# Patient Record
Sex: FEMALE | HISPANIC OR LATINO | Employment: UNEMPLOYED | URBAN - METROPOLITAN AREA
[De-identification: names, ages, dates, MRNs, and addresses within clinical notes are randomized per-mention and may not be internally consistent; named-entity substitution may affect disease eponyms.]

---

## 2017-10-09 ENCOUNTER — HOSPITAL ENCOUNTER (EMERGENCY)
Facility: HOSPITAL | Age: 57
Discharge: HOME | End: 2017-10-10
Attending: EMERGENCY MEDICINE | Admitting: EMERGENCY MEDICINE
Payer: COMMERCIAL

## 2017-10-09 ENCOUNTER — APPOINTMENT (EMERGENCY)
Dept: CT IMAGING | Facility: HOSPITAL | Age: 57
End: 2017-10-09
Payer: COMMERCIAL

## 2017-10-09 VITALS
HEART RATE: 100 BPM | WEIGHT: 169.8 LBS | TEMPERATURE: 97.7 F | RESPIRATION RATE: 16 BRPM | DIASTOLIC BLOOD PRESSURE: 67 MMHG | SYSTOLIC BLOOD PRESSURE: 142 MMHG | OXYGEN SATURATION: 98 %

## 2017-10-09 DIAGNOSIS — L03.317 CELLULITIS, GLUTEAL, LEFT: Primary | ICD-10-CM

## 2017-10-09 LAB
ANION GAP SERPL CALCULATED.3IONS-SCNC: 4 MMOL/L (ref 4–13)
BASOPHILS # BLD AUTO: 0.02 THOUSANDS/ΜL (ref 0–0.1)
BASOPHILS NFR BLD AUTO: 0 % (ref 0–1)
BUN SERPL-MCNC: 12 MG/DL (ref 5–25)
CALCIUM SERPL-MCNC: 9 MG/DL (ref 8.3–10.1)
CHLORIDE SERPL-SCNC: 105 MMOL/L (ref 100–108)
CO2 SERPL-SCNC: 33 MMOL/L (ref 21–32)
CREAT SERPL-MCNC: 0.86 MG/DL (ref 0.6–1.3)
EOSINOPHIL # BLD AUTO: 0.08 THOUSAND/ΜL (ref 0–0.61)
EOSINOPHIL NFR BLD AUTO: 1 % (ref 0–6)
ERYTHROCYTE [DISTWIDTH] IN BLOOD BY AUTOMATED COUNT: 14.2 % (ref 11.6–15.1)
GFR SERPL CREATININE-BSD FRML MDRD: 76 ML/MIN/1.73SQ M
GLUCOSE SERPL-MCNC: 170 MG/DL (ref 65–140)
HCT VFR BLD AUTO: 40.2 % (ref 34.8–46.1)
HGB BLD-MCNC: 13.2 G/DL (ref 11.5–15.4)
LYMPHOCYTES # BLD AUTO: 1.63 THOUSANDS/ΜL (ref 0.6–4.47)
LYMPHOCYTES NFR BLD AUTO: 28 % (ref 14–44)
MCH RBC QN AUTO: 32.6 PG (ref 26.8–34.3)
MCHC RBC AUTO-ENTMCNC: 32.8 G/DL (ref 31.4–37.4)
MCV RBC AUTO: 99 FL (ref 82–98)
MONOCYTES # BLD AUTO: 0.55 THOUSAND/ΜL (ref 0.17–1.22)
MONOCYTES NFR BLD AUTO: 9 % (ref 4–12)
NEUTROPHILS # BLD AUTO: 3.65 THOUSANDS/ΜL (ref 1.85–7.62)
NEUTS SEG NFR BLD AUTO: 62 % (ref 43–75)
NRBC BLD AUTO-RTO: 0 /100 WBCS
PLATELET # BLD AUTO: 234 THOUSANDS/UL (ref 149–390)
PMV BLD AUTO: 10 FL (ref 8.9–12.7)
POTASSIUM SERPL-SCNC: 3.5 MMOL/L (ref 3.5–5.3)
RBC # BLD AUTO: 4.05 MILLION/UL (ref 3.81–5.12)
SODIUM SERPL-SCNC: 142 MMOL/L (ref 136–145)
WBC # BLD AUTO: 5.93 THOUSAND/UL (ref 4.31–10.16)

## 2017-10-09 PROCEDURE — 80048 BASIC METABOLIC PNL TOTAL CA: CPT | Performed by: EMERGENCY MEDICINE

## 2017-10-09 PROCEDURE — 85025 COMPLETE CBC W/AUTO DIFF WBC: CPT | Performed by: EMERGENCY MEDICINE

## 2017-10-09 PROCEDURE — 36415 COLL VENOUS BLD VENIPUNCTURE: CPT | Performed by: EMERGENCY MEDICINE

## 2017-10-09 PROCEDURE — 72193 CT PELVIS W/DYE: CPT

## 2017-10-09 RX ORDER — SULFAMETHOXAZOLE AND TRIMETHOPRIM 800; 160 MG/1; MG/1
1 TABLET ORAL ONCE
Status: COMPLETED | OUTPATIENT
Start: 2017-10-10 | End: 2017-10-10

## 2017-10-09 RX ORDER — SULFAMETHOXAZOLE AND TRIMETHOPRIM 800; 160 MG/1; MG/1
1 TABLET ORAL EVERY 12 HOURS SCHEDULED
Qty: 14 TABLET | Refills: 0 | Status: SHIPPED | OUTPATIENT
Start: 2017-10-09 | End: 2017-10-16

## 2017-10-09 RX ADMIN — IOHEXOL 100 ML: 350 INJECTION, SOLUTION INTRAVENOUS at 22:34

## 2017-10-10 PROCEDURE — 99284 EMERGENCY DEPT VISIT MOD MDM: CPT

## 2017-10-10 RX ADMIN — SULFAMETHOXAZOLE AND TRIMETHOPRIM 1 TABLET: 800; 160 TABLET ORAL at 00:07

## 2017-10-10 NOTE — ED PROVIDER NOTES
History  Chief Complaint   Patient presents with    Perineal Abscess     pt reports abscess to this area, states noted pus and blood this morning, hx of same in [place] and had drain placed  denies fevers  pt does not want mother to know that she is HIV positive and would not like this mentioned in front of her in treatment area     71-year-old female presents for evaluation of a perianal abscess which began spontaneously draining today  She has had discomfort in the region on and off for some time  She has had 2 of them in that region  She had 1 drained in [place] in the recent past and that 1 resolved  The patient denies any associated fevers, chills  She has pain with bowel movements and wiping  Nothing she has done has made it better  She notes that she has blood and pus like drainage from the wound        History provided by:  Patient  Perineal Abscess   Location:  Pelvis  Pelvic abscess location:  L buttock  Abscess quality: draining    Red streaking: no    Progression:  Worsening  Chronicity:  Recurrent  Context: immunosuppression    Relieved by:  Nothing  Worsened by:  Nothing  Ineffective treatments:  None tried  Associated symptoms: no fatigue, no fever, no headaches, no nausea and no vomiting        None       Past Medical History:   Diagnosis Date    Arthritis     [facility]     Fibromyalgia     HIV (human immunodeficiency virus infection) ([facility] )     Hyperlipidemia        History reviewed  No pertinent surgical history  History reviewed  No pertinent family history  I have reviewed and agree with the history as documented  Social History   Substance Use Topics    Smoking status: Current Every Day Smoker     Types: Cigarettes    Smokeless tobacco: Never Used    Alcohol use No        Review of Systems   Constitutional: Negative for chills, fatigue and fever  HENT: Negative for sore throat  Eyes: Negative for visual disturbance     Respiratory: Negative for shortness of breath  Cardiovascular: Negative for chest pain  Gastrointestinal: Positive for rectal pain  Negative for abdominal pain, diarrhea, nausea and vomiting  Genitourinary: Negative for difficulty urinating, dysuria and pelvic pain  Musculoskeletal: Negative for back pain  Skin: Positive for wound (left buttock)  Negative for rash  Neurological: Negative for syncope, weakness and headaches  All other systems reviewed and are negative  Physical Exam  ED Triage Vitals [10/09/17 1735]   Temperature Pulse Respirations Blood Pressure SpO2   97 7 °F (36 5 °C) 100 16 142/67 98 %      Temp Source Heart Rate Source Patient Position - Orthostatic VS BP Location FiO2 (%)   Temporal -- -- -- --      Pain Score       7           Physical Exam   Constitutional: She is oriented to person, place, and time  She appears well-developed and well-nourished  No distress  HENT:   Head: Normocephalic and atraumatic  Right Ear: External ear normal    Left Ear: External ear normal    Eyes: Conjunctivae and EOM are normal  Pupils are equal, round, and reactive to light  No scleral icterus  Neck: Normal range of motion  Cardiovascular: Normal rate, regular rhythm and normal heart sounds  Pulmonary/Chest: Effort normal and breath sounds normal  No respiratory distress  Abdominal: Soft  Bowel sounds are normal  There is no tenderness  There is no rebound and no guarding  Musculoskeletal: Normal range of motion  She exhibits no edema  Neurological: She is alert and oriented to person, place, and time  Skin: Skin is warm and dry  No rash noted  Draining perianal abscess with tenderness  No fluctuance appreciated  There is mild localized induration at the spontaneously draining wound site   Psychiatric: She has a normal mood and affect  Nursing note and vitals reviewed        ED Medications  Medications   sulfamethoxazole-trimethoprim (BACTRIM DS) 800-160 mg per tablet 1 tablet (not administered)   iohexol (OMNIPAQUE) 350 MG/ML injection (MULTI-DOSE) 100 mL (100 mL Intravenous Given 10/9/17 4185)       Diagnostic Studies  Labs Reviewed   CBC AND DIFFERENTIAL - Abnormal        Result Value Ref Range Status    MCV 99 (*) 82 - 98 fL Final    WBC 5 93  4 31 - 10 16 Thousand/uL Final    RBC 4 05  3 81 - 5 12 Million/uL Final    Hemoglobin 13 2  11 5 - 15 4 g/dL Final    Hematocrit 40 2  34 8 - 46 1 % Final    MCH 32 6  26 8 - 34 3 pg Final    MCHC 32 8  31 4 - 37 4 g/dL Final    RDW 14 2  11 6 - 15 1 % Final    MPV 10 0  8 9 - 12 7 fL Final    Platelets 273  404 - 390 Thousands/uL Final    nRBC 0  /100 WBCs Final    Neutrophils Relative 62  43 - 75 % Final    Lymphocytes Relative 28  14 - 44 % Final    Monocytes Relative 9  4 - 12 % Final    Eosinophils Relative 1  0 - 6 % Final    Basophils Relative 0  0 - 1 % Final    Neutrophils Absolute 3 65  1 85 - 7 62 Thousands/µL Final    Lymphocytes Absolute 1 63  0 60 - 4 47 Thousands/µL Final    Monocytes Absolute 0 55  0 17 - 1 22 Thousand/µL Final    Eosinophils Absolute 0 08  0 00 - 0 61 Thousand/µL Final    Basophils Absolute 0 02  0 00 - 0 10 Thousands/µL Final   BASIC METABOLIC PANEL - Abnormal     CO2 33 (*) 21 - 32 mmol/L Final    Glucose 170 (*) 65 - 140 mg/dL Final    Comment:   If the patient is fasting, the ADA then defines impaired fasting glucose as > 100 mg/dL and diabetes as > or equal to 123 mg/dL  Specimen collection should occur prior to Sulfasalazine administration due to the potential for falsely depressed results  Specimen collection should occur prior to Sulfapyridine administration due to the potential for falsely elevated results      Sodium 142  136 - 145 mmol/L Final    Potassium 3 5  3 5 - 5 3 mmol/L Final    Chloride 105  100 - 108 mmol/L Final    Anion Gap 4  4 - 13 mmol/L Final    BUN 12  5 - 25 mg/dL Final    Creatinine 0 86  0 60 - 1 30 mg/dL Final    Comment: Standardized to IDMS reference method    Calcium 9 0  8 3 - 10 1 mg/dL Final    eGFR 76 ml/min/1 73sq m Final    Narrative:     National Kidney Disease Education Program recommendations are as follows:  GFR calculation is accurate only with a steady state creatinine  Chronic Kidney disease less than 60 ml/min/1 73 sq  meters  Kidney failure less than 15 ml/min/1 73 sq  meters  CT pelvis w contrast   ED Interpretation   Vrad:Cutaneous thickening in the intergluteal fold posterior to   the level of the anus with subcutaneous   fatty stranding compatible with inflammation  There is no   discrete fluid collection to suggest abscess   however  Thank you for allowing us to participate in the care of your   patient  Procedures  Procedures      Phone Contacts  ED Phone Contact    ED Course  ED Course                                MDM  Number of Diagnoses or Management Options  Cellulitis, gluteal, left: new and requires workup  Diagnosis management comments: Differential diagnosis:  Superficial wound, abscess, deep space infection, cellulitis  The plan is to check laboratories and obtain a CT of the pelvis to rule out extending deep space abscess or infection  The CT results reviewed and discussed with the patient  The plan is for oral antibiotics and discharge  The patient (and any family present) verbalized understanding of the discharge instructions and warnings that would necessitate return to the Emergency Department  All questions were answered prior to discharge  Amount and/or Complexity of Data Reviewed  Clinical lab tests: ordered and reviewed  Tests in the radiology section of CPT®: ordered and reviewed  Independent visualization of images, tracings, or specimens: yes      CritCare Time    Disposition  Final diagnoses:   Cellulitis, gluteal, left     ED Disposition     ED Disposition Condition Comment    Discharge  [facility] discharge to home/self care      Condition at discharge: Stable        Follow-up Information     Follow up With Specialties Details Why Contact Info Additional [address] [facility] Schedule an appointment as soon as possible for a visit in 2 days For further evaluation, if not improved [facility] [id number] [address] [facility] [address] Emergency Department Emergency Medicine Go to For further evaluation, If symptoms worsen [address] [facility] ED, [address] [place], [place], [facility]        Patient's Medications   Discharge Prescriptions    SULFAMETHOXAZOLE-TRIMETHOPRIM (BACTRIM DS) 800-160 MG PER TABLET    Take 1 tablet by mouth every 12 (twelve) hours for 7 days       Start Date: 10/9/2017 End Date: 10/16/2017       Order Dose: 1 tablet       Quantity: 14 tablet    Refills: 0     No discharge procedures on file      ED Provider  Electronically Signed by       Nat Villafuerte DO  10/10/17 0871

## 2017-10-10 NOTE — DISCHARGE INSTRUCTIONS
Celulitis   LO QUE NECESITA SABER:   La celulitis es ricky infección en la piel causada por bacteria  La celulitis podría desaparecer por sí gay o puede que necesite tratamiento  Arenas médico puede dibujar un círculo alrededor de los bordes externos de arenas celulitis  Si la celulitis se extiende, arenas médico verá que se salió del círculo  INSTRUCCIONES SOBRE EL SANTO HOSPITALARIA:   Llame al 911 si presenta:   · Tiene dolor en el pecho o dificultad repentina para respirar  Regrese a la kristine de emergencias si:   · Arenas herida se engrandece o tiene más dolor  · Usted siente sonidos crepitantes bajo la piel al tocarla  · Usted tiene puntos o protuberancias color deepika en arenas piel o nota ara debajo arenas piel  · Usted tiene ricky nueva inflamación o dolor en john piernas  · [place] [employer] enrojecidas, cálidas e inflamadas se [address]  · Usted ve líneas jack saliendo del área infectada  Pregúntele a arenas [place] vitaminas y minerales son adecuados para usted  · Usted tiene fiebre  · Arenas fiebre o dolor no desaparecen o empeoran  · El área no se reduce después de 2 días de uso de antibióticos  · Arenas piel se escama o se pela  · Usted tiene preguntas o inquietudes acerca de arenas condición o cuidado  Medicamentos:   · Antibióticos  sirven para tratar la infección bacterial      · AINEs (Analgésicos antiinflamatorios no esteroides) sarah el ibuprofeno, ayudan a disminuir la inflamación, el dolor y la fiebre  Los AINEs pueden causar sangrado estomacal o problemas renales en ciertas personas  Si usted esta tomando un anticoágulante,  siempre  pregunte si los AINEs son seguros para usted  Siempre mahogany la etiqueta de nicki medicamento y [place] instrucciones  No administre nicki medicamento a niños menores de 6 meses de beth sin antes obtener la autorización de arenas médico      · Acetaminofeno:  sharron el dolor y baja la fiebre  Está disponible sin receta médica   Pregunte la cantidad y la frecuencia con que debe tomarlos  [address]  Erum las etiquetas de todos los demás medicamentos que esté usando para saber si también contienen acetaminofén, o pregunte a arensa médico o farmacéutico  El acetaminofén puede causar daño en el hígado cuando no se merly de forma correcta  No use más de 4 gramos (4000 miligramos) en total de acetaminofeno en un día  · [place] john medicamentos sarah se le haya indicado  Consulte con arenas médico si usted nelia que arenas medicamento no le está ayudando o si presenta efectos secundarios  Infórmele si es alérgico a cualquier medicamento  Mantenga ricky lista actualizada de los [place], las vitaminas y los productos herbales que merly  Incluya los siguientes datos de los medicamentos: cantidad, frecuencia y motivo de administración  Traiga con usted la lista o los envases de la píldoras a john citas de seguimiento  Lleve la lista de los medicamentos con usted en lulu de ricky emergencia  Cuidados personales:   · Eleve el área por encima del nivel de arenas corazón   con la frecuencia posible  [place] va a disminuir inflamación y el dolor  Coloque el área sobre almohadas o sábanas para tratar de mantenerla elevada cómodamente  · Limpie la sobia diariamente hasta que se forme ricky costra sobre la herida  Rhae Mat y agua  Séquela con palmaditas  Use apósitos sarah se le haya indicado  · Coloque paños húmedos fríos o calientes en la sobia sarah se le haya indicado  Use paños limpios y agua limpia  Déjelos en el área hasta que el paño llegue a temperatura ambiente  Seque el área con palmaditas con un paño limpio y seco  Josie [place]arrow ayudar a disminuir el dolor  Evite la celulitis:   · No se rasque picaduras de insectos o áreas lesionadas  Rascarse estas áreas aumenta arenas riesgo de tener celulitis  · No comparta los artículos de [address] [place] [address] personal, sarah toallas, ropa, o navajas de afeitar       · Limpie los equipos de ejercicio  con un detergente desinfectante antes y después de utilizarlo  · Lávese las juventino frecuentemente  Utilice agua y Luis  [employer] las juventino después de usar el baño, cambiarle el pañal a un owen o estornudar  Lávese las juventino antes de comer o preparar alimentos  Use ricky loción para evitar que la piel se reseque o se agriete  · Use medias de compresión sarah se le indique  Es posible que le recomienden usar las medias si tiene edema periférico  Las medias mejoran el flujo sanguíneo y [address]  · Trate el pie de atleta  [place] puede ayudar a prevenir la propagación de ricky infección bacteriana en la piel  Acuda a john consultas de control con arenas médico dentro de 3 días o según le indicaron:  Arenas médico comprobará si la celulitis está mejorando  Es posible que necesite un medicamento diferente  Anote john preguntas para que se acuerde de hacerlas tyler john visitas  © 2017 [facility] Information is for End User's use only and may not be sold, redistributed or otherwise used for commercial purposes  All illustrations and images included in CareNotes® are the copyrighted property of A D A M , Inc  or [employer]  Esta información es sólo para uso en educación  Arenas intención no es darle un consejo médico sobre enfermedades o tratamientos  Colsulte con arenas Benuel Jacobson farmacéutico antes de seguir cualquier régimen médico para saber si es seguro y efectivo para usted

## 2017-11-10 ENCOUNTER — LAB (OUTPATIENT)
Dept: LAB | Facility: CLINIC | Age: 57
End: 2017-11-10
Payer: COMMERCIAL

## 2017-11-10 DIAGNOSIS — B20 HUMAN IMMUNODEFICIENCY VIRUS I INFECTION (HCC): Primary | ICD-10-CM

## 2017-11-10 LAB
BACTERIA UR QL AUTO: ABNORMAL /HPF
BASOPHILS # BLD AUTO: 0.02 THOUSANDS/ΜL (ref 0–0.1)
BASOPHILS NFR BLD AUTO: 0 % (ref 0–1)
BILIRUB UR QL STRIP: NEGATIVE
CHLAMYDIA DNA CVX QL NAA+PROBE: NORMAL
CHOLEST SERPL-MCNC: 164 MG/DL (ref 50–200)
CLARITY UR: ABNORMAL
COLOR UR: YELLOW
EOSINOPHIL # BLD AUTO: 0.14 THOUSAND/ΜL (ref 0–0.61)
EOSINOPHIL NFR BLD AUTO: 2 % (ref 0–6)
ERYTHROCYTE [DISTWIDTH] IN BLOOD BY AUTOMATED COUNT: 13.9 % (ref 11.6–15.1)
EST. AVERAGE GLUCOSE BLD GHB EST-MCNC: 134 MG/DL
GLUCOSE UR STRIP-MCNC: NEGATIVE MG/DL
HBA1C MFR BLD: 6.3 % (ref 4.2–6.3)
HCT VFR BLD AUTO: 42.8 % (ref 34.8–46.1)
HDLC SERPL-MCNC: 51 MG/DL (ref 40–60)
HGB BLD-MCNC: 14 G/DL (ref 11.5–15.4)
HGB UR QL STRIP.AUTO: NEGATIVE
KETONES UR STRIP-MCNC: NEGATIVE MG/DL
LDLC SERPL CALC-MCNC: 81 MG/DL (ref 0–100)
LEUKOCYTE ESTERASE UR QL STRIP: NEGATIVE
LYMPHOCYTES # BLD AUTO: 1.37 THOUSANDS/ΜL (ref 0.6–4.47)
LYMPHOCYTES NFR BLD AUTO: 22 % (ref 14–44)
MCH RBC QN AUTO: 32.5 PG (ref 26.8–34.3)
MCHC RBC AUTO-ENTMCNC: 32.7 G/DL (ref 31.4–37.4)
MCV RBC AUTO: 99 FL (ref 82–98)
MONOCYTES # BLD AUTO: 0.38 THOUSAND/ΜL (ref 0.17–1.22)
MONOCYTES NFR BLD AUTO: 6 % (ref 4–12)
N GONORRHOEA DNA GENITAL QL NAA+PROBE: NORMAL
NEUTROPHILS # BLD AUTO: 4.26 THOUSANDS/ΜL (ref 1.85–7.62)
NEUTS SEG NFR BLD AUTO: 70 % (ref 43–75)
NITRITE UR QL STRIP: NEGATIVE
NON-SQ EPI CELLS URNS QL MICRO: ABNORMAL /HPF
NRBC BLD AUTO-RTO: 0 /100 WBCS
OTHER STN SPEC: ABNORMAL
PH UR STRIP.AUTO: 5.5 [PH] (ref 4.5–8)
PLATELET # BLD AUTO: 278 THOUSANDS/UL (ref 149–390)
PMV BLD AUTO: 10.9 FL (ref 8.9–12.7)
PROT UR STRIP-MCNC: NEGATIVE MG/DL
RBC # BLD AUTO: 4.31 MILLION/UL (ref 3.81–5.12)
RBC #/AREA URNS AUTO: ABNORMAL /HPF
SP GR UR STRIP.AUTO: 1.02 (ref 1–1.03)
TRIGL SERPL-MCNC: 158 MG/DL
UROBILINOGEN UR QL STRIP.AUTO: 0.2 E.U./DL
WBC # BLD AUTO: 6.18 THOUSAND/UL (ref 4.31–10.16)
WBC #/AREA URNS AUTO: ABNORMAL /HPF

## 2017-11-10 PROCEDURE — 87901 NFCT AGT GNTYP ALYS HIV1 REV: CPT

## 2017-11-10 PROCEDURE — 80061 LIPID PANEL: CPT

## 2017-11-10 PROCEDURE — 86701 HIV-1ANTIBODY: CPT

## 2017-11-10 PROCEDURE — 86645 CMV ANTIBODY IGM: CPT

## 2017-11-10 PROCEDURE — 86702 HIV-2 ANTIBODY: CPT

## 2017-11-10 PROCEDURE — 86480 TB TEST CELL IMMUN MEASURE: CPT

## 2017-11-10 PROCEDURE — 86777 TOXOPLASMA ANTIBODY: CPT

## 2017-11-10 PROCEDURE — 86706 HEP B SURFACE ANTIBODY: CPT

## 2017-11-10 PROCEDURE — 87340 HEPATITIS B SURFACE AG IA: CPT

## 2017-11-10 PROCEDURE — 85025 COMPLETE CBC W/AUTO DIFF WBC: CPT

## 2017-11-10 PROCEDURE — 36415 COLL VENOUS BLD VENIPUNCTURE: CPT

## 2017-11-10 PROCEDURE — 83036 HEMOGLOBIN GLYCOSYLATED A1C: CPT

## 2017-11-10 PROCEDURE — 86778 TOXOPLASMA ANTIBODY IGM: CPT

## 2017-11-10 PROCEDURE — 81001 URINALYSIS AUTO W/SCOPE: CPT

## 2017-11-10 PROCEDURE — 87491 CHLMYD TRACH DNA AMP PROBE: CPT

## 2017-11-10 PROCEDURE — 86708 HEPATITIS A ANTIBODY: CPT

## 2017-11-10 PROCEDURE — 86361 T CELL ABSOLUTE COUNT: CPT

## 2017-11-10 PROCEDURE — 87591 N.GONORRHOEAE DNA AMP PROB: CPT

## 2017-11-10 PROCEDURE — 87900 PHENOTYPE INFECT AGENT DRUG: CPT

## 2017-11-10 PROCEDURE — 86592 SYPHILIS TEST NON-TREP QUAL: CPT

## 2017-11-10 PROCEDURE — 86803 HEPATITIS C AB TEST: CPT

## 2017-11-10 PROCEDURE — 81381 HLA I TYPING 1 ALLELE HR: CPT

## 2017-11-10 PROCEDURE — 87536 HIV-1 QUANT&REVRSE TRNSCRPJ: CPT

## 2017-11-10 PROCEDURE — 86644 CMV ANTIBODY: CPT

## 2017-11-10 PROCEDURE — 87389 HIV-1 AG W/HIV-1&-2 AB AG IA: CPT

## 2017-11-11 LAB
BASOPHILS # BLD AUTO: 0 X10E3/UL (ref 0–0.2)
BASOPHILS NFR BLD AUTO: 0 %
CD3+CD4+ CELLS # BLD: 182 /UL (ref 359–1519)
CD3+CD4+ CELLS NFR BLD: 15.2 % (ref 30.8–58.5)
CLINICAL COMMENT: NORMAL
CMV IGG SERPL IA-ACNC: >10 U/ML (ref 0–0.59)
CMV IGM SERPL IA-ACNC: <30 AU/ML (ref 0–29.9)
EOSINOPHIL # BLD AUTO: 0.1 X10E3/UL (ref 0–0.4)
EOSINOPHIL NFR BLD AUTO: 2 %
ERYTHROCYTE [DISTWIDTH] IN BLOOD BY AUTOMATED COUNT: 14.1 % (ref 12.3–15.4)
HAV AB SER QL IA: REACTIVE
HBV SURFACE AB SER-ACNC: 3.65 MIU/ML
HBV SURFACE AG SER QL: NORMAL
HCT VFR BLD AUTO: 41.3 % (ref 34–46.6)
HCV AB SER QL: NORMAL
HGB BLD-MCNC: 13.9 G/DL (ref 11.1–15.9)
IMM GRANULOCYTES # BLD: 0 X10E3/UL (ref 0–0.1)
IMM GRANULOCYTES NFR BLD: 0 %
LYMPHOCYTES # BLD AUTO: 1.2 X10E3/UL (ref 0.7–3.1)
LYMPHOCYTES NFR BLD AUTO: 21 %
MCH RBC QN AUTO: 32 PG (ref 26.6–33)
MCHC RBC AUTO-ENTMCNC: 33.7 G/DL (ref 31.5–35.7)
MCV RBC AUTO: 95 FL (ref 79–97)
MONOCYTES # BLD AUTO: 0.4 X10E3/UL (ref 0.1–0.9)
MONOCYTES NFR BLD AUTO: 7 %
NEUTROPHILS # BLD AUTO: 3.7 X10E3/UL (ref 1.4–7)
NEUTROPHILS NFR BLD AUTO: 70 %
PLATELET # BLD AUTO: 290 X10E3/UL (ref 150–379)
RBC # BLD AUTO: 4.34 X10E6/UL (ref 3.77–5.28)
T GONDII IGG SERPL IA-ACNC: <3 IU/ML (ref 0–7.1)
T GONDII IGM SER IA-ACNC: <3 AU/ML (ref 0–7.9)
WBC # BLD AUTO: 5.4 X10E3/UL (ref 3.4–10.8)

## 2017-11-12 LAB
ANNOTATION COMMENT IMP: NORMAL
GAMMA INTERFERON BACKGROUND BLD IA-ACNC: 0.07 IU/ML
M TB IFN-G BLD-IMP: NEGATIVE
M TB IFN-G CD4+ BCKGRND COR BLD-ACNC: <0.01 IU/ML
M TB IFN-G CD4+ T-CELLS BLD-ACNC: 0.06 IU/ML
MITOGEN IGNF BLD-ACNC: 1.64 IU/ML
QUANTIFERON-TB GOLD IN TUBE: NORMAL
SERVICE CMNT-IMP: NORMAL

## 2017-11-13 LAB — RPR SER QL: NORMAL

## 2017-11-14 LAB
HIV1 RNA # SERPL NAA+PROBE: <20 COPIES/ML
HIV1 RNA SERPL NAA+PROBE-LOG#: NORMAL LOG10COPY/ML

## 2017-11-15 LAB — HIV 1+2 AB+HIV1 P24 AG SERPL QL IA: REACTIVE

## 2017-11-16 LAB
HIV 2 AB SERPL QL IA: NEGATIVE
HIV1 AB SERPL QL IA: POSITIVE
LABORATORY COMMENT REPORT: NORMAL
SL AMB NOTE:: ABNORMAL

## 2017-11-17 ENCOUNTER — ALLSCRIPTS OFFICE VISIT (OUTPATIENT)
Dept: OTHER | Facility: CLINIC | Age: 57
End: 2017-11-17

## 2017-11-17 ENCOUNTER — APPOINTMENT (OUTPATIENT)
Dept: LAB | Facility: HOSPITAL | Age: 57
End: 2017-11-17
Payer: COMMERCIAL

## 2017-11-17 DIAGNOSIS — B20 HUMAN IMMUNODEFICIENCY VIRUS (HIV) DISEASE (HCC): ICD-10-CM

## 2017-11-17 DIAGNOSIS — K61.1 RECTAL ABSCESS: ICD-10-CM

## 2017-11-17 DIAGNOSIS — H53.8 OTHER VISUAL DISTURBANCES (CODE): ICD-10-CM

## 2017-11-17 LAB — HLA-B*57:01 SPEC QL: NEGATIVE

## 2017-11-17 PROCEDURE — 87205 SMEAR GRAM STAIN: CPT

## 2017-11-17 PROCEDURE — 87070 CULTURE OTHR SPECIMN AEROBIC: CPT

## 2017-11-20 LAB
BACTERIA WND AEROBE CULT: NORMAL
GRAM STN SPEC: NORMAL
MISCELLANEOUS LAB TEST RESULT: NORMAL

## 2017-11-27 LAB — HIV GENTYP ISLT NAR: NORMAL

## 2017-12-08 ENCOUNTER — GENERIC CONVERSION - ENCOUNTER (OUTPATIENT)
Dept: OTHER | Facility: OTHER | Age: 57
End: 2017-12-08

## 2017-12-11 ENCOUNTER — GENERIC CONVERSION - ENCOUNTER (OUTPATIENT)
Dept: SURGERY | Facility: CLINIC | Age: 57
End: 2017-12-11

## 2017-12-28 ENCOUNTER — APPOINTMENT (OUTPATIENT)
Dept: LAB | Facility: CLINIC | Age: 57
End: 2017-12-28
Payer: COMMERCIAL

## 2017-12-28 DIAGNOSIS — M19.90 ARTHRITIS: ICD-10-CM

## 2017-12-28 DIAGNOSIS — M79.642 LEFT HAND PAIN: ICD-10-CM

## 2017-12-28 DIAGNOSIS — B20 HUMAN IMMUNODEFICIENCY VIRUS (HIV) DISEASE (HCC): ICD-10-CM

## 2017-12-28 DIAGNOSIS — M25.50 ARTHRALGIA, UNSPECIFIED JOINT: Primary | ICD-10-CM

## 2017-12-28 LAB
25(OH)D3 SERPL-MCNC: 10.9 NG/ML (ref 30–100)
ALBUMIN SERPL BCP-MCNC: 3.7 G/DL (ref 3.5–5)
ALP SERPL-CCNC: 102 U/L (ref 46–116)
ALT SERPL W P-5'-P-CCNC: 26 U/L (ref 12–78)
ANION GAP SERPL CALCULATED.3IONS-SCNC: 10 MMOL/L (ref 4–13)
AST SERPL W P-5'-P-CCNC: 14 U/L (ref 5–45)
BILIRUB SERPL-MCNC: 0.45 MG/DL (ref 0.2–1)
BUN SERPL-MCNC: 14 MG/DL (ref 5–25)
CALCIUM SERPL-MCNC: 9.1 MG/DL (ref 8.3–10.1)
CHLORIDE SERPL-SCNC: 104 MMOL/L (ref 100–108)
CK SERPL-CCNC: 78 U/L (ref 26–192)
CO2 SERPL-SCNC: 23 MMOL/L (ref 21–32)
CREAT SERPL-MCNC: 0.65 MG/DL (ref 0.6–1.3)
CRP SERPL QL: <3 MG/L
GFR SERPL CREATININE-BSD FRML MDRD: 100 ML/MIN/1.73SQ M
GLUCOSE P FAST SERPL-MCNC: 162 MG/DL (ref 65–99)
POTASSIUM SERPL-SCNC: 3.7 MMOL/L (ref 3.5–5.3)
PROT SERPL-MCNC: 8.7 G/DL (ref 6.4–8.2)
SODIUM SERPL-SCNC: 137 MMOL/L (ref 136–145)
TSH SERPL DL<=0.05 MIU/L-ACNC: 0.9 UIU/ML (ref 0.36–3.74)

## 2017-12-28 PROCEDURE — 82306 VITAMIN D 25 HYDROXY: CPT

## 2017-12-28 PROCEDURE — 82550 ASSAY OF CK (CPK): CPT

## 2017-12-28 PROCEDURE — 86618 LYME DISEASE ANTIBODY: CPT

## 2017-12-28 PROCEDURE — 86200 CCP ANTIBODY: CPT

## 2017-12-28 PROCEDURE — 36415 COLL VENOUS BLD VENIPUNCTURE: CPT

## 2017-12-28 PROCEDURE — 80053 COMPREHEN METABOLIC PANEL: CPT

## 2017-12-28 PROCEDURE — 84443 ASSAY THYROID STIM HORMONE: CPT

## 2017-12-28 PROCEDURE — 86430 RHEUMATOID FACTOR TEST QUAL: CPT

## 2017-12-28 PROCEDURE — 86140 C-REACTIVE PROTEIN: CPT

## 2017-12-28 PROCEDURE — 86038 ANTINUCLEAR ANTIBODIES: CPT

## 2017-12-28 PROCEDURE — 86431 RHEUMATOID FACTOR QUANT: CPT

## 2017-12-29 LAB
B BURGDOR IGG SER IA-ACNC: 0.06
B BURGDOR IGM SER IA-ACNC: 0.18
CRYOGLOB RF SER-ACNC: ABNORMAL [IU]/ML
RHEUMATOID FACT SER QL LA: POSITIVE
RYE IGE QN: NEGATIVE

## 2017-12-30 LAB — CCP IGA+IGG SERPL IA-ACNC: 9 UNITS (ref 0–19)

## 2018-01-03 ENCOUNTER — ALLSCRIPTS OFFICE VISIT (OUTPATIENT)
Dept: OTHER | Facility: CLINIC | Age: 58
End: 2018-01-03

## 2018-01-03 ENCOUNTER — GENERIC CONVERSION - ENCOUNTER (OUTPATIENT)
Dept: OTHER | Facility: OTHER | Age: 58
End: 2018-01-03

## 2018-01-10 ENCOUNTER — APPOINTMENT (OUTPATIENT)
Dept: LAB | Facility: HOSPITAL | Age: 58
End: 2018-01-10
Payer: COMMERCIAL

## 2018-01-10 ENCOUNTER — TRANSCRIBE ORDERS (OUTPATIENT)
Dept: ADMINISTRATIVE | Facility: HOSPITAL | Age: 58
End: 2018-01-10

## 2018-01-10 DIAGNOSIS — Z79.899 NEED FOR PROPHYLACTIC CHEMOTHERAPY: Primary | ICD-10-CM

## 2018-01-10 DIAGNOSIS — M06.9 RHEUMATOID ARTHRITIS INVOLVING MULTIPLE SITES, UNSPECIFIED RHEUMATOID FACTOR PRESENCE: ICD-10-CM

## 2018-01-10 DIAGNOSIS — Z79.899 NEED FOR PROPHYLACTIC CHEMOTHERAPY: ICD-10-CM

## 2018-01-10 PROCEDURE — 36415 COLL VENOUS BLD VENIPUNCTURE: CPT

## 2018-01-10 PROCEDURE — 82955 ASSAY OF G6PD ENZYME: CPT

## 2018-01-12 LAB
G6PD BLD QN: 226 U/10E12 RBC (ref 146–376)
RBC # BLD AUTO: 4.49 X10E6/UL (ref 3.77–5.28)

## 2018-01-13 VITALS
HEIGHT: 64 IN | TEMPERATURE: 98.3 F | WEIGHT: 165.34 LBS | OXYGEN SATURATION: 98 % | SYSTOLIC BLOOD PRESSURE: 112 MMHG | BODY MASS INDEX: 28.23 KG/M2 | HEART RATE: 87 BPM | DIASTOLIC BLOOD PRESSURE: 70 MMHG

## 2018-01-17 NOTE — RESULT NOTES
Verified Results  (1) WOUND CULTURE [id number] 07:28PM Beck Artis   TW Order Number: [id number]     Test Name Result Flag Reference   CLINICAL REPORT (Report)     Test:        Wound culture and Gram stain  Specimen Type: Wound  Specimen Date:   11/17/2017 7:28 PM  Result Date:    11/20/2017 9:42 AM  Result Status:   Final result  Resulting Lab:   [facility] [address] [address]            Tel: [phone]      CULTURE                                       ------------------                                   Few Colonies of      *** Mixed Skin Hilary ***      STAIN                                        ------------------                                   1+ Polys    1+ Gram negative rods    1+ Gram positive cocci in pairs       Plan  Diabetes mellitus, type 2    · MetFORMIN HCl - 500 MG Oral Tablet; take 1 tablet by mouth twice a day  Facial rash    · *1 -  CLINIC DERMATOLOGY Co-Management  *  Status: Hold For - Scheduling   Requested for: 94WQX2079  Care Summary provided  : Yes   · Dermatology Referral Other Co-Management  *  Status: Hold For - Scheduling   Requested for: 60DMW1379  are Referring to a non- Preferred Provider : Scheduling access issues  Care Summary provided  : Yes  Fibromyalgia    · Gabapentin 800 MG Oral Tablet; TAKE 1 TABLET 3 TIMES DAILY  Fungal rash of torso    · Clotrimazole 1 % External Cream; Apply to affected skin twice daily  Health Maintenance    · Follow-up visit in 3 months Evaluation and Treatment  Follow-up  Status: Complete   Done: 25EWB3389   · Ophthalmology Follow Up Evaluation and Treatment  Follow-up  Status: Hold For -  Scheduling  Requested for: 29KQT4810  High cholesterol    · Atorvastatin Calcium 40 MG Oral Tablet; TAKE 1 TABLET DAILY AS DIRECTED  HIV disease    · *1 - [facility] BEHAVIORAL HEALTH CONSULTANT Co-Management  *  Status: Hold For -  Scheduling  Requested for: 66IPC2667  Care Summary provided   Sandro Ramierz Yes   · 1 - Ivan Cabrera RD (Registered Dietitian) Co-Management  *  Status: Hold For -  Scheduling  Requested for: 59WRP4430  Care Summary provided  : Yes   · 1 - Pina Melendez MD  (Infectious Disease) Co-Management  HIV care  Status: Hold For -  Scheduling  Requested for: 61LFK1544  Care Summary provided  : Yes   · (1) COMPREHENSIVE METABOLIC PANEL; Status:Active; Requested for:20Juq0629;    · Flulaval Quadrivalent Intramuscular Suspension; INJECT 0 5  ML  Intramuscular;  To Be Done: 77YRU5686  PMH: Blurry vision, bilateral, Rectal abscess    · (1) WOUND CULTURE; Status:Complete;   Done: 17DDM8571 07:28PM  Poor dentition    · *1 -  DENTAL CLINIC Co-Management  *  Status: Active  Requested for: 04NFG8223  Care Summary provided  : Yes  Rectal abscess    · Doxycycline Hyclate 100 MG Oral Capsule; TAKE 1 CAPSULE EVERY 12 HOURS  DAILY

## 2018-01-23 VITALS
HEART RATE: 85 BPM | WEIGHT: 171.52 LBS | SYSTOLIC BLOOD PRESSURE: 122 MMHG | BODY MASS INDEX: 29.44 KG/M2 | OXYGEN SATURATION: 99 % | TEMPERATURE: 98 F | DIASTOLIC BLOOD PRESSURE: 78 MMHG

## 2018-01-23 NOTE — PROGRESS NOTES
Assessment    1  Diabetes mellitus, type 2 (250 00) (E11 9)   2  HIV disease (042) (B20)    Discussion/Summary    Intervention Diet Prescription   Energy 1560 kcal   26kcal /60kg   Protein 70 - 75g   1 2 - 1 3g /60kg   Fluid 1560ml   26ml /60kg Adjusted body weight  Snack/Supplement Recommendations: cut back on sweets, instead choose a bowl of cereal/milk, s/w or leftovers Estimated Intake: 1800kcal 75g Protein   Her current intake is meeting estimated nutrition needs  Nutrition Recommendations:  try not to skip meals  Goal #1 - Improve/Maintain  weight and comprehend education  Goal initiated  Time Frame For Accomplishment: By next follow-up  Reviewed basic diabetes education w/pt  Offered info  about DSMt classes and next DM Support group  Pt  not very interested  Intervention Nutrition Education No interest    Person Educated: patient   Topics Discussed: Lab results and T2DM   Barriers To Learning: none  Readiness to Learn: Denied Need  Teaching Method: verbal   Evaluation Of Learning: verbalized/demonstrated understanding   Offered written ed  materiials, pt  declined  History of Present Illness  Assessment: Clinical Data/Client History HIV - Yes  Her socio-economic status includes  cooks  She lives in [facility] [place] [place] apartment  Living Environment - She has access to refrigerator, stove and microwave  Her functional status is  ambulatory, prepares own meals and & other family members who she lives with  Her activity level is  normal    She eats breakfast at  Coffee, donut or "cookies/ crackers"  She eats lunch at  Often skips lunch  She eats dinner at  [facility] & beans, baked meat (no salad or vegetables, pt  states she doesn't like green veggies)  She snacks Admits she loves sweets, but tries not to eat every day  Her appetite is  good   She does not take supplements  She has problems with  pt  has an appointment with our dental clinic, no major mouth pain or oral health problems stated  Her  is Meseret Sapp  Nutrition Diagnosis   Problem related to comprehend education  As evidenced by  knowledge deficit  Signs/Symptoms:  pt  w/ T2DM  Pt  states she checks her BGs daily, and they usually are ~ 100  Good appetite & po intake reported  Current BMI 29 4  Active Problems    1  AIDS (042) (B20)   2  Depression (311) (F32 9)   3  Diabetes mellitus, type 2 (250 00) (E11 9)   4  Facial rash (782 1) (R21)   5  Fibromyalgia (729 1) (M79 7)   6  Fungal rash of torso (111 9) (B36 9)   7  High cholesterol (272 0) (E78 00)   8  HIV disease (042) (B20)   9  Mild intermittent asthma without complication (904 60) (R12 81)   10  Need for pneumocystis prophylaxis (V07 8) (Z29 8)   11  Neuropathy (355 9) (G62 9)   12  Nicotine dependence (305 1) (F17 200)   13  Poor dentition (525 9) (K08 9)   14  Rectal abscess (566) (K61 1)   15  Ringing in the ears (388 30) (H93 19)   16  Viral upper respiratory infection (465 9) (J06 9,B97 89)    Past Medical History    1  History of Blurry vision, bilateral (368 8) (H53 8)   2  History of Facial abscess (682 0) (L02 01)   3  History of abscess of skin and subcutaneous tissue (V13 3) (Z87 2)   4  History of cellulitis (V13 3) (Z87 2)    Surgical History    1  History of Breast Surgery Lumpectomy   2  History of Hysterectomy   3  History of Perirectal Abscess I&D   4  History of Tubal Ligation    Family History  Mother    1  Family history of cardiac disorder (V17 49) (Z82 49)   2  Family history of diabetes mellitus (V18 0) (Z83 3)   3  Family history of hypertension (V17 49) (Z82 49)   4  Family history of Type 1 diabetes mellitus with hypoglycemic coma  Sister    5  Family history of malignant neoplasm (V16 9) (Z80 9)    Social History    · Current every day smoker (305 1) (F17 200)    Current Meds   1  Atorvastatin Calcium 40 MG Oral Tablet; TAKE 1 TABLET DAILY AS DIRECTED;    Therapy: 27ZZG6983 to (Evaluate:73Ddm0958)  Requested for: 38TNQ0283; Last Rx:17Nov2017   Ordered   2  Bactrim -160 MG Oral Tablet; Therapy: 83DMY0423 to Recorded   3  Clotrimazole 1 % External Cream; Apply to affected skin twice daily; Therapy: 25JEQ8619 to (Last Rx:17Nov2017)  Requested for: 38DDD5943 Ordered   4  Doxycycline Hyclate 100 MG Oral Capsule; TAKE 1 CAPSULE EVERY 12 HOURS DAILY; Therapy: 96FNB8641 to (Evaluate:27Nov2017)  Requested for: 35HAW3215; Last Rx:20Nov2017   Ordered   5  Gabapentin 800 MG Oral Tablet; TAKE 1 TABLET 3 TIMES DAILY; Therapy: 85IKC2593 to (Evaluate:60Azb0513)  Requested for: 54Xch0375; Last Rx:32Tdg8868   Ordered   6  Genvoya [phone] MG Oral Tablet; take 1 tablet by mouth daily; Therapy: 02MAM7075 to (Catherine Poles)  Requested for: 90EYX7667; Last Rx:03Jan2018   Ordered   7  MetFORMIN HCl - 500 MG Oral Tablet; take 1 tablet by mouth twice a day; Therapy: 73LIQ0678 to (Evaluate:01Yyn1693)  Requested for: 83Xfn4405; Last Rx:53Soe8501   Ordered    Allergies    1  No Known Drug Allergies    2   Mold    Future Appointments    Date/Time Provider Specialty Site   03/07/2018 05:30 PM Johnny Grubbs MD Infectious Disease [facility] [place]   02/23/2018 11:30 AM Riley Rogers, [facility] [place]   01/16/2018 10:00 AM Specialty Clinic, [address] [place] [address]     Signatures   Electronically signed by : Colby Pratt, [address] [place] [address]; Jan 8 2018  2:56PM EST                       (Author)

## 2018-01-23 NOTE — PROGRESS NOTES
Patient Health Assessment    Date:            12/08/2017  Blood Pressure:  157/91  Pulse:           68  Age:             56  Weight:          154 lbs  Height/Length:   5' 5"  Body Mass Index: 25 6  Provider:        [id number]  Clinic:          [facility]: Asthma    Diabetes    Tobacco User    Frequent Headaches    HIV/AIDS  Medications: Lipitor    Gabapentin    OTHER    METFORMIN    TAB 750MG   Allergies:  Since Last Visit: Medical Alert: No Change    Medications: No Change    Allergies:        No Change  Pain Scale Type: Numeric Pain ScalePain Level: 0  Description:  Pt presents for limited exam with CC ''pain the UR and LL''  PMH obtained, Pt  significant for HIV, Diabetes, and Asthma  Obtained Panoramic film, generalized   moderate-severe bone loss  Tooth #4 and 5 have gross caries, and #18 and 19  have class 3 mobility  #4, 5, 18, and 19: Palpation (-), percussion (+)  Diagnosis of #4 and 5 carious non-restorable teeth, and #18 and 19 pain due  to periodontitis  Pt tx planned for #4, 5, 18, and 19 extractions  Karla June  translated between Dr Cecy Morales and the patient  Pt left ambulatory and alert      NV: #4, 5, 18, and 19 Extraction on Non-OS day (1 hour)    BH/AK    ----- Signed on Friday, December 08, 2017 at 9:13:14 AM  -----  ----- Provider: Abdulaziz Rubi - Resident Two, Dentist -- Clinic: [facility] -----

## 2018-01-23 NOTE — PROGRESS NOTES
History of Present Illness  [facility]:   She is being seen for an initial consultation  The patient is being seen regarding wellness screener, depression   Support/Coping: family  The patient states the problem is moderately severe  [facility]:         1  I like who I am  Yes, describes me exactly (12)   2  I am not an easy person to get along with    Somewhat describes me (21)   3  I am basically a healthy person    Somewhat describes me (31)   4  I give up to easily    No, doesn't describe me at all (42)   5  I have difficulty concentrating    Somewhat describes me (51)   6  I am happy with my family relationships    Yes, describes me exactly (62)   7  I am comfortable being around people    Yes, describes me exactly (72)     8  Walking up a flight of stairs    Rodriguez Washington A Lot (80)   9  Running the length of a football field    Rodriguez Washington A Lot (90)     10  Sleeping    Rodriguez Neri A Lot (100)   11  Hurting or aching in any part of your body    Rodriguez Washington A Lot (110)   12  Getting tired easily    Rodriguez Washington A Lot (120)   13  Feeling depressed or sad    Some (131)   14  Nervousness    Some (141)     15  Socialize with other people (talk or visit with friends or relatives A Lot ()   12  Take part in social, Congregational, or recreation activities (meetings, Yazidism, movies, sports, parties)    Rodriguez Neri A Lot (162)   17   Stay in your home, nursing home, or hospital because of sickness, injury, or other health problem None (172)   MANUAL SCORING FOR THE [facility] PROFILE     8 = 0   9 = 0   10 = 0   11 = 0   12 = 0   Sum = 0   PHYSICAL HEALTH SCORE 0      1 = 2   4 = 2   5 = 1   13 = 1   14 = 1   Sum = 7   MENTAL HEALTH SCORE 70      2 = 1   6 = 2   7 = 2   15 = 2   16 = 2   Sum = 9   SOCIAL HEALTH SCORE 90  Physical Health score = 0   Mental Health score = 70   Social Health score = 90   Sum = 160   GENERAL HEALTH SCORE 53 333      3 = 1   PERCEIVED HEALTH SCORE 50      1 = 2   2 = 1   4 = 2   6 = 2   7 = 2   Sum = 9   SELF-ESTEEM SCORE 90            2 = 1 and 1   5 = 1 and 1   7 = 2 and 0   10 = 0 and 2   12 = 0 and 2   14 = 1 and 1   Sum = 7   ANXIETY SCORE 58 331      4 = 2 and 0   5 = 1 and 1   10 = 0 and 2   12 = 0 and 2   13 = 1 and 1   Sum = 6   DEPRESSION SCORE 60      4 = 2, 0, 1 and 1   7 = 2 and 0   10 = 0 and 2   12 = 0 and 2   13 = 1 and 1   14 = 1 and 1   Sum = 7   ANXIETY-DEPRESSION ([facility]) SCORE 50 001      11 = 0 and 2   PAIN SCORE 100      17 = 2 and 0   DISABILITY SCORE 0  Smoking Cessation (Brief): The patient is being seen for a consultation regarding tobacco cessation assistance  The patient has no interest in quitting  Physical Exam    Objective: Orientation: oriented to person, oriented to place and oriented to time  Appearance: well developed, well nourished and appearance reflects stated age  Observed mood and affect: sad, but appropriate  Harm to self or others: SI but no plan  Substance abuse: None reported or observed  Assessment    1  HIV disease (042) (B20)   2  Nicotine dependence (305 1) (F17 200)   3  Viral upper respiratory infection (465 9) (J06 9,B97 89)    Plan    1  Genvoya [phone] MG Oral Tablet; take 1 tablet by mouth daily   2  (1) CBC/PLT/DIFF; Status:Active; Requested for:02Mar2018;    3  (1) COMPREHENSIVE METABOLIC PANEL; Status:Active; Requested for:02Mar2018;    4  (1) HIV-1 RNA QUANTITATIVE; [Do Not Release]; Status:Active; Requested   for:02Mar2018;    5  (1) T LYMPH SUBSET (CD4); Status:Active; Requested for:02Mar2018;    6  Influenza; INJECT 0 5  ML Intramuscular; To Be Done: [id number]    7   Follow-up visit in 3 months Evaluation and Treatment  Follow-up Status: Hold For -   Scheduling  Requested for: [id number]    [address] [facility]: Today, patient presents with depression  Patient will likely benefit from using resource given by [facility] to link to ongoing care  The stage of change is preparation  Behavioral recommendations: 1  F/U with [facility] at next visit  2  Continue maintenance of wellbeing  3  Call [facility] to schedule an intake for MH services   4  Go to ER or call 911 if having SI/HI   Discussion Summary [facility]:   PT was seen for her initial behavioral health consultation with [facility] with the help of a   [facility] services were introduced  PT completed her [facility] screener  PT stated that she was previously seen by a therapist and psychiatrist in [address] [place] [address] for depression and would like to be connected to care again  Recently, she has been triggered by being  from her sons and grandsons over the holidays  However, she also said that the depression started "years ago" after being put on medication for her fibromyalgia, when she was living near her family  As PT needs Georgian speaking providers, [facility] recommended that PT call [facility]  She was provided with their contact information and PT was told to call [facility] if she is having an issues connecting to care  She did mention that she feels she should be back on her medication as she has had suicidal thoughts  However, she does not have a plan was not actively having thoughts during the visit  PT was advised to go to ER or call 911 if having SI/HI  PT agreed to this plan        Future Appointments    Date/Time Provider Specialty Site   03/07/2018 05:30 PM Birgit Evans MD Infectious Disease [facility] [place]   02/23/2018 11:30 AM Marcella Melvin[address] [facility] [place]   01/16/2018 10:00 AM Specialty Clinic, [address] [place] [address]     Signatures   Electronically signed by : Gary Keen MS; Jan 5 2018  3:38PM EST (Author)

## 2018-01-29 ENCOUNTER — TRANSCRIBE ORDERS (OUTPATIENT)
Dept: INTERNAL MEDICINE CLINIC | Facility: CLINIC | Age: 58
End: 2018-01-29

## 2018-01-29 ENCOUNTER — OFFICE VISIT (OUTPATIENT)
Dept: MULTI SPECIALTY CLINIC | Facility: CLINIC | Age: 58
End: 2018-01-29
Payer: COMMERCIAL

## 2018-01-29 VITALS
BODY MASS INDEX: 30.23 KG/M2 | HEART RATE: 88 BPM | TEMPERATURE: 97.8 F | SYSTOLIC BLOOD PRESSURE: 122 MMHG | HEIGHT: 63 IN | DIASTOLIC BLOOD PRESSURE: 80 MMHG | WEIGHT: 170.64 LBS

## 2018-01-29 DIAGNOSIS — E11.9 DIABETES MELLITUS WITHOUT COMPLICATION (HCC): Primary | ICD-10-CM

## 2018-01-29 DIAGNOSIS — E11.40 TYPE 2 DIABETES MELLITUS WITH DIABETIC NEUROPATHY, UNSPECIFIED LONG TERM INSULIN USE STATUS: Primary | ICD-10-CM

## 2018-01-29 DIAGNOSIS — L84 CALLUS: ICD-10-CM

## 2018-01-29 PROCEDURE — 99202 OFFICE O/P NEW SF 15 MIN: CPT | Performed by: PODIATRIST

## 2018-01-29 RX ORDER — DOXYCYCLINE HYCLATE 100 MG/1
1 CAPSULE ORAL EVERY 12 HOURS
COMMUNITY
Start: 2017-11-20 | End: 2018-02-13

## 2018-01-29 RX ORDER — GABAPENTIN 800 MG/1
1 TABLET ORAL 3 TIMES DAILY
COMMUNITY
Start: 2017-11-17 | End: 2018-02-20 | Stop reason: SDUPTHER

## 2018-01-29 RX ORDER — IBUPROFEN 800 MG/1
800 TABLET ORAL EVERY 8 HOURS
COMMUNITY
Start: 2017-09-15 | End: 2018-06-13 | Stop reason: SDUPTHER

## 2018-01-29 RX ORDER — BLOOD-GLUCOSE METER
EACH MISCELLANEOUS
COMMUNITY
Start: 2017-04-18 | End: 2021-05-28 | Stop reason: ALTCHOICE

## 2018-01-29 RX ORDER — ALBUTEROL SULFATE 90 UG/1
2 AEROSOL, METERED RESPIRATORY (INHALATION)
COMMUNITY
Start: 2017-01-19 | End: 2019-03-12

## 2018-01-29 RX ORDER — CLOTRIMAZOLE 1 %
CREAM (GRAM) TOPICAL 2 TIMES DAILY
COMMUNITY
Start: 2017-11-17 | End: 2021-05-28 | Stop reason: ALTCHOICE

## 2018-01-29 RX ORDER — ALBUTEROL SULFATE 2.5 MG/3ML
3 SOLUTION RESPIRATORY (INHALATION)
COMMUNITY
Start: 2017-01-19 | End: 2018-10-01 | Stop reason: SDUPTHER

## 2018-01-29 RX ORDER — POLYETHYLENE GLYCOL 3350 17 G/17G
POWDER, FOR SOLUTION ORAL
COMMUNITY
Start: 2017-04-19

## 2018-01-29 RX ORDER — BUDESONIDE AND FORMOTEROL FUMARATE DIHYDRATE 160; 4.5 UG/1; UG/1
2 AEROSOL RESPIRATORY (INHALATION)
COMMUNITY
Start: 2016-12-27 | End: 2021-05-28

## 2018-01-29 RX ORDER — ATORVASTATIN CALCIUM 40 MG/1
1 TABLET, FILM COATED ORAL DAILY
COMMUNITY
Start: 2017-11-17 | End: 2018-04-24 | Stop reason: ALTCHOICE

## 2018-01-29 RX ORDER — SULFAMETHOXAZOLE AND TRIMETHOPRIM 800; 160 MG/1; MG/1
1 TABLET ORAL
COMMUNITY
Start: 2017-11-15 | End: 2018-04-24 | Stop reason: ALTCHOICE

## 2018-01-29 RX ORDER — LANCETS
EACH MISCELLANEOUS
COMMUNITY
Start: 2017-01-19 | End: 2018-09-10 | Stop reason: SDUPTHER

## 2018-01-29 NOTE — PROGRESS NOTES
[address] [facility] 65 y o  female MRN: [id number]  Encounter: [id number]    Assessment/Plan     Assessment:  1  DM with neuropathy  2  Painful callus, right submet 1    Plan:  - Patient was seen/examined   - discussed the importance of glycemic control, shoe gear, and proper diabetic foot care  - dispensed instructions on proper diabetic foot care  - trimmed callus x1 with #15 blade to epithelial skin without incidence  - rx: diabetic shoes w inserts  - all questions and concerns addressed  - RTC in 6 mo    History of Present Illness     HPI:  Shmuel Hagan is a 65 y o  female who returns to clinic for diabetic risk assessment  Pt states that she recently moved to the area about 4 months ago from [facility]   Pt states that she saw a podiatrist when she lived in [place] as she is a diabetic  She states that she got diabetic shoes more than a year ago but misplaced them during the move and is requesting another pair  Pt states her sugar this morning was 138  Her last visit to her PCP was about a month ago  Pt admits to burning, numbness and tingling  Pt complains of pain in her right foot especially when she ambulates as she has a painful callus  Pt denies any recent nausea, fever, vomiting, chills, SOB or chest pain  Pt ambulates to clinic in boots  Consults  Review of Systems   Constitutional: Negative  HENT: Negative  Eyes: Negative  Respiratory: Negative  Cardiovascular: Negative  Gastrointestinal: Negative      Musculoskeletal: Negative   Skin: Callus  Neurological: Burning, numbness    Historical Information   Past Medical History:   Diagnosis Date    Arthritis     Cellulitis     Diabetes mellitus ([facility] [address] )     Fibromyalgia     [facility]     Hyperlipidemia     Pinguecula of both eyes     Presbyopia of both eyes      Past Surgical History:   Procedure Laterality Date    BREAST SURGERY      lumpectomy    HYSTERECTOMY      INCISION AND DRAINAGE PERIRECTAL ABSCESS      TUBAL LIGATION       Social History   History   Alcohol Use    Yes     Comment: occasionally      History   Drug Use No     History   Smoking Status    Current Every Day Smoker    Types: Cigarettes   Smokeless Tobacco    Never Used     Family History:   Family History   Problem Relation Age of Onset    Heart disease Mother     Diabetes Mother      type 1    Hypertension Mother     Cancer Sister        Meds/Allergies     (Not in a hospital admission)  No Known Allergies    Objective     Current Vitals:   Blood Pressure: 122/80 (01/29/18 1449)  Pulse: 88 (01/29/18 1449)  Temperature: 97 8 °F (36 6 °C) (01/29/18 1449)  Temp Source: Oral (01/29/18 1449)  Height: 5' 3" (160 cm) (01/29/18 1449)  Weight - Scale: 77 4 kg (170 lb 10 2 oz) (01/29/18 1449)        /80 (BP Location: Right arm, Patient Position: Sitting, Cuff Size: Adult)   Pulse 88   Temp 97 8 °F (36 6 °C) (Oral)   Ht 5' 3" (1 6 m)   Wt 77 4 kg (170 lb 10 2 oz)   BMI 30 23 kg/m²     General Appearance:    Alert, cooperative, no distress   Head:    Normocephalic, without obvious abnormality, atraumatic   Eyes:    PERRL, conjunctiva/corneas clear, EOM's intact            Nose:   Moist mucous membranes, no drainage or sinus tenderness   Throat:   No tenderness, no exudates   Neck:   Supple, symmetrical, trachea midline, no JVD   Back:     Symmetric, no CVA tenderness   Lungs:     Clear to auscultation bilaterally, respirations unlabored   Chest wall:    No tenderness or deformity   Heart:    Regular rate and rhythm, S1 and S2 normal, no murmur, rub   or gallop   Abdomen:     Soft, non-tender, bowel sounds active all four quadrants,     no masses, no organomegaly           Extremities:   MMT is 4/5 to all compartments of the LE, +0/4 edema B/L, Digital ROM is intact,    Pulses:   R DP is +1/4, R PT is +1/4, L DP is +1/4, L PT is +1/4, CFT< 3sec to all digits   Skin: No open Lesions   Skin of the LE is normal texture, turgor  No edema, no erythema; no clinical signs of infection  No maceration in interspaces  Right submet 1 callus, tender to palpation       Neurologic:   CNII-XII intact  Normal strength, sensation and reflexes       Throughout  Gross sensation is intact   Protective sensation is Diminished with SWMF 6/10 b/l

## 2018-02-13 ENCOUNTER — TRANSCRIBE ORDERS (OUTPATIENT)
Dept: GASTROENTEROLOGY | Facility: MEDICAL CENTER | Age: 58
End: 2018-02-13

## 2018-02-13 ENCOUNTER — OFFICE VISIT (OUTPATIENT)
Dept: OBGYN CLINIC | Facility: MEDICAL CENTER | Age: 58
End: 2018-02-13
Payer: COMMERCIAL

## 2018-02-13 VITALS
WEIGHT: 171.8 LBS | HEIGHT: 63 IN | DIASTOLIC BLOOD PRESSURE: 70 MMHG | SYSTOLIC BLOOD PRESSURE: 128 MMHG | BODY MASS INDEX: 30.44 KG/M2

## 2018-02-13 DIAGNOSIS — B20 HIV (HUMAN IMMUNODEFICIENCY VIRUS INFECTION) (HCC): ICD-10-CM

## 2018-02-13 DIAGNOSIS — Z86.010 HISTORY OF COLON POLYPS: ICD-10-CM

## 2018-02-13 DIAGNOSIS — B20 AIDS (HCC): ICD-10-CM

## 2018-02-13 DIAGNOSIS — F17.219 CIGARETTE NICOTINE DEPENDENCE WITH NICOTINE-INDUCED DISORDER: ICD-10-CM

## 2018-02-13 DIAGNOSIS — E11.8 TYPE 2 DIABETES MELLITUS WITH COMPLICATION, WITH LONG-TERM CURRENT USE OF INSULIN (HCC): ICD-10-CM

## 2018-02-13 DIAGNOSIS — M79.7 FIBROMYALGIA: ICD-10-CM

## 2018-02-13 DIAGNOSIS — Z91.89 ENCOUNTER FOR GYNECOLOGIC EXAMINATION FOR HIGH-RISK PATIENT COVERED BY MEDICARE: Primary | ICD-10-CM

## 2018-02-13 DIAGNOSIS — F32.3 MDD (MAJOR DEPRESSIVE DISORDER), SINGLE EPISODE, SEVERE WITH PSYCHOTIC FEATURES (HCC): ICD-10-CM

## 2018-02-13 DIAGNOSIS — R19.09 MASS OF RIGHT INGUINAL REGION: ICD-10-CM

## 2018-02-13 DIAGNOSIS — Z79.4 TYPE 2 DIABETES MELLITUS WITH COMPLICATION, WITH LONG-TERM CURRENT USE OF INSULIN (HCC): ICD-10-CM

## 2018-02-13 DIAGNOSIS — Z12.31 ENCOUNTER FOR SCREENING MAMMOGRAM FOR MALIGNANT NEOPLASM OF BREAST: ICD-10-CM

## 2018-02-13 PROBLEM — E11.9 DIABETES MELLITUS, TYPE 2 (HCC): Status: ACTIVE | Noted: 2017-11-17

## 2018-02-13 PROBLEM — F32.A DEPRESSION: Status: ACTIVE | Noted: 2017-11-17

## 2018-02-13 PROBLEM — Z91.09 MULTIPLE ENVIRONMENTAL ALLERGIES: Status: ACTIVE | Noted: 2017-04-19

## 2018-02-13 PROBLEM — F17.200 NICOTINE DEPENDENCE: Status: ACTIVE | Noted: 2017-11-17

## 2018-02-13 PROBLEM — Z90.710 H/O ABDOMINAL HYSTERECTOMY: Status: ACTIVE | Noted: 2018-02-13

## 2018-02-13 PROBLEM — K63.5 COLON POLYPS: Status: ACTIVE | Noted: 2017-04-19

## 2018-02-13 PROBLEM — K08.9 POOR DENTITION: Status: ACTIVE | Noted: 2017-11-17

## 2018-02-13 PROCEDURE — 99386 PREV VISIT NEW AGE 40-64: CPT | Performed by: OBSTETRICS & GYNECOLOGY

## 2018-02-13 PROCEDURE — G0145 SCR C/V CYTO,THINLAYER,RESCR: HCPCS | Performed by: OBSTETRICS & GYNECOLOGY

## 2018-02-13 NOTE — PROGRESS NOTES
ASSESSMENT & PLAN: Washington Lo is a 65 y o   with normal gynecologic exam     1   Routine well woman exam done today  2  Pap and HPV:  The patient's Pap and cotesting was done today, raymon recent diagnosis of HIV (patient states diagnosed approximately one year ago)  Pap of vaginal cuff was performed    3  Mammogram ordered- hx of abnormal pap in past   4  Colonoscopy ordered - 6 polyps removed last year   5  The following were reviewed in today's visit: breast self exam, mammography screening ordered, menopause, adequate intake of calcium and vitamin D, exercise, tobacco cessation and colonoscopy discussed and ordered  6  HIV - states she currently follows with  in [place] but has recently moved to [facility] and does not have PCP / Referral placed   7  DM 2 - unable to tell if compliant / referral to PCP   8  Current every day smoker - cessation encouraged / states not interested   9  Right inguinal induration - General surgery referral     CC: Annual Gynecologic Examination    HPI: Washington Lo is a 65 y o   who presents for annual gynecologic examination  She has the following concerns:  Induration of right inguinal area  Health Maintenance:    She wears her seatbelt routinely  She does not perform regular monthly self breast exams  She feels safe at home       Pap: Hysterectomy in her 30's for benign reason   Last mammogram:    Last colonoscopy: 2017 - polyps noted      Past Medical History:   Diagnosis Date    Arthritis     Cellulitis     [facility]     Fibromyalgia     HIV [facility]human immunodeficiency virus infection) [facility]     Hyperlipidemia     Pinguecula of both eyes     Presbyopia of both eyes        Past Surgical History:   Procedure Laterality Date    BREAST SURGERY      lumpectomy    HYSTERECTOMY      INCISION AND DRAINAGE PERIRECTAL ABSCESS      TUBAL LIGATION         Past OB/Gyn History:  OB History      Para Term  AB Living    4 4 SAB TAB Ectopic Multiple Live Births                       Family History   Problem Relation Age of Onset    Heart disease Mother     Diabetes Mother      type 1    Hypertension Mother     Cancer Sister        Social History:  Social History     Social History    Marital status: Single     Spouse name: N/A    Number of children: N/A    Years of education: N/A     Occupational History    Not on file  Social History Main Topics    Smoking status: Current Every Day Smoker     Types: Cigarettes    Smokeless tobacco: Never Used    Alcohol use Yes      Comment: occasionally     Drug use: No    Sexual activity: No     Other Topics Concern    Not on file     Social History Narrative    No narrative on file     No Known Allergies    Current Outpatient Prescriptions:     albuterol (2 5 mg/3 mL) 0 083 % nebulizer solution, Inhale 3 mL, Disp: , Rfl:     albuterol (PROVENTIL HFA,VENTOLIN HFA) 90 mcg/act inhaler, Inhale 2 puffs, Disp: , Rfl:     atorvastatin (LIPITOR) 40 mg tablet, Take 1 tablet by mouth daily, Disp: , Rfl:     bisacodyl (DULCOLAX) 5 mg EC tablet, Take 30 mg by mouth, Disp: , Rfl:     Blood Glucose Monitoring Suppl (TRUE METRIX METER) GIL, daily  , Disp: , Rfl:     budesonide-formoterol (SYMBICORT) 160-4 5 mcg/act inhaler, Inhale 2 puffs, Disp: , Rfl:     clotrimazole (LOTRIMIN) 1 % cream, Apply topically 2 (two) times a day, Disp: , Rfl:     elvitegravir-cobicistat-emtricitabine-tenofovir (STRIBILD) [phone] mg, Take 1 tablet by mouth, Disp: , Rfl:     elvitegravir-cobicistat-emtricitabine-tenofovir alafenamide (GENVOYA) tablet, Take 1 tablet by mouth daily, Disp: , Rfl:     gabapentin (NEURONTIN) 800 mg tablet, Take 1 tablet by mouth 3 (three) times a day, Disp: , Rfl:     glucose blood test strip, by Percutaneous route, Disp: , Rfl:     ibuprofen (MOTRIN) 800 mg tablet, Take 800 mg by mouth every 8 (eight) hours, Disp: , Rfl:     metFORMIN (GLUCOPHAGE) 500 mg tablet, Take 1 tablet by mouth 2 (two) times a day, Disp: , Rfl:     polyethylene glycol (GLYCOLAX) powder, Drink per MD recommendations, Disp: , Rfl:     sulfamethoxazole-trimethoprim (BACTRIM DS) 800-160 mg per tablet, Take 1 tablet by mouth, Disp: , Rfl:     SUPER THIN LANCETS [facility], by Percutaneous route, Disp: , Rfl:       Review of Systems  Constitutional :no fever, feels well, no tiredness, no recent weight gain or loss  ENT: no ear ache, no loss of hearing, no nosebleeds or nasal discharge, no sore throat or hoarseness  Cardiovascular: no complaints of slow or fast heart beat, no chest pain, no palpitations, no leg claudication or lower extremity edema  Respiratory: no complaints of shortness of shortness of breath, no HECTOR  Breasts:no complaints of breast pain, breast lump, or nipple discharge  Gastrointestinal: no complaints of abdominal pain, constipation,nausea, vomiting, or diarrhea or bloody stools  Genitourinary : no complaints of dysuria, incontinence, pelvic pain, no dysmenorrhea, vaginal discharge or abnormal vaginal bleeding and as noted in HPI  Integumentary: no complaints of skin rash or lesion, itching or dry skin  Neurological: no complaints of headache, no confusion, no numbness or tingling, no dizziness or fainting    Objective      /70   Ht 5' 3" (1 6 m)   Wt 77 9 kg (171 lb 12 8 oz)   Breastfeeding? No   BMI 30 43 kg/m²     General:   appears stated age, cooperative, alert normal mood and affect   Neck: Neck: normal, supple,trachea midline, no masses   Heart: regular rate and rhythm, S1, S2 normal, no murmur, click, rub or gallop   Lungs: clear to auscultation bilaterally   Breasts: normal appearance, no masses or tenderness   Abdomen: soft, non-tender, without masses or organomegaly   Vulva: normal, normal female hair distribution, no clitoral enlargement   Vagina: normal vagina, no discharge, exudate, lesion, or erythema   Urethra: normal   Cervix: Absent     Uterus: uterus absent Adnexa: no mass, fullness, tenderness

## 2018-02-14 ENCOUNTER — OFFICE VISIT (OUTPATIENT)
Dept: GASTROENTEROLOGY | Facility: MEDICAL CENTER | Age: 58
End: 2018-02-14
Payer: COMMERCIAL

## 2018-02-14 VITALS
SYSTOLIC BLOOD PRESSURE: 114 MMHG | BODY MASS INDEX: 30.43 KG/M2 | WEIGHT: 171.74 LBS | RESPIRATION RATE: 20 BRPM | DIASTOLIC BLOOD PRESSURE: 76 MMHG | TEMPERATURE: 98.6 F | HEART RATE: 98 BPM | HEIGHT: 63 IN

## 2018-02-14 DIAGNOSIS — K63.5 POLYP OF COLON, UNSPECIFIED PART OF COLON, UNSPECIFIED TYPE: Primary | ICD-10-CM

## 2018-02-14 PROCEDURE — 99244 OFF/OP CNSLTJ NEW/EST MOD 40: CPT | Performed by: INTERNAL MEDICINE

## 2018-02-14 NOTE — PROGRESS NOTES
[facility] - Outpatient Consultation  Ashley Zavaleta 65 y o  female MRN: [id number]  Encounter: [id number]          ASSESSMENT AND PLAN:        Polyp of colon, unspecified part of colon, unspecified type   - My plan is to schedule her for colonoscopy, however upon review of labs her most recent CD4 in January 2018 was just below 200  I would like to hold off scheduling her colonoscopy until her CD4 count is at or above 200  I will discuss with Dr Yanet Loya    -     Case request operating room: COLONOSCOPY    ______________________________________________________________________    HPI: Ashley Zavaleta, 65 y o  here today as a referral for colonoscopy  Per daughter in the room acting as  for patient, her last colonoscopy was last year where she was found to have five polyps  She was advised to have follow up colonoscopy in six months which she was unable to do  She is currently asymptomatic at this time  She denies any abdominal pain,blood in stool, and any other s/s  She has DM and is currently undergoing treatment for HIV  She does not have a family history of colon cancer  She is a current smoker  Infrequent drinker  REVIEW OF SYSTEMS:    CONSTITUTIONAL: Denies any fever, chills, rigors, and weight loss  HEENT: No earache or tinnitus  Denies hearing loss or visual disturbances  CARDIOVASCULAR: No chest pain or palpitations  RESPIRATORY: Denies any cough, hemoptysis, shortness of breath or dyspnea on exertion  GASTROINTESTINAL: As noted in the History of Present Illness  GENITOURINARY: No problems with urination  Denies any hematuria or dysuria  NEUROLOGIC: No dizziness or vertigo, denies headaches  MUSCULOSKELETAL: Denies any muscle or joint pain  SKIN: Denies skin rashes or itching  ENDOCRINE: Denies excessive thirst  Denies intolerance to heat or cold  PSYCHOSOCIAL: Denies depression or anxiety  Denies any recent memory loss         Historical Information Past Medical History:   Diagnosis Date    Arthritis     Cellulitis     Colon polyp     Diabetes mellitus (HCC)     Fibromyalgia     [facility]     Hyperlipidemia     Pinguecula of both eyes     Presbyopia of both eyes      Past Surgical History:   Procedure Laterality Date    BREAST SURGERY      lumpectomy    COLONOSCOPY      last year, polyps, every 6 months    HYSTERECTOMY      INCISION AND DRAINAGE PERIRECTAL ABSCESS      TUBAL LIGATION       Social History   History   Alcohol Use    Yes     Comment: occasionally      History   Drug Use No     History   Smoking Status    Current Every Day Smoker    Types: Cigarettes   Smokeless Tobacco    Never Used     Family History   Problem Relation Age of Onset    Heart disease Mother     Diabetes Mother      type 1    Hypertension Mother     Cancer Sister        Meds/Allergies       Current Outpatient Prescriptions:     albuterol (2 5 mg/3 mL) 0 083 % nebulizer solution    albuterol (PROVENTIL HFA,VENTOLIN HFA) 90 mcg/act inhaler    atorvastatin (LIPITOR) 40 mg tablet    bisacodyl (DULCOLAX) 5 mg EC tablet    Blood Glucose Monitoring Suppl (TRUE METRIX METER) GIL    budesonide-formoterol (SYMBICORT) 160-4 5 mcg/act inhaler    clotrimazole (LOTRIMIN) 1 % cream    elvitegravir-cobicistat-emtricitabine-tenofovir (STRIBILD) [id number] mg    elvitegravir-cobicistat-emtricitabine-tenofovir alafenamide (GENVOYA) tablet    gabapentin (NEURONTIN) 800 mg tablet    glucose blood test strip    ibuprofen (MOTRIN) 800 mg tablet    metFORMIN (GLUCOPHAGE) 500 mg tablet    polyethylene glycol (GLYCOLAX) powder    sulfamethoxazole-trimethoprim (BACTRIM DS) 800-160 mg per tablet    SUPER THIN LANCETS MISC    No Known Allergies        Objective     Blood pressure 114/76, pulse 98, temperature 98 6 °F (37 °C), temperature source Oral, resp   rate 20, height 5' 2 99" (1 6 m), weight 77 9 kg (171 lb 11 8 oz), not currently breastfeeding  PHYSICAL EXAM:      General Appearance:   Alert, cooperative, no distress   HEENT:   Normocephalic, atraumatic, anicteric      Neck:  Supple, symmetrical, trachea midline   Lungs:   Clear to auscultation bilaterally; no rales, rhonchi or wheezing; respirations unlabored    Heart[de-identified]   Regular rate and rhythm; no murmur, rub, or gallop  Abdomen:   Soft, non-tender, non-distended; normal bowel sounds; no masses, no organomegaly    Genitalia:   Deferred    Rectal:   Deferred    Extremities:  No cyanosis, clubbing or edema    Pulses:  2+ and symmetric    Skin:  No jaundice, rashes, or lesions    Lymph nodes:  No palpable cervical lymphadenopathy        Lab Results:   No visits with results within 1 Day(s) from this visit  Latest known visit with results is:   Appointment on 01/10/2018   Component Date Value    G6PD, Quant 01/10/2018 226     SL AMB LAB RED BLOOD IWLIAM* 01/10/2018 4 49          Radiology Results:   No results found  Attestation:   By signing my name below, Padma Edwards, aura that this documentation has been prepared under the direction and in the presence of Lamar Valentin DO  Electronically Signed: Sebastián Wells  2/14/2018    I, Lamar Valentin, personally performed the services described in this documentation  All medical record entries made by the scribe were at my direction and in my presence  I have reviewed the chart and discharge instructions and agree that the record reflects my personal performance and is accurate and complete  Lamar Valentin DO  2/14/2018

## 2018-02-15 LAB
LAB AP GYN PRIMARY INTERPRETATION: NORMAL
Lab: NORMAL

## 2018-02-20 ENCOUNTER — APPOINTMENT (OUTPATIENT)
Dept: LAB | Facility: CLINIC | Age: 58
End: 2018-02-20
Payer: COMMERCIAL

## 2018-02-20 DIAGNOSIS — B20 HUMAN IMMUNODEFICIENCY VIRUS (HIV) DISEASE (HCC): ICD-10-CM

## 2018-02-20 DIAGNOSIS — Z13.820 SCREENING FOR OSTEOPOROSIS: Primary | ICD-10-CM

## 2018-02-20 DIAGNOSIS — Z79.4 TYPE 2 DIABETES MELLITUS WITH COMPLICATION, WITH LONG-TERM CURRENT USE OF INSULIN (HCC): ICD-10-CM

## 2018-02-20 DIAGNOSIS — E11.8 TYPE 2 DIABETES MELLITUS WITH COMPLICATION, WITH LONG-TERM CURRENT USE OF INSULIN (HCC): ICD-10-CM

## 2018-02-20 DIAGNOSIS — M06.9 RHEUMATOID ARTHRITIS, INVOLVING UNSPECIFIED SITE, UNSPECIFIED RHEUMATOID FACTOR PRESENCE: ICD-10-CM

## 2018-02-20 DIAGNOSIS — G62.9 NEUROPATHY: Primary | ICD-10-CM

## 2018-02-20 DIAGNOSIS — B20 HIV (HUMAN IMMUNODEFICIENCY VIRUS INFECTION) (HCC): Primary | ICD-10-CM

## 2018-02-20 LAB
ALBUMIN SERPL BCP-MCNC: 3.5 G/DL (ref 3.5–5)
ALP SERPL-CCNC: 85 U/L (ref 46–116)
ALT SERPL W P-5'-P-CCNC: 24 U/L (ref 12–78)
ANION GAP SERPL CALCULATED.3IONS-SCNC: 11 MMOL/L (ref 4–13)
AST SERPL W P-5'-P-CCNC: 13 U/L (ref 5–45)
BASOPHILS # BLD AUTO: 0.04 THOUSANDS/ΜL (ref 0–0.1)
BASOPHILS NFR BLD AUTO: 1 % (ref 0–1)
BILIRUB SERPL-MCNC: 0.43 MG/DL (ref 0.2–1)
BUN SERPL-MCNC: 11 MG/DL (ref 5–25)
CALCIUM SERPL-MCNC: 8.7 MG/DL (ref 8.3–10.1)
CHLORIDE SERPL-SCNC: 103 MMOL/L (ref 100–108)
CO2 SERPL-SCNC: 23 MMOL/L (ref 21–32)
CREAT SERPL-MCNC: 0.76 MG/DL (ref 0.6–1.3)
EOSINOPHIL # BLD AUTO: 0.06 THOUSAND/ΜL (ref 0–0.61)
EOSINOPHIL NFR BLD AUTO: 1 % (ref 0–6)
ERYTHROCYTE [DISTWIDTH] IN BLOOD BY AUTOMATED COUNT: 14.5 % (ref 11.6–15.1)
GFR SERPL CREATININE-BSD FRML MDRD: 88 ML/MIN/1.73SQ M
GLUCOSE P FAST SERPL-MCNC: 298 MG/DL (ref 65–99)
HCT VFR BLD AUTO: 46.5 % (ref 34.8–46.1)
HGB BLD-MCNC: 15 G/DL (ref 11.5–15.4)
LYMPHOCYTES # BLD AUTO: 1.48 THOUSANDS/ΜL (ref 0.6–4.47)
LYMPHOCYTES NFR BLD AUTO: 27 % (ref 14–44)
MCH RBC QN AUTO: 32.1 PG (ref 26.8–34.3)
MCHC RBC AUTO-ENTMCNC: 32.3 G/DL (ref 31.4–37.4)
MCV RBC AUTO: 100 FL (ref 82–98)
MONOCYTES # BLD AUTO: 0.43 THOUSAND/ΜL (ref 0.17–1.22)
MONOCYTES NFR BLD AUTO: 8 % (ref 4–12)
NEUTROPHILS # BLD AUTO: 3.38 THOUSANDS/ΜL (ref 1.85–7.62)
NEUTS SEG NFR BLD AUTO: 63 % (ref 43–75)
NRBC BLD AUTO-RTO: 0 /100 WBCS
PLATELET # BLD AUTO: 251 THOUSANDS/UL (ref 149–390)
PMV BLD AUTO: 10.2 FL (ref 8.9–12.7)
POTASSIUM SERPL-SCNC: 3.9 MMOL/L (ref 3.5–5.3)
PROT SERPL-MCNC: 7.8 G/DL (ref 6.4–8.2)
RBC # BLD AUTO: 4.67 MILLION/UL (ref 3.81–5.12)
SODIUM SERPL-SCNC: 137 MMOL/L (ref 136–145)
WBC # BLD AUTO: 5.44 THOUSAND/UL (ref 4.31–10.16)

## 2018-02-20 PROCEDURE — 80053 COMPREHEN METABOLIC PANEL: CPT

## 2018-02-20 PROCEDURE — 36415 COLL VENOUS BLD VENIPUNCTURE: CPT

## 2018-02-20 PROCEDURE — 85025 COMPLETE CBC W/AUTO DIFF WBC: CPT

## 2018-02-20 PROCEDURE — 86361 T CELL ABSOLUTE COUNT: CPT

## 2018-02-20 PROCEDURE — 87536 HIV-1 QUANT&REVRSE TRNSCRPJ: CPT

## 2018-02-20 RX ORDER — ELVITEGRAVIR, COBICISTAT, EMTRICITABINE, AND TENOFOVIR ALAFENAMIDE 150; 150; 200; 10 MG/1; MG/1; MG/1; MG/1
TABLET ORAL
Qty: 30 TABLET | Refills: 0 | Status: SHIPPED | OUTPATIENT
Start: 2018-02-20 | End: 2018-03-20 | Stop reason: SDUPTHER

## 2018-02-20 RX ORDER — GABAPENTIN 800 MG/1
TABLET ORAL
Qty: 90 TABLET | Refills: 0 | Status: SHIPPED | OUTPATIENT
Start: 2018-02-20 | End: 2018-03-20 | Stop reason: SDUPTHER

## 2018-02-21 LAB
BASOPHILS # BLD AUTO: 0 X10E3/UL (ref 0–0.2)
BASOPHILS NFR BLD AUTO: 0 %
CD3+CD4+ CELLS # BLD: 207 /UL (ref 359–1519)
CD3+CD4+ CELLS NFR BLD: 14.8 % (ref 30.8–58.5)
EOSINOPHIL # BLD AUTO: 0 X10E3/UL (ref 0–0.4)
EOSINOPHIL NFR BLD AUTO: 1 %
ERYTHROCYTE [DISTWIDTH] IN BLOOD BY AUTOMATED COUNT: 14.2 % (ref 12.3–15.4)
HCT VFR BLD AUTO: 45.5 % (ref 34–46.6)
HGB BLD-MCNC: 14.6 G/DL (ref 11.1–15.9)
IMM GRANULOCYTES # BLD: 0 X10E3/UL (ref 0–0.1)
IMM GRANULOCYTES NFR BLD: 1 %
LYMPHOCYTES # BLD AUTO: 1.4 X10E3/UL (ref 0.7–3.1)
LYMPHOCYTES NFR BLD AUTO: 27 %
MCH RBC QN AUTO: 32.3 PG (ref 26.6–33)
MCHC RBC AUTO-ENTMCNC: 32.1 G/DL (ref 31.5–35.7)
MCV RBC AUTO: 101 FL (ref 79–97)
MONOCYTES # BLD AUTO: 0.5 X10E3/UL (ref 0.1–0.9)
MONOCYTES NFR BLD AUTO: 9 %
NEUTROPHILS # BLD AUTO: 3.2 X10E3/UL (ref 1.4–7)
NEUTROPHILS NFR BLD AUTO: 62 %
PLATELET # BLD AUTO: 256 X10E3/UL (ref 150–379)
RBC # BLD AUTO: 4.52 X10E6/UL (ref 3.77–5.28)
WBC # BLD AUTO: 5.2 X10E3/UL (ref 3.4–10.8)

## 2018-02-22 LAB
HIV1 RNA # SERPL NAA+PROBE: <20 COPIES/ML
HIV1 RNA SERPL NAA+PROBE-LOG#: NORMAL {LOG_COPIES}/ML

## 2018-02-22 RX ORDER — SULFASALAZINE 500 MG/1
TABLET, DELAYED RELEASE ORAL
COMMUNITY
Start: 2018-02-19 | End: 2021-05-28 | Stop reason: ALTCHOICE

## 2018-02-26 ENCOUNTER — OFFICE VISIT (OUTPATIENT)
Dept: MULTI SPECIALTY CLINIC | Facility: CLINIC | Age: 58
End: 2018-02-26
Payer: COMMERCIAL

## 2018-02-26 VITALS
HEART RATE: 84 BPM | WEIGHT: 168.87 LBS | TEMPERATURE: 97.6 F | DIASTOLIC BLOOD PRESSURE: 82 MMHG | BODY MASS INDEX: 29.92 KG/M2 | HEIGHT: 63 IN | SYSTOLIC BLOOD PRESSURE: 120 MMHG

## 2018-02-26 DIAGNOSIS — F17.219 CIGARETTE NICOTINE DEPENDENCE WITH NICOTINE-INDUCED DISORDER: ICD-10-CM

## 2018-02-26 DIAGNOSIS — R19.09 MASS OF RIGHT INGUINAL REGION: ICD-10-CM

## 2018-02-26 DIAGNOSIS — L73.2 HIDRADENITIS SUPPURATIVA: Primary | ICD-10-CM

## 2018-02-26 DIAGNOSIS — R06.02 SHORTNESS OF BREATH: ICD-10-CM

## 2018-02-26 PROCEDURE — 99202 OFFICE O/P NEW SF 15 MIN: CPT | Performed by: SURGERY

## 2018-02-26 RX ORDER — DOXYCYCLINE 100 MG/1
100 TABLET ORAL DAILY
Qty: 90 TABLET | Refills: 0 | Status: SHIPPED | OUTPATIENT
Start: 2018-02-26 | End: 2018-05-27

## 2018-02-26 NOTE — PROGRESS NOTES
Office Visit - General Surgery  Cristiana Corcoran MRN: [id number]  Encounter: [id number]    Assessment and Plan    1  Hidradenitis suppurativa   - CD4+ count improving  She has not been treated for a long period of time with antibiotics, and is only on Bactrim as a prophylactic dosage for low CD4+ count  Discussed with infectious disease Dr Bolden, and they agree that she should continue Bactrim DS three times per week and then we can add on 3 months of doxycycline and will follow her up in our office  She is seeing Dr Nancy Bland on 3/7/2018      2  Shortness of breath   - patient advised to stop smoking and follow-up with PCP    3  Nicotine dependence   - patient advised to stop but is not interested    Rand Solomon MD PGY-4  9:39 AM  02/26/18        Chief Complaint:  Cristiana Corcoran is a 65 y o  female who presents for Mass of Right Inguinal Region    Subjective  65 y F with history of multiple draining areas in her bilateral groins, buttocks, and axillas  She was once hospitalized for this problem in [facility] but they only treated her with antibiotics  She was seen in the ED recently and at an outside hospital a perineal wound was drained  She is being followed by GI for colonoscopy but her CD4+ count is <200 so her scope is being deferred  She has an appointment with Dr Nancy Bland on 3/7/2018  She also complains of shortness of breath when walking short distances  The present wound she is concerned about is in her right groin and pilonidal area       Past Medical History  Past Medical History:   Diagnosis Date    Arthritis     Cellulitis     Colon polyp     [facility] [address] [facility]     Fibromyalgia     HIV (human immunodeficiency virus infection) ([facility] [address] [facility]     Hyperlipidemia     Pinguecula of both eyes     Presbyopia of both eyes        Past Surgical History  Past Surgical History:   Procedure Laterality Date    BREAST SURGERY      lumpectomy    COLONOSCOPY      last year, polyps, every 6 months    HYSTERECTOMY      INCISION AND DRAINAGE PERIRECTAL ABSCESS      TUBAL LIGATION         Family History  Family History   Problem Relation Age of Onset    Heart disease Mother     Diabetes Mother      type 1    Hypertension Mother     Cancer Sister        Medications  Current Outpatient Prescriptions on File Prior to Visit   Medication Sig Dispense Refill    albuterol (2 5 mg/3 mL) 0 083 % nebulizer solution Inhale 3 mL      albuterol (PROVENTIL HFA,VENTOLIN HFA) 90 mcg/act inhaler Inhale 2 puffs      atorvastatin (LIPITOR) 40 mg tablet Take 1 tablet by mouth daily      bisacodyl (DULCOLAX) 5 mg EC tablet Take 30 mg by mouth      Blood Glucose Monitoring Suppl (TRUE METRIX METER) GIL daily   budesonide-formoterol (SYMBICORT) 160-4 5 mcg/act inhaler Inhale 2 puffs      clotrimazole (LOTRIMIN) 1 % cream Apply topically 2 (two) times a day      gabapentin (NEURONTIN) 800 mg tablet TAKE 1 TABLET BY MOUTH THREE TIMES A DAY 90 tablet 0    GENVOYA tablet TAKE 1 TABLET BY MOUTH DAILY (PATCH IS STOPPING STRIBILD AND SWITCHING TO GENVOYA) 30 tablet 0    glucose blood test strip by Percutaneous route      ibuprofen (MOTRIN) 800 mg tablet Take 800 mg by mouth every 8 (eight) hours      metFORMIN (GLUCOPHAGE) 500 mg tablet TAKE 1 TABLET BY MOUTH TWICE A DAY 60 tablet 2    polyethylene glycol (GLYCOLAX) powder Drink per MD recommendations      sulfamethoxazole-trimethoprim (BACTRIM DS) 800-160 mg per tablet Take 1 tablet by mouth      SUPER THIN LANCETS MISC by Percutaneous route      elvitegravir-cobicistat-emtricitabine-tenofovir (STRIBILD) [phone] mg Take 1 tablet by mouth      Na Sulfate-K Sulfate-Mg Sulf (SUPREP BOWEL PREP KIT) 17 5-3 13-1 6 GM/180ML SOLN Take 1 Bottle by mouth once for 1 dose 1 Bottle 0     No current facility-administered medications on file prior to visit  Allergies  No Known Allergies    Review of Systems   Constitutional: Negative for fever     HENT: Negative for sore throat  Eyes: Negative for visual disturbance  Respiratory: Positive for shortness of breath  Cardiovascular: Negative for chest pain  Gastrointestinal: Negative for nausea and vomiting  Endocrine: Negative for polyuria  Genitourinary: Negative for dysuria  Musculoskeletal: Negative for arthralgias  Skin: Positive for wound  Negative for rash  Allergic/Immunologic: Negative for environmental allergies  Neurological: Negative for dizziness  Hematological: Negative for adenopathy  Psychiatric/Behavioral: The patient is not nervous/anxious  Objective  Vitals:    02/26/18 0836   BP: 120/82   Pulse: 84   Temp: 97 6 °F (36 4 °C)       Physical Exam   Constitutional: She is oriented to person, place, and time  She appears well-developed and well-nourished  HENT:   Head: Normocephalic and atraumatic  Eyes: Pupils are equal, round, and reactive to light  Neck: Normal range of motion  No tracheal deviation present  Cardiovascular: Normal rate and regular rhythm  Pulmonary/Chest: Effort normal    Abdominal: Soft  There is no tenderness  Musculoskeletal: Normal range of motion  She exhibits no edema  Neurological: She is alert and oriented to person, place, and time  Skin: Skin is warm  No rash noted  Indurated scar tissue with healed drainage sites of right inguinal area, left inguinal area, and pilonidal area with mild tenderness but no fluctuance or erythema  Psychiatric: She has a normal mood and affect

## 2018-02-26 NOTE — ASSESSMENT & PLAN NOTE
CD4+ count improving  She has not been treated for a long period of time with antibiotics, and is only on Bactrim as a prophylactic dosage for low CD4+ count  Discussed with infectious disease Dr Andre Rodriguez, and they agree that she should continue Bactrim DS three times per week and then we can add on 3 months of doxycycline and will follow her up in our office  She is seeing Dr Casandra Martinez on 3/7/2018

## 2018-02-27 ENCOUNTER — HOSPITAL ENCOUNTER (OUTPATIENT)
Dept: MAMMOGRAPHY | Facility: MEDICAL CENTER | Age: 58
Discharge: HOME | End: 2018-02-27
Payer: COMMERCIAL

## 2018-02-27 DIAGNOSIS — Z12.31 ENCOUNTER FOR SCREENING MAMMOGRAM FOR MALIGNANT NEOPLASM OF BREAST: ICD-10-CM

## 2018-02-27 PROCEDURE — 77067 SCR MAMMO BI INCL CAD: CPT

## 2018-02-27 PROCEDURE — 77063 BREAST TOMOSYNTHESIS BI: CPT

## 2018-03-02 DIAGNOSIS — B20 HUMAN IMMUNODEFICIENCY VIRUS (HIV) DISEASE (HCC): ICD-10-CM

## 2018-03-20 DIAGNOSIS — B20 HIV (HUMAN IMMUNODEFICIENCY VIRUS INFECTION) (HCC): ICD-10-CM

## 2018-03-20 DIAGNOSIS — G62.9 NEUROPATHY: ICD-10-CM

## 2018-03-20 RX ORDER — ELVITEGRAVIR, COBICISTAT, EMTRICITABINE, AND TENOFOVIR ALAFENAMIDE 150; 150; 200; 10 MG/1; MG/1; MG/1; MG/1
TABLET ORAL
Qty: 30 TABLET | Refills: 3 | Status: SHIPPED | OUTPATIENT
Start: 2018-03-20 | End: 2018-07-11 | Stop reason: SDUPTHER

## 2018-03-20 RX ORDER — GABAPENTIN 800 MG/1
TABLET ORAL
Qty: 90 TABLET | Refills: 3 | Status: SHIPPED | OUTPATIENT
Start: 2018-03-20 | End: 2018-07-11 | Stop reason: SDUPTHER

## 2018-04-24 ENCOUNTER — APPOINTMENT (EMERGENCY)
Dept: RADIOLOGY | Facility: HOSPITAL | Age: 58
End: 2018-04-24
Payer: COMMERCIAL

## 2018-04-24 ENCOUNTER — HOSPITAL ENCOUNTER (EMERGENCY)
Facility: HOSPITAL | Age: 58
Discharge: HOME | End: 2018-04-24
Payer: COMMERCIAL

## 2018-04-24 ENCOUNTER — APPOINTMENT (EMERGENCY)
Dept: CT IMAGING | Facility: HOSPITAL | Age: 58
End: 2018-04-24
Payer: COMMERCIAL

## 2018-04-24 VITALS
HEART RATE: 127 BPM | DIASTOLIC BLOOD PRESSURE: 75 MMHG | SYSTOLIC BLOOD PRESSURE: 130 MMHG | OXYGEN SATURATION: 100 % | RESPIRATION RATE: 20 BRPM | BODY MASS INDEX: 27.46 KG/M2 | WEIGHT: 155 LBS | TEMPERATURE: 98.1 F

## 2018-04-24 DIAGNOSIS — S82.142A TIBIAL PLATEAU FRACTURE, LEFT, CLOSED, INITIAL ENCOUNTER: Primary | ICD-10-CM

## 2018-04-24 PROCEDURE — 99284 EMERGENCY DEPT VISIT MOD MDM: CPT

## 2018-04-24 PROCEDURE — 73700 CT LOWER EXTREMITY W/O DYE: CPT

## 2018-04-24 PROCEDURE — 73564 X-RAY EXAM KNEE 4 OR MORE: CPT

## 2018-04-24 RX ORDER — OXYCODONE HYDROCHLORIDE 5 MG/1
5 TABLET ORAL ONCE
Status: COMPLETED | OUTPATIENT
Start: 2018-04-24 | End: 2018-04-24

## 2018-04-24 RX ORDER — ACETAMINOPHEN 325 MG/1
650 TABLET ORAL ONCE
Status: COMPLETED | OUTPATIENT
Start: 2018-04-24 | End: 2018-04-24

## 2018-04-24 RX ORDER — TRAMADOL HYDROCHLORIDE 50 MG/1
50 TABLET ORAL EVERY 6 HOURS PRN
Qty: 30 TABLET | Refills: 0 | Status: SHIPPED | OUTPATIENT
Start: 2018-04-24 | End: 2018-05-04

## 2018-04-24 RX ADMIN — OXYCODONE HYDROCHLORIDE 5 MG: 5 TABLET ORAL at 12:12

## 2018-04-24 RX ADMIN — ACETAMINOPHEN 650 MG: 325 TABLET, FILM COATED ORAL at 11:14

## 2018-04-24 NOTE — ED NOTES
Patient had 400mg of ibuprofen at 8am today     [facility]  04/24/18 3799

## 2018-04-24 NOTE — DISCHARGE INSTRUCTIONS
Fractura de pierna   LO QUE NECESITA SABER:   Ricky fractura de pierna es ricky fractura en cualquiera de los 3 huesos largos de la pierna  El fémur es el hueso más marcellsu y va de arenas cadera a la rodilla  Buzzy Sumit y tibia son los 2 huesos en la parte inferior de arenas pierna que Claudia [place] de arenas rodilla al tobillo  INSTRUCCIONES SOBRE EL SANTO HOSPITALARIA:   Regrese a la kristine de emergencias si:   · Arenas pierna se siente cálida, sensible y adolorida  Se podría yonny inflamado y finney  · El dolor que siente en arenas pierna lesionada empeora aún después de descansar y jacquie medicamentos  · El yeso se moja o se daña  · El pie o los dedos del pie están entumecidos  · La piel en los dedos de la pierna lesionada se enrojece, está fría, o [place]  Comuníquese con arenas médico o arenas especialista en huesos si:   · Usted tiene fiebre  · El yeso o la abrazadera están muy apretados  · Hay manchas nuevas de ara o un mal olor que sale de por debajo del yeso  · Usted tiene dificultad para  arenas pierna o la dificultad que ya tenía empeora  · Usted tiene preguntas o inquietudes acerca de arenas condición o cuidado  Medicamentos:   · Un medicamento con receta para el dolor  podrían ser [place] Panda  Pregunte cómo jacquie estos medicamentos de ricky forma cervantes  · AINEs (Analgésicos antiinflamatorios no esteroides) sarah el ibuprofeno, ayudan a disminuir la inflamación, el dolor y la [place]  Los AINEs pueden causar sangrado estomacal o problemas renales en ciertas personas  Si usted merly un medicamento anticoagulante, siempre pregúntele a arenas médico si los NAZARIO son seguros para usted  Siempre mahogany la etiqueta de nicki medicamento y [place] instrucciones  · Acetaminofeno:  sharron el dolor y baja la fiebre  Está disponible sin receta médica  Pregunte la cantidad y la frecuencia con que debe tomarlos  Lists of hospitals in the [place] 645   [place]any las etiquetas de todos los demás medicamentos que esté usando para saber si también contienen acetaminofén, o pregunte a arenas médico o farmacéutico  El acetaminofén puede causar daño en el hígado cuando no se merly de forma correcta  No use más de 4 gramos (4000 miligramos) en total de acetaminofeno en un día  · [place] john medicamentos sarah se le haya indicado  Consulte con arenas médico si usted nelia que arenas medicamento no le está ayudando o si presenta efectos secundarios  Infórmele si es alérgico a algún medicamento  Mantenga ricky lista actualizada de los [place], las vitaminas y los productos herbales que merly  Incluya los siguientes datos de los medicamentos: cantidad, frecuencia y motivo de administración  Traiga con usted la lista o los envases de la píldoras a john citas de seguimiento  Lleve la lista de los medicamentos con usted en lulu de ricky emergencia  Cuidado del yeso o la férula:   · Fíjese todos los días si la piel alrededor del yeso o la férula está enrojecida o [place]  · No use objetos afilados o punzantes para rascarse la piel debajo del yeso o la férula  · No se quite la férula a menos que arenas médico lo autorice  Bañarse con un yeso o ricky férula:  No permita que el yeso o férula se moje  Antes de bañarse, cubra el yeso o la férula con ricky bolsa plástica  Pegue la bolsa a arenas piel encima de la férula o yeso para sellar el agua  Mantenga la pierna fuera del agua en lulu de fugas en la bolsa  Pregunte cuándo puede jacquie un baño o [place]  Cuidados personales:   · Descanse  la pierna sarah se le indique y evite actividades que causen dolor en las piernas  · Aplique hielo  en la pierna de 15 a 20 minutos cada hora o sarah se le indique  Use un paquete de hielo o ponga hielo molido dentro de [employer]  Cúbrala con ricky toalla  El hielo ayuda a evitar daño al tejido y a disminuir la inflamación y el dolor  · Eleve  arenas pierna por encima del nivel de arenas corazón con la mayor frecuencia posible  [place] va a disminuir inflamación y el dolor   Coloque arenas pierna sobre almohadas o cobijas para mantenerla elevada cómodamente  · Use muletas o un andador  según las indicaciones  Las [facility] Insurance ayudarán a caminar y quitar un poco de peso de la pierna lesionada hasta que sane  · Fisioterapia  podría recomendarse  Un fisioterapeuta le puede enseñar ejercicios para ayudarle a mejorar el movimiento y la fuerza, y para disminuir el dolor  Acuda a john consultas de control con gonzales médico o especialista en huesos según le indicaron:  Es posible que deba regresar para que le quiten la férula o el yeso  Es posible que necesite ricky radiografía de la pierna para comprobar qué tan alysa el hueso cline sanado  Anote john preguntas para que se acuerde de hacerlas tyler john visitas  © 2017 [address] Information is for End User's use only and may not be sold, redistributed or otherwise used for commercial purposes  All illustrations and images included in CareNotes® are the copyrighted property of A D A M , Inc  or Victoriano Vuong  Esta información es sólo para uso en educación  Gonzales intención no es darle un consejo médico sobre enfermedades o tratamientos  Colsulte con gonzales [place] farmacéutico antes de seguir cualquier régimen médico para saber si es seguro y efectivo para usted  Doxycycline (By mouth)   Doxycycline ([id number])  Treats and prevents infections  Also used to prevent malaria and treat rosacea or severe acne  This medicine is a tetracycline antibiotic  Brand Name(s): Acticlate, Adoxa, Adoxa Anibal 1/150, Avidoxy, Avidoxy DK, BenzoDox 30 Kit, BenzoDox 60 Kit, Doryx, Doryx MPC, Monodox, Morgidox [id number], Morgidox [id number] Kit, Morgidox 1x50MG, Morgidox 1x50MG Kit, Morgidox [id number]   There may be other brand names for this medicine  When This Medicine Should Not Be Used: This medicine is not right for everyone   Do not use it if you had an allergic reaction to doxycycline or another tetracycline antibiotic, or if you are pregnant or breastfeeding  How to Use This Medicine:   Capsule, Delayed Release Capsule, Long Acting Capsule, Liquid, Tablet, Delayed Release Tablet  · Your doctor will tell you how much medicine to use  Do not use more than directed  · Ask your pharmacist or doctor if you need to take this medicine with or without food  Some forms can be taken with food or milk, but others must be taken on an empty stomach  · Tablets: You may take this medicine with food or milk to avoid stomach irritation  To break a tablet, hold the tablet between your thumb and index fingers close to the appropriate scored line  Then, apply enough pressure to snap the tablet segments apart  Do not use the tablet if it does not break on the scored lines  · Delayed-release tablets: You may also take this medicine by sprinkling the broken tablets onto room-temperature applesauce  Swallow this mixture right away; do not chew  Do not store the mixture for later use  · Oracea® capsules: This medicine must be taken on an empty stomach, at least 1 hour before or 2 hours after a meal   · Capsule: Swallow whole  Do not break, crush, chew, or open it  · Oral liquid: Shake the bottle well just before each use  Measure the oral liquid medicine with a marked measuring spoon, oral syringe, or medicine cup  · Take all of the medicine in your prescription to clear up your infection, even if you feel better after the first few doses  · Drink plenty of fluids to avoid throat problems, if you take the capsule or tablet form  · Malaria prevention: Start taking the medicine 1 or 2 days before you travel  Take the medicine every day during your trip  Keep taking it for 4 weeks after you return  However, do not use the medicine for longer than 4 months  · Do not use this medicine for more than 9 months if you are using it for rosacea  · Use only the brand of medicine your doctor prescribed  Other brands may not work the same way    · Read and follow the patient instructions that come with this medicine  Talk to your doctor or pharmacist if you have any questions  · Missed dose: Take a dose as soon as you remember  If it is almost time for your next dose, wait until then and take a regular dose  Do not take extra medicine to make up for a missed dose  · Store the medicine in a closed container at room temperature, away from heat, moisture, and direct light  Do not freeze the oral liquid  Drugs and Foods to Avoid:   Ask your doctor or pharmacist before using any other medicine, including over-the-counter medicines, vitamins, and herbal products  · Some foods and medicines can affect how doxycycline works  Tell your doctor if you are using any of the following:  ¨ Bismuth subsalicylate, isotretinoin or other acne medicines, acitretin or other medicine to treat psoriasis  ¨ Penicillin antibiotic, birth control pills, medicine for seizures (such as carbamazepine, phenobarbital, phenytoin), stomach medicine, a blood thinner (such as warfarin), or any medicine that contains aluminum, calcium, or iron (such an antacid or vitamin supplement)  Warnings While Using This Medicine:   · This medicine may cause birth defects if either partner is using it during conception or pregnancy  Tell your doctor right away if you or your partner becomes pregnant  Birth control pills may not work as well when used with this medicine  Use a second form of birth control to keep from getting pregnant  · Tell your doctor if you have kidney disease, liver disease, asthma, or an allergy to sulfites  Tell your doctor if you had surgery on your stomach, or if you have a history of yeast infections  · This medicine may cause the following problems:  ¨ Permanent change in tooth color (in children younger than 6years old)  ¨ Increased pressure inside the head  ¨ Yeast infection  ¨ Immune system problems  · This medicine can cause diarrhea   Call your doctor if the diarrhea becomes severe, does not stop, or is bloody  Do not take any medicine to stop diarrhea until you have talked to your doctor  Diarrhea can occur 2 months or more after you stop taking this medicine  · This medicine may make your skin more sensitive to sunlight  Wear sunscreen  Do not use sunlamps or tanning beds  · Tell any doctor or dentist who treats you that you are using this medicine  This medicine may affect certain medical test results  · Call your doctor if your symptoms do not improve or if they get worse  · Keep all medicine out of the reach of children  Never share your medicine with anyone  Possible Side Effects While Using This Medicine:   Call your doctor right away if you notice any of these side effects:  · Allergic reaction: Itching or hives, swelling in your face or hands, swelling or tingling in your mouth or throat, chest tightness, trouble breathing  · Blistering, peeling, red skin rash  · Burning, pain, or irritation in your upper stomach or throat  · Diarrhea that may contain blood  · Fever, chills, cough, runny or stuffy nose, sore throat, and body aches  · Joint pain, fever, rash, and unusual tiredness or weakness  · Severe headache, dizziness, or vision changes  If you notice these less serious side effects, talk with your doctor:   · Darkening of your skin, scars, teeth, or gums  · Sores or white patches on your lips, mouth, or throat  If you notice other side effects that you think are caused by this medicine, tell your doctor  Call your doctor for medical advice about side effects  You may report side effects to FDA at 5-086-FDA-7929  © 2017 [address] Information is for End User's use only and may not be sold, redistributed or otherwise used for commercial purposes  The above information is an  only  It is not intended as medical advice for individual conditions or treatments   Talk to your doctor, nurse or pharmacist before following any medical regimen to see if it is safe and effective for you  Inmovilizador de rodilla   LO QUE NECESITA SABER:   Un inmovilizador de rodilla limita el movimiento de la rodilla  Se [place] después de ricky lesión o ricky cirugía para ayudar en la recuperación de la rodilla, músculos o tendones  INSTRUCCIONES SOBRE EL SANTO HOSPITALARIA:   Kirk Veto de usar un inmovilizador de rodilla:   · Solicite que arenas médico le suministre un inmovilizador de rodilla que se ajuste adecuadamente  Es muy importante que arenas inmovilizador de rodilla sea de la talla [place] para usted y que le [place]  · Use arenas inmovilizador de rodilla según le indicaron  Lo puede usar [employer]  Con frecuencia revise que el inmovilizador de rodilla le quede alyas  Si no le [place] alysa o si se le mueve de lugar, le podría causar más daño  · Use las muletas sarah se le indica  Es posible que tenga que evitar sobrecargar arenas Samuel Guy  Arenas médico le indicara si usted necesita muletas y por [place]  · Inspeccione arenas inmovilizador de rodilla con frecuencia  No use arenas inmovilizador de rodilla si está dañado o roto  Usted necesita reemplazarlo si está muy gastado  · Pregunte a arenas médico sobre cómo debe cuidar arenas inmovilizador de rodilla  Es posible que lo pueda renée a mano con agua y un [place]  No lo coloque en la lavadora ni en la secadora  · Vaya a terapia física según indicaciones  Un fisioterapeuta puede enseñarle a fortalecer los músculos de arenas pierna para ayudar a la recuperación de arenas rodilla  Pregúntele a arenas [place] vitaminas y minerales son adecuados para usted  · El dolor en arenas rodilla se empeora cuando [place] arenas inmovilizador de rodilla  · Arenas piel está adolorida o en carne viva después de usar arenas inmovilizador de rodilla  · Arenas pierna se siente entumecida o inflamada mientras está usando arenas inmovilizador de rodilla  · El inmovilizador de rodilla está dañado       · Usted tiene preguntas o inquietudes acerca de arenas condición o cuidado  Regrese a la kristine de emergencias si:   · Usted tiene dolor o inflamación severos en gonzales kurtna o jasmyne  © 2017 [address] Information is for End User's use only and may not be sold, redistributed or otherwise used for commercial purposes  All illustrations and images included in CareNotes® are the copyrighted property of A D A M , Inc  or Victoriano Vuong  Esta información es sólo para uso en educación  Gonzales intención no es darle un consejo médico sobre enfermedades o tratamientos  Colsulte con gonzales [place] Cherelle farmacéutico antes de seguir cualquier régimen médico para saber si es seguro y efectivo para usted  Instrucciones para el uso de Batkrz[place]ztof starkeyalejandra   LO QUE NECESITA SABER:   Las muletas son instrumentos que proporcionan apoyo al caminar  Usted podría necesitar 1 o 2 muletas para ayudarlo a apoyar gonzales peso corporal  Es posible que usted necesite muletas si tuvo ricky cirugía o ricky lesión que afecta gonzales capacidad de caminar  INSTRUCCIONES SOBRE EL SANTO HOSPITALARIA:   Cómo usar las [place] batista con precaución:   · Apoye gonzales peso con los brazos y [employer]  No apoye gonzales peso [employer]  [place] podría [employer] [facility] nervios que están debajo de [place]  Mantenga gonzales codo doblado cuando la muleta esté colocada bajo gonzales brazo  · Camine lento y con [address]  [place] and [place] y baje escaleras y rampas lentamente y deténgase a descansar cuando se sienta cansado  Levántese con calma para sentarse o pararse  [place] le ayudará a evitar mareos y VIRA [place]  Use john muletas solamente en suelo firme  Tenga precaución cuando camine sobre el hielo o la josue  Los pisos mojados o con cera y los pisos de cemento liso pueden estar resbalosos  Tenga cuidado con los tapetes pequeños o cables  Cómo caminar con muletas:   · Coloque ambas muletas debajo de john brazos, y coloque john juventino en las agarraderas de las [place]  Coloque john muletas ligeramente en frente de usted       · La parte superior de las muletas deberían estar aproximadamente 2 dedos de lado a lado (alrededor de 1½ pulgadas) por debajo de las [place]  Coloque arenas peso [employer]  La parte superior de las muletas no debería presionar john [place]  · Si ricky de las piernas está [place], [facility] Cent[facility] fuera del suelo doblando la rodilla  · [employer] y [address] un paso adelante de usted  Coloque los extremos de goma de las muletas firmemente sobre el suelo  Mueva el pie obdulio entre las [place]  Coloque clark talón en el suelo vanessa  · Si usted Luis Manuel Gutierrez para equilibrarse, [place] arenas pie derecho y la muleta izquierda hacia vaishali  Después mueva el pie marlin y la muleta derecha hacia vaishali  Continúe caminando de ésta forma        óCómo subir las escaleras con muletas:   · Colóquese en frente de las escaleras  Coloque las [place] cerca del primer escalón  · Empújese con las muletas y coloque la pierna kellee en el primer escalón  · Coloque raenas peso en la pierna kellee que ya está en el primer escalón  Suba ambas muletas y la pierna lesionada sobre el escalón al [employer]  · Cuando usted se esté tomando de la barandilla con un brazo, coloque ambas muletas debajo del otro brazo  Use la barandilla para ayudarse a [employer]  Cómo bajar las escaleras con muletas:   · Párese con los dedos de los pies de la pierna kellee, cerca de la orilla del escalón  · Flexione la rodilla de arenas pierna kellee  Baje ambas muletas lentamente junto con la pierna lesionada al otro escalón  · Apóyese en las muletas  Baje lentamente la pierna kellee al mismo escalón  · Coloque ambas muletas debajo de un brazo mientras usted se sostiene con la barandilla con el otro brazo  Cómo sentarse en ricky silla con las muletas:   · Colóquese dando la espalda a la silla hasta que sienta la [place] de ésta en la parte de atrás de las piernas  Mantenga la pierna lesionada hacia adelante  · Quite las muletas debajo de john brazos  Siéntese mientras dobla la rodilla kellee  Cómo levantarse de ricky silla con las muletas:   · Siéntese en la orilla de la silla  · Empújese con lyla juventino usando las muletas o los brazos de la silla  Coloque arenas peso en la pierna kellee mientras se levanta  · Mantenga la pierna lesionada doblada y fuera del suelo  Pregúntele a arenas [place] vitaminas y minerales son adecuados para usted  · Las [place] no son de Creve coeur  · [employer] es mas larga que la [place]  · Lyla muletas se rompen o se pierden  · Las puntas de goma de lyla muletas se dividen o se sueltan  · Le salen ampollas o callosidad dolorosa en lyla juventino o en lyla axilas  · Arenas axila se pone celsa, adolorida o tiene protuberancias o granos  · Los músculos de arenas brazo se debilitan entre más tiempo [place] las [place]  · Usted tiene preguntas o inquietudes acerca de arenas condición o cuidado  Regrese a la kristine de emergencias si:   · Arenas mano o brazo se entumecen repentinamente  · Lyla dedos se sienten fríos o con dolor de calambre  © 2017 [address] Information is for End User's use only and may not be sold, redistributed or otherwise used for commercial purposes  All illustrations and images included in CareNotes® are the copyrighted property of A D A M , Inc  or [facility]  Esta información es sólo para uso en educación  Arenas intención no es darle un consejo médico sobre enfermedades o tratamientos  Colsulte con arenas Yuly Mason farmacéutico antes de seguir cualquier régimen médico para saber si es seguro y efectivo para usted

## 2018-04-24 NOTE — ED PROVIDER NOTES
History  Chief Complaint   Patient presents with   Amina Chacon Fall     pt went to throw out trash yesterday tripped over and fell on sidewalk  c/o left knee pain today and swelling, unable to put weight  pt took 400mg ibuprofen prior to arrival      65 y o  Female presents for evaluation of left knee pain x 24 hours  States yesterday while taking trash out she tripped on the curb landing on her knee  + pain, swelling, decreased ROM and abrasion on knee cap  Denies hitting head, LOC  Notes pain worse with ROM, weight bearing and ambulation  Denies numbness, tingling or loss of strength  States pain radiates up and down leg when she tries to walk to bear weight on it  Prior to Admission Medications   Prescriptions Last Dose Informant Patient Reported? Taking? Blood Glucose Monitoring Suppl (TRUE METRIX METER) GIL Unknown at Unknown time Self Yes No   Sig: daily     GENVOYA tablet 4/24/2018 at Unknown time  No Yes   Sig: TAKE 1 TABLET BY MOUTH DAILY   Na Sulfate-K Sulfate-Mg Sulf (SUPREP BOWEL PREP KIT) 17 5-3 13-1 6 GM/180ML SOLN   No No   Sig: Take 1 Bottle by mouth once for 1 dose   SUPER THIN LANCETS MISC Unknown at Unknown time Self Yes No   Sig: by Percutaneous route   albuterol (2 5 mg/3 mL) 0 083 % nebulizer solution Unknown at Unknown time Self Yes No   Sig: Inhale 3 mL   albuterol (PROVENTIL HFA,VENTOLIN HFA) 90 mcg/act inhaler Unknown at Unknown time Self Yes No   Sig: Inhale 2 puffs   bisacodyl (DULCOLAX) 5 mg EC tablet Unknown at Unknown time Self Yes No   Sig: Take 30 mg by mouth   budesonide-formoterol (SYMBICORT) 160-4 5 mcg/act inhaler Unknown at Unknown time Self Yes No   Sig: Inhale 2 puffs   clotrimazole (LOTRIMIN) 1 % cream Unknown at Unknown time Self Yes No   Sig: Apply topically 2 (two) times a day   doxycycline (ADOXA) 100 MG tablet Unknown at Unknown time  No No   Sig: Take 1 tablet (100 mg total) by mouth daily for 90 days   elvitegravir-cobicistat-emtricitabine-tenofovir (STRIBILD) 019-927-846-300 mg Unknown at Unknown time Self Yes No   Sig: Take 1 tablet by mouth   gabapentin (NEURONTIN) 800 mg tablet 4/23/2018 at Unknown time  No Yes   Sig: TAKE 1 TABLET BY MOUTH THREE TIMES A DAY   glucose blood test strip Unknown at Unknown time Self Yes No   Sig: by Percutaneous route   ibuprofen (MOTRIN) 800 mg tablet Unknown at Unknown time Self Yes No   Sig: Take 800 mg by mouth every 8 (eight) hours   metFORMIN (GLUCOPHAGE) 500 mg tablet 4/24/2018 at Unknown time Self No Yes   Sig: TAKE 1 TABLET BY MOUTH TWICE A DAY   polyethylene glycol (GLYCOLAX) powder Unknown at Unknown time Self Yes No   Sig: Drink per MD recommendations   sulfaSALAzine (AZULFIDINE-EN) 500 mg EC tablet Unknown at Unknown time Self Yes No      Facility-Administered Medications: None       Past Medical History:   Diagnosis Date    Arthritis     Cellulitis     Colon polyp     Diabetes mellitus (HCC)     Fibromyalgia     Fibromyalgia     HIV (human immunodeficiency virus infection) (HCC)     Hyperlipidemia     Pinguecula of both eyes     Presbyopia of both eyes        Past Surgical History:   Procedure Laterality Date    BREAST SURGERY      lumpectomy    COLONOSCOPY      last year, polyps, every 6 months    HYSTERECTOMY      INCISION AND DRAINAGE PERIRECTAL ABSCESS      TUBAL LIGATION         Family History   Problem Relation Age of Onset    Heart disease Mother     Diabetes Mother      type 1    Hypertension Mother     Cancer Sister      I have reviewed and agree with the history as documented  Social History   Substance Use Topics    Smoking status: Current Every Day Smoker     Types: Cigarettes    Smokeless tobacco: Never Used    Alcohol use Yes      Comment: occasionally         Review of Systems   Musculoskeletal: Positive for arthralgias, gait problem and joint swelling  All other systems reviewed and are negative        Physical Exam  ED Triage Vitals [04/24/18 1036]   Temperature Pulse Respirations Blood Pressure SpO2   98 1 °F (36 7 °C) (!) 127 20 130/75 100 %      Temp Source Heart Rate Source Patient Position - Orthostatic VS BP Location FiO2 (%)   Oral Monitor -- -- --      Pain Score       Worst Possible Pain           Orthostatic Vital Signs  Vitals:    04/24/18 1036   BP: 130/75   Pulse: (!) 127       Physical Exam   Constitutional: She is oriented to person, place, and time  She appears well-developed and well-nourished  HENT:   Head: Normocephalic and atraumatic  Right Ear: External ear normal    Left Ear: External ear normal    Eyes: Conjunctivae are normal    Neck: Normal range of motion  Cardiovascular: Normal rate and regular rhythm  Pulmonary/Chest: Effort normal and breath sounds normal    Abdominal: Soft  Bowel sounds are normal    Musculoskeletal: She exhibits edema and tenderness  Left knee: She exhibits decreased range of motion, swelling, ecchymosis and bony tenderness  Tenderness found  Medial joint line and lateral joint line tenderness noted  Neurological: She is alert and oriented to person, place, and time  Skin: Skin is warm  Capillary refill takes less than 2 seconds  Psychiatric: She has a normal mood and affect  Her behavior is normal    Nursing note and vitals reviewed  ED Medications  Medications   acetaminophen (TYLENOL) tablet 650 mg (650 mg Oral Given 4/24/18 1114)       Diagnostic Studies  Results Reviewed     None                 XR knee 4+ vw left injury    (Results Pending)              Procedures  Procedures       Phone Contacts  ED Phone Contact    ED Course  ED Course                                MDM  Number of Diagnoses or Management Options  Tibial plateau fracture, left, closed, initial encounter: new and requires workup  Diagnosis management comments: Spoke with Orthopedics on-call requesting CT scan while Pt is here in Ed for further evaluation   Will order CT scan, still likely DC home with ortho follow up  Ct obtained, not showing fracture, will keep immobilized and nonweightbearing until Follow up with Orthopedics outpt  Amount and/or Complexity of Data Reviewed  Tests in the radiology section of CPT®: ordered and reviewed      CritCare Time    Disposition  Final diagnoses:   None     ED Disposition     None      Follow-up Information    None       Patient's Medications   Discharge Prescriptions    No medications on file     No discharge procedures on file      ED Provider  Electronically Signed by           López Muhammad PA-C  05/03/18 5460

## 2018-04-27 ENCOUNTER — OFFICE VISIT (OUTPATIENT)
Dept: OBGYN CLINIC | Facility: MEDICAL CENTER | Age: 58
End: 2018-04-27
Payer: COMMERCIAL

## 2018-04-27 VITALS — HEART RATE: 93 BPM | SYSTOLIC BLOOD PRESSURE: 137 MMHG | DIASTOLIC BLOOD PRESSURE: 86 MMHG

## 2018-04-27 DIAGNOSIS — M25.462 EFFUSION OF LEFT KNEE: Primary | ICD-10-CM

## 2018-04-27 PROCEDURE — 99204 OFFICE O/P NEW MOD 45 MIN: CPT | Performed by: ORTHOPAEDIC SURGERY

## 2018-04-27 RX ORDER — AMMONIUM LACTATE 12 G/100G
LOTION TOPICAL
COMMUNITY
Start: 2018-03-20 | End: 2021-05-28 | Stop reason: ALTCHOICE

## 2018-04-27 NOTE — PROGRESS NOTES
Orthopaedic Surgery - Office Note  Shania Deleon (40 y o  female)   : 1960   MRN: [id number]  Encounter Date: 2018    Chief Complaint   Patient presents with    Left Knee - Pain       Assessment / Plan  Traumatic Left knee pain and swelling    · Due to patient's limited exam we will obtain MRI study of the left knee at this time  · Continue with ice and analgesics as needed  · Continue with crutches and brace as needed at this time  Return for Follow-up after MRI study  History of Present Illness  Hellen Nichols is a 65 y o  female who presents 2 days following fall while taking out her trash at home she landed on her left knee and immediately felt pain afterwards  She proceeded to [facility] emergency room where she had x-rays and a CT scan and was sent home in immobilizer  She did receive tramadol 1 pill from the emergency room which she fell helped but then she did have anymore  She has continued with the immobilizer since and is here for evaluation  She denies any previous injuries or issues with her left knee at this time  She describes most of her pain at this time being in the area of the distal quads which she also notably has increased swelling  She does have some soreness on the joint lines and posteriorly at this time  The patient is denying left groin pain or hip pain at this time, she is also denying back pain at this time  Review of Systems  Pertinent items are noted in HPI  All other systems were reviewed and are negative  Physical Exam    /86   Pulse 93   Cons: Appears well  No apparent distress  Psych: Alert  Oriented x3  Mood and affect normal   Eyes: PERRLA, EOMI  Resp: Normal effort  No audible wheezing or stridor  CV: Palpable pulse  No discernable arrhythmia  No LE edema  Lymph:  No palpable cervical, axillary, or inguinal lymphadenopathy  Skin: Warm  No palpable masses  No visible lesions  Neuro: Normal muscle tone  Normal and symmetric DTR's  Left Knee Exam  Alignment:  Normal resting hip posture  Normal knee alignment  Normal ankle alignment  Inspection:  Moderate knee swelling  No erythema  No deformity  Patient does have abrasion on the anterior part of her left knee  Palpation:  Severe tenderness at The distal quad tendon over the area of the fusion  The patient also has mild tenderness over the lateral and medial joint lines and moderate tenderness in the posterior aspect of the knee  There is no pain at this time over the left hip laterally or the groin area     ROM:  Knee Extension 0°  Knee Flexion 40° with severe pain past that point  I am able to externally and internally rotate the patient's left hip and she has no pain in the hip area  Strength:  Patient able to do straight leg raise and extend her knee but with discomfort  Stability:  Knee is stable to medial lateral stress but due to apprehension will not let us test ACL or PCL function  Tests:  No pertinent positive or negative tests  Unable to perform test due to pain and swelling  Patella:  Patella tracks medially and there is no tenderness to palpation of the patella  Neurovascular:  Sensation intact in DP/SP/Gonzales/Sa/T nerve distributions  Sensation intact in all digital nerve distributions  Toes warm and perfused  Gait:  Not tested  Studies Reviewed  XR of left knee - Show moderate size joint effusion and questionable linear lucency in the lateral tibial plateau  CT of left knee - Shows no fracture specifically in the lateral tibial plateau there is a moderate joint effusion and arthritic changes throughout the joint    Procedures  No procedures today  Medical, Surgical, Family, and Social History  The patient's medical history, family history, and social history, were reviewed and updated as appropriate      Past Medical History:   Diagnosis Date    Arthritis     Cellulitis     Colon polyp     Diabetes mellitus ([facility] [address] [facility]     Fibromyalgia     Fibromyalgia     [facility] immunodeficiency [facility] [address] [facility]     Hyperlipidemia     Pinguecula of both eyes     Presbyopia of both eyes        Past Surgical History:   Procedure Laterality Date    BREAST SURGERY      lumpectomy    COLONOSCOPY      last year, polyps, every 6 months    HYSTERECTOMY      INCISION AND DRAINAGE PERIRECTAL ABSCESS      TUBAL LIGATION         Family History   Problem Relation Age of Onset    Heart disease Mother     Diabetes Mother      type 1    Hypertension Mother     Cancer Sister        Social History     Occupational History    Not on file  Social History Main Topics    Smoking status: Current Every Day Smoker     Types: Cigarettes    Smokeless tobacco: Never Used    Alcohol use Yes      Comment: occasionally     Drug use: No    Sexual activity: No       No Known Allergies      Current Outpatient Prescriptions:     albuterol (2 5 mg/3 mL) 0 083 % nebulizer solution, Inhale 3 mL, Disp: , Rfl:     albuterol (PROVENTIL HFA,VENTOLIN HFA) 90 mcg/act inhaler, Inhale 2 puffs, Disp: , Rfl:     ammonium lactate (LAC-HYDRIN) 12 % lotion, , Disp: , Rfl:     bisacodyl (DULCOLAX) 5 mg EC tablet, Take 30 mg by mouth, Disp: , Rfl:     Blood Glucose Monitoring Suppl (TRUE METRIX METER) GIL, daily  , Disp: , Rfl:     budesonide-formoterol (SYMBICORT) 160-4 5 mcg/act inhaler, Inhale 2 puffs, Disp: , Rfl:     clotrimazole (LOTRIMIN) 1 % cream, Apply topically 2 (two) times a day, Disp: , Rfl:     doxycycline (ADOXA) 100 MG tablet, Take 1 tablet (100 mg total) by mouth daily for 90 days, Disp: 90 tablet, Rfl: 0    elvitegravir-cobicistat-emtricitabine-tenofovir (STRIBILD) [id number] mg, Take 1 tablet by mouth, Disp: , Rfl:     gabapentin (NEURONTIN) 800 mg tablet, TAKE 1 TABLET BY MOUTH THREE TIMES A DAY, Disp: 90 tablet, Rfl: 3    GENVOYA tablet, TAKE 1 TABLET BY MOUTH DAILY, Disp: 30 tablet, Rfl: 3    glucose blood test strip, by Percutaneous route, Disp: , Rfl:     ibuprofen (MOTRIN) 800 mg tablet, Take 800 mg by mouth every 8 (eight) hours, Disp: , Rfl:     metFORMIN (GLUCOPHAGE) 500 mg tablet, TAKE 1 TABLET BY MOUTH TWICE A DAY, Disp: 60 tablet, Rfl: 2    polyethylene glycol (GLYCOLAX) powder, Drink per MD recommendations, Disp: , Rfl:     sulfaSALAzine (AZULFIDINE-EN) 500 mg EC tablet, , Disp: , Rfl:     SUPER THIN LANCETS MISC, by Percutaneous route, Disp: , Rfl:     traMADol (ULTRAM) 50 mg tablet, Take 1 tablet (50 mg total) by mouth every 6 (six) hours as needed for moderate pain for up to 10 days, Disp: 30 tablet, Rfl: 0    Na Sulfate-K Sulfate-Mg Sulf (SUPREP BOWEL PREP KIT) 17 5-3 13-1 6 GM/180ML SOLN, Take 1 Bottle by mouth once for 1 dose, Disp: 1 Bottle, Rfl: 0      Augusto Gilbert PA-C    Scribe Attestation    I,:    am acting as a scribe while in the presence of the attending physician :        I,:    personally performed the services described in this documentation    as scribed in my presence :

## 2018-05-02 DIAGNOSIS — B20 HIV (HUMAN IMMUNODEFICIENCY VIRUS INFECTION) (HCC): Primary | ICD-10-CM

## 2018-05-03 ENCOUNTER — APPOINTMENT (OUTPATIENT)
Dept: LAB | Facility: CLINIC | Age: 58
End: 2018-05-03
Payer: COMMERCIAL

## 2018-05-03 DIAGNOSIS — B20 HIV (HUMAN IMMUNODEFICIENCY VIRUS INFECTION) (HCC): ICD-10-CM

## 2018-05-03 LAB
ALBUMIN SERPL BCP-MCNC: 3.4 G/DL (ref 3.5–5)
ALP SERPL-CCNC: 77 U/L (ref 46–116)
ALT SERPL W P-5'-P-CCNC: 18 U/L (ref 12–78)
ANION GAP SERPL CALCULATED.3IONS-SCNC: 8 MMOL/L (ref 4–13)
AST SERPL W P-5'-P-CCNC: 10 U/L (ref 5–45)
BASOPHILS # BLD AUTO: 0.02 THOUSANDS/ΜL (ref 0–0.1)
BASOPHILS NFR BLD AUTO: 0 % (ref 0–1)
BILIRUB SERPL-MCNC: 0.58 MG/DL (ref 0.2–1)
BUN SERPL-MCNC: 7 MG/DL (ref 5–25)
CALCIUM SERPL-MCNC: 9.2 MG/DL (ref 8.3–10.1)
CHLORIDE SERPL-SCNC: 101 MMOL/L (ref 100–108)
CO2 SERPL-SCNC: 27 MMOL/L (ref 21–32)
CREAT SERPL-MCNC: 0.67 MG/DL (ref 0.6–1.3)
EOSINOPHIL # BLD AUTO: 0.1 THOUSAND/ΜL (ref 0–0.61)
EOSINOPHIL NFR BLD AUTO: 2 % (ref 0–6)
ERYTHROCYTE [DISTWIDTH] IN BLOOD BY AUTOMATED COUNT: 13.4 % (ref 11.6–15.1)
GFR SERPL CREATININE-BSD FRML MDRD: 99 ML/MIN/1.73SQ M
GLUCOSE P FAST SERPL-MCNC: 196 MG/DL (ref 65–99)
HCT VFR BLD AUTO: 44 % (ref 34.8–46.1)
HGB BLD-MCNC: 13.7 G/DL (ref 11.5–15.4)
LYMPHOCYTES # BLD AUTO: 1.23 THOUSANDS/ΜL (ref 0.6–4.47)
LYMPHOCYTES NFR BLD AUTO: 22 % (ref 14–44)
MCH RBC QN AUTO: 32.4 PG (ref 26.8–34.3)
MCHC RBC AUTO-ENTMCNC: 31.1 G/DL (ref 31.4–37.4)
MCV RBC AUTO: 104 FL (ref 82–98)
MONOCYTES # BLD AUTO: 0.55 THOUSAND/ΜL (ref 0.17–1.22)
MONOCYTES NFR BLD AUTO: 10 % (ref 4–12)
NEUTROPHILS # BLD AUTO: 3.69 THOUSANDS/ΜL (ref 1.85–7.62)
NEUTS SEG NFR BLD AUTO: 66 % (ref 43–75)
NRBC BLD AUTO-RTO: 0 /100 WBCS
PLATELET # BLD AUTO: 350 THOUSANDS/UL (ref 149–390)
PMV BLD AUTO: 9.8 FL (ref 8.9–12.7)
POTASSIUM SERPL-SCNC: 3.9 MMOL/L (ref 3.5–5.3)
PROT SERPL-MCNC: 7.9 G/DL (ref 6.4–8.2)
RBC # BLD AUTO: 4.23 MILLION/UL (ref 3.81–5.12)
SODIUM SERPL-SCNC: 136 MMOL/L (ref 136–145)
WBC # BLD AUTO: 5.6 THOUSAND/UL (ref 4.31–10.16)

## 2018-05-03 PROCEDURE — 80053 COMPREHEN METABOLIC PANEL: CPT

## 2018-05-03 PROCEDURE — 87536 HIV-1 QUANT&REVRSE TRNSCRPJ: CPT

## 2018-05-03 PROCEDURE — 36415 COLL VENOUS BLD VENIPUNCTURE: CPT

## 2018-05-03 PROCEDURE — 85025 COMPLETE CBC W/AUTO DIFF WBC: CPT

## 2018-05-03 PROCEDURE — 86361 T CELL ABSOLUTE COUNT: CPT

## 2018-05-04 LAB
BASOPHILS # BLD AUTO: 0 X10E3/UL (ref 0–0.2)
BASOPHILS NFR BLD AUTO: 1 %
CD3+CD4+ CELLS # BLD: 234 /UL (ref 359–1519)
CD3+CD4+ CELLS NFR BLD: 18 % (ref 30.8–58.5)
EOSINOPHIL # BLD AUTO: 0.1 X10E3/UL (ref 0–0.4)
EOSINOPHIL NFR BLD AUTO: 2 %
ERYTHROCYTE [DISTWIDTH] IN BLOOD BY AUTOMATED COUNT: 13.6 % (ref 12.3–15.4)
HCT VFR BLD AUTO: 41.1 % (ref 34–46.6)
HGB BLD-MCNC: 13.5 G/DL (ref 11.1–15.9)
IMM GRANULOCYTES # BLD: 0 X10E3/UL (ref 0–0.1)
IMM GRANULOCYTES NFR BLD: 0 %
LYMPHOCYTES # BLD AUTO: 1.3 X10E3/UL (ref 0.7–3.1)
LYMPHOCYTES NFR BLD AUTO: 23 %
MCH RBC QN AUTO: 32.7 PG (ref 26.6–33)
MCHC RBC AUTO-ENTMCNC: 32.8 G/DL (ref 31.5–35.7)
MCV RBC AUTO: 100 FL (ref 79–97)
MONOCYTES # BLD AUTO: 0.5 X10E3/UL (ref 0.1–0.9)
MONOCYTES NFR BLD AUTO: 10 %
NEUTROPHILS # BLD AUTO: 3.6 X10E3/UL (ref 1.4–7)
NEUTROPHILS NFR BLD AUTO: 64 %
PLATELET # BLD AUTO: 336 X10E3/UL (ref 150–379)
RBC # BLD AUTO: 4.13 X10E6/UL (ref 3.77–5.28)
WBC # BLD AUTO: 5.5 X10E3/UL (ref 3.4–10.8)

## 2018-05-05 LAB
HIV1 RNA # SERPL NAA+PROBE: <20 COPIES/ML
HIV1 RNA SERPL NAA+PROBE-LOG#: NORMAL LOG10COPY/ML

## 2018-05-06 ENCOUNTER — HOSPITAL ENCOUNTER (OUTPATIENT)
Dept: MRI IMAGING | Facility: HOSPITAL | Age: 58
Discharge: HOME | End: 2018-05-06
Payer: COMMERCIAL

## 2018-05-06 DIAGNOSIS — M25.462 EFFUSION OF LEFT KNEE: ICD-10-CM

## 2018-05-06 PROCEDURE — 73721 MRI JNT OF LWR EXTRE W/O DYE: CPT

## 2018-05-08 ENCOUNTER — OFFICE VISIT (OUTPATIENT)
Dept: OBGYN CLINIC | Facility: MEDICAL CENTER | Age: 58
End: 2018-05-08
Payer: COMMERCIAL

## 2018-05-08 VITALS — HEART RATE: 90 BPM | SYSTOLIC BLOOD PRESSURE: 128 MMHG | DIASTOLIC BLOOD PRESSURE: 83 MMHG

## 2018-05-08 DIAGNOSIS — M25.462 EFFUSION OF LEFT KNEE: Primary | ICD-10-CM

## 2018-05-08 PROCEDURE — 99214 OFFICE O/P EST MOD 30 MIN: CPT | Performed by: ORTHOPAEDIC SURGERY

## 2018-05-08 NOTE — PROGRESS NOTES
Orthopaedic Surgery - Office Note  Astrid Montanez (2 y o  female)   : 1960   MRN: [id number]  Encounter Date: 2018    Chief Complaint   Patient presents with    Left Knee - Follow-up       Assessment / Plan  R knee pain     · begin outpatient therapy for L knee ROM and strengthening   · Patient will be given a short hinged knee brace to wear today   Return in about 6 weeks (around 2018) for Recheck  History of Present Illness  Kathryn Fajardo is a 65 y o  female who presents as a follow up for her L knee pain and swelling  Pt has been walking with a knee immbolizer brace however she states when she is at home and tries to walk without the brace she notices that her knee will give out on her and she is not able to walk  Pt states that her swelling has not gone done since her last visit  Pt is using, ice and tramadol with only partial relief  Pt c/o numbness and tingling into her toes  Pts niece is here translating for her today  Review of Systems  Pertinent items are noted in HPI  All other systems were reviewed and are negative  Physical Exam  /83   Pulse 90   Cons: Appears well  No apparent distress  Psych: Alert  Oriented x3  Mood and affect normal   Eyes: PERRLA, EOMI  Resp: Normal effort  No audible wheezing or stridor  CV: Palpable pulse  No discernable arrhythmia  Trace LE edema  Lymph:  No palpable cervical, axillary, or inguinal lymphadenopathy  Skin: Warm  No palpable masses  No visible lesions  Neuro: Normal muscle tone  Normal and symmetric DTR's  Left Knee Exam  Alignment:  Normal knee alignment  Inspection:  mild knee swelling  mild edema  No ecchymosis  No muscle atrophy  No deformity  Palpation:  severe tenderness at lateral quad insertion and patellar tendon  No effusion  minimal tenderness on medial retinaculum,   ROM:  Knee Extension 5  Knee Flexion 100  Strength:  Quadriceps 4/5  Hamstrings 4/5    Stability:  No objective knee instability  Stable Varus / Valgus stress, Lachman, and Posterior drawer  Tests:  No pertinent positive or negative tests  Patella:  Patella tracks centrally without crepitus  Neurovascular:  Sensation intact in DP/SP/Gonzales/Sa/T nerve distributions  2+ DP & PT pulses  Gait:  Antalgic  Studies Reviewed  MRI of left knee - moderate bone contusion to medial patellar facet and strain and partial tear of hte patellar attachment of the media patellofemoral ligament  Procedures  No procedures today  Medical, Surgical, Family, and Social History  The patient's medical history, family history, and social history, were reviewed and updated as appropriate  Past Medical History:   Diagnosis Date    Arthritis     Cellulitis     Colon polyp     [facility] mellitus ([facility] [address] [facility]     Fibromyalgia     Fibromyalgia     [facility]     Hyperlipidemia     Pinguecula of both eyes     Presbyopia of both eyes        Past Surgical History:   Procedure Laterality Date    BREAST SURGERY      lumpectomy    COLONOSCOPY      last year, polyps, every 6 months    HYSTERECTOMY      INCISION AND DRAINAGE PERIRECTAL ABSCESS      TUBAL LIGATION         Family History   Problem Relation Age of Onset    Heart disease Mother     Diabetes Mother      type 1    Hypertension Mother     Cancer Sister        Social History     Occupational History    Not on file       Social History Main Topics    Smoking status: Current Every Day Smoker     Types: Cigarettes    Smokeless tobacco: Never Used    Alcohol use Yes      Comment: occasionally     Drug use: No    Sexual activity: No       No Known Allergies      Current Outpatient Prescriptions:     albuterol (2 5 mg/3 mL) 0 083 % nebulizer solution, Inhale 3 mL, Disp: , Rfl:     albuterol (PROVENTIL HFA,VENTOLIN HFA) 90 mcg/act inhaler, Inhale 2 puffs, Disp: , Rfl:     ammonium lactate (LAC-HYDRIN) 12 % lotion, , Disp: , Rfl:     bisacodyl (DULCOLAX) 5 mg EC tablet, Take 30 mg by mouth, Disp: , Rfl:     Blood Glucose Monitoring Suppl (TRUE METRIX METER) GIL, daily  , Disp: , Rfl:     budesonide-formoterol (SYMBICORT) 160-4 5 mcg/act inhaler, Inhale 2 puffs, Disp: , Rfl:     clotrimazole (LOTRIMIN) 1 % cream, Apply topically 2 (two) times a day, Disp: , Rfl:     doxycycline (ADOXA) 100 MG tablet, Take 1 tablet (100 mg total) by mouth daily for 90 days, Disp: 90 tablet, Rfl: 0    elvitegravir-cobicistat-emtricitabine-tenofovir (STRIBILD) [phone] mg, Take 1 tablet by mouth, Disp: , Rfl:     gabapentin (NEURONTIN) 800 mg tablet, TAKE 1 TABLET BY MOUTH THREE TIMES A DAY, Disp: 90 tablet, Rfl: 3    GENVOYA tablet, TAKE 1 TABLET BY MOUTH DAILY, Disp: 30 tablet, Rfl: 3    glucose blood test strip, by Percutaneous route, Disp: , Rfl:     ibuprofen (MOTRIN) 800 mg tablet, Take 800 mg by mouth every 8 (eight) hours, Disp: , Rfl:     metFORMIN (GLUCOPHAGE) 500 mg tablet, TAKE 1 TABLET BY MOUTH TWICE A DAY, Disp: 60 tablet, Rfl: 2    polyethylene glycol (GLYCOLAX) powder, Drink per MD recommendations, Disp: , Rfl:     sulfaSALAzine (AZULFIDINE-EN) 500 mg EC tablet, , Disp: , Rfl:     SUPER THIN LANCETS MISC, by Percutaneous route, Disp: , Rfl:     Na Sulfate-K Sulfate-Mg Sulf (SUPREP BOWEL PREP KIT) 17 5-3 13-1 6 GM/180ML SOLN, Take 1 Bottle by mouth once for 1 dose, Disp: 1 Bottle, Rfl: 0      Pamela Moya    Scribe Attestation    I,:   [place] Javier am acting as a scribe while in the presence of the attending physician :        I,:   Irena Osman MD personally performed the services described in this documentation    as scribed in my presence :

## 2018-05-16 DIAGNOSIS — Z79.4 TYPE 2 DIABETES MELLITUS WITH COMPLICATION, WITH LONG-TERM CURRENT USE OF INSULIN (HCC): ICD-10-CM

## 2018-05-16 DIAGNOSIS — E11.8 TYPE 2 DIABETES MELLITUS WITH COMPLICATION, WITH LONG-TERM CURRENT USE OF INSULIN (HCC): ICD-10-CM

## 2018-05-23 ENCOUNTER — TELEPHONE (OUTPATIENT)
Dept: GASTROENTEROLOGY | Facility: MEDICAL CENTER | Age: 58
End: 2018-05-23

## 2018-05-23 ENCOUNTER — EVALUATION (OUTPATIENT)
Dept: PHYSICAL THERAPY | Facility: CLINIC | Age: 58
End: 2018-05-23
Payer: COMMERCIAL

## 2018-05-23 DIAGNOSIS — M25.462 EFFUSION OF LEFT KNEE: ICD-10-CM

## 2018-05-23 DIAGNOSIS — M25.562 ACUTE PAIN OF LEFT KNEE: Primary | ICD-10-CM

## 2018-05-23 PROCEDURE — G8979 MOBILITY GOAL STATUS: HCPCS | Performed by: PHYSICAL THERAPIST

## 2018-05-23 PROCEDURE — G8978 MOBILITY CURRENT STATUS: HCPCS | Performed by: PHYSICAL THERAPIST

## 2018-05-23 PROCEDURE — 97162 PT EVAL MOD COMPLEX 30 MIN: CPT | Performed by: PHYSICAL THERAPIST

## 2018-05-23 NOTE — TELEPHONE ENCOUNTER
Called the relative as directed- LMOM for her to call back to schedule pts colon at a hospital setting with Dr Avila Erickson

## 2018-05-23 NOTE — PROGRESS NOTES
PT Evaluation     Today's date: 2018  Patient name: Shania Deleon  : 1960  MRN: [id number]  Referring provider: Elisabet Martinez MD  Dx:   Encounter Diagnosis     ICD-10-CM    1  Effusion of left knee M25 462 Ambulatory referral to Physical Therapy                  Assessment  Impairments: abnormal gait, abnormal or restricted ROM, activity intolerance, impaired balance, impaired physical strength, lacks appropriate home exercise program and weight-bearing intolerance    Assessment details: Pt is a 65year old female presenting to PT with 4 week history of left knee pain  Pt presents with deficits in left knee range of motion, left hip and knee strength  Pt also with moderate effusion and tenderness to medial patellar region and MPFL  Pt with impaired balance, gait, functional mobility and activity tolerance  Pt is a good candidate for skilled PT intervention and will benefit from treatment in order to address their limitations and to restore maximal function  Understanding of Dx/Px/POC: good   Prognosis: good    Goals  Short Term Goals: To be achieved by 4 weeks    1) Patient to be independent with basic HEP  2) Decrease pain to 4/10 at worst   3) Increase ROM by 5 degrees or greater in all deficient planes  4) Increase strength by 1/2 MMT grade or greater in all deficient planes  5) Patient to report decreased sleep interruption secondary to pain  Long Term Goals: To be achieved by discharge    1) FOTO equal to or greater than 53   2) Patient to be independent with comprehensive HEP  3) Decrease pain to 2/10 at worst  for improved quality of life  4) Increase strength to 5/5 MMT grade in all deficient planes to improve a/iadls  5) Increase ROM to within normal limits  6) Patient to report no sleep interruption secondary to pain        Plan  Patient would benefit from: skilled PT  Planned modality interventions: cryotherapy  Planned therapy interventions: home exercise program, functional ROM exercises, flexibility, balance, manual therapy, neuromuscular re-education, therapeutic exercise, strengthening and patient education  Frequency: 2x week  Duration in visits: 12  Duration in weeks: 6  Treatment plan discussed with: patient        Subjective Evaluation    History of Present Illness  Date of onset: 2018  Mechanism of injury: trauma  Mechanism of injury: Pt is a 65year old female presenting to PT with 4 week history of left knee pain  Pt reports on 18 she was taking her trash out, lost her balance and landed on her left knee  She had immediate pain and swelling with an abrasion on her kneecap  Pt went to ED where x-rays were performed and negative for fracture  Pt placed in knee immobilizer and given crutches, advised to follow-up with orthopedist  Pt saw Dr Berwyn Bernheim for further evaluation and MRI was performed which was positive for moderate bone contusion to medial patellar facet and strain and partial tear of hte patellar attachment of the media patellofemoral ligament  Pt placed in hinged knee brace and referred to OPPT  Pt reports increased pain and/or difficulty with: walking, getting up out of the chair, going up/down stairs; pt reports sleep disturbance secondary to knee pain waking approximately 2-3/times per night  Pt reports decreased pain with: medication, ice, elevation, rest   Not a recurrent problem   Quality of life: good    Pain  Current pain ratin  At best pain ratin  At worst pain ratin  Quality: dull ache and sharp      Diagnostic Tests  X-ray: normal  MRI studies: abnormal  Patient Goals  Patient goals for therapy: decreased edema, decreased pain, improved balance, increased motion, independence with ADLs/IADLs and increased strength          Objective     Observations   Left Knee   Positive for effusion  Additional Observation Details  Pt with moderate to significant left knee effusion; healed left patellar abrasion  Gait: severely antalgic with decreased left stance time, lacking left terminal knee extension  SLS: pt unable on L, declined testing on R  Flexibility: moderate L > R hamstring and gastrocnemius tightness present    Sol's Sign: negative bilaterally     Tenderness   Left Knee   Tenderness in the medial joint line, medial patella, medial retinaculum and pes anserinus  Neurological Testing     Sensation     Knee   Left Knee   Intact: light touch    Right Knee   Intact: light touch     Active Range of Motion   Left Knee   Flexion: 28 degrees with pain  Extension: Left knee active extension: lacking 10 degrees  with pain  Extensor lag: Left knee extensor lag: lacking 25 degrees   with pain    Right Knee   Flexion: 138 degrees   Extension: 0 degrees   Extensor la degrees     Patellar Static Positioning   Left Knee: WFL  Right Knee: [facility]    Strength/Myotome Testing     Left Hip   Planes of Motion   Flexion: 3+  Extension: 3-  Abduction: 3-  Adduction: 3-    Right Hip   Planes of Motion   Flexion: 5  Extension: 5  Abduction: 5  Adduction: 5    Left Knee   Flexion: 3-  Extension: 2+    Right Knee   Flexion: 5  Extension: 5        Precautions: DM, Fibromyalgia, HIV, HLD      Daily Treatment Diary       Manuals             L knee PROM             L gastroc and hamstring stretch                                       Exercise Diary              Gastroc Stretch w/Strap 30"x3            Towel Crush 5"x10            Quad Set             Heel Slide             SAQ             LAQ             Weight shifts:  M/L &   A/P (tandem stance)             Heel Raises                                                                                                                                                                                                   Modalities             CP L knee

## 2018-05-24 NOTE — TELEPHONE ENCOUNTER
Called Niece Octavia to schedule her Aunt's colon  Did not get to speak to anyone  Was cut off and could not leave a message

## 2018-05-25 NOTE — TELEPHONE ENCOUNTER
Called pt's home number and her niece answered Called:[phone]  I was unable to hear her due loud noises  She will call to schedule

## 2018-05-30 ENCOUNTER — OFFICE VISIT (OUTPATIENT)
Dept: PHYSICAL THERAPY | Facility: CLINIC | Age: 58
End: 2018-05-30
Payer: COMMERCIAL

## 2018-05-30 DIAGNOSIS — M25.562 ACUTE PAIN OF LEFT KNEE: ICD-10-CM

## 2018-05-30 DIAGNOSIS — M25.462 EFFUSION OF LEFT KNEE: Primary | ICD-10-CM

## 2018-05-30 PROCEDURE — 97150 GROUP THERAPEUTIC PROCEDURES: CPT | Performed by: PHYSICAL THERAPIST

## 2018-05-30 PROCEDURE — 97010 HOT OR COLD PACKS THERAPY: CPT | Performed by: PHYSICAL THERAPIST

## 2018-05-30 PROCEDURE — 97140 MANUAL THERAPY 1/> REGIONS: CPT | Performed by: PHYSICAL THERAPIST

## 2018-05-30 NOTE — PROGRESS NOTES
Daily Note     Today's date: 2018  Patient name: Surya Marina  : 1960  MRN: [id number]  Referring provider: Yannick Noyola MD  Dx:   Encounter Diagnosis     ICD-10-CM    1  Effusion of left knee M25 462    2  Acute pain of left knee M25 562                   Subjective: Pt rates pain about 7/10 at start of session  Pt states that she has been applying ice 3x/day and has had mild relief with swelling but continues to have some swelling over lateral distal L thigh  Pt reports most pain is over inferior pole of patella  Objective: See treatment diary below    Precautions: DM, Fibromyalgia, HIV, HLD      Daily Treatment Diary       Manuals  18           L knee PROM  10 min           L gastroc and hamstring stretch  nv                                     Exercise Diary   18           Gastroc Stretch w/Strap 30"x3 30"x3           Towel Crush 5"x10 3"x20           Quad Set  3"x15           Heel Slide  5"x10           SAQ  3"x15           LAQ  3"x15           Weight shifts:  M/L &   A/P (tandem stance)  10x ea           Heel Raises  15x ea                                                                                                                                                                                                 Modalities  18           CP L knee  10 min                                  Assessment: Tolerated treatment fair  Patient demonstrated fatigue post treatment and would benefit from continued PT Pt has significant pain with active motion, particularly into flexion  Pt has pain with WB without brace even with UE support  Pt has significant pain with PROM with guarding present and palpable click with pain at about 40 deg of flexion  Concluded with CP in supine with L leg elevated  Pt states that she will take pain medication at home  Plan: Continue per plan of care  Progress treatment as tolerated

## 2018-06-04 ENCOUNTER — OFFICE VISIT (OUTPATIENT)
Dept: SURGERY | Facility: CLINIC | Age: 58
End: 2018-06-04
Payer: COMMERCIAL

## 2018-06-04 VITALS
DIASTOLIC BLOOD PRESSURE: 79 MMHG | SYSTOLIC BLOOD PRESSURE: 108 MMHG | HEART RATE: 89 BPM | HEIGHT: 65 IN | WEIGHT: 171 LBS | OXYGEN SATURATION: 99 % | TEMPERATURE: 98.2 F | BODY MASS INDEX: 28.49 KG/M2

## 2018-06-04 DIAGNOSIS — Z78.9 HISTORY OF RECENT CHANGE IN LIFESTYLE: ICD-10-CM

## 2018-06-04 DIAGNOSIS — Z20.2 EXPOSURE TO SEXUALLY TRANSMITTED DISEASE (STD): ICD-10-CM

## 2018-06-04 DIAGNOSIS — B20 HIV (HUMAN IMMUNODEFICIENCY VIRUS INFECTION) (HCC): Primary | ICD-10-CM

## 2018-06-04 DIAGNOSIS — Z13.6 SCREENING FOR CARDIOVASCULAR CONDITION: ICD-10-CM

## 2018-06-04 DIAGNOSIS — Z11.3 SCREENING EXAMINATION FOR SEXUALLY TRANSMITTED DISEASE: ICD-10-CM

## 2018-06-04 DIAGNOSIS — F17.219 CIGARETTE NICOTINE DEPENDENCE WITH NICOTINE-INDUCED DISORDER: ICD-10-CM

## 2018-06-04 PROCEDURE — 99214 OFFICE O/P EST MOD 30 MIN: CPT | Performed by: INTERNAL MEDICINE

## 2018-06-04 RX ORDER — ACETAMINOPHEN AND CODEINE PHOSPHATE 300; 30 MG/1; MG/1
300 TABLET ORAL DAILY PRN
COMMUNITY
Start: 2017-01-27 | End: 2018-06-13 | Stop reason: HOSPADM

## 2018-06-04 RX ORDER — DIPHENHYDRAMINE HCL 50 MG
50 CAPSULE ORAL EVERY 6 HOURS PRN
COMMUNITY
Start: 2017-06-02 | End: 2018-06-13 | Stop reason: SDUPTHER

## 2018-06-04 NOTE — PROGRESS NOTES
Progress Note - Infectious Disease   Sheri Whitehead 65 y o  female MRN: [id number]  Unit/Bed#:  Encounter: [id number]      Impression/Plan:  1  HIV/aids-doing well onGenvoradha with an undetectable viral load but with a CD4 count that  is now over 200  Continue antiretrovirals  He no Pneumocystis prophylaxis for now as the patient is undetectable and the CD4 count remains above t 200  Will recheck labs in 2 months and follow up in 3 months  Stressed adherence  Also stressed with the patient that she needs to be taking her Genvoya with food  2  Nicotine dependence-continue intervention by are tobacco cessation program    Patient was provided medication, adherence and prevention education    Subjective:  Routine follow-up for HIV  Patient claims 100% adherence with Genvoya  Patient denies any notable side effects  Overall the feeling well  The patient denies any fever chills or sweats, denies any nausea vomiting or diarrhea, denies any cough or shortness of breath  ROS: A complete 12 point ROS is negative other than that noted in the HPI    Objective:  Vitals:  Vitals:    06/04/18 1028   BP: 108/79   BP Location: Right arm   Patient Position: Sitting   Cuff Size: Standard   Pulse: 89   Temp: 98 2 °F (36 8 °C)   SpO2: 99%   Weight: 77 6 kg (171 lb)   Height: 5' 5" (1 651 m)       Physical Exam:   General Appearance:  Alert, interactive, appearing well,  nontoxic, no acute distress  Neck:   Supple without lymphadenopathy, no thyromegaly or masses   Throat: Oropharynx moist without lesions  Lungs:   Clear to auscultation bilaterally; no wheezes, rhonchi or rales; respirations unlabored   Heart:  RRR; no murmur, rub or gallop   Abdomen:   Soft, non-tender, non-distended, positive bowel sounds  Extremities: No clubbing, cyanosis or edema   Skin: No new rashes or lesions  No draining wounds noted         Lab Results   Component Value Date     05/03/2018    K 3 9 05/03/2018     05/03/2018 CO2 27 05/03/2018    ANIONGAP 8 05/03/2018    BUN 7 05/03/2018    CREATININE 0 67 05/03/2018    GLUCOSE 170 (H) 10/09/2017    GLUF 196 (H) 05/03/2018    CALCIUM 9 2 05/03/2018    AST 10 05/03/2018    ALT 18 05/03/2018    ALKPHOS 77 05/03/2018    PROT 7 9 05/03/2018    BILITOT 0 58 05/03/2018    EGFR 99 05/03/2018     Lab Results   Component Value Date    WBC 5 60 05/03/2018    HGB 13 7 05/03/2018    HGB 13 5 05/03/2018    HCT 44 0 05/03/2018    HCT 41 1 05/03/2018     (H) 05/03/2018     (H) 05/03/2018     05/03/2018     05/03/2018     Lab Results   Component Value Date    HEPCAB Non-reactive 11/10/2017     Lab Results   Component Value Date    HAV Reactive (A) 11/10/2017    HEPCAB Non-reactive 11/10/2017     Lab Results   Component Value Date    RPR Non-Reactive 11/10/2017     CD4 T Cell Abs   Date/Time Value Ref Range Status   05/03/2018 08:40  (L) 359 - 1519 /uL Final     HIV-1 RNA Viral Load Log   Date/Time Value Ref Range Status   05/03/2018 08:40 AM COMMENT [id number]/mL Final     Comment:     Unable to calculate result since non-numeric result obtained for  component test        Labs, Imaging, & Other studies:   All pertinent labs and imaging studies were personally reviewed      Current Outpatient Prescriptions:     albuterol (2 5 mg/3 mL) 0 083 % nebulizer solution, Inhale 3 mL, Disp: , Rfl:     albuterol (PROVENTIL HFA,VENTOLIN HFA) 90 mcg/act inhaler, Inhale 2 puffs, Disp: , Rfl:     ammonium lactate (LAC-HYDRIN) 12 % lotion, , Disp: , Rfl:     Blood Glucose Monitoring Suppl (TRUE METRIX METER) GIL, daily  , Disp: , Rfl:     budesonide-formoterol (SYMBICORT) 160-4 5 mcg/act inhaler, Inhale 2 puffs, Disp: , Rfl:     clotrimazole (LOTRIMIN) 1 % cream, Apply topically 2 (two) times a day, Disp: , Rfl:     gabapentin (NEURONTIN) 800 mg tablet, TAKE 1 TABLET BY MOUTH THREE TIMES A DAY, Disp: 90 tablet, Rfl: 3    GENVOYA tablet, TAKE 1 TABLET BY MOUTH DAILY, Disp: 30 tablet, Rfl: 3    glucose blood test strip, by Percutaneous route, Disp: , Rfl:     metFORMIN (GLUCOPHAGE) 500 mg tablet, TAKE 1 TABLET BY MOUTH TWICE A DAY, Disp: 60 tablet, Rfl: 3    sulfaSALAzine (AZULFIDINE-EN) 500 mg EC tablet, , Disp: , Rfl:     SUPER THIN LANCETS MISC, by Percutaneous route, Disp: , Rfl:     acetaminophen-codeine (TYLENOL with CODEINE #3) 300-30 MG per tablet, Take 300 tablets by mouth Once daily as needed, Disp: , Rfl:     bisacodyl (DULCOLAX) 5 mg EC tablet, Take 30 mg by mouth, Disp: , Rfl:     diphenhydrAMINE (BENADRYL) 50 mg capsule, Take 50 mg by mouth every 6 (six) hours as needed, Disp: , Rfl:     elvitegravir-cobicistat-emtricitabine-tenofovir (STRIBILD) [phone] mg, Take 1 tablet by mouth, Disp: , Rfl:     ibuprofen (MOTRIN) 800 mg tablet, Take 800 mg by mouth every 8 (eight) hours, Disp: , Rfl:     Na Sulfate-K Sulfate-Mg Sulf (SUPREP BOWEL PREP KIT) 17 5-3 13-1 6 GM/180ML SOLN, Take 1 Bottle by mouth once for 1 dose, Disp: 1 Bottle, Rfl: 0    polyethylene glycol (GLYCOLAX) powder, Drink per MD recommendations, Disp: , Rfl:

## 2018-06-04 NOTE — PROGRESS NOTES
Initial Nutritional Assessment     Socio-Economic Status: patient receives SNAP, explained benefits of SNAP @ local [employer]  Patient lives in [place], [place] [employer] does not serve that location  Living situation:     apartment                            & Kitchen Facilities: fully functioning   Psychosocial Factors:                                Functional Status:                   Physical Activity Level:patient does food shopping and meal prep   GI Intolerance:    None reported                                         Appetite: Good   Oral Health  History/Status:         No problems reported                                             Last Dental visit:   May 2018, she has a follow-up appt  In 2018                       ?:   [address] Allergies/Intolerances      None reported                                                                            Diet History: Breakfast: egg sandwich, coffee             Lunch: usually skips              Dinner:    Rice & beans, meat, cooked veggies, juice or water                                                                        Snacks: crackers, cookies, juice or water  Weight Status: todays weight:    77 7 kg             IBW:    52          %IBW:    149%       UBW:    77kg        Weight Stable for past 6 months? Appears that patient had lost ~ 7kg over past year, but has regained the weight, weight now stable                                 current BMI: 28 5;  Adjusted body weight:  58kg  Estimated Nutritional Needs: Kcal/k kcal/ 58kg Adjusted body weight                                                           =   1450 calories/day                                             Gm Protein/kg           1 0 - 1 2                  =      58 to 70 grams/day                              Fluid:           25    ml/kg =       1450 ml/day                                         Patient meeting nutritional needs: Yes           , Supplements: no  Nutrition Diagnosis: Problem Related to:        Not ready for diet/lifestyle change                                                                                                                       As Evidenced by:denial of need to change   Signs &  symptoms: elevated blood sugar  Nutrition Education Intervention:   Person Educated:      patient                    , Education Provided: offered diabetes classes or individual appointment with this RD/CDE  Readiness to Learn:       No interest                            Expected compliance:  Fair to good (patient states that when she checks her sugars in the morning they are below 100, states she had eaten sweets the night before her fasting blood sugar test)  Recommendations:  Continue to monitor patient's Blood sugar control, suggest patient have A1C test; monitorweight status                                                                                                                                                                  Ivan Cabrera RDN,LDN,CDE

## 2018-06-05 ENCOUNTER — OFFICE VISIT (OUTPATIENT)
Dept: PHYSICAL THERAPY | Facility: CLINIC | Age: 58
End: 2018-06-05
Payer: COMMERCIAL

## 2018-06-05 DIAGNOSIS — M25.562 ACUTE PAIN OF LEFT KNEE: ICD-10-CM

## 2018-06-05 DIAGNOSIS — M25.462 EFFUSION OF LEFT KNEE: Primary | ICD-10-CM

## 2018-06-05 PROCEDURE — 97150 GROUP THERAPEUTIC PROCEDURES: CPT | Performed by: PHYSICAL THERAPIST

## 2018-06-05 PROCEDURE — 97010 HOT OR COLD PACKS THERAPY: CPT | Performed by: PHYSICAL THERAPIST

## 2018-06-05 PROCEDURE — 97140 MANUAL THERAPY 1/> REGIONS: CPT | Performed by: PHYSICAL THERAPIST

## 2018-06-05 NOTE — PROGRESS NOTES
Daily Note     Today's date: 2018  Patient name: Radha Friedman  : 1960  MRN: [id number]  Referring provider: Starlin Sicard, MD  Dx:   Encounter Diagnosis     ICD-10-CM    1  Effusion of left knee M25 462    2  Acute pain of left knee M25 562                   Subjective: Pt states that her pain is "like a 6/10" at start of session  Pt reports continued clicking in L knee and feeling of grinding with flexion/extension  Objective: See treatment diary below    Precautions: DM, Fibromyalgia, HIV, HLD      Daily Treatment Diary       Manuals  18          L knee PROM  10 min 5 min          L gastroc and hamstring stretch  nv 5 min                                    Exercise Diary   18          Gastroc Stretch w/Strap 30"x3 30"x3 30"x3          Towel Crush 5"x10 3"x20 3"x20          Quad Set  3"x15 3"x20          Heel Slide  5"x10 5"x20          SAQ  3"x15 3"x20          LAQ  3"x15 3"x20          Weight shifts:  M/L &   A/P (tandem stance)  10x ea 20x ea          Heel Raises/TR  15x ea 20x ea          Heel taps onto step   nv          Mini squats   nv          clamshells   nv                                                                                                                                                         Modalities  18          CP L knee  10 min 10 min                               Assessment: Tolerated treatment fair  Patient demonstrated fatigue post treatment and would benefit from continued PT Pt continues to have pain with active and passive motion  Pt indicates pain at distal patella and reports feeling grinding and pain at this location  Pt was able to progress exercises without significant increase in pain  Concluded with CP in supine  Plan: Continue per plan of care  Progress treatment as tolerated

## 2018-06-07 ENCOUNTER — OFFICE VISIT (OUTPATIENT)
Dept: PHYSICAL THERAPY | Facility: CLINIC | Age: 58
End: 2018-06-07
Payer: COMMERCIAL

## 2018-06-07 DIAGNOSIS — M25.562 ACUTE PAIN OF LEFT KNEE: ICD-10-CM

## 2018-06-07 DIAGNOSIS — M25.462 EFFUSION OF LEFT KNEE: Primary | ICD-10-CM

## 2018-06-07 PROCEDURE — 97110 THERAPEUTIC EXERCISES: CPT

## 2018-06-07 PROCEDURE — 97150 GROUP THERAPEUTIC PROCEDURES: CPT

## 2018-06-07 PROCEDURE — 97010 HOT OR COLD PACKS THERAPY: CPT

## 2018-06-07 PROCEDURE — 97140 MANUAL THERAPY 1/> REGIONS: CPT

## 2018-06-07 NOTE — PROGRESS NOTES
Daily Note     Today's date: 2018  Patient name: Ridge Brace  : 1960  MRN: [id number]  Referring provider: Lizbet Bill MD  Dx:   Encounter Diagnosis     ICD-10-CM    1  Effusion of left knee M25 462    2  Acute pain of left knee M25 562                   Subjective: Pt presents to PT donning knee brace and reporting pain in L knee grading the pain a 6/10  Pt states she felt better after CP but did not change pain rating  Objective: See treatment diary below    Precautions: DM, Fibromyalgia, HIV, HLD      Daily Treatment Diary       Manuals  18         L knee PROM  10 min 5 min PK         L gastroc and hamstring stretch  nv 5 min PK                                   Exercise Diary   18         Gastroc Stretch w/Strap 30"x3 30"x3 30"x3 30"x3         Towel Crush 5"x10 3"x20 3"x20 3"x20         Quad Set  3"x15 3"x20 3"x20         Heel Slide  5"x10 5"x20 5"x20         SAQ  3"x15 3"x20 3"x20         LAQ  3"x15 3"x20 3"x20         Weight shifts:  M/L &   A/P (tandem stance)  10x ea 20x ea 20x ea         Heel Raises/TR  15x ea 20x ea 20x ea         Heel taps onto step   nv 0R 10 x ea         Mini squats   nv 3" x 15         clamshells   nv                                                                                                                                                         Modalities  18         CP L knee  10 min 10 min 10 min                              Assessment: Pt demonstrates poor to fair tolerance to TE and progressions secondary to pain  Patient demonstrated fatigue post treatment and would benefit from continued PT  Pt continues to have pain with active and passive motion  Concluded with CP in supine  Plan: Continue per plan of care  Progress treatment as tolerated

## 2018-06-12 ENCOUNTER — OFFICE VISIT (OUTPATIENT)
Dept: PHYSICAL THERAPY | Facility: CLINIC | Age: 58
End: 2018-06-12
Payer: COMMERCIAL

## 2018-06-12 DIAGNOSIS — M25.462 EFFUSION OF LEFT KNEE: Primary | ICD-10-CM

## 2018-06-12 DIAGNOSIS — M25.562 ACUTE PAIN OF LEFT KNEE: ICD-10-CM

## 2018-06-12 PROCEDURE — 97150 GROUP THERAPEUTIC PROCEDURES: CPT | Performed by: PHYSICAL THERAPIST

## 2018-06-12 PROCEDURE — 97110 THERAPEUTIC EXERCISES: CPT | Performed by: PHYSICAL THERAPIST

## 2018-06-12 PROCEDURE — 97010 HOT OR COLD PACKS THERAPY: CPT | Performed by: PHYSICAL THERAPIST

## 2018-06-12 PROCEDURE — 97140 MANUAL THERAPY 1/> REGIONS: CPT | Performed by: PHYSICAL THERAPIST

## 2018-06-12 NOTE — PROGRESS NOTES
Daily Note     Today's date: 2018  Patient name: Ora Shipley  : 1960  MRN: [id number]  Referring provider: Rick Peace MD  Dx:   Encounter Diagnosis     ICD-10-CM    1  Effusion of left knee M25 462    2  Acute pain of left knee M25 562                   Subjective: Pt arrives at PT with knee brace in place but reporting significant pain rated 9/10  Pt states that it bothers her L leg to lie on R side and locates most pain over medial L patella  Objective: See treatment diary below    Precautions: DM, Fibromyalgia, HIV, HLD      Daily Treatment Diary       Manuals  18        L knee PROM  10 min 5 min PK ED        L gastroc and hamstring stretch  nv 5 min PK nv        L hip passive abd and ER     ED                     Exercise Diary   18        Gastroc Stretch w/Strap 30"x3 30"x3 30"x3 30"x3 30"x3        Towel Crush 5"x10 3"x20 3"x20 3"x20 3"x20        Quad Set  3"x15 3"x20 3"x20 3"x20        Heel Slide  5"x10 5"x20 5"x20 5"x15        SAQ  3"x15 3"x20 3"x20 3"x20        LAQ  3"x15 3"x20 3"x20 3"x20        Weight shifts:  M/L &   A/P (tandem stance)  10x ea 20x ea 20x ea 20x ea        Heel Raises/TR  15x ea 20x ea 20x ea 30x ea        Heel taps onto step   nv 0R 10 x ea 0R 15x ea        Mini squats   nv 3" x 15 3"x20        Clamshells w/ bolster in hooklying   nv  peach 3"x15                                                                                                                                                       Modalities  18        CP L knee  10 min 10 min 10 min 10 min                               Assessment: Tolerated treatment well  Patient demonstrated fatigue post treatment and would benefit from continued PT Pt continues to have pain with most exercises and requires VC to avoid taking ROM into pain with all exercises  Pt demonstrates concurrent hip adduction and IR with knee flexion  Added passive hip stretching into hip ER and abduction to reduce this compensation with tightness noted by pt but good tolerance  Concluded with CP in supine with pt reporting decrease pain to 7/10 at end of session  Plan: Continue per plan of care  Progress treatment as tolerated

## 2018-06-13 ENCOUNTER — OFFICE VISIT (OUTPATIENT)
Dept: SURGERY | Facility: CLINIC | Age: 58
End: 2018-06-13
Payer: COMMERCIAL

## 2018-06-13 VITALS
HEIGHT: 65 IN | TEMPERATURE: 98 F | OXYGEN SATURATION: 99 % | BODY MASS INDEX: 28.89 KG/M2 | HEART RATE: 94 BPM | WEIGHT: 173.4 LBS | SYSTOLIC BLOOD PRESSURE: 113 MMHG | DIASTOLIC BLOOD PRESSURE: 68 MMHG

## 2018-06-13 DIAGNOSIS — Z79.4 TYPE 2 DIABETES MELLITUS WITHOUT COMPLICATION, WITH LONG-TERM CURRENT USE OF INSULIN (HCC): Primary | ICD-10-CM

## 2018-06-13 DIAGNOSIS — M25.512 ACUTE PAIN OF LEFT SHOULDER: ICD-10-CM

## 2018-06-13 DIAGNOSIS — M25.512 LEFT SHOULDER PAIN, UNSPECIFIED CHRONICITY: ICD-10-CM

## 2018-06-13 DIAGNOSIS — G47.00 INSOMNIA, UNSPECIFIED TYPE: ICD-10-CM

## 2018-06-13 DIAGNOSIS — E11.9 TYPE 2 DIABETES MELLITUS WITHOUT COMPLICATION, WITH LONG-TERM CURRENT USE OF INSULIN (HCC): Primary | ICD-10-CM

## 2018-06-13 DIAGNOSIS — F51.01 PRIMARY INSOMNIA: ICD-10-CM

## 2018-06-13 PROBLEM — K63.5 POLYP OF COLON: Status: RESOLVED | Noted: 2018-02-14 | Resolved: 2018-06-13

## 2018-06-13 PROBLEM — R19.09 MASS OF RIGHT INGUINAL REGION: Status: RESOLVED | Noted: 2018-02-26 | Resolved: 2018-06-13

## 2018-06-13 PROBLEM — B36.9 FUNGAL RASH OF TORSO: Status: RESOLVED | Noted: 2017-11-17 | Resolved: 2018-06-13

## 2018-06-13 PROBLEM — R21 FACIAL RASH: Status: RESOLVED | Noted: 2017-11-17 | Resolved: 2018-06-13

## 2018-06-13 PROBLEM — B20 AIDS DUE TO HIV-I (HCC): Status: RESOLVED | Noted: 2017-04-19 | Resolved: 2018-06-13

## 2018-06-13 PROBLEM — R06.02 SHORTNESS OF BREATH: Status: RESOLVED | Noted: 2018-02-26 | Resolved: 2018-06-13

## 2018-06-13 PROBLEM — Z91.89 ENCOUNTER FOR GYNECOLOGIC EXAMINATION FOR HIGH-RISK PATIENT COVERED BY MEDICARE: Status: RESOLVED | Noted: 2018-02-13 | Resolved: 2018-06-13

## 2018-06-13 PROBLEM — G62.9 NEUROPATHY: Status: ACTIVE | Noted: 2017-11-17

## 2018-06-13 PROBLEM — B20 HIV DISEASE (HCC): Status: ACTIVE | Noted: 2017-11-17

## 2018-06-13 PROBLEM — H93.19 RINGING IN THE EARS: Status: ACTIVE | Noted: 2017-12-29

## 2018-06-13 PROBLEM — B36.9 FUNGAL RASH OF TORSO: Status: ACTIVE | Noted: 2017-11-17

## 2018-06-13 PROBLEM — E78.00 HIGH CHOLESTEROL: Status: ACTIVE | Noted: 2017-11-17

## 2018-06-13 PROBLEM — F32.A DEPRESSION: Status: RESOLVED | Noted: 2017-11-17 | Resolved: 2018-06-13

## 2018-06-13 PROBLEM — H93.19 RINGING IN THE EARS: Status: RESOLVED | Noted: 2017-12-29 | Resolved: 2018-06-13

## 2018-06-13 PROBLEM — K05.6 PERIODONTAL DISEASE: Status: ACTIVE | Noted: 2017-09-15

## 2018-06-13 PROBLEM — B20 AIDS (HCC): Status: RESOLVED | Noted: 2017-11-17 | Resolved: 2018-06-13

## 2018-06-13 PROBLEM — J45.20 MILD INTERMITTENT ASTHMA WITHOUT COMPLICATION: Status: ACTIVE | Noted: 2017-11-17

## 2018-06-13 PROBLEM — R21 FACIAL RASH: Status: ACTIVE | Noted: 2017-11-17

## 2018-06-13 PROCEDURE — 99214 OFFICE O/P EST MOD 30 MIN: CPT | Performed by: NURSE PRACTITIONER

## 2018-06-13 RX ORDER — ACETAMINOPHEN 500 MG
1000 TABLET ORAL EVERY 8 HOURS
Qty: 60 TABLET | Refills: 0 | Status: SHIPPED | OUTPATIENT
Start: 2018-06-13 | End: 2018-07-06 | Stop reason: SDUPTHER

## 2018-06-13 RX ORDER — IBUPROFEN 800 MG/1
800 TABLET ORAL EVERY 8 HOURS
Qty: 30 TABLET | Refills: 0 | Status: SHIPPED | OUTPATIENT
Start: 2018-06-13 | End: 2018-07-06 | Stop reason: SDUPTHER

## 2018-06-13 RX ORDER — DIPHENHYDRAMINE HCL 50 MG
50 CAPSULE ORAL
Qty: 30 CAPSULE | Refills: 0 | Status: SHIPPED | OUTPATIENT
Start: 2018-06-13 | End: 2018-07-06 | Stop reason: SDUPTHER

## 2018-06-13 NOTE — ASSESSMENT & PLAN NOTE
Cd4:    CD4 T Cell Abs   Date/Time Value Ref Range Status   2018 08:40  (L) 359 - 1519 /uL Final      Viral load: <20   ART: Smita Servin  Denies missing any doses  Denies side effects  Stressed the importance of adherence  Continue follow up with ID clinic  Reviewed most recent labs, including Cd4 and viral load  Discussed the risks and benefits of treatment options, instructions for management, importance of treatment adherence, and reduction of risk factor  Educated on possible  medication side effects  Counseled on routes of HIV transmission, including the risk of  infection  Emphasized that viral suppression is the best method to prevent HIV transmission  At this time pt denies the need for HIV testing of anyone in their life  Total encounter time was 45 minutes  Greater then 20 minutes were spent on counseling and patient education  Pt voices understanding and agreement with treatment plan

## 2018-06-13 NOTE — ASSESSMENT & PLAN NOTE
Injury occurred after fall one month ago  Currently undergoing orthopedic treatment for left knee injury that was caused by same fall  Exam + for tenderness with palpitation and edema  No redness or bruising present  Self limiting ROM secondary to pain   strength symmetrical in upper extremities  Xray ordered and will refer to orthopedics  Advised acetaminophen TID with ibuprofen for breakthrough pain  Educated to use ice or heat for comfort  Refer to orthopedics for further evaluation

## 2018-06-13 NOTE — PATIENT INSTRUCTIONS
Lesión del [employer] de los rotadores   LO QUE NECESITA SABER:   ¿Qué es ricky lesión del manguito rotador? Se produce ricky lesión en el manguito rotador cuando se dañan los músculos o los tendones del manguito rotador  Los tendones son cuerdas de tejido resistente que [employer] músculos a los [place]  El manguito rotador está formado por un rahul de músculos y tendones que mantienen la articulación del hombro en arenas lugar  Es posible que, sarah parte del daño, los músculos se estiren demasiado o los tendones se desgarren  También es posible que se inflame la pequeña bolsa llena de líquido que rodea la articulación  ¿Qué causa ricky [place] rotador? · Uso y desgaste de los tendones:  Los tendones se debilitan a medida que la persona envejece  · Pinzamiento:  [place] ocurre cuando el hueso de la parte superior del brazo pone presión en el tendón del hombro al levantar el brazo  El pinzamiento causa dolor e inflamación lo que debilita el manguito rotador  · Uso excesivo o lesión:  El manguito rotador se puede dañar al levantar objetos pesados, lanzar objetos o por usarlo en forma excesiva  El [employer] rotador se puede dañar cuando se levanta el brazo por encima de la calderon al practicar deportes sarah el béisbol y el tenis  El manguito rotador también se podría dañar sarah resultado de ricky caída u otra lesión en el hombro  ¿Cuáles son los signos y síntomas de Mardene Phy lesión del manguito rotador? · Dolor o rigidez:  Podría sentir dolor en el hombro que baja por el brazo  Es posible que sienta dolor todo el tiempo o solo a veces  Puede que sienta dolor cuando se acuesta del lado del hombro lesionado  · Rango de movimiento disminuido:  Es posible que tenga dificultad para levantar el brazo o para colocarlo detrás de arenas espalda  También podría resultarle difícil usar el hombro  Si la lesión es grave, es posible que no pueda  para nada el hombro      · Sensibilidad o inflamación:  Es posible que [place] del hombro se le hinche y le duela al tocarla  · Adormecimiento:  Es posible que pierda la sensación en parte o en todo el brazo  [place] es más común para los atletas que realizan lanzamiento sarah parte de arenas deporte, sarah los pítchers de [place]  · Chasquido:  Es posible que oiga un ruido y sienta dolor al levantar el [place]  ¿Cómo se diagnostica ricky lesión del manguito rotador? Arenas médico le preguntará si ha tenido ricky lesión o [place] en el hombro  Derryl Slay el hombro y comprobará la fuerza y movimiento de arenas Beth Korina  Es posible que necesite otras pruebas para determinar cuál es la causa de john síntomas  · Ultrasonido:  Un ultrasonido [place] ondas sonoras para mostrar imágenes en un monitor  Es posible que se realice esta prueba para comprobar si hay líquido o inflamación alrededor del manguito rotador  · Radiografía:  [place] radiografía podría mostrar si los huesos y tejidos de arenas hombro se clifford lesionado  · Tomografía computarizada:  Nicki examen también se conoce sarah escán TAC  Vannessa Dine de susan x [place] ricky computadora para jacquie imágenes de arenas hombro  Es posible que le administren un medio de contraste antes de jacquie las imágenes para que los médicos las puedan yonny con mayor claridad  Dígale al médico si usted alguna vez ha tenido ricky reacción alérgica al tinte de [place]  · Imágenes por resonancia magnética (IRM):  Para realizar nicki estudio se utilizan imanes potentes y [place] computadora que merly imágenes de arenas hombro  Puede que la IRM muestre si se ha dañado los músculos o tendones  Le podrían administrar un tinte para ayudar a que las imágenes se vean mejor  Dígale al médico si usted alguna vez ha tenido ricky reacción alérgica al tinte de [place]  No entre a la kristine donde le harán el estudio por resonancia magnética con ningún objeto de metal  El metal puede causar lesiones serias  Dígale al médico si usted tiene algo de metal por dentro o sobre arenas cuerpo    ¿Cómo se trata ricky lesión del manguito rotador? · Medicamentos:      ¨ Acetaminofeno:  Darby medicamento disminuye el dolor  El acetaminofeno puede obtener sin receta médica  Pregunte la cantidad y la frecuencia con que debe tomarlos  [id number]  El acetaminofén puede causar daño en el hígado cuando no se merly de forma correcta  ¨ AINEs (analgésicos antiinflamatorios no esteroides):  Estos medicamentos disminuyen la inflamación, dolor y [place]  Los AINEs se pueden obtener sin receta médica  Consulte con arenas médico cuál medicamento es el adecuado para usted  Pregunte cuánto jacquie y cuándo  Tómelos sarah se le indique  Cuando no se frieda de la [employer], los medicamentos antiinflamatorios no esteroides pueden causar sangrado estomacal o problemas renales  ¨ Analgésicos:  Es posible que le receten un medicamento para aliviar el dolor  No espere hasta que el dolor sea severo antes de jacquie darby medicamento  ¨ Esteroides:  Es posible que le inyecten darby medicamento en el área del manguito rotador para bajar la inflamación y calmar el dolor  · Fisioterapia:  Un fisioterapeuta puede enseñarle ejercicios que contribuyan a mejorar arenas movimiento y fuerza, y a aliviar el dolor  Es posible que con estos ejercicios pueda retomar john actividades acostumbradas o volver a practicar deportes  · Cirugía:  Usted podría necesitar cirugía si arenas lesión es grave o john síntomas no mejoran  También podría necesitar cirugía para reducir john signos y síntomas si [de-identified] tratamientos no clifford dado resultado  ¨ Desbridamiento y reparación:  Se hace esta cirugía para desgarres parciales o totales del manguito rotador  El médico limpiará el tejido dañado y reparará el desgarro  ¨ Transferencia de tendón o injerto:  Se hace esta cirugía cuando el manguito rotador se ha desgarrado gravemente y no se puede reparar con facilidad  Arenas médico usará un trozo de otro tejido o músculo para reparar el tendón desgarrado      ¨ Artrodesis:  Mediante esta cirugía se cambia la forma del hueso de la articulación del hombro para que permanezca en gonzales lugar  Se hace nicki procedimiento si los músculos del manguito rotador no están funcionando  ¨ Artroplastia:  [place] cirugía se coloca ricky articulación artificial de metal o plástico en el hombro  Se hace nicki cirugía si no se puede reparar el manguito rotador o si el dolor y la inflamación no desaparecen  ¿Cómo puedo cuidar mi lesión del manguito rotador en mi hogar? · Descanse:  Es posible que la quietud contribuya a que gonzales hombro se sane  El uso excesivo del hombro puede empeorar gonzales [place] [employer]  No levante objetos pesados, no alce los brazos por encima de la calderon ni franklin ninguna otra actividad que [place]  · Póngase hielo o calor en el hombro:  Póngase hielo en el hombro cada varias horas tyler los primeros días  [place] puede ayudar a disminuir el dolor y la inflamación  Después de los primeros howie, es posible que un paño caliente ayude a relajar los músculos del hombro  ¿Cuándo mervat comunicarme con mi médico?   · El dolor en gonzales hombro o brazo no mejora o es peor que antes de comenzar el tratamiento  · Le surge un dolor nuevo en el yvette  · Usted tiene fiebre  · Usted tiene preguntas o inquietudes acerca de gonzales condición o cuidado  ¿Cuándo mervat buscar atención inmediata o llamar al 911? · No puede  el brazo en forma repentina  · Siente dolor intenso de BJURHOLM o espalda, vomita ara o john evacuaciones intestinales son de color hector  ACUERDOS SOBRE GONZALES CUIDADO:   Usted tiene el derecho de ayudar a planear gonzales cuidado  Aprenda todo lo que pueda sobre gonzales condición y saarh darle tratamiento  Discuta john opciones de tratamiento con john médicos para decidir el cuidado que usted desea recibir  Usted siempre tiene el derecho de rechazar el tratamiento  Esta información es sólo para uso en educación  Gonzales intención no es darle un consejo médico sobre enfermedades o tratamientos   Colsulte con arenas [place] farmacéutico antes de seguir cualquier régimen médico para saber si es seguro y efectivo para usted  © 2017 [address] Information is for End User's use only and may not be sold, redistributed or otherwise used for commercial purposes  All illustrations and images included in CareNotes® are the copyrighted property of A D A M , Inc  or Victoriano Vuong

## 2018-06-13 NOTE — ASSESSMENT & PLAN NOTE
Advised to use benadryl to treat insomnia  Scheduled for  treatment intake per pt  report  Giselechris Rdz states she was on medication previously in [facility] to treat her insomnia but can not recall the name of the medication  Educated to discuss insomnia with psychiatrist at next appointment

## 2018-06-13 NOTE — ASSESSMENT & PLAN NOTE
Lab Results   Component Value Date    HGBA1C 6 3 11/10/2017       Continue Metformin  Repeat HgbA1C with next labs  BGS test strips reordered today

## 2018-06-14 ENCOUNTER — HOSPITAL ENCOUNTER (OUTPATIENT)
Dept: RADIOLOGY | Facility: HOSPITAL | Age: 58
Discharge: HOME | End: 2018-06-14
Payer: COMMERCIAL

## 2018-06-14 ENCOUNTER — OFFICE VISIT (OUTPATIENT)
Dept: PHYSICAL THERAPY | Facility: CLINIC | Age: 58
End: 2018-06-14
Payer: COMMERCIAL

## 2018-06-14 DIAGNOSIS — M25.462 EFFUSION OF LEFT KNEE: Primary | ICD-10-CM

## 2018-06-14 DIAGNOSIS — M25.512 LEFT SHOULDER PAIN, UNSPECIFIED CHRONICITY: ICD-10-CM

## 2018-06-14 DIAGNOSIS — M25.562 ACUTE PAIN OF LEFT KNEE: ICD-10-CM

## 2018-06-14 PROCEDURE — 97140 MANUAL THERAPY 1/> REGIONS: CPT

## 2018-06-14 PROCEDURE — 97010 HOT OR COLD PACKS THERAPY: CPT

## 2018-06-14 PROCEDURE — 97110 THERAPEUTIC EXERCISES: CPT

## 2018-06-14 PROCEDURE — 73030 X-RAY EXAM OF SHOULDER: CPT

## 2018-06-14 NOTE — PROGRESS NOTES
Daily Note     Today's date: 2018  Patient name: Johanna Schmidt  : 1960  MRN: [id number]  Referring provider: Matias Paulino MD  Dx:   Encounter Diagnosis     ICD-10-CM    1  Effusion of left knee M25 462    2  Acute pain of left knee M25 562                   Subjective: Pt arrives at PT with knee brace in place and reporting pain in L knee grading the pain a 7/10 and demonstrates an antalgic gait  Pt reports pain while attempting PROM however she reports decreased pain post PT session grading the pain a 5/10 but continues to demonstrates antalgic gait       Objective: See treatment diary below    Precautions: DM, Fibromyalgia, HIV, HLD      Daily Treatment Diary       Manuals  18       L knee PROM  10 min 5 min PK ED PK       L gastroc and hamstring stretch  nv 5 min PK nv unable       L hip passive abd and ER     ED pk                    Exercise Diary   18       Gastroc Stretch w/Strap 30"x3 30"x3 30"x3 30"x3 30"x3 30"x3       Towel Crush 5"x10 3"x20 3"x20 3"x20 3"x20 3"x20       Quad Set  3"x15 3"x20 3"x20 3"x20 3"x20       Heel Slide  5"x10 5"x20 5"x20 5"x15 5" x 15       SAQ  3"x15 3"x20 3"x20 3"x20 3"x20       LAQ  3"x15 3"x20 3"x20 3"x20 3"x20       Weight shifts:  M/L &   A/P (tandem stance)  10x ea 20x ea 20x ea 20x ea np       Heel Raises/TR  15x ea 20x ea 20x ea 30x ea 30x ea       Heel taps onto step   nv 0R 10 x ea 0R 15x ea 0R 15x ea       Mini squats   nv 3" x 15 3"x20 3"x20       Clamshells w/ bolster in hooklying   nv  peach 3"x15 peach 3"x15                                                                                                                                                      Modalities  5/30/18       CP L knee  10 min 10 min 10 min 10 min 10 mins                              Assessment: Pt demonstrates fair tolerance to TE noting grimaces and c/o pain t/o TE session  Difficult to perform PROM secondary to guarding despite translation from a friend to help pt relax  Noted tightness remains in L hip adductors  Concluded with CP in long sit with pt reporting decrease pain to 5/10 at end of session  Plan: Continue per plan of care  Progress treatment as tolerated

## 2018-06-19 ENCOUNTER — OFFICE VISIT (OUTPATIENT)
Dept: PHYSICAL THERAPY | Facility: CLINIC | Age: 58
End: 2018-06-19
Payer: COMMERCIAL

## 2018-06-19 DIAGNOSIS — M25.562 ACUTE PAIN OF LEFT KNEE: ICD-10-CM

## 2018-06-19 DIAGNOSIS — M25.462 EFFUSION OF LEFT KNEE: Primary | ICD-10-CM

## 2018-06-19 PROCEDURE — 97140 MANUAL THERAPY 1/> REGIONS: CPT

## 2018-06-19 PROCEDURE — 97110 THERAPEUTIC EXERCISES: CPT

## 2018-06-19 PROCEDURE — 97010 HOT OR COLD PACKS THERAPY: CPT

## 2018-06-19 NOTE — PROGRESS NOTES
Daily Note     Today's date: 2018  Patient name: Radha April  : 1960  MRN: [id number]  Referring provider: Ruthy Wheatley MD  Dx:   Encounter Diagnosis     ICD-10-CM    1  Effusion of left knee M25 462    2  Acute pain of left knee M25 562                   Subjective: Pt arrives at PT with knee brace in place and reporting pain in L knee grading the pain a 8/10 and continues to demonstrate an antalgic gait  Pt  reports decreased pain post PT session grading the pain a 5/10       Objective: See treatment diary below    Precautions: DM, Fibromyalgia, HIV, HLD      Daily Treatment Diary       Manuals  18      L knee PROM  10 min 5 min PK ED PK PK      L gastroc and hamstring stretch  nv 5 min PK nv unable pain      L hip passive abd and ER     ED pk PK                   Exercise Diary   18      Gastroc Stretch w/Strap 30"x3 30"x3 30"x3 30"x3 30"x3 30"x3 30"x3      Towel Crush 5"x10 3"x20 3"x20 3"x20 3"x20 3"x20 3"x20      Quad Set  3"x15 3"x20 3"x20 3"x20 3"x20 3"x20      Heel Slide  5"x10 5"x20 5"x20 5"x15 5" x 15 5" x 15      SAQ  3"x15 3"x20 3"x20 3"x20 3"x20 1# 3" x 15      LAQ  3"x15 3"x20 3"x20 3"x20 3"x20 1# 3" x 15      Weight shifts:  M/L &   A/P (tandem stance)  10x ea 20x ea 20x ea 20x ea np 20x ea      Heel Raises/TR  15x ea 20x ea 20x ea 30x ea 30x ea 30x ea      Heel taps onto step   nv 0R 10 x ea 0R 15x ea 0R 15x ea 0R 15x ea      Mini squats   nv 3" x 15 3"x20 3"x20 3"x20      Clamshells w/ bolster in hooklying   nv  peach 3"x15 peach 3"x15 org' 3"x15                                                                                                                                                     Modalities  18      CP L knee  10 min 10 min 10 min 10 min 10 mins 10 mins                             Assessment: Pt demonstrates good to fair tolerance to progressions being able to perform LAQ and SAQ with 1# weight with no complaints  She continues to have poor tolerance to manual therapy noting grimacing and c/o increased pain with HS, gastroc stretch, and hooklying clamshells  Plan: Continue per plan of care  Progress treatment as tolerated

## 2018-06-21 ENCOUNTER — OFFICE VISIT (OUTPATIENT)
Dept: PHYSICAL THERAPY | Facility: CLINIC | Age: 58
End: 2018-06-21
Payer: COMMERCIAL

## 2018-06-21 DIAGNOSIS — M25.562 ACUTE PAIN OF LEFT KNEE: ICD-10-CM

## 2018-06-21 DIAGNOSIS — M25.462 EFFUSION OF LEFT KNEE: Primary | ICD-10-CM

## 2018-06-21 PROCEDURE — 97150 GROUP THERAPEUTIC PROCEDURES: CPT

## 2018-06-21 PROCEDURE — 97140 MANUAL THERAPY 1/> REGIONS: CPT

## 2018-06-21 PROCEDURE — 97110 THERAPEUTIC EXERCISES: CPT

## 2018-06-21 PROCEDURE — 97010 HOT OR COLD PACKS THERAPY: CPT

## 2018-06-21 NOTE — PROGRESS NOTES
Daily Note     Today's date: 2018  Patient name: Leelee Pappas  : 1960  MRN: [id number]  Referring provider: Yahaira Dias MD  Dx:   Encounter Diagnosis     ICD-10-CM    1  Effusion of left knee M25 462    2  Acute pain of left knee M25 562                   Subjective: Pt arrives at PT with knee brace in place and reporting decreased pain in L knee grading the pain a 5/10 with antalgic gait  Pt  reports decreased pain post PT session grading the pain a 4/10  Pt reports MD visit next Wednesday for pain in L knee and pain in L shoulder which she revealed today through translation of her friend  Pt advised to let clincian know if any TE bothers her L shoulder: pt reports a verbal understanding         Objective: See treatment diary below    Precautions: DM, Fibromyalgia, HIV, HLD      Daily Treatment Diary       Manuals  18     L knee PROM  10 min 5 min PK ED PK PK PK     L gastroc and hamstring stretch  nv 5 min PK nv unable pain pain     L hip passive abd and ER     ED pk PK PK                  Exercise Diary   18     Gastroc Stretch w/Strap 30"x3 30"x3 30"x3 30"x3 30"x3 30"x3 30"x3 30"x3     Towel Crush 5"x10 3"x20 3"x20 3"x20 3"x20 3"x20 3"x20 3"x20     Quad Set  3"x15 3"x20 3"x20 3"x20 3"x20 3"x20 3"x20     Heel Slide  5"x10 5"x20 5"x20 5"x15 5" x 15 5" x 15 5" x 15     SAQ  3"x15 3"x20 3"x20 3"x20 3"x20 1# 3" x 15 1# 3" x 20     LAQ  3"x15 3"x20 3"x20 3"x20 3"x20 1# 3" x 15 1# 3" x 20     Weight shifts:  M/L &   A/P (tandem stance)  10x ea 20x ea 20x ea 20x ea np 20x ea 20x ea     Heel Raises/TR  15x ea 20x ea 20x ea 30x ea 30x ea 30x ea 30x ea     Heel taps onto step   nv 0R 10 x ea 0R 15x ea 0R 15x ea 0R 15x ea 0R 15x ea     Mini squats   nv 3" x 15 3"x20 3"x20 3"x20 pn     Pati w/ perry in hooklying   nv  peach 3"x15 peach 3"x15 org' 3"x15 org 3"x15 Modalities  5/30/18 6/5/18 6/7/18 6/12/18 6/14/18 6/19/18 6/21/18     CP L knee  10 min 10 min 10 min 10 min 10 mins 10 mins 10 mins                            Assessment: Pt demonstrates good to fair tolerance to TE today  Attempted mini squats however pt reports too much pain  She continues to have poor tolerance to manual therapy noting grimacing and c/o increased pain with HS, gastroc stretch, and hooklying clamshells  Plan: Continue per plan of care  Progress treatment as tolerated

## 2018-06-25 ENCOUNTER — EVALUATION (OUTPATIENT)
Dept: PHYSICAL THERAPY | Facility: CLINIC | Age: 58
End: 2018-06-25
Payer: COMMERCIAL

## 2018-06-25 ENCOUNTER — TELEPHONE (OUTPATIENT)
Dept: SURGERY | Facility: CLINIC | Age: 58
End: 2018-06-25

## 2018-06-25 DIAGNOSIS — M25.462 EFFUSION OF LEFT KNEE: Primary | ICD-10-CM

## 2018-06-25 DIAGNOSIS — M25.562 ACUTE PAIN OF LEFT KNEE: ICD-10-CM

## 2018-06-25 PROCEDURE — G8979 MOBILITY GOAL STATUS: HCPCS | Performed by: PHYSICAL THERAPIST

## 2018-06-25 PROCEDURE — 97140 MANUAL THERAPY 1/> REGIONS: CPT | Performed by: PHYSICAL THERAPIST

## 2018-06-25 PROCEDURE — G8978 MOBILITY CURRENT STATUS: HCPCS | Performed by: PHYSICAL THERAPIST

## 2018-06-25 PROCEDURE — 97010 HOT OR COLD PACKS THERAPY: CPT | Performed by: PHYSICAL THERAPIST

## 2018-06-25 NOTE — PROGRESS NOTES
PT Re-Evaluation     Today's date: 2018  Patient name: Lois Fraga  : 1960  MRN: [id number]  Referring provider: Sisi Lemos MD  Dx:   Encounter Diagnosis     ICD-10-CM    1  Effusion of left knee M25 462    2  Acute pain of left knee M25 562                   Assessment  Impairments: abnormal gait, abnormal or restricted ROM, activity intolerance, impaired balance, impaired physical strength and weight-bearing intolerance    Assessment details: Pt is a 65year old female presenting to PT with history of left knee pain  Pt has improved L knee ROM and strength compared to initial eval but continues to have significant deficits  Pt has strength deficits in L hip and poor balance and ambulation  Pt also with moderate effusion and tenderness to medial and inferior patellar regions and MPFL  Pt with impaired balance, gait, functional mobility and activity tolerance  Pt continues to report difficulty with stairclimbing, ambulation, standing, and L knee flexion  Pt is a good candidate for skilled PT intervention and will benefit from treatment in order to address their above limitations and restore maximal function  Thank you for this referral      Barriers to therapy: pt is primarily Greek-speaking; [facility] language line used during history and subjective portion of RE  Understanding of Dx/Px/POC: good   Prognosis: good    Goals  Short Term Goals: To be achieved by 4 weeks    1) Patient to be independent with basic HEP  - met  2) Decrease pain to 4/10 at worst - partially met  3) Increase ROM by 5 degrees or greater in all deficient planes  - met  4) Increase strength by 1/2 MMT grade or greater in all deficient planes  - not met  5) Patient to report decreased sleep interruption secondary to pain  - not met      Long Term Goals: To be achieved by discharge    1) FOTO equal to or greater than 53 - not met  2) Patient to be independent with comprehensive HEP  - partially met  3) Decrease pain to 2/10 at worst  for improved quality of life  - not met  4) Increase strength to 5/5 MMT grade in all deficient planes to improve a/iadls  - not met  5) Increase ROM to within normal limits  - not met  6) Patient to report no sleep interruption secondary to pain  - not met      Plan  Patient would benefit from: skilled PT  Planned modality interventions: cryotherapy  Planned therapy interventions: home exercise program, functional ROM exercises, flexibility, balance, manual therapy, neuromuscular re-education, therapeutic exercise, strengthening, patient education and gait training  Frequency: 2x week  Duration in visits: 12  Duration in weeks: 6  Treatment plan discussed with: patient        Subjective Evaluation    History of Present Illness  Date of onset: 2018  Mechanism of injury: trauma  Mechanism of injury: Pt feels about 40% improved with respect to L knee since starting therapy  Pt states that her L shoulder has been hurting since falling 18  Pt has been compliant with therapy attendance and performing home exercise program  Pt wears her knee brace daily  Pt reports occasional numbness and tingling in bilateral feet and L calf  Pt must stop 4-5 times between house and bus stop when walking secondary to pain and fatigue  Pt has pain with standing for a prolonged time while waiting for the bus  Pt has significant pain with walking  Pt lives on 2nd floor and has difficulty and pain with descending stairs  Pt climbs stairs in step to manner, leading with good leg ascending and bad leg descending  Pt states that she has difficulty with stairclimbing while carrying a package secondary to she has no one to help her  Pt has relief with ice  Pt returns to MD tomorrow, 18, for the knee, 18 for L shoulder    Not a recurrent problem   Quality of life: good    Pain  At best pain ratin  At worst pain ratin  Location: L knee  Quality: dull ache and sharp    Social Support  Stairs in house: yes       Diagnostic Tests  X-ray: normal  MRI studies: abnormal  Patient Goals  Patient goals for therapy: decreased edema, decreased pain, improved balance, increased motion, independence with ADLs/IADLs and increased strength          Objective     Observations   Left Knee   Positive for effusion  Additional Observation Details  Pt with moderate to significant left knee effusion; healed left patellar abrasion  ; please see below for girth measurement    Gait: severely antalgic with decreased left stance time, lacking left terminal knee extension  ; persists at RE    SLS: pt unable on L, declined testing on R; did not reassess    Flexibility: moderate L > R hamstring and gastrocnemius tightness present; persists at RE with pain with passive gastroc > hamstring stretching    Sol's Sign: negative bilaterally ; unchanged at RE    Tenderness   Left Knee   Tenderness in the inferior patella, lateral joint line, medial joint line and medial patella  No tenderness in the medial retinaculum and pes anserinus       Neurological Testing     Sensation     Knee   Left Knee   Intact: light touch    Right Knee   Intact: light touch     Active Range of Motion   Left Knee   Flexion: 72 degrees with pain  Extension: 3 degrees with pain  Extensor lag: with pain    Right Knee   Flexion: 133 degrees   Extension: 0 degrees     Additional Active Range of Motion Details  Pt has resolved quad lag with SLR but has difficulty with lifting leg greater than 30 deg from table    Passive Range of Motion   Left Knee   Flexion: 75 degrees with pain  Extension: 0 degrees with pain    Additional Passive Range of Motion Details  Empty end feel secondary to pain    Patellar Static Positioning   Left Knee: WFL  Right Knee: [facility]    Strength/Myotome Testing     Left Hip   Planes of Motion   Flexion: 3+  External rotation: 3  Internal rotation: 4-    Right Hip   Planes of Motion   Flexion: 5  External rotation: 5  Internal rotation: 4+    Left Knee   Flexion: 3  Extension: 3    Right Knee   Flexion: 5  Extension: 5    Left Ankle/Foot   Dorsiflexion: 4-  Plantar flexion: 4-  Inversion: 4+  Eversion: 4+    Right Ankle/Foot   Dorsiflexion: 5  Plantar flexion: 5  Inversion: 4+  Eversion: 5    Additional Strength Details  Pt has pain in L knee with active motion, did not apply resistance  Pt has pain in L knee with all L ankle resisted testing    Swelling     Left Knee Girth Measurement (cm)   Joint line: 38 cm    Precautions: DM, Fibromyalgia, HIV, HLD      Daily Treatment Diary       Manuals  5/30/18 6/5/18 6/7/18 6/12/18 6/14/18 6/19/18 6/21/18 6/25/18    L knee PROM  10 min 5 min PK ED PK PK PK np    L gastroc and hamstring stretch  nv 5 min PK nv unable pain pain np    L hip passive abd and ER     ED pk PK PK np    measurements         ED    Exercise Diary   5/30/18 6/5/18 6/7/18 6/12/18 6/14/18 6/19/18 6/21/18 6/25/18    Gastroc Stretch w/Strap 30"x3 30"x3 30"x3 30"x3 30"x3 30"x3 30"x3 30"x3 nv    Towel Crush 5"x10 3"x20 3"x20 3"x20 3"x20 3"x20 3"x20 3"x20 nv    Quad Set  3"x15 3"x20 3"x20 3"x20 3"x20 3"x20 3"x20 nv    Heel Slide  5"x10 5"x20 5"x20 5"x15 5" x 15 5" x 15 5" x 15 nv    SAQ  3"x15 3"x20 3"x20 3"x20 3"x20 1# 3" x 15 1# 3" x 20 nv    LAQ  3"x15 3"x20 3"x20 3"x20 3"x20 1# 3" x 15 1# 3" x 20 nv    Weight shifts:  M/L &   A/P (tandem stance)  10x ea 20x ea 20x ea 20x ea np 20x ea 20x ea nv    Heel Raises/TR  15x ea 20x ea 20x ea 30x ea 30x ea 30x ea 30x ea nv    Heel taps onto step   nv 0R 10 x ea 0R 15x ea 0R 15x ea 0R 15x ea 0R 15x ea nv    Mini squats   nv 3" x 15 3"x20 3"x20 3"x20 pn nv    Clamshells w/ bolster in hooklying   nv  peach 3"x15 peach 3"x15 org' 3"x15 org 3"x15 nv    Bike          5 min                                                                                                                                      Modalities  5/30/18 6/5/18 6/7/18 6/12/18 6/14/18 6/19/18 6/21/18 6/25/18    CP L knee  10 min 10 min 10 min 10 min 10 mins 10 mins 10 mins 10 min

## 2018-06-26 ENCOUNTER — OFFICE VISIT (OUTPATIENT)
Dept: OBGYN CLINIC | Facility: MEDICAL CENTER | Age: 58
End: 2018-06-26
Payer: COMMERCIAL

## 2018-06-26 VITALS
HEART RATE: 102 BPM | DIASTOLIC BLOOD PRESSURE: 85 MMHG | BODY MASS INDEX: 28.32 KG/M2 | SYSTOLIC BLOOD PRESSURE: 123 MMHG | HEIGHT: 65 IN | WEIGHT: 170 LBS

## 2018-06-26 DIAGNOSIS — M25.462 EFFUSION OF LEFT KNEE: Primary | ICD-10-CM

## 2018-06-26 PROCEDURE — 99213 OFFICE O/P EST LOW 20 MIN: CPT | Performed by: ORTHOPAEDIC SURGERY

## 2018-06-26 NOTE — PROGRESS NOTES
Orthopaedic Surgery - Office Note  Astrid Montanez (89 y o  female)   : 1960   MRN: [id number]  Encounter Date: 2018    Chief Complaint   Patient presents with    Left Shoulder - Follow-up       Assessment / Plan  R knee pain     · Continue PT for L knee ROM and strengthening   · Continue short hinged knee brace as needed, and transition to neoprene sleeve as able  · Continue anti-inflammatories and ice as needed  Return in about 6 weeks (around 2018)  History of Present Illness  Kathryn Fajardo is a 65 y o  female who presents as a follow up for her L knee pain after a fall on 18  She has superolateral knee pain and limited ROM  She had an MRI showing a patellar bone contusion and MPFL strain  She has been in PT and is making very slow progress  She is still unable to make stairs  She has swelling and pain with walking  Denies instability or locking  Review of Systems  Pertinent items are noted in HPI  All other systems were reviewed and are negative  Physical Exam  /85   Pulse 102   Ht 5' 5" (1 651 m)   Wt 77 1 kg (170 lb)   BMI 28 29 kg/m²   Cons: Appears well  No apparent distress  Psych: Alert  Oriented x3  Mood and affect normal   Eyes: PERRLA, EOMI  Resp: Normal effort  No audible wheezing or stridor  CV: Palpable pulse  No discernable arrhythmia  Trace LE edema  Lymph:  No palpable cervical, axillary, or inguinal lymphadenopathy  Skin: Warm  No palpable masses  No visible lesions  Neuro: Normal muscle tone  Normal and symmetric DTR's  Left Knee Exam  Alignment:  Normal knee alignment  Inspection:  mild knee swelling  mild edema  No ecchymosis  No muscle atrophy  No deformity  Palpation:  moderate tenderness at lateral quad insertion  No effusion  No tenderness on medial retinaculum  No joint line tenderness  ROM:  Knee Extension 0  Knee Flexion 100  Strength:  Quadriceps 4/5  Hamstrings 4/5    Stability:  No objective knee instability  Stable Varus / Valgus stress, Lachman, and Posterior drawer  Tests:  No pertinent positive or negative tests  Patella:  Patella tracks centrally without crepitus  Neurovascular:  Sensation intact in DP/SP/Gonzales/Sa/T nerve distributions  2+ DP & PT pulses  Gait:  Antalgic  Studies Reviewed  No studies to review    Procedures  No procedures today  Medical, Surgical, Family, and Social History  The patient's medical history, family history, and social history, were reviewed and updated as appropriate  Past Medical History:   Diagnosis Date    Arthritis     Cellulitis     Colon polyp     [facility]     Fibromyalgia     Fibromyalgia     HIV [facility]human immunodeficiency virus infection) ([facility] )     Hyperlipidemia     Pinguecula of both eyes     Presbyopia of both eyes        Past Surgical History:   Procedure Laterality Date    BREAST SURGERY      lumpectomy    COLONOSCOPY      last year, polyps, every 6 months    HYSTERECTOMY      INCISION AND DRAINAGE PERIRECTAL ABSCESS      TUBAL LIGATION         Family History   Problem Relation Age of Onset    Heart disease Mother     Diabetes Mother         type 1    Hypertension Mother     Cancer Sister        Social History     Occupational History    Not on file       Social History Main Topics    Smoking status: Current Every Day Smoker     Types: Cigarettes    Smokeless tobacco: Never Used    Alcohol use Yes      Comment: occasionally     Drug use: No    Sexual activity: No       No Known Allergies      Current Outpatient Prescriptions:     acetaminophen (TYLENOL) 500 mg tablet, Take 2 tablets (1,000 mg total) by mouth every 8 (eight) hours, Disp: 60 tablet, Rfl: 0    albuterol (2 5 mg/3 mL) 0 083 % nebulizer solution, Inhale 3 mL, Disp: , Rfl:     albuterol (PROVENTIL HFA,VENTOLIN HFA) 90 mcg/act inhaler, Inhale 2 puffs, Disp: , Rfl:     ammonium lactate (LAC-HYDRIN) 12 % lotion, , Disp: , Rfl:     bisacodyl (DULCOLAX) 5 mg EC tablet, Take 30 mg by mouth, Disp: , Rfl:     Blood Glucose Monitoring Suppl (TRUE METRIX METER) GIL, daily  , Disp: , Rfl:     budesonide-formoterol (SYMBICORT) 160-4 5 mcg/act inhaler, Inhale 2 puffs, Disp: , Rfl:     clotrimazole (LOTRIMIN) 1 % cream, Apply topically 2 (two) times a day, Disp: , Rfl:     diphenhydrAMINE (BENADRYL) 50 mg capsule, Take 1 capsule (50 mg total) by mouth daily at bedtime as needed for sleep, Disp: 30 capsule, Rfl: 0    elvitegravir-cobicistat-emtricitabine-tenofovir (STRIBILD) [phone] mg, Take 1 tablet by mouth, Disp: , Rfl:     gabapentin (NEURONTIN) 800 mg tablet, TAKE 1 TABLET BY MOUTH THREE TIMES A DAY, Disp: 90 tablet, Rfl: 3    GENVOYA tablet, TAKE 1 TABLET BY MOUTH DAILY, Disp: 30 tablet, Rfl: 3    glucose blood test strip, 1 each by Other route 4 (four) times a day, Disp: 100 each, Rfl: 3    ibuprofen (MOTRIN) 800 mg tablet, Take 1 tablet (800 mg total) by mouth every 8 (eight) hours, Disp: 30 tablet, Rfl: 0    metFORMIN (GLUCOPHAGE) 500 mg tablet, TAKE 1 TABLET BY MOUTH TWICE A DAY, Disp: 60 tablet, Rfl: 3    polyethylene glycol (GLYCOLAX) powder, Drink per MD recommendations, Disp: , Rfl:     sulfaSALAzine (AZULFIDINE-EN) 500 mg EC tablet, , Disp: , Rfl:     SUPER THIN LANCETS MISC, by Percutaneous route, Disp: , Rfl:     Na Sulfate-K Sulfate-Mg Sulf (SUPREP BOWEL PREP KIT) 17 5-3 13-1 6 GM/180ML SOLN, Take 1 Bottle by mouth once for 1 dose, Disp: 1 Bottle, Rfl: 0      Cortney Faulkner MD    Scribe Attestation    I,:    am acting as a scribe while in the presence of the attending physician :        I,:    personally performed the services described in this documentation    as scribed in my presence :

## 2018-06-27 ENCOUNTER — OFFICE VISIT (OUTPATIENT)
Dept: OBGYN CLINIC | Facility: MEDICAL CENTER | Age: 58
End: 2018-06-27
Payer: COMMERCIAL

## 2018-06-27 VITALS — WEIGHT: 170 LBS | HEIGHT: 65 IN | BODY MASS INDEX: 28.32 KG/M2

## 2018-06-27 DIAGNOSIS — M75.41 IMPINGEMENT SYNDROME OF RIGHT SHOULDER: Primary | ICD-10-CM

## 2018-06-27 DIAGNOSIS — M25.512 LEFT SHOULDER PAIN, UNSPECIFIED CHRONICITY: ICD-10-CM

## 2018-06-27 PROCEDURE — 20610 DRAIN/INJ JOINT/BURSA W/O US: CPT | Performed by: FAMILY MEDICINE

## 2018-06-27 PROCEDURE — 99214 OFFICE O/P EST MOD 30 MIN: CPT | Performed by: FAMILY MEDICINE

## 2018-06-27 RX ORDER — BUPIVACAINE HYDROCHLORIDE 2.5 MG/ML
4 INJECTION, SOLUTION INFILTRATION; PERINEURAL
Status: COMPLETED | OUTPATIENT
Start: 2018-06-27 | End: 2018-06-27

## 2018-06-27 RX ORDER — METHYLPREDNISOLONE ACETATE 40 MG/ML
1 INJECTION, SUSPENSION INTRA-ARTICULAR; INTRALESIONAL; INTRAMUSCULAR; SOFT TISSUE
Status: COMPLETED | OUTPATIENT
Start: 2018-06-27 | End: 2018-06-27

## 2018-06-27 RX ADMIN — METHYLPREDNISOLONE ACETATE 1 ML: 40 INJECTION, SUSPENSION INTRA-ARTICULAR; INTRALESIONAL; INTRAMUSCULAR; SOFT TISSUE at 10:55

## 2018-06-27 RX ADMIN — BUPIVACAINE HYDROCHLORIDE 4 ML: 2.5 INJECTION, SOLUTION INFILTRATION; PERINEURAL at 10:55

## 2018-06-27 NOTE — PROGRESS NOTES
1  Impingement syndrome of right shoulder  [facility]   2  Left shoulder pain, unspecified chronicity  Ambulatory referral to Orthopedic Surgery    [facility]     Orders Placed This Encounter   Procedures    Large joint arthrocentesis    [facility]        Imaging Studies (I personally reviewed results in PACS):  X-ray left shoulder 06/14/2018:  No acute osseous abnormality    IMPRESSION:  Left shoulder impingement      Return if symptoms worsen or fail to improve  Patient Instructions   Start physical therapy for left shoulder    You have been diagnosed with Rotator cuff impingement which is a pinching of the tendon, or cable, that attaches to the arm bone known as the humerus on the outside of your shoulder and can lead to pain, feelings of weakness due to pain, and worsening pain with reaching overhead, repetitive movements with your arm, or lying on your shoulder at night  There is no indication today of significant or large rotator cuff tear requiring surgery although it is possible you may still have a small tear that does not require surgery at the moment  Impingement and even small rotator cuff tear symptoms usually improve with rest, range of motion exercises, and physical therapy over 1-3 months but sometimes can take 4-6 months  Additional treatments include steroid injection for inflammation; however, the pain relief from steroid injections is not long lasting and the curative treatment is physical therapy  If you continue to fail to improve with conservative measures, then we will discuss obtaining an MRI at future visits to evaluate for severe rotator cuff tear that may be difficult to find during your initial visits  Surgery for impingement syndrome is usually not considered until after 4-6 months of initial therapy unless a severe rotator cuff tear is found on MRI           reviewed red flags with patient regarding injection including infection, skin dimpling, hypo-pigmentation, and nerve damage  patient expressed understanding and agreed to proceed with procedure  educated if any symptoms including fevers, chills, swelling, or worsening symptoms occur then to call office or go to hospital for immediate care if physician unavailable  patient expressed understanding and agreed to plan  CHIEF COMPLAINT:  Left shoulder pain and stiffness    HPI:  Obed Gustafson is a 65 y o  female  who presents for       Visit 06/27/2018:  Evaluation of left shoulder pain and stiffness ongoing 1 month  Patient does state that she had injury prior April 23, 2018 but denies any pain developing after then  She points to her humeral head and lateral shoulder  She describes constant burning stinging leg pain as well as pulling on a cord sensation that is worst when reaching overhead  She also has associated nighttime pain in her left shoulder  She has been taking Tylenol with codeine which does offer some relief  She has also been taking ibuprofen  There is radiation of her pain down her arm into her biceps  Associated symptoms do include left-sided neck pain  Patient has been performing physical therapy for knee and is currently under treatment with Dr Gale Peters  Review of Systems   Constitutional: Negative for chills, fever and unexpected weight change  HENT: Negative for hearing loss, nosebleeds and sore throat  Eyes: Negative for pain, redness and visual disturbance  Respiratory: Negative for cough, shortness of breath and wheezing  Cardiovascular: Negative for chest pain, palpitations and leg swelling  Gastrointestinal: Negative for abdominal distention, nausea and vomiting  Endocrine: Negative for polydipsia and polyuria  Genitourinary: Negative for dysuria and hematuria  Skin: Negative for rash and wound  Neurological: Negative for dizziness, numbness and headaches     Psychiatric/Behavioral: Negative for decreased concentration and suicidal ideas  Following history reviewed and update:    Past Medical History:   Diagnosis Date    Arthritis     Cellulitis     Colon polyp     [facility]     Fibromyalgia     Fibromyalgia     [facility]     Hyperlipidemia     Pinguecula of both eyes     Presbyopia of both eyes      Past Surgical History:   Procedure Laterality Date    BREAST SURGERY      lumpectomy    COLONOSCOPY      last year, polyps, every 6 months    HYSTERECTOMY      INCISION AND DRAINAGE PERIRECTAL ABSCESS      TUBAL LIGATION       Social History   History   Alcohol Use    Yes     Comment: occasionally      History   Drug Use No     History   Smoking Status    Current Every Day Smoker    Types: Cigarettes   Smokeless Tobacco    Never Used     Family History   Problem Relation Age of Onset    Heart disease Mother     Diabetes Mother         type 1    Hypertension Mother     Cancer Sister      No Known Allergies       Physical Exam  Constitutional:  see vital signs  Gen: well-developed, normocephalic/atraumatic, well-groomed  Eyes: No inflammation or discharge of conjunctiva or lids; sclera clear   Pharynx: no inflammation, lesion, or mass of lips  Neck: supple, no masses, non-distended  MSK: no inflammation, lesion, mass, or clubbing of nails and digits except for other than mentioned below  SKIN: no visible rashes or skin lesions  Pulmonary/Chest: Effort normal  No respiratory distress     NEURO: cranial nerves grossly intact  PSYCH:  Alert and oriented to person, place, and time; recent and remote memory intact; mood normal, no depression, anxiety, or agitation, judgment and insight good and intact     Ortho Exam    Cervical  ROM: intact  Tenderness: no spinous process tenderness; + left upper trapezius  Sensation UE Bilateral:  C5: normal  C6: normal  C7: normal  C8: normal  T1: normal  Strength UE: 5/5 elbow, wrist, fingers bilatearl  Reflexes: symmetric bilateral triceps, biceps, corachobrachiais      LEFT SHOULDER:  Erythema: no  Swelling: no  Increased Warmth: no    Tenderness: + lateral subacromial    ROM  Abduction left 90 secondary to pain    Strength  Abduction: 5/5  ER: 5/5  IR: 5/5    Drop-Arm: negative  Emptycan: negative  Belly Press: negative  Lift-off Test:    Ness: +  Neer: +  Cross-Arm: negative  Speeds: negative    RIGHT SHOULDER:  Strength  Abduction: 5/5  ER: 5/5  IR: 5/5    ROM  Touchdown sign: intact  Appley Scratch Test: symmetric  Passive: symmetric    Empty can: negative         Large joint arthrocentesis  Date/Time: 6/27/2018 10:55 AM  Consent given by: patient  Site marked: site marked  Timeout: Immediately prior to procedure a time out was called to verify the correct patient, procedure, equipment, support staff and site/side marked as required   Supporting Documentation  Indications: pain and diagnostic evaluation   Procedure Details  Location: shoulder - L subacromial bursa  Preparation: Patient was prepped and draped in the usual sterile fashion  Needle size: 22 G  Ultrasound guidance: no  Approach: posterolateral  Medications administered: 4 mL bupivacaine 0 25 %; 1 mL methylPREDNISolone acetate 40 mg/mL    Patient tolerance: patient tolerated the procedure well with no immediate complications  Dressing:  Sterile dressing applied        Clarification 09/17/2018:   Injection on 06/27/2018 was of left subacromial bursa for patient's complaint of left shoulder pain at that time

## 2018-06-27 NOTE — PATIENT INSTRUCTIONS
Start physical therapy for left shoulder    You have been diagnosed with Rotator cuff impingement which is a pinching of the tendon, or cable, that attaches to the arm bone known as the humerus on the outside of your shoulder and can lead to pain, feelings of weakness due to pain, and worsening pain with reaching overhead, repetitive movements with your arm, or lying on your shoulder at night  There is no indication today of significant or large rotator cuff tear requiring surgery although it is possible you may still have a small tear that does not require surgery at the moment  Impingement and even small rotator cuff tear symptoms usually improve with rest, range of motion exercises, and physical therapy over 1-3 months but sometimes can take 4-6 months  Additional treatments include steroid injection for inflammation; however, the pain relief from steroid injections is not long lasting and the curative treatment is physical therapy  If you continue to fail to improve with conservative measures, then we will discuss obtaining an MRI at future visits to evaluate for severe rotator cuff tear that may be difficult to find during your initial visits  Surgery for impingement syndrome is usually not considered until after 4-6 months of initial therapy unless a severe rotator cuff tear is found on MRI  reviewed red flags with patient regarding injection including infection, skin dimpling, hypo-pigmentation, and nerve damage  patient expressed understanding and agreed to proceed with procedure  educated if any symptoms including fevers, chills, swelling, or worsening symptoms occur then to call office or go to hospital for immediate care if physician unavailable  patient expressed understanding and agreed to plan

## 2018-06-28 ENCOUNTER — OFFICE VISIT (OUTPATIENT)
Dept: PHYSICAL THERAPY | Facility: CLINIC | Age: 58
End: 2018-06-28
Payer: COMMERCIAL

## 2018-06-28 DIAGNOSIS — M25.562 ACUTE PAIN OF LEFT KNEE: Primary | ICD-10-CM

## 2018-06-28 DIAGNOSIS — M25.462 EFFUSION OF LEFT KNEE: ICD-10-CM

## 2018-06-28 PROCEDURE — 97010 HOT OR COLD PACKS THERAPY: CPT | Performed by: PHYSICAL THERAPIST

## 2018-06-28 PROCEDURE — 97110 THERAPEUTIC EXERCISES: CPT | Performed by: PHYSICAL THERAPIST

## 2018-06-28 PROCEDURE — 97140 MANUAL THERAPY 1/> REGIONS: CPT | Performed by: PHYSICAL THERAPIST

## 2018-06-28 NOTE — PROGRESS NOTES
Daily Note     Today's date: 2018  Patient name: Ora Shipley  : 1960  MRN: [id number]  Referring provider: Rick Peace MD  Dx:   Encounter Diagnosis     ICD-10-CM    1  Acute pain of left knee M25 562    2  Effusion of left knee M25 462                   Subjective: Pt arrives at PT with knee brace in place  Pt reports pain with all flexion movements  She pointed to a pain location in elton lateral knee  Objective: See treatment diary below    Precautions: DM, Fibromyalgia, HIV, HLD      Daily Treatment Diary       Manuals 18            L knee PROM RZ -10min            L gastroc and hamstring stretch             L hip passive abd and ER RZ                         Exercise Diary  18            Gastroc Stretch w/Strap 30"x3            Towel Crush 5"x10            Quad Set 3"x20            Heel Slide 5"15            SAQ 1# 3" x 20            LAQ 1# 3" x 20            Weight shifts:  M/L &   A/P (tandem stance) 20x ea            Heel Raises/TR 30x ea            Heel taps onto step 0R 15x ea            Mini squats             Clamshells w/ bolster in hooklying org 3"x15             Std L hip flex/abd/ext 20ea                                                                                                                                              Modalities             CP L knee 10min                               ADDED - TPR to gastroc and quad, as well as medial patellar glide     Assessment: Pt demonstrates good to fair tolerance to TE today; demonstrating fascial grimacing throughout the session with any flexion motion  She will benefit from trial of Nevarez taping next visit  She demonstrated reactivity in distal lateral quad and lateral patella; as well as lateral retinaculum tightness  Plan: Continue per plan of care  Progress treatment as tolerated

## 2018-07-02 ENCOUNTER — OFFICE VISIT (OUTPATIENT)
Dept: MULTI SPECIALTY CLINIC | Facility: CLINIC | Age: 58
End: 2018-07-02
Payer: COMMERCIAL

## 2018-07-02 VITALS
BODY MASS INDEX: 27.99 KG/M2 | WEIGHT: 167.99 LBS | HEIGHT: 65 IN | DIASTOLIC BLOOD PRESSURE: 80 MMHG | HEART RATE: 100 BPM | TEMPERATURE: 98 F | SYSTOLIC BLOOD PRESSURE: 130 MMHG

## 2018-07-02 DIAGNOSIS — L73.2 HIDRADENITIS: ICD-10-CM

## 2018-07-02 DIAGNOSIS — L73.2 HIDRADENITIS SUPPURATIVA: ICD-10-CM

## 2018-07-02 DIAGNOSIS — L73.2 HIDRADENITIS SUPPURATIVA: Primary | ICD-10-CM

## 2018-07-02 PROCEDURE — 99213 OFFICE O/P EST LOW 20 MIN: CPT | Performed by: ORTHOPAEDIC SURGERY

## 2018-07-02 RX ORDER — DOXYCYCLINE HYCLATE 100 MG/1
100 TABLET, DELAYED RELEASE ORAL DAILY
Qty: 7 TABLET | Refills: 0 | Status: SHIPPED | OUTPATIENT
Start: 2018-07-02 | End: 2018-07-09

## 2018-07-02 NOTE — PROGRESS NOTES
Office Visit - General Surgery  Radha Alvarado MRN: [id number]  Encounter: [id number]    Assessment and Plan  66 yo female with T2DM, HLD, Cellulitis, and HIV who presents with chronic hydradenitis of her right inner thigh  Plan:  - No surgical intervention at this time  - Continue Doxycycline  - Follow up as needed      Problem List Items Addressed This Visit     Hidradenitis suppurativa - Primary     Doxycycline course         Relevant Medications    doxycycline (DORYX) 100 MG EC tablet      Other Visit Diagnoses     Hidradenitis              Chief Complaint:  Radha Alvarado is a 65 y o  female who presents for hydradenitis suppurativa of the right inner thigh  HPI:  Ms Dilma Eddy is a 66 yo female with history of T2DM, HLD, Cellulitis, Colon Polyps, Perirectal Abscess, HIV, and  Hidradenitis Suppurativa who presents with complaints of recent drainage from hydradenitis lesions of her right groin/inner thigh  She was seen previously in our clinic for the same complaint in 2/26/2018  At that time she was to continue bactrim and started on doxycycline, then told to follow up in three months  At that time her CD4+ count was low at 207 so no surgical option was recommended  Upon completion of her doxycycline course she started experiencing some drainage  She also endorses tenderness and denied fevers or chills        Past Medical History  Past Medical History:   Diagnosis Date    Arthritis     Cellulitis     Colon polyp     Diabetes mellitus ([address]     Fibromyalgia     Fibromyalgia     [facility]     Hyperlipidemia     Pinguecula of both eyes     Presbyopia of both eyes        Past Surgical History  Past Surgical History:   Procedure Laterality Date    BREAST SURGERY      lumpectomy    COLONOSCOPY      last year, polyps, every 6 months    HYSTERECTOMY      INCISION AND DRAINAGE PERIRECTAL ABSCESS      TUBAL LIGATION         Family History  Family History   Problem Relation Age of Onset    Heart disease Mother     Diabetes Mother         type 1    Hypertension Mother     Cancer Sister        Medications  Current Outpatient Prescriptions on File Prior to Visit   Medication Sig Dispense Refill    acetaminophen (TYLENOL) 500 mg tablet Take 2 tablets (1,000 mg total) by mouth every 8 (eight) hours 60 tablet 0    albuterol (2 5 mg/3 mL) 0 083 % nebulizer solution Inhale 3 mL      albuterol (PROVENTIL HFA,VENTOLIN HFA) 90 mcg/act inhaler Inhale 2 puffs      ammonium lactate (LAC-HYDRIN) 12 % lotion       bisacodyl (DULCOLAX) 5 mg EC tablet Take 30 mg by mouth      Blood Glucose Monitoring Suppl (TRUE METRIX METER) GIL daily   budesonide-formoterol (SYMBICORT) 160-4 5 mcg/act inhaler Inhale 2 puffs      clotrimazole (LOTRIMIN) 1 % cream Apply topically 2 (two) times a day      diphenhydrAMINE (BENADRYL) 50 mg capsule Take 1 capsule (50 mg total) by mouth daily at bedtime as needed for sleep 30 capsule 0    elvitegravir-cobicistat-emtricitabine-tenofovir (STRIBILD) [phone] mg Take 1 tablet by mouth      gabapentin (NEURONTIN) 800 mg tablet TAKE 1 TABLET BY MOUTH THREE TIMES A DAY 90 tablet 3    GENVOYA tablet TAKE 1 TABLET BY MOUTH DAILY 30 tablet 3    glucose blood test strip 1 each by Other route 4 (four) times a day 100 each 3    ibuprofen (MOTRIN) 800 mg tablet Take 1 tablet (800 mg total) by mouth every 8 (eight) hours 30 tablet 0    metFORMIN (GLUCOPHAGE) 500 mg tablet TAKE 1 TABLET BY MOUTH TWICE A DAY 60 tablet 3    Na Sulfate-K Sulfate-Mg Sulf (SUPREP BOWEL PREP KIT) 17 5-3 13-1 6 GM/180ML SOLN Take 1 Bottle by mouth once for 1 dose 1 Bottle 0    polyethylene glycol (GLYCOLAX) powder Drink per MD recommendations      sulfaSALAzine (AZULFIDINE-EN) 500 mg EC tablet       SUPER THIN LANCETS MISC by Percutaneous route       No current facility-administered medications on file prior to visit  Allergies  No Known Allergies    Review of Systems   All other systems reviewed and are negative  Objective  Vitals:    07/02/18 1152   BP: 130/80   Pulse: 100   Temp: 98 °F (36 7 °C)       Physical Exam   Constitutional: She is oriented to person, place, and time  She appears well-developed and well-nourished  HENT:   Head: Normocephalic and atraumatic  Mouth/Throat: Oropharynx is clear and moist    Eyes: Conjunctivae and EOM are normal  Pupils are equal, round, and reactive to light  Neck: Neck supple  No JVD present  No thyromegaly present  Cardiovascular: Normal rate, regular rhythm and intact distal pulses  Pulmonary/Chest: Effort normal and breath sounds normal  No respiratory distress  Abdominal: Soft  She exhibits no distension  There is no tenderness  Musculoskeletal: Normal range of motion  She exhibits no edema  Indurated scar tissue with healed drainage sites of right inguinal area   Lymphadenopathy:     She has no cervical adenopathy  Neurological: She is alert and oriented to person, place, and time  Skin: Skin is warm and dry  Psychiatric: She has a normal mood and affect   Her behavior is normal

## 2018-07-02 NOTE — PATIENT INSTRUCTIONS
Hidradenitis supurativa   CUIDADO AMBULATORIO:   La hidradenitis supurativa  es ricky enfermedad crónica (de larga duración) de la piel que hace que las glándulas sudoríparas o los folículos pilosos se obstruyan y se inflamen  La HS hace que se desarrollen en la piel bultos rojos que parecen granos o forúnculos pequeños  La causa de la HS se desconoce  Es posible que sea hereditario  Tener sobrepeso y [employer] signos y síntomas de la HS  Signos y síntomas de la HS:  La HS generalmente ocurre en áreas alrededor de pliegues de la piel o donde están las glándulas sudoríparas o los folículos pilosos  [place] incluye las axilas, la bora, la sobia genital o anal, y debajo de los senos en las mujeres  Los signos y síntomas podrían aparecer y desaparecer  También podrían empeorar con el paso del [place]  Puede presentar cualquiera de los siguientes signos o síntomas:  · Signos leves o tempranos de la HS:  Maxcine Peters bultos rojos y sensibles que parecen granos o forúnculos    · Agravamiento de los signos y síntomas de la HS:      ¨ Bultos dolorosos y duros que crecen, se rompen y drenan pus con mal olor    ¨ Bultos que se parrish profundos debajo de la piel y se conectan entre sí y parrish un túnel     ¨ Cicatrices profundas y gruesas causadas por los bultos que se sanan y vuelven a aparecer con el paso del tiempo en la misma sobia  Pregúntele a arenas Milena Clayman vitaminas y minerales son adecuados para usted  · [place]yla síntomas empeoran, aún con [place]  · Usted tiene preguntas o inquietudes acerca de arenas condición o cuidado  Tratamiento:  El objetivo del tratamiento es controlar los síntomas, prevenir el desarrollo de nuevos bultos y limitar las cicatrices  Es posible que usted necesite alguno de los siguientes:  · Antibióticos  se usan para tratar o evitar ricky infección bacteriana  Los antibióticos podrían utilizarse a mirna plazo  Es posible que los antibióticos se administren en forma de crema o píldoras  · AINEs (Analgésicos antiinflamatorios no esteroides) sarah el ibuprofeno, ayudan a disminuir la inflamación, el dolor y la [place]  [place] medicamento esta disponible con o sin ricky receta médica  Los AINEs pueden causar sangrado estomacal o problemas renales en ciertas personas  Si usted merly un medicamento anticoagulante, siempre pregúntele a arenas médico si los NAZARIO son seguros para usted  Siempre mahogany la etiqueta de [place] medicamento y [place] instrucciones  · El acetaminofén  [place] el dolor y baja la fiebre  Está disponible sin receta médica  Pregunte la cantidad y la frecuencia con que debe tomarlos  [address]  El acetaminofén puede causar daño en el hígado cuando no se merly de forma correcta  · Otros medicamentos  pueden utilizarse para tratar la HS  Estos pueden incluir hormonas, medicamentos para el acné, esteroides, terapia biológica y medicamentos que hacen más lento el sistema inmunológico      · Incisión y drenaje  es un procedimiento para drenar pus de ricky herida causada por la HS y limpiarla para que pueda sanar  · Cirugía  podría ser necesaria si otros medicamentos no funcionan  La cirugía puede hacerse para extraer áreas de piel afectadas por la HS  Controlar los síntomas de la HS y [employer] brotes:   · Aplique compresas calientes y [place]  [place] podría ayudar a disminuir el dolor  Deje la compresa sobre la piel tyler 10 minutos  Es posible que le recomienden anderson de asiento si arenas área genital o anal se ve afectada por la HS  Para realizar un baño de asiento, llene la amber con 4 a 6 pulgadas de agua tibia  [address] usar un recipiente para baño de asiento que quepa en el inodoro  Siéntese en el baño de asiento tyler 15 minutos  [place] esto 3 veces al día y después de cada evacuación intestinal  El agua cálida va a ayudara reducir el dolor y la inflamación  · Lave suavemente la piel    Use los limpiadores que le recomiende arenas médico  El jabón antibacterial podría ser útil     · Baje de peso si usted tiene sobrepeso  La pérdida de peso puede ayudar a [employer] signos y síntomas de la HS  · No fume  El fumar puede dificultar el tratamiento de la HS y [employer] síntomas  Pida información a gonzales médico si usted actualmente fuma y necesita ayuda para dejar de fumar  Los cigarrillos electrónicos o tabaco sin humo todavía contienen nicotina  Consulte con gonzales médico antes de [employer]  · No use ropa apretada  La ropa apretada frota contra la piel y causa irritación que puede empeorar la HS  · No se afeite ni use desodorante en las zonas de la piel afectada por la HS  Pregúntele a gonzales médico sobre métodos seguros para eliminar el vello u opciones de desodorantes seguras  · Mantenga gonzales piel fresca  El sobrecalentamiento y el sudor pueden causar un brote de HS  · Pregunte a gonzales médico si usted debe hacer algún cambio en gonzales dieta  Gonzales médico podría recomendarle que evite los alimentos lácteos  Pratima dieta dali de lácteos podría ayudar a [employer]  [address] [place] [address], el RUSTo, el yogur y [place]  · Dígale a gonzales médico si la HS hace que se sienta deprimido  Gonzales médico podría recomendarle un consejero para ayudarlo a afrontar la HS  Acuda a john consultas de control con gonzales médico según le indicaron  Anote john preguntas para que se acuerde de hacerlas tyler john visitas  © 2017 [address] Information is for End User's use only and may not be sold, redistributed or otherwise used for commercial purposes  All illustrations and images included in CareNotes® are the copyrighted property of [employer]  or Victoriano Vuong  Esta información es sólo para uso en educación  Gonzales intención no es darle un consejo médico sobre enfermedades o tratamientos  Colsulte con gonzales [place] [employer] farmacéutico antes de seguir cualquier régimen médico para saber si es seguro y efectivo para usted

## 2018-07-03 ENCOUNTER — APPOINTMENT (OUTPATIENT)
Dept: PHYSICAL THERAPY | Facility: CLINIC | Age: 58
End: 2018-07-03
Payer: COMMERCIAL

## 2018-07-05 ENCOUNTER — OFFICE VISIT (OUTPATIENT)
Dept: PHYSICAL THERAPY | Facility: CLINIC | Age: 58
End: 2018-07-05
Payer: COMMERCIAL

## 2018-07-05 ENCOUNTER — TELEPHONE (OUTPATIENT)
Dept: MULTI SPECIALTY CLINIC | Facility: CLINIC | Age: 58
End: 2018-07-05

## 2018-07-05 DIAGNOSIS — M25.562 ACUTE PAIN OF LEFT KNEE: Primary | ICD-10-CM

## 2018-07-05 DIAGNOSIS — M25.462 EFFUSION OF LEFT KNEE: ICD-10-CM

## 2018-07-05 PROCEDURE — 97112 NEUROMUSCULAR REEDUCATION: CPT | Performed by: PHYSICAL MEDICINE & REHABILITATION

## 2018-07-05 PROCEDURE — 97110 THERAPEUTIC EXERCISES: CPT | Performed by: PHYSICAL MEDICINE & REHABILITATION

## 2018-07-05 PROCEDURE — 97140 MANUAL THERAPY 1/> REGIONS: CPT | Performed by: PHYSICAL MEDICINE & REHABILITATION

## 2018-07-05 NOTE — TELEPHONE ENCOUNTER
PATIENT CALLED  [facility]  DR EATON PRESCRIBED DOXYCELINE (DORYX)  100 MG EC TABLET  PATIENT STATES THE INSURANCE WILL NOT PAY FOR THE MEDICATION    PATIENT WANTS NEW SCRIPT WITH A MEDICATION THAT THE INSURANCE COMPANY WILL COVER

## 2018-07-05 NOTE — PROGRESS NOTES
Daily Note     Today's date: 2018  Patient name: Fermin Hwang  : 1960  MRN: [id number]  Referring provider: Christofer Kaye MD  Dx:   Encounter Diagnosis     ICD-10-CM    1  Acute pain of left knee M25 562    2  Effusion of left knee M25 462        Start Time: 4462  Stop Time: 1023  Total time in clinic (min): 45 minutes    Subjective: Pt reports that she is feeling "so so"  Minimal pain reported with no other complaints at this time  Objective: See treatment diary below     Precautions: DM, Fibromyalgia, HIV, HLD        Daily Treatment Diary         Manuals 18                   L knee PROM RZ -10min                     L gastroc and hamstring stretch                       L hip passive abd and ER RZ  NC                                           Exercise Diary  18                   Gastroc Stretch w/Strap 30"x3  30" x 3                   Towel Crush 5"x10                     Quad Set 3"x20                     Heel Slide 5"15  5" x 20                   SAQ 1# 3" x 20  2# 3" x 20                   LAQ 1# 3" x 20  2# 3" hold x 20                   Weight shifts:  M/L &   A/P (tandem stance) 20x ea  20 ea                   Heel Raises/TR 30x ea  30 ea                   Heel taps onto step 0R 15x ea  20 ea                   Mini squats                       Clamshells w/ bolster in hooklying org 3"x15                      Std L hip flex/abd/ext 20ea  20                                                                                                                                                                                                                                                                   Modalities                      CP L knee 10min                                                      Assessment: Tolerated treatment well   Patient demonstrated fatigue post treatment and required verbal, visual, and tactile cueing for proper performance of TE      Plan: Progress treatment as tolerated

## 2018-07-06 DIAGNOSIS — M25.512 LEFT SHOULDER PAIN, UNSPECIFIED CHRONICITY: ICD-10-CM

## 2018-07-06 DIAGNOSIS — G47.00 INSOMNIA, UNSPECIFIED TYPE: ICD-10-CM

## 2018-07-09 NOTE — TELEPHONE ENCOUNTER
Patient was called by one of the general surgery resident and they state that in absence of fever and drainage the patient don't need antibiotics  I called patient to make sure that was informed and patient state that yes they called her  No questions or concerns at this time

## 2018-07-10 ENCOUNTER — APPOINTMENT (OUTPATIENT)
Dept: PHYSICAL THERAPY | Facility: CLINIC | Age: 58
End: 2018-07-10
Payer: COMMERCIAL

## 2018-07-10 RX ORDER — DIPHENHYDRAMINE HCL 50 MG/1
CAPSULE ORAL
Qty: 30 CAPSULE | Refills: 1 | Status: SHIPPED | OUTPATIENT
Start: 2018-07-10 | End: 2021-05-28 | Stop reason: ALTCHOICE

## 2018-07-10 RX ORDER — IBUPROFEN 800 MG/1
TABLET ORAL
Qty: 30 TABLET | Refills: 1 | Status: SHIPPED | OUTPATIENT
Start: 2018-07-10 | End: 2018-08-19

## 2018-07-10 RX ORDER — ACETAMINOPHEN 500 MG
TABLET ORAL
Qty: 60 TABLET | Refills: 1 | Status: SHIPPED | OUTPATIENT
Start: 2018-07-10 | End: 2018-08-22

## 2018-07-11 ENCOUNTER — TELEPHONE (OUTPATIENT)
Dept: GASTROENTEROLOGY | Facility: MEDICAL CENTER | Age: 58
End: 2018-07-11

## 2018-07-11 ENCOUNTER — PREP FOR PROCEDURE (OUTPATIENT)
Dept: GASTROENTEROLOGY | Facility: MEDICAL CENTER | Age: 58
End: 2018-07-11

## 2018-07-11 DIAGNOSIS — B20 HIV (HUMAN IMMUNODEFICIENCY VIRUS INFECTION) (HCC): ICD-10-CM

## 2018-07-11 DIAGNOSIS — K63.5 POLYP OF COLON, UNSPECIFIED PART OF COLON, UNSPECIFIED TYPE: Primary | ICD-10-CM

## 2018-07-11 DIAGNOSIS — G62.9 NEUROPATHY: ICD-10-CM

## 2018-07-12 ENCOUNTER — EVALUATION (OUTPATIENT)
Dept: PHYSICAL THERAPY | Facility: CLINIC | Age: 58
End: 2018-07-12
Payer: COMMERCIAL

## 2018-07-12 DIAGNOSIS — G89.29 CHRONIC LEFT SHOULDER PAIN: ICD-10-CM

## 2018-07-12 DIAGNOSIS — M75.41 IMPINGEMENT SYNDROME OF RIGHT SHOULDER: ICD-10-CM

## 2018-07-12 DIAGNOSIS — M25.562 ACUTE PAIN OF LEFT KNEE: Primary | ICD-10-CM

## 2018-07-12 DIAGNOSIS — M25.462 EFFUSION OF LEFT KNEE: ICD-10-CM

## 2018-07-12 DIAGNOSIS — M25.512 CHRONIC LEFT SHOULDER PAIN: ICD-10-CM

## 2018-07-12 PROCEDURE — G8978 MOBILITY CURRENT STATUS: HCPCS | Performed by: PHYSICAL THERAPIST

## 2018-07-12 PROCEDURE — 97164 PT RE-EVAL EST PLAN CARE: CPT | Performed by: PHYSICAL THERAPIST

## 2018-07-12 PROCEDURE — G8979 MOBILITY GOAL STATUS: HCPCS | Performed by: PHYSICAL THERAPIST

## 2018-07-12 PROCEDURE — 97110 THERAPEUTIC EXERCISES: CPT | Performed by: PHYSICAL THERAPIST

## 2018-07-12 PROCEDURE — 97010 HOT OR COLD PACKS THERAPY: CPT | Performed by: PHYSICAL THERAPIST

## 2018-07-12 RX ORDER — NAPROXEN 500 MG/1
250 TABLET ORAL 2 TIMES DAILY WITH MEALS
COMMUNITY
End: 2018-09-25 | Stop reason: SDUPTHER

## 2018-07-12 NOTE — PROGRESS NOTES
PT Re-Evaluation     Today's date: 2018  Patient name: Shaquille Han  : 1960  MRN: [id number]  Referring provider: Asiya Cheek MD  Dx:   Encounter Diagnosis     ICD-10-CM    1  Acute pain of left knee M25 562    2  Effusion of left knee M25 462                   Assessment  Impairments: abnormal gait, abnormal or restricted ROM, activity intolerance, impaired balance, impaired physical strength and weight-bearing intolerance    Assessment details: Pt is a 65year old female presenting to PT with history of left knee pain and L shoulder pain s/p fall  Pt reports greater ease with stairs as long as she climbs them slowly  Pt has been compliant with therapy attendance and performing home exercise program  Pt has improved L knee ROM and strength compared to last RE  Pt has difficulty with reaching L arm in all planes  Pt has significant deficits in ROM both actively and passively  Pt is unable to lift with L arm  Pt has significant strength deficits throughout L UE  Pt is a good candidate for skilled PT intervention and will benefit from treatment in order to address their above limitations and restore maximal function  Thank you for this referral      Barriers to therapy: pt is primarily Indonesian-speaking; [facility] language line used during history and subjective portion of RE  Understanding of Dx/Px/POC: good   Prognosis: good    Goals  Short Term Goals: To be achieved by 4 weeks    1) Patient to be independent with basic HEP  - met  2) Decrease pain to 4/10 at worst - partially met  3) Increase ROM by 5 degrees or greater in all deficient planes  - met  4) Increase strength by 1/2 MMT grade or greater in all deficient planes  - not met  5) Patient to report decreased sleep interruption secondary to pain  - not met      Long Term Goals: To be achieved by discharge    1) FOTO equal to or greater than 53 - not met  2) Patient to be independent with comprehensive HEP  - partially met  3) Decrease pain to 2/10 at worst  for improved quality of life  - not met  4) Increase strength to 5/5 MMT grade in all deficient planes to improve a/iadls  - not met  5) Increase ROM to within normal limits  - partially met  6) Patient to report no sleep interruption secondary to pain  - not met  7) Pt will reach in all planes without scapular compensation and with pain <3/10 by discharge  Plan  Patient would benefit from: skilled PT  Planned modality interventions: cryotherapy  Planned therapy interventions: home exercise program, functional ROM exercises, flexibility, balance, manual therapy, neuromuscular re-education, therapeutic exercise, strengthening, patient education, gait training, body mechanics training and postural training  Frequency: 2x week  Duration in visits: 12  Duration in weeks: 6  Treatment plan discussed with: patient        Subjective Evaluation    History of Present Illness  Date of onset: 2018  Mechanism of injury: trauma  Mechanism of injury: Pt feels that therapy has been helpful for L knee  Pt is presenting with new prescription for L shoulder  Pt reports that L shoulder pain started with her fall  Pt had cortisone injection into L arm with mild relief of pain  Pt uses patches to help with pain  Pt denies numbness and tingling into L arm  Pt had radiographs of L shoulder  Pt states that xray showed arthritis and will have MRI if shoulder pain fails to improve  Pt rates pain in L shoulder 9/10  Pt states that she feels better when she splints L arm to her abdomen  Pt has pain with any movement of arm and is unable to reach overhead  Pt denies popping and clicking in shoulder  Pt returns to MD 18     Not a recurrent problem   Quality of life: good    Pain  At best pain ratin  At worst pain ratin  Location: L knee  Quality: dull ache and sharp    Social Support  Stairs in house: yes     Hand dominance: right      Diagnostic Tests  X-ray: normal  MRI studies: abnormal  Patient Goals  Patient goals for therapy: decreased edema, decreased pain, improved balance, increased motion, independence with ADLs/IADLs and increased strength  Patient goal: be able to lift my arm like the other        Objective     Observations   Left Knee   Positive for effusion  Additional Observation Details  Pt with moderate to significant left knee effusion; healed left patellar abrasion  ; please see below for girth measurement    Gait: severely antalgic with decreased left stance time, lacking left terminal knee extension  ; persists at RE    SLS: pt unable on L, declined testing on R; did not reassess    Flexibility: moderate L > R hamstring and gastrocnemius tightness present; persists at RE with pain with passive gastroc > hamstring stretching    Sol's Sign: negative bilaterally ; unchanged at RE    Palpation   Left   Hypertonic in the upper trapezius  Tenderness of the upper trapezius  Trigger point to upper trapezius  Additional Palpation Details  Pain and atrophy at ER attachment posterior upper arm    Tenderness     Left Shoulder   Tenderness in the [facility] joint  Left Knee   Tenderness in the inferior patella and medial patella  No tenderness in the lateral joint line, medial joint line, medial retinaculum and pes anserinus  Cervical/Thoracic Screen   Cervical range of motion within normal limits    Neurological Testing     Sensation     Knee   Left Knee   Intact: light touch    Right Knee   Intact: light touch     Active Range of Motion   Left Shoulder   Flexion: 93 degrees with pain  Abduction: 61 degrees with pain  External rotation BTH: Active external rotation behind the head: unable to reach head  with pain  Internal rotation BTB: Active internal rotation behind the back: reach past frontal plane   with pain    Right Shoulder   Flexion: 169 degrees   Abduction: 180 degrees   External rotation BTH: T7   Internal rotation BTB: T10   Left Knee   Flexion: 118 degrees with pain  Extension: 2 degrees     Right Knee   Flexion: 133 degrees   Extension: 0 degrees     Additional Active Range of Motion Details  Pt has resolved quad lag with SLR but has difficulty with lifting leg greater than 30 deg from table    Passive Range of Motion   Left Shoulder   Flexion: 68 degrees with pain  Abduction: 65 degrees with pain  External rotation 45°: 45 degrees with pain  Internal rotation 45°: 44 degrees with pain  Left Knee   Flexion: 127 degrees with pain  Extension: 0 degrees with pain    Additional Passive Range of Motion Details  Pt has significant muscle guarding despite verbal cues to relax    Patellar Static Positioning   Left Knee: WFL  Right Knee: [facility]    Strength/Myotome Testing     Left Shoulder     Planes of Motion   Flexion: 3- (pain)   Abduction: 3- (pain)   External rotation at 0°: 4   External rotation at 90°: 4-   Internal rotation at 0°: 4-   Internal rotation at 90°: 4-     Right Shoulder     Planes of Motion   Flexion: 5   Abduction: 5   External rotation at 0°: 5   External rotation at 90°: 4   Internal rotation at 0°: 5   Internal rotation at 90°: 4     Left Hip   Planes of Motion   Flexion: 3+  External rotation: 3  Internal rotation: 4-    Right Hip   Planes of Motion   Flexion: 5  External rotation: 5  Internal rotation: 4+    Left Knee   Flexion: 4-  Extension: 4-    Right Knee   Flexion: 5  Extension: 5    Left Ankle/Foot   Dorsiflexion: 4-  Plantar flexion: 4-  Inversion: 4+  Eversion: 4+    Right Ankle/Foot   Dorsiflexion: 5  Plantar flexion: 5  Inversion: 4+  Eversion: 5    Additional Strength Details  Pt reports pain radiating from L shoulder to elbow with resisted testing  Pt has pain in L knee with all L ankle resisted testing    Tests     Left Shoulder   Positive AC shear, full can, Hawkin's and Neer's       Precautions: DM, Fibromyalgia, HIV, HLD        Daily Treatment Diary         Manuals 6/28/18 7/5 7/12/18                 L knee PROM RZ -10min    nv                 L gastroc and hamstring stretch      nv                 L hip passive abd and ER RZ  NC  nv                  L shoulder PROM     nv                 Exercise Diary  6/28/18  7/5 7/12                 Gastroc Stretch w/Strap 30"x3  30" x 3  nv                 Towel Crush 5"x10    dc                 Quad Set 3"x20    nv                 Heel Slide 5"15  5" x 20  nv                 SAQ 1# 3" x 20  2# 3" x 20  nv                 LAQ 1# 3" x 20  2# 3" hold x 20  nv                 Weight shifts:  M/L &   A/P (tandem stance) 20x ea  20 ea  nv                 Heel Raises/TR 30x ea  30 ea  nv                 Heel taps onto step 0R 15x ea  20 ea  nv                 Mini squats      nv                 Clamshells w/ bolster in hooklying org 3"x15     nv                 Std L hip flex/abd/ext 20ea  20  nv                  pulleys     nv                  wall ladder     5"x10                  table slides: flex, scap, ER     5"x10 ea                  scap retraction     5"x10                  isometrics:flx, abd, IR, ER     nv                  wand flx, ER supine     nv                  wand IR, abd     nv                                                                                         Modalities 6/28 7/12/18                 CP L knee & L shoulder 10min   10 min

## 2018-07-17 ENCOUNTER — OFFICE VISIT (OUTPATIENT)
Dept: PHYSICAL THERAPY | Facility: CLINIC | Age: 58
End: 2018-07-17
Payer: COMMERCIAL

## 2018-07-17 DIAGNOSIS — M25.462 EFFUSION OF LEFT KNEE: ICD-10-CM

## 2018-07-17 DIAGNOSIS — G89.29 CHRONIC LEFT SHOULDER PAIN: ICD-10-CM

## 2018-07-17 DIAGNOSIS — M25.512 CHRONIC LEFT SHOULDER PAIN: ICD-10-CM

## 2018-07-17 DIAGNOSIS — M25.562 ACUTE PAIN OF LEFT KNEE: Primary | ICD-10-CM

## 2018-07-17 DIAGNOSIS — M75.41 IMPINGEMENT SYNDROME OF RIGHT SHOULDER: ICD-10-CM

## 2018-07-17 PROCEDURE — 97150 GROUP THERAPEUTIC PROCEDURES: CPT

## 2018-07-17 PROCEDURE — 97010 HOT OR COLD PACKS THERAPY: CPT

## 2018-07-17 PROCEDURE — 97140 MANUAL THERAPY 1/> REGIONS: CPT

## 2018-07-17 PROCEDURE — 97110 THERAPEUTIC EXERCISES: CPT

## 2018-07-17 RX ORDER — ELVITEGRAVIR, COBICISTAT, EMTRICITABINE, AND TENOFOVIR ALAFENAMIDE 150; 150; 200; 10 MG/1; MG/1; MG/1; MG/1
TABLET ORAL
Qty: 30 TABLET | Refills: 3 | Status: SHIPPED | OUTPATIENT
Start: 2018-07-17 | End: 2018-10-25 | Stop reason: SDUPTHER

## 2018-07-17 RX ORDER — GABAPENTIN 800 MG/1
TABLET ORAL
Qty: 90 TABLET | Refills: 3 | Status: SHIPPED | OUTPATIENT
Start: 2018-07-17 | End: 2018-11-08 | Stop reason: SDUPTHER

## 2018-07-17 NOTE — PROGRESS NOTES
Daily Note     Today's date: 2018  Patient name: Berkeley Saint  : 1960  MRN: [id number]  Referring provider: Poppy Weeks MD  Dx:   Encounter Diagnosis     ICD-10-CM    1  Acute pain of left knee M25 562    2  Effusion of left knee M25 462    3  Impingement syndrome of right shoulder M75 41    4  Chronic left shoulder pain M25 512     G89 29                   Subjective: Pt presents to PT reporting decreased pain in L knee grading the pain a 3/10  She reports pain in anterior and posterior aspect of L shoulder and medial and lateral aspect of L elbow; she rates both areas a 9/10  Pt reports decreased pain post PT session grading the pain a 6/10 in L shoulder  She denies increased pain in L knee        Objective: See treatment diary below    Precautions: DM, Fibromyalgia, HIV, HLD        Daily Treatment Diary         Manuals 18               L knee PROM RZ -10min    nv  PK               L gastroc and hamstring stretch      nv                 L hip passive abd and ER RZ  NC  nv                  L shoulder PROM     nv  PK               Exercise Diary  18               Gastroc Stretch w/Strap 30"x3  30" x 3  nv  np                                   Quad Set 3"x20    nv  np               Heel Slide 5"15  5" x 20  nv  np               SAQ 1# 3" x 20  2# 3" x 20  nv  np               LAQ 1# 3" x 20  2# 3" hold x 20  nv  np               Weight shifts:  M/L &   A/P (tandem stance) 20x ea  20 ea  nv  np               Heel Raises/TR 30x ea  30 ea  nv  np               Heel taps onto step 0R 15x ea  20 ea  nv  np               Mini squats      nv  np               Clamshells w/ bolster in hooklying org 3"x15     nv  np               Std L hip flex/abd/ext 20ea  20  nv  np                pulleys     nv  5 mins                wall ladder     5"x10  5"x10                table slides: flex, scap, ER     5"x10 ea  5"x10 ea                scap retraction     5"x10  5"x15                isometrics:flx, abd, IR, ER     nv  5"x5                wand flx, ER supine     nv  5" x10 ea                wand IR, abd     nv  5"x10 ea                                                                                       Modalities 6/28 7/12/18 7/17/18               CP L knee & L shoulder 10min   10 min  10 min shoulder only                                                  Assessment: Focused on shoulder TE today  Pt demonstrates good to fair tolerance of new TE with occ complaints of increased pain  Performed manual therapy however unable to go above 90* for flex and scap secondary to pain  Patient demonstrated fatigue post treatment      Plan: Continue per plan of care  Progress treatment as tolerated

## 2018-07-19 ENCOUNTER — OFFICE VISIT (OUTPATIENT)
Dept: PHYSICAL THERAPY | Facility: CLINIC | Age: 58
End: 2018-07-19
Payer: COMMERCIAL

## 2018-07-19 DIAGNOSIS — M75.41 IMPINGEMENT SYNDROME OF RIGHT SHOULDER: ICD-10-CM

## 2018-07-19 DIAGNOSIS — M25.462 EFFUSION OF LEFT KNEE: ICD-10-CM

## 2018-07-19 DIAGNOSIS — M25.562 ACUTE PAIN OF LEFT KNEE: Primary | ICD-10-CM

## 2018-07-19 DIAGNOSIS — M25.512 CHRONIC LEFT SHOULDER PAIN: ICD-10-CM

## 2018-07-19 DIAGNOSIS — G89.29 CHRONIC LEFT SHOULDER PAIN: ICD-10-CM

## 2018-07-19 PROCEDURE — 97010 HOT OR COLD PACKS THERAPY: CPT

## 2018-07-19 PROCEDURE — 97110 THERAPEUTIC EXERCISES: CPT

## 2018-07-19 PROCEDURE — 97530 THERAPEUTIC ACTIVITIES: CPT

## 2018-07-19 PROCEDURE — 97140 MANUAL THERAPY 1/> REGIONS: CPT

## 2018-07-19 NOTE — PROGRESS NOTES
Daily Note     Today's date: 2018  Patient name: Pepe Saldaña  : 1960  MRN: [id number]  Referring provider: Don Blount MD  Dx:   Encounter Diagnosis     ICD-10-CM    1  Acute pain of left knee M25 562    2  Effusion of left knee M25 462    3  Impingement syndrome of right shoulder M75 41    4  Chronic left shoulder pain M25 512     G89 29                   Subjective: Pt presents to PT reporting decreased pain in L knee grading the pain a 5/10 which she states is secondary to increased AMB  She reports pain in anterior and posterior aspect of L shoulder and medial and lateral aspect of L elbow; she continues to rate both areas a 9/10  Pt reports decreased pain post PT session grading the pain a 8/10 in L shoulder  She denies increased pain in L knee        Objective: See treatment diary below    Precautions: DM, Fibromyalgia, HIV, HLD        Daily Treatment Diary         Manuals 18  718             L knee PROM RZ -10min    nv  PK  PK             L gastroc and hamstring stretch      nv                 L hip passive abd and ER RZ  NC  nv                  L shoulder PROM     nv  PK  PK             Exercise Diary  18  7/18             Gastroc Stretch w/Strap 30"x3  30" x 3  nv  np                                   Quad Set 3"x20    nv  np  3" x 20             Heel Slide 5"15  5" x 20  nv  np  5" x 20             SAQ 1# 3" x 20  2# 3" x 20  nv  np  2# 3" x 20             LAQ 1# 3" x 20  2# 3" hold x 20  nv  np  2# 3" x 20             Weight shifts:  M/L &   A/P (tandem stance) 20x ea  20 ea  nv  np  np             Heel Raises/TR 30x ea  30 ea  nv  np  30x ea             Heel taps onto step 0R 15x ea  20 ea  nv  np  np             Mini squats      nv  np  15x             Clamshells w/ bolster in hooklying org 3"x15     nv  np  np             Std L hip flex/abd/ext 20ea  20  nv  np  2# 10x ea              pulleys     nv  5 mins  5 mins              wall ladder     5"x10  5"x10  np              table slides: flex, scap, ER     5"x10 ea  5"x10 ea   5"x10 ea              scap retraction     5"x10  5"x15  np              isometrics:flx, abd, IR, ER     nv  5"x5  5"x5 ea              wand flx, ER supine     nv  5" x10 ea  np              wand IR, abd     nv  5"x10 ea  np                                                                                     Modalities 6/28 7/12/18 7/17/18 7/19/19             CP L knee & L shoulder 10min   10 min  10 min shoulder only  10 mins  both                                                Assessment: Secondary to c/o increased pain in L shoulder held some shoulder PT and added some knee TE  She demonstrates good tolerance to knee TE and fair to shoulder secondary to pain  Performed manual therapy however again unable to go above 90* for flex and scap secondary to pain  Pt also demonstrates TTP while stabilizing shoulder for shoulder PROM  Patient demonstrated fatigue post treatment      Plan: Continue per plan of care  Progress treatment as tolerated

## 2018-07-24 ENCOUNTER — OFFICE VISIT (OUTPATIENT)
Dept: PHYSICAL THERAPY | Facility: CLINIC | Age: 58
End: 2018-07-24
Payer: COMMERCIAL

## 2018-07-24 DIAGNOSIS — M25.462 EFFUSION OF LEFT KNEE: ICD-10-CM

## 2018-07-24 DIAGNOSIS — M25.512 CHRONIC LEFT SHOULDER PAIN: ICD-10-CM

## 2018-07-24 DIAGNOSIS — M25.562 ACUTE PAIN OF LEFT KNEE: Primary | ICD-10-CM

## 2018-07-24 DIAGNOSIS — M75.41 IMPINGEMENT SYNDROME OF RIGHT SHOULDER: ICD-10-CM

## 2018-07-24 DIAGNOSIS — G89.29 CHRONIC LEFT SHOULDER PAIN: ICD-10-CM

## 2018-07-24 PROCEDURE — 97014 ELECTRIC STIMULATION THERAPY: CPT

## 2018-07-24 PROCEDURE — G0283 ELEC STIM OTHER THAN WOUND: HCPCS

## 2018-07-24 PROCEDURE — 97110 THERAPEUTIC EXERCISES: CPT

## 2018-07-24 PROCEDURE — 97140 MANUAL THERAPY 1/> REGIONS: CPT

## 2018-07-24 PROCEDURE — 97010 HOT OR COLD PACKS THERAPY: CPT

## 2018-07-24 NOTE — PROGRESS NOTES
Daily Note     Today's date: 2018  Patient name: Juju Perez  : 1960  MRN: [id number]  Referring provider: Monika Carranza MD  Dx:   Encounter Diagnosis     ICD-10-CM    1  Acute pain of left knee M25 562    2  Effusion of left knee M25 462    3  Impingement syndrome of right shoulder M75 41    4  Chronic left shoulder pain M25 512     G89 29                   Subjective: Pt presents to PT reporting decreased pain in L knee grading the pain a 1-2/10  She continues to report pain in anterior and posterior aspect of L shoulder and medial and lateral aspect of L elbow;  rating both areas a 9/10  Noted mild edema in superior L shoulder  Pt reports decreased pain post PT session grading the pain a 8/10 in L shoulder  She denies increased pain in L knee  Objective: See treatment diary below    Precautions: DM, Fibromyalgia, HIV, HLD        Daily Treatment Diary         Manuals 18           L knee PROM RZ -10min    nv  PK  PK             L gastroc and hamstring stretch      nv                 L hip passive abd and ER RZ  NC  nv                  L shoulder PROM     nv  PK  PK  PK in SL w/ scap PROM             Retrograde STM to L shoulder      PK       Exercise Diary  18           Gastroc Stretch w/Strap 30"x3  30" x 3  nv  np                                   Quad Set 3"x20    nv  np  3" x 20             Heel Slide 5"15  5" x 20  nv  np  5" x 20             SAQ 1# 3" x 20  2# 3" x 20  nv  np  2# 3" x 20             LAQ 1# 3" x 20  2# 3" hold x 20  nv  np  2# 3" x 20             Weight shifts:  M/L &   A/P (tandem stance) 20x ea  20 ea  nv  np  np             Heel Raises/TR 30x ea  30 ea  nv  np  30x ea             Heel taps onto step 0R 15x ea  20 ea  nv  np  np             Mini squats      nv  np  15x             Clamshells w/ bolster in hooklying org 3"x15     nv  np  np             Std L hip flex/abd/ext 20ea  20  nv  np  2# 10x ea              pulleys     nv  5 mins  5 mins  5 mins            wall ladder     5"x10  5"x10  np              table slides: flex, scap, ER     5"x10 ea  5"x10 ea   5"x10 ea  5"x10 ea            scap retraction     5"x10  5"x15  np              isometrics:flx, abd, IR, ER     nv  5"x5  5"x5 ea              wand flx, ER supine     nv  5" x10 ea  np              wand IR, abd     nv  5"x10 ea  np                                                                                     Modalities 6/28 7/12/18 7/17/18 7/19/19 7/24/18           CP L knee & L shoulder 10min   10 min  10 min shoulder only  10 mins  both  w/ TENS to L shoulder today                                              Assessment: Pt demonstrates poor tolerance to pulleys and table slides  Conferred with DPT ED; held remaining TE, performed retrograde STM, scap PROM in R side lying and L shoulder PROM to tolerance while in side lying then TENS and CP  Pt demonstrates fair to good tolerance to manual today however no real change in pain post PT session  Assess NV and progress as able  Plan: Continue per plan of care  Progress treatment as tolerated

## 2018-07-26 ENCOUNTER — LAB (OUTPATIENT)
Dept: LAB | Facility: HOSPITAL | Age: 58
End: 2018-07-26
Attending: INTERNAL MEDICINE
Payer: COMMERCIAL

## 2018-07-26 ENCOUNTER — EVALUATION (OUTPATIENT)
Dept: PHYSICAL THERAPY | Facility: CLINIC | Age: 58
End: 2018-07-26
Payer: COMMERCIAL

## 2018-07-26 ENCOUNTER — TELEPHONE (OUTPATIENT)
Dept: SURGERY | Facility: CLINIC | Age: 58
End: 2018-07-26

## 2018-07-26 DIAGNOSIS — Z13.6 SCREENING FOR CARDIOVASCULAR CONDITION: ICD-10-CM

## 2018-07-26 DIAGNOSIS — Z78.9 HISTORY OF RECENT CHANGE IN LIFESTYLE: ICD-10-CM

## 2018-07-26 DIAGNOSIS — M75.41 IMPINGEMENT SYNDROME OF RIGHT SHOULDER: ICD-10-CM

## 2018-07-26 DIAGNOSIS — M25.562 ACUTE PAIN OF LEFT KNEE: Primary | ICD-10-CM

## 2018-07-26 DIAGNOSIS — M25.512 CHRONIC LEFT SHOULDER PAIN: ICD-10-CM

## 2018-07-26 DIAGNOSIS — G89.29 CHRONIC LEFT SHOULDER PAIN: ICD-10-CM

## 2018-07-26 DIAGNOSIS — Z11.3 SCREENING EXAMINATION FOR SEXUALLY TRANSMITTED DISEASE: ICD-10-CM

## 2018-07-26 DIAGNOSIS — B20 HIV (HUMAN IMMUNODEFICIENCY VIRUS INFECTION) (HCC): ICD-10-CM

## 2018-07-26 DIAGNOSIS — Z20.2 EXPOSURE TO SEXUALLY TRANSMITTED DISEASE (STD): ICD-10-CM

## 2018-07-26 DIAGNOSIS — M25.462 EFFUSION OF LEFT KNEE: ICD-10-CM

## 2018-07-26 LAB
ALBUMIN SERPL BCP-MCNC: 3.6 G/DL (ref 3.5–5)
ALP SERPL-CCNC: 67 U/L (ref 46–116)
ALT SERPL W P-5'-P-CCNC: 19 U/L (ref 12–78)
ANION GAP SERPL CALCULATED.3IONS-SCNC: 10 MMOL/L (ref 4–13)
AST SERPL W P-5'-P-CCNC: 14 U/L (ref 5–45)
BACTERIA UR QL AUTO: ABNORMAL /HPF
BASOPHILS # BLD AUTO: 0.03 THOUSANDS/ΜL (ref 0–0.1)
BASOPHILS NFR BLD AUTO: 1 % (ref 0–1)
BILIRUB SERPL-MCNC: 0.34 MG/DL (ref 0.2–1)
BILIRUB UR QL STRIP: NEGATIVE
BUN SERPL-MCNC: 8 MG/DL (ref 5–25)
CALCIUM SERPL-MCNC: 9.3 MG/DL (ref 8.3–10.1)
CHLAMYDIA DNA CVX QL NAA+PROBE: NORMAL
CHLORIDE SERPL-SCNC: 101 MMOL/L (ref 100–108)
CHOLEST SERPL-MCNC: 321 MG/DL (ref 50–200)
CLARITY UR: ABNORMAL
CO2 SERPL-SCNC: 27 MMOL/L (ref 21–32)
COLOR UR: YELLOW
CREAT SERPL-MCNC: 0.69 MG/DL (ref 0.6–1.3)
EOSINOPHIL # BLD AUTO: 0.12 THOUSAND/ΜL (ref 0–0.61)
EOSINOPHIL NFR BLD AUTO: 3 % (ref 0–6)
ERYTHROCYTE [DISTWIDTH] IN BLOOD BY AUTOMATED COUNT: 13.5 % (ref 11.6–15.1)
GFR SERPL CREATININE-BSD FRML MDRD: 98 ML/MIN/1.73SQ M
GLUCOSE P FAST SERPL-MCNC: 234 MG/DL (ref 65–99)
GLUCOSE UR STRIP-MCNC: ABNORMAL MG/DL
HCT VFR BLD AUTO: 44 % (ref 34.8–46.1)
HCV AB SER QL: NORMAL
HDLC SERPL-MCNC: 41 MG/DL (ref 40–60)
HGB BLD-MCNC: 14.4 G/DL (ref 11.5–15.4)
HGB UR QL STRIP.AUTO: ABNORMAL
KETONES UR STRIP-MCNC: NEGATIVE MG/DL
LDLC SERPL CALC-MCNC: 213 MG/DL (ref 0–100)
LEUKOCYTE ESTERASE UR QL STRIP: NEGATIVE
LYMPHOCYTES # BLD AUTO: 1.68 THOUSANDS/ΜL (ref 0.6–4.47)
LYMPHOCYTES NFR BLD AUTO: 36 % (ref 14–44)
MCH RBC QN AUTO: 32.7 PG (ref 26.8–34.3)
MCHC RBC AUTO-ENTMCNC: 32.7 G/DL (ref 31.4–37.4)
MCV RBC AUTO: 100 FL (ref 82–98)
MONOCYTES # BLD AUTO: 0.45 THOUSAND/ΜL (ref 0.17–1.22)
MONOCYTES NFR BLD AUTO: 10 % (ref 4–12)
N GONORRHOEA DNA GENITAL QL NAA+PROBE: NORMAL
NEUTROPHILS # BLD AUTO: 2.39 THOUSANDS/ΜL (ref 1.85–7.62)
NEUTS SEG NFR BLD AUTO: 51 % (ref 43–75)
NITRITE UR QL STRIP: NEGATIVE
NON-SQ EPI CELLS URNS QL MICRO: ABNORMAL /HPF
NONHDLC SERPL-MCNC: 280 MG/DL
NRBC BLD AUTO-RTO: 0 /100 WBCS
PH UR STRIP.AUTO: 5.5 [PH] (ref 4.5–8)
PLATELET # BLD AUTO: 290 THOUSANDS/UL (ref 149–390)
PMV BLD AUTO: 10 FL (ref 8.9–12.7)
POTASSIUM SERPL-SCNC: 4 MMOL/L (ref 3.5–5.3)
PROT SERPL-MCNC: 8.1 G/DL (ref 6.4–8.2)
PROT UR STRIP-MCNC: ABNORMAL MG/DL
RBC # BLD AUTO: 4.4 MILLION/UL (ref 3.81–5.12)
RBC #/AREA URNS AUTO: ABNORMAL /HPF
RPR SER QL: NORMAL
SODIUM SERPL-SCNC: 138 MMOL/L (ref 136–145)
SP GR UR STRIP.AUTO: >=1.03 (ref 1–1.03)
TRIGL SERPL-MCNC: 333 MG/DL
UROBILINOGEN UR QL STRIP.AUTO: 0.2 E.U./DL
WBC # BLD AUTO: 4.67 THOUSAND/UL (ref 4.31–10.16)
WBC #/AREA URNS AUTO: ABNORMAL /HPF

## 2018-07-26 PROCEDURE — 87536 HIV-1 QUANT&REVRSE TRNSCRPJ: CPT

## 2018-07-26 PROCEDURE — 86803 HEPATITIS C AB TEST: CPT

## 2018-07-26 PROCEDURE — 36415 COLL VENOUS BLD VENIPUNCTURE: CPT

## 2018-07-26 PROCEDURE — 87591 N.GONORRHOEAE DNA AMP PROB: CPT

## 2018-07-26 PROCEDURE — 80061 LIPID PANEL: CPT

## 2018-07-26 PROCEDURE — 97010 HOT OR COLD PACKS THERAPY: CPT | Performed by: PHYSICAL THERAPIST

## 2018-07-26 PROCEDURE — 81001 URINALYSIS AUTO W/SCOPE: CPT

## 2018-07-26 PROCEDURE — 85025 COMPLETE CBC W/AUTO DIFF WBC: CPT

## 2018-07-26 PROCEDURE — 86480 TB TEST CELL IMMUN MEASURE: CPT

## 2018-07-26 PROCEDURE — 80053 COMPREHEN METABOLIC PANEL: CPT

## 2018-07-26 PROCEDURE — G8978 MOBILITY CURRENT STATUS: HCPCS | Performed by: PHYSICAL THERAPIST

## 2018-07-26 PROCEDURE — 97140 MANUAL THERAPY 1/> REGIONS: CPT | Performed by: PHYSICAL THERAPIST

## 2018-07-26 PROCEDURE — 86361 T CELL ABSOLUTE COUNT: CPT

## 2018-07-26 PROCEDURE — G8979 MOBILITY GOAL STATUS: HCPCS | Performed by: PHYSICAL THERAPIST

## 2018-07-26 PROCEDURE — 86592 SYPHILIS TEST NON-TREP QUAL: CPT

## 2018-07-26 PROCEDURE — 87491 CHLMYD TRACH DNA AMP PROBE: CPT

## 2018-07-26 RX ORDER — BUDESONIDE AND FORMOTEROL FUMARATE DIHYDRATE 160; 4.5 UG/1; UG/1
2 AEROSOL RESPIRATORY (INHALATION)
COMMUNITY
Start: 2016-12-27 | End: 2018-08-06 | Stop reason: SDUPTHER

## 2018-07-26 RX ORDER — LANCETS
EACH MISCELLANEOUS
COMMUNITY
Start: 2017-01-19 | End: 2018-11-02 | Stop reason: ALTCHOICE

## 2018-07-26 RX ORDER — ERGOCALCIFEROL (VITAMIN D2) 1250 MCG
50000 CAPSULE ORAL
COMMUNITY
Start: 2018-07-16 | End: 2019-04-11 | Stop reason: ALTCHOICE

## 2018-07-26 RX ORDER — GLUCOSAMINE HCL/CHONDROITIN SU 500-400 MG
1 CAPSULE ORAL
COMMUNITY
Start: 2018-06-13 | End: 2019-02-21 | Stop reason: ALTCHOICE

## 2018-07-26 NOTE — PROGRESS NOTES
PT Re-Evaluation     Today's date: 2018  Patient name: Inocencia Corral  : 1960  MRN: [id number]  Referring provider: Areli Carvajal MD  Dx:   Encounter Diagnosis     ICD-10-CM    1  Acute pain of left knee M25 562    2  Effusion of left knee M25 462    3  Impingement syndrome of right shoulder M75 41    4  Chronic left shoulder pain M25 512     G89 29                   Assessment  Impairments: abnormal or restricted ROM, activity intolerance, impaired balance, impaired physical strength and weight-bearing intolerance    Assessment details: Pt is a 65year old female presenting to PT with history of left knee pain and L shoulder pain s/p fall  Pt has been compliant with therapy attendance and performing home exercise program  Pt has made significant improvements in L LE strength and ROM compared to last RE  Pt has made some improvements in L UE active and passive ROM compared to last RE but continues to have significant deficits in ROM and strength with increased pain with all movements  Pt has difficulty with lifting, reaching in all planes, and carrying items in her L arm  Pt is a good candidate for skilled PT intervention and will benefit from treatment in order to address their above limitations and restore maximal function  Thank you for this referral      Barriers to therapy: pt is primarily [place]i-speaking; [facility] language line used during history and subjective portion of RE  Understanding of Dx/Px/POC: good   Prognosis: good    Goals  Short Term Goals: To be achieved by 4 weeks    1) Patient to be independent with basic HEP  - met  2) Decrease pain to 4/10 at worst - partially met  3) Increase ROM by 5 degrees or greater in all deficient planes  - met  4) Increase strength by 1/2 MMT grade or greater in all deficient planes  - partially met  5) Patient to report decreased sleep interruption secondary to pain  - not met      Long Term Goals:  To be achieved by discharge    1) FOTO equal to or greater than 53 - met  2) Patient to be independent with comprehensive HEP  - partially met  3) Decrease pain to 2/10 at worst  for improved quality of life  - not met  4) Increase strength to 5/5 MMT grade in all deficient planes to improve a/iadls  - partially met  5) Increase ROM to within normal limits  - partially met  6) Patient to report no sleep interruption secondary to pain  - not met  7) Pt will reach in all planes without scapular compensation and with pain <3/10 by discharge  - not met      Plan  Patient would benefit from: skilled PT  Planned modality interventions: cryotherapy  Planned therapy interventions: home exercise program, functional ROM exercises, flexibility, balance, manual therapy, neuromuscular re-education, therapeutic exercise, strengthening, patient education, gait training, body mechanics training and postural training  Frequency: 2x week  Duration in visits: 12  Duration in weeks: 6  Treatment plan discussed with: patient        Subjective Evaluation    History of Present Illness  Date of onset: 4/23/2018  Mechanism of injury: trauma  Mechanism of injury: Pt feels that L knee is much better since starting therapy  Pt states that L shoulder is the same over the last 2 weeks  Pt reports tingling in feet only, denies numbness and tingling in upper extremities  Pt states that L shoulder feels swollen, denies popping, clicking, and catching  Pt has difficulty sleeping due to L shoulder pain  Pt rates L shoulder as constant 8-9/10 pain  Pt reports only mild relief with ice and medications  Pt reports that L knee pain is more bearable with therapy and ice and that she is able to bend her knee better  Pt has discontinued knee brace  Pt has difficulty with stairclimbing and walking long distances without breaks  Pt has difficulty with reaching behind her back, sleeping, bending to get something from the floor, lifting, and reaching overhead  Pt returns to MD 8/6/18     Not a recurrent problem Quality of life: good    Pain  At best pain rating: 3  At worst pain ratin  Location: L knee  Quality: dull ache and sharp    Social Support  Stairs in house: yes     Hand dominance: right      Diagnostic Tests  X-ray: normal  MRI studies: abnormal  Patient Goals  Patient goals for therapy: decreased edema, decreased pain, improved balance, increased motion, independence with ADLs/IADLs and increased strength  Patient goal: be able to lift my arm like the other        Objective     Observations   Left Knee   Positive for effusion  Palpation   Left   Hypertonic in the upper trapezius  Tenderness of the upper trapezius  Trigger point to upper trapezius  Additional Palpation Details  Pain and atrophy at ER attachment posterior upper arm    Tenderness     Left Shoulder   Tenderness in the [facility] joint  Left Knee   Tenderness in the inferior patella  No tenderness in the lateral joint line, medial joint line, medial patella, medial retinaculum and pes anserinus  Cervical/Thoracic Screen   Cervical range of motion within normal limits    Neurological Testing     Sensation     Knee   Left Knee   Intact: light touch    Right Knee   Intact: light touch     Active Range of Motion   Left Shoulder   Flexion: 83 degrees with pain  Abduction: 74 degrees with pain  External rotation BTH: Active external rotation behind the head: unable to reach head  with pain  Internal rotation BTB: Active internal rotation behind the back: to L glutes   with pain    Left Elbow   Flexion: 127 degrees   Extension: 0 degrees   Forearm supination: 78 degrees   Forearm pronation: 82 degrees   Left Knee   Flexion: 122 degrees   Extension: 0 degrees     Additional Active Range of Motion Details  Pain over AC joint and posterior shoulder  Pt has resolved quad lag with SLR but has difficulty with lifting leg greater than 30 deg from table; persists at RE    Passive Range of Motion   Left Shoulder   Flexion: 118 degrees with pain  Abduction: 93 degrees with pain  External rotation 45°: 60 degrees with pain  Internal rotation 45°: 68 degrees with pain    Additional Passive Range of Motion Details  Pt has significant muscle guarding despite verbal cues to relax; persists at RE, significant pain with passive motion in all planes    Patellar Static Positioning   Left Knee: WFL  Right Knee: [facility]    Strength/Myotome Testing     Left Shoulder     Planes of Motion   Flexion: 3+ (pain)   Abduction: 3+ (pain)   External rotation at 0°: 4   External rotation at 90°: 4-   Internal rotation at 0°: 4   Internal rotation at 90°: 4     Left Elbow   Flexion: 4+  Extension: 4-    Left Hip   Planes of Motion   Flexion: 5  External rotation: 4+  Internal rotation: 5    Left Knee   Flexion: 4  Extension: 4+    Left Ankle/Foot   Dorsiflexion: 4+  Plantar flexion: 4+  Inversion: 5  Eversion: 5    Additional Strength Details  Pt reports pain radiating from L shoulder to elbow with resisted testing; persists at RE  Pain with resisted testing  Pt has pain in L knee with all L ankle resisted testing; resolved at RE    Tests     Left Shoulder   Positive AC shear, full can, Hawkin's and Neer's       Precautions: DM, Fibromyalgia, HIV, HLD        Daily Treatment Diary         Manuals 6/28/18 7/5 7/12/18 7/17/18 7/19/18 7/24/18 7/26/18         L knee PROM RZ -10min    nv  PK  PK    nv         L gastroc and hamstring stretch      nv        nv         L hip passive abd and ER RZ  NC  nv        nv          L shoulder PROM     nv  PK  PK  PK in SL w/ scap PROM   nv         Retrograde STM to L shoulder      PK nv      Exercise Diary  6/28/18  7/5 7/12 7/17/18 7/19/18 7/24/18 7/26/18         Gastroc Stretch w/Strap 30"x3  30" x 3  nv  np      nv                             Quad Set 3"x20    nv  np  3" x 20    nv         Heel Slide 5"15  5" x 20  nv  np  5" x 20    nv         SAQ 1# 3" x 20  2# 3" x 20  nv  np  2# 3" x 20    nv         LAQ 1# 3" x 20  2# 3" hold x 20  nv  np  2# 3" x 20    nv         Weight shifts:  M/L &   A/P (tandem stance) 20x ea  20 ea  nv  np  np    nv         Heel Raises/TR 30x ea  30 ea  nv  np  30x ea    nv         Heel taps onto step 0R 15x ea  20 ea  nv  np  np    nv         Mini squats      nv  np  15x    nv         Clamshells w/ bolster in hooklying org 3"x15     nv  np  np    nv         Std L hip flex/abd/ext 20ea  20  nv  np  2# 10x ea    nv          pulleys     nv  5 mins  5 mins  5 mins  nv          wall ladder     5"x10  5"x10  np    nv          table slides: flex, scap, ER     5"x10 ea  5"x10 ea   5"x10 ea  5"x10 ea  nv          scap retraction     5"x10  5"x15  np    nv          isometrics:flx, abd, IR, ER     nv  5"x5  5"x5 ea    nv          wand flx, ER supine     nv  5" x10 ea  np    nv          wand IR, abd     nv  5"x10 ea  np    nv                                                                                 Modalities 6/28 7/12/18 7/17/18 7/19/19 7/24/18 7/26/18         CP L knee & L shoulder 10min   10 min  10 min shoulder only  10 mins  both  w/ TENS to L shoulder today  L shoulder 10 min

## 2018-07-27 LAB
BASOPHILS # BLD AUTO: 0.1 X10E3/UL (ref 0–0.2)
BASOPHILS NFR BLD AUTO: 1 %
CD3+CD4+ CELLS # BLD: 272 /UL (ref 359–1519)
CD3+CD4+ CELLS NFR BLD: 17 % (ref 30.8–58.5)
EOSINOPHIL # BLD AUTO: 0.1 X10E3/UL (ref 0–0.4)
EOSINOPHIL NFR BLD AUTO: 2 %
ERYTHROCYTE [DISTWIDTH] IN BLOOD BY AUTOMATED COUNT: 14 % (ref 12.3–15.4)
HCT VFR BLD AUTO: 42.4 % (ref 34–46.6)
HGB BLD-MCNC: 13.3 G/DL (ref 11.1–15.9)
IMM GRANULOCYTES # BLD: 0 X10E3/UL (ref 0–0.1)
IMM GRANULOCYTES NFR BLD: 0 %
LYMPHOCYTES # BLD AUTO: 1.6 X10E3/UL (ref 0.7–3.1)
LYMPHOCYTES NFR BLD AUTO: 35 %
MCH RBC QN AUTO: 31.3 PG (ref 26.6–33)
MCHC RBC AUTO-ENTMCNC: 31.4 G/DL (ref 31.5–35.7)
MCV RBC AUTO: 100 FL (ref 79–97)
MONOCYTES # BLD AUTO: 0.5 X10E3/UL (ref 0.1–0.9)
MONOCYTES NFR BLD AUTO: 10 %
NEUTROPHILS # BLD AUTO: 2.4 X10E3/UL (ref 1.4–7)
NEUTROPHILS NFR BLD AUTO: 52 %
PLATELET # BLD AUTO: 316 X10E3/UL (ref 150–379)
RBC # BLD AUTO: 4.25 X10E6/UL (ref 3.77–5.28)
WBC # BLD AUTO: 4.6 X10E3/UL (ref 3.4–10.8)

## 2018-07-28 LAB
ANNOTATION COMMENT IMP: NORMAL
GAMMA INTERFERON BACKGROUND BLD IA-ACNC: 0.06 IU/ML
M TB IFN-G BLD-IMP: NEGATIVE
M TB IFN-G CD4+ BCKGRND COR BLD-ACNC: 0 IU/ML
M TB IFN-G CD4+ T-CELLS BLD-ACNC: 0.06 IU/ML
MITOGEN IGNF BLD-ACNC: 8.52 IU/ML
QUANTIFERON-TB GOLD IN TUBE: NORMAL
SERVICE CMNT-IMP: NORMAL

## 2018-07-30 ENCOUNTER — OFFICE VISIT (OUTPATIENT)
Dept: SURGERY | Facility: CLINIC | Age: 58
End: 2018-07-30
Payer: COMMERCIAL

## 2018-07-30 ENCOUNTER — OFFICE VISIT (OUTPATIENT)
Dept: MULTI SPECIALTY CLINIC | Facility: CLINIC | Age: 58
End: 2018-07-30
Payer: COMMERCIAL

## 2018-07-30 VITALS
WEIGHT: 170 LBS | HEART RATE: 96 BPM | SYSTOLIC BLOOD PRESSURE: 130 MMHG | DIASTOLIC BLOOD PRESSURE: 70 MMHG | OXYGEN SATURATION: 98 % | BODY MASS INDEX: 28.29 KG/M2 | TEMPERATURE: 98 F

## 2018-07-30 VITALS
BODY MASS INDEX: 28.36 KG/M2 | HEART RATE: 84 BPM | HEIGHT: 65 IN | DIASTOLIC BLOOD PRESSURE: 80 MMHG | SYSTOLIC BLOOD PRESSURE: 110 MMHG | TEMPERATURE: 98.3 F | WEIGHT: 170.19 LBS

## 2018-07-30 DIAGNOSIS — E11.40 TYPE 2 DIABETES MELLITUS WITH DIABETIC NEUROPATHY, WITHOUT LONG-TERM CURRENT USE OF INSULIN (HCC): Primary | ICD-10-CM

## 2018-07-30 DIAGNOSIS — E11.8 TYPE 2 DIABETES MELLITUS WITH COMPLICATION, WITHOUT LONG-TERM CURRENT USE OF INSULIN (HCC): ICD-10-CM

## 2018-07-30 DIAGNOSIS — B20 HIV (HUMAN IMMUNODEFICIENCY VIRUS INFECTION) (HCC): Primary | ICD-10-CM

## 2018-07-30 DIAGNOSIS — E78.2 ELEVATED TRIGLYCERIDES WITH HIGH CHOLESTEROL: ICD-10-CM

## 2018-07-30 PROCEDURE — 99213 OFFICE O/P EST LOW 20 MIN: CPT | Performed by: PODIATRIST

## 2018-07-30 PROCEDURE — 99214 OFFICE O/P EST MOD 30 MIN: CPT | Performed by: NURSE PRACTITIONER

## 2018-07-30 NOTE — ASSESSMENT & PLAN NOTE
Continue taking gabapentin  Fibromyalgia:    Stable  Continue current medications  Educated on the fluctuating chronic course of fibromyalgia  Emphasized the need to continue exercising to prevent exacerbation of symptoms  Exercise should include strength building and stretching  Continue with cognitive behavioral therapy, physical therapy and maintain a good sleep hygiene and avoid stressful situations    Emphasized the importance of maintaining a healthy weight and diet

## 2018-07-30 NOTE — PATIENT INSTRUCTIONS
LABBRIEF(hgbA1C) 6 4  Repeat lab order  Continue current medication  Educated to follow diabetic diet, maintain a healthy weight, and exercise regularly  Referred to dietician for additional education  Refer to podiatry for regular foot care  Refer to opthalmology for yearly retinal exam      10% - bad control"> 10% - bad control,Hemoglobin A1c (HbA1c) greater than 10% indicating poor diabetic control,Haemoglobin A1c greater than 10% indicating poor diabetic control">   Diabetes Mellitus Type 2 in Adults, Ambulatory Care   GENERAL INFORMATION:   Diabetes mellitus type 2  is a disease that affects how your body uses glucose (sugar)  Insulin helps move sugar out of the blood so it can be used for energy  Normally, when the blood sugar level increases, the pancreas makes more insulin  Type 2 diabetes develops because either the body cannot make enough insulin, or it cannot use the insulin correctly  After many years, your pancreas may stop making insulin  Common symptoms include the following:   · More hunger or thirst than usual     · Frequent urination     · Weight loss without trying     · Blurred vision  Seek immediate care for the following symptoms:   · Severe abdominal pain, or pain that spreads to your back  You may also be vomiting  · Trouble staying awake or focusing    · Shaking or sweating    · Blurred or double vision    · Breath has a fruity, sweet smell    · Breathing is deep and labored, or rapid and shallow    · Heartbeat is fast and weak  Treatment for diabetes mellitus type 2  includes keeping your blood sugar at a normal level  You must eat the right foods, and exercise regularly  You may also need medicine if you cannot control your blood sugar level with nutrition and exercise  Manage diabetes mellitus type 2:   · Check your blood sugar level  You will be taught how to check a small drop of blood in a glucose monitor   Ask your healthcare provider when and how often to check during the day  Ask your healthcare provider what your blood sugar levels should be when you check them  · Keep track of carbohydrates (sugar and starchy foods)  Your blood sugar level can get too high if you eat too many carbohydrates  Your dietitian will help you plan meals and snacks that have the right amount of carbohydrates  · Eat low-fat foods  Some examples are skinless chicken and low-fat milk  · Eat less sodium (salt)  Some examples of high-sodium foods to limit are soy sauce, potato chips, and soup  Do not add salt to food you cook  Limit your use of table salt  · Eat high-fiber foods  Foods that are a good source of fiber include vegetables, whole grain bread, and beans  · Limit alcohol  Alcohol affects your blood sugar level and can make it harder to manage your diabetes  Women should limit alcohol to 1 drink a day  Men should limit alcohol to 2 drinks a day  A drink of alcohol is 12 ounces of beer, 5 ounces of wine, or 1½ ounces of liquor  · Get regular exercise  Exercise can help keep your blood sugar level steady, decrease your risk of heart disease, and help you lose weight  Exercise for at least 30 minutes, 5 days a week  Include muscle strengthening activities 2 days each week  Work with your healthcare provider to create an exercise plan  · Check your feet each day  for injuries or open sores  Ask your healthcare provider for activities you can do if you have an open sore  · Quit smoking  If you smoke, it is never too late to quit  Smoking can worsen the problems that may occur with diabetes  Ask your healthcare provider for information about how to stop smoking if you are having trouble quitting  · Ask about your weight:  Ask healthcare providers if you need to lose weight, and how much to lose  Ask them to help you with a weight loss program  Even a 10 to 15 pound weight loss can help you manage your blood sugar level       · Carry medical alert identification  Wear medical alert jewelry or carry a card that says you have diabetes  Ask your healthcare provider where to get these items  · Ask about vaccines  Diabetes puts you at risk of serious illness if you get the flu, pneumonia, or hepatitis  Ask your healthcare provider if you should get a flu, pneumonia, or hepatitis B vaccine, and when to get the vaccine  Follow up with your healthcare provider as directed:  Write down your questions so you remember to ask them during your visits  CARE AGREEMENT:   You have the right to help plan your care  Learn about your health condition and how it may be treated  Discuss treatment options with your caregivers to decide what care you want to receive  You always have the right to refuse treatment  The above information is an  only  It is not intended as medical advice for individual conditions or treatments  Talk to your doctor, nurse or pharmacist before following any medical regimen to see if it is safe and effective for you  © 2014 [address] is for End User's use only and may not be sold, redistributed or otherwise used for commercial purposes  All illustrations and images included in CareNotes® are the copyrighted property of [employer] D [employer] M [employer]  or [employer]  Elevated triglycerides:    Education was given regarding eating a diet high in fruit, vegetables and fiber and to avoid foods containing sugar, fats, and oils  Foods high in sugar, fats, and oils should be eaten in small moderation  Avoid snack foods such as chips, cookies, fast food restaurants, butter, and sweets  Please be sure to drink plenty of water  Avoids drinks containing sugar such as sodas, sports drinks, alcoholic beverages, and sweetened ice teas        Educated on the following lifestyle modifications to lower and decrease cardiovascular risks: limit alcohol intake, reduce salt to 2 grams daily, maintain a healthy weight, engage in 30 minutes of cardiovascular exercise daily, and do not smoke cigarettes

## 2018-07-30 NOTE — PROGRESS NOTES
Assessment:     Elevated lipid panel     Nutrition Diagnosis    Problem: Excess fat intake    Related to: Lack of value for behavior change    As Evidenced By: ? Cholesterol > 200 mg/dL, ? LDL cholesterol > 100 mg/dL, ? triglycerides > 150 mg/dL    Nutrition Education Intervention: No Interest  and Provided     Person Educated: patient    Topics Discussed: Lean proteins, incorporating vegetables, physical activity     Teaching Method: Verbal    Goals    Goal #1 Pt will limit high fat meats (pork, red meat) to 2x/week and replace with chicken, shrimp, etc    Education started - reinforce in follow-up  Visit Summary    Met w/ pt to discuss most recent lipid panel (7/26/18)  Chol, Tri & LDL are all elevated  Pt admits to eating high fat meats and desserts/sweets regularly  Education provided on limiting these foods to reduce lipid panel as well as help with T2DM  Pt is resistant to change  States she does not like vegetables and does not want to try new foods  Pt was agreeable to limiting red meats  Pt is also not physically active and was educated on importance of increasing physical activity  Pt currently sees PT for her shoulder and knee 2x/week  Pt will start to walk/increase activity once she completes PT  Will continue to monitor weight and labs and provide interventions as needed      Dasia Hicks RDN, LDN, [facility]

## 2018-07-30 NOTE — ASSESSMENT & PLAN NOTE
Elevated triglycerides 333, and 158  CRNP referred to [facility] dietary and will initiate dietary changes and add exercise prior to starting medication  Elevated triglycerides:    Education was given regarding eating a diet high in fruit, vegetables and fiber and to avoid foods containing sugar, fats, and oils  Foods high in sugar, fats, and oils should be eaten in small moderation  Avoid snack foods such as chips, cookies, fast food restaurants, butter, and sweets  Please be sure to drink plenty of water  Avoids drinks containing sugar such as sodas, sports drinks, alcoholic beverages, and sweetened ice teas  Educated on the following lifestyle modifications to lower and decrease cardiovascular risks: limit alcohol intake, reduce salt to 2 grams daily, maintain a healthy weight, engage in 30 minutes of cardiovascular exercise daily, and do not smoke cigarettes

## 2018-07-30 NOTE — ASSESSMENT & PLAN NOTE
Pt continue to smoke 10 cigarettes per day  Cigarettes are helping pt cope at this time  Pt is not interested in quitting  Nicotine Dependency - Primary    Counseled for greater than 15 minutes on the importance of smoking cessation  Education was given regarding the cardiovascular effects of how nicotine affects the heart, lungs, kidneys,and peripheral vascular system    [place] [facility] cessation program

## 2018-07-30 NOTE — ASSESSMENT & PLAN NOTE
Lab Results   Component Value Date    HGBA1C 6 3 11/10/2017         Last   CRNP will recheck HBGA1C and refer pt to [address] to [facility] for dietary recommends  PT was instructed to exercise 30 mins 4 to 5 times per week and to keep a food diary  Will continue to monitor

## 2018-07-30 NOTE — ASSESSMENT & PLAN NOTE
Stable when SOB since last year  Continue to use albuterol inhaler as needed  Stable  Continue current medications  Contact clinic if you develop nighttime cough, wheezing, or have to use rescue inhaler more frequently

## 2018-07-30 NOTE — ASSESSMENT & PLAN NOTE
Stable  Rhuematoid doctor order Ergocalciferol 50,000 capsule pt does take weekly  Follow up with rheumatoid doctor  Continue daily vitamin D supplement  Educated to do low impact, weight bearing exercises to promote bone health

## 2018-07-30 NOTE — ASSESSMENT & PLAN NOTE
Not controlled  Pt is interested in starting melatonin for sleep  Education was given about melatonin

## 2018-07-30 NOTE — ASSESSMENT & PLAN NOTE
Ongoing issue  Pt did follow up with general surgery and no surgical intervention is needed  Cyst do keep coming back and then rupture a week later  No cysts at this time  No abx therapy

## 2018-07-30 NOTE — ASSESSMENT & PLAN NOTE
Pt is following with PT  Continue current treatment plan and keep follow up appointment with orthopedic doctor

## 2018-07-30 NOTE — PROGRESS NOTES
Assessment/Plan:    Fibromyalgia    Continue taking gabapentin  Fibromyalgia:    Stable  Continue current medications  Educated on the fluctuating chronic course of fibromyalgia  Emphasized the need to continue exercising to prevent exacerbation of symptoms  Exercise should include strength building and stretching  Continue with cognitive behavioral therapy, physical therapy and maintain a good sleep hygiene and avoid stressful situations  Emphasized the importance of maintaining a healthy weight and diet    Nicotine dependence    Pt continue to smoke 10 cigarettes per day  Cigarettes are helping pt cope at this time  Pt is not interested in quitting  Nicotine Dependency - Primary    Counseled for greater than 15 minutes on the importance of smoking cessation  Education was given regarding the cardiovascular effects of how nicotine affects the heart, lungs, kidneys,and peripheral vascular system  Referred to [facility] [address] for enrollment in smoking cessation program       HIV disease ([facility] )  Pt denies any side effects and reports 100%  Pt does see Dr Oly Benito next month  HIV Counseling:    Viral Load: <20    CD4 Count: 212    ART: Smita Servin    Denies side effects  Stressed the importance of adherence  Continue follow up in the ID clinic with Dr Oly Benito  Reviewed the most recent labs, including the Cd4 and viral load  Discussed the risks and benefits of treatment options, instructions for management, importance of treatment adherence, and reduction of risk factors  Educated on possible medication side effects  Counseled on routes of HIV transmission, including the risk of  infection  Emphasized that viral suppression is the best method to prevent HIV transmission  At this time the pt denies the need for HIV testing of anyone in their life  Total encounter time was greater than 35 minutes  Greater than 20 minutes were spent on counseling and patient education    Pt voices understanding and agreement with treatment plan  Asthma  Stable when SOB since last year  Continue to use albuterol inhaler as needed  Stable  Continue current medications  Contact clinic if you develop nighttime cough, wheezing, or have to use rescue inhaler more frequently  MDD (major depressive disorder), single episode, severe with psychotic features ([place] [address] )  Pt is in the process of changing mental health  Pt did not have a good relationship with psychologist and has not seen the psychiatrist   Pt is currently not taking medication  Stable but stressed over finding housing  High cholesterol        Total cholesterol: 321, 164    Total triglycerides: 333, 158  LIPIDS:     Pt is currently not on a statin  CRNP will place pt   Eat a low fat/low cholesterol diet  Follow up with dietician for additional education  Poor dentition  Stable  Current diet visit on Friday for denture fitting  Periodontal disease  Follows with dentist     Type 2 diabetes mellitus without complication, without long-term current use of insulin ([place] [address] )  Lab Results   Component Value Date    HGBA1C 6 3 11/10/2017         Last   CRNP will recheck HBGA1C and refer pt to [address] to Andrew Hernandez for dietary recommends  PT was instructed to exercise 30 mins 4 to 5 times per week and to keep a food diary  Will continue to monitor  Neuropathy  Moderately  Osteopenia  Stable  Rhuematoid doctor order Vitamin D pt does take daily  Multiple environmental allergies  Stable    Primary insomnia  Not controlled  Pt is interested in starting melatonin for sleep  Education was given about melatonin  Colon polyps  Pt is scheduled for colonoscopy on 9/13/18         Diagnoses and all orders for this visit:    HIV (human immunodeficiency virus infection) ([place] [address] )    Type 2 diabetes mellitus with complication, without long-term current use of insulin (HCC)    Elevated triglycerides with high cholesterol    Other orders  -     Hemoglobin A1C; Future        Patient Active Problem List   Diagnosis    Asthma    Colon polyps    Type 2 diabetes mellitus without complication, without long-term current use of insulin ([facility])    Fibromyalgia    MDD (major depressive disorder), single episode, severe with psychotic features ([facility] )    Multiple environmental allergies    Nicotine dependence    Poor dentition    H/O abdominal hysterectomy    Hidradenitis suppurativa    Effusion of left knee    High cholesterol    HIV disease ([facility] )    Mild intermittent asthma without complication    Neuropathy    Osteopenia    Periodontal disease    Acute pain of left shoulder    Primary insomnia    Polyp of colon    Elevated triglycerides with high cholesterol       Lab Results   Component Value Date     07/26/2018    K 4 0 07/26/2018     07/26/2018    CO2 27 07/26/2018    ANIONGAP 10 07/26/2018    BUN 8 07/26/2018    CREATININE 0 69 07/26/2018    GLUCOSE 170 (H) 10/09/2017    GLUF 234 (H) 07/26/2018    CALCIUM 9 3 07/26/2018    AST 14 07/26/2018    ALT 19 07/26/2018    ALKPHOS 67 07/26/2018    PROT 8 1 07/26/2018    BILITOT 0 34 07/26/2018    EGFR 98 07/26/2018     Lab Results   Component Value Date    WBC 4 67 07/26/2018    HGB 14 4 07/26/2018    HGB 13 3 07/26/2018    HCT 44 0 07/26/2018    HCT 42 4 07/26/2018     (H) 07/26/2018     (H) 07/26/2018     07/26/2018     07/26/2018         Subjective:      Patient ID: Danette Mcdonald is a 65 y o  female  Pt is being seen in the office for follow up appointment with PCP to discuss labs and determine plan of care  Pt is going to PT for recent fall and hurt left shoulder and knee pt is still having pain 8 out of 10  PT is moderately helping  Pt is not using any assistive devices to ambulate at this time pt has graduate from using cane and tolerating stairs    Pt reports feeling the bothering of pain when sleeping until pt is able to fall asleep and does wake up 2 to 3 times at night due to pain  Pt is taking tylenol and motrin  Pt is taking gabapentin and was taking banophen but was not working for sleep  PT does take ibuprohren and bacophen  Pt does follow up with ortho next week  Pt is following up with podiatry today and follows up with dental for fitting of dentures  Pt was recommended to try heating pad prior to falling asleep at night  PT is taking pain patches with lidocaine at night  Pt reports eating a lot of red meat and sweet foods and treats  Pt does not follow a heart healthy diet  Pt does exercise except for PT  Pt was instructed to keep food diary and to start eating more fruits and vegetable along with exercising regularly and to stop smoking  Pt has ongoing cysts in bilat groins that are painful Hidradenitis suppurativa  They come and go lasting about a week once they rupture the pain is better  No abx therapy  Pt did follow up with general surgery  Pt does have an eye exam scheduled for next year  Pt is getting her dentures fitted this Friday  Pt does seem very organized with her appointments and does keep a schedule  The following portions of the patient's history were reviewed and updated as appropriate: allergies, current medications, past family history, past medical history, past social history, past surgical history and problem list     Review of Systems   Constitutional: Negative  HENT: Negative  Eyes: Negative  Pt has eye exam scheduled for next year  Respiratory: Negative  Cardiovascular: Negative  Gastrointestinal: Negative  Genitourinary: We referred her to general cyst in groin area but they are not removable and ordered abx but insurance does not cover them but pt was informed not needed  Musculoskeletal: Positive for myalgias  Left shoulder swelling and tenderness  Allergic/Immunologic: Positive for environmental allergies and food allergies  Neurological: Negative  Psychiatric/Behavioral: Negative  Objective:      /70 (BP Location: Right arm, Patient Position: Sitting)   Pulse 96   Temp 98 °F (36 7 °C) (Oral)   Wt 77 1 kg (170 lb)   SpO2 98%   BMI 28 29 kg/m²          Physical Exam   Constitutional: She is oriented to person, place, and time  She appears well-developed and well-nourished  HENT:   Head: Normocephalic  Right Ear: External ear normal    Left Ear: External ear normal    Mouth/Throat: Oropharynx is clear and moist  No oropharyngeal exudate  erythremic nasal passages  Eyes: Pupils are equal, round, and reactive to light  Neck: Normal range of motion  Neck supple  Cardiovascular: Normal rate, regular rhythm and normal heart sounds  Pulmonary/Chest: Effort normal and breath sounds normal    Abdominal: Soft  Bowel sounds are normal    Musculoskeletal: Normal range of motion  She exhibits edema and tenderness  Arms:  Neurological: She is oriented to person, place, and time  Skin: Skin is warm and dry  Psychiatric: She has a normal mood and affect  Her behavior is normal    Nursing note and vitals reviewed

## 2018-07-30 NOTE — PROGRESS NOTES
07/30/18 1000   Clinical Encounter Type   Visited With Patient; Health care provider  ( present as )   Routine Visit Introduction   Continue Visiting Yes      07/30/18 [address] [facility] [address] Affiliation None stated   Stress Factors   Patient Stress Factors Financial concerns; Health changes      Visit     introduced self and spiritual care to Pt with assistance from Pt's  as   Pt did not share much information but thanked  for offer of visits in future   provided education on 5 Wishes form and freezer pack   provided [place] version of 5 Wishes and explained the components of the form and process to complete it  Pt stated she was thankful for information and that she will complete the form and bring it to her next visit   provided card and invitation to assist Pt with form with assistance of        Chaplain [facility]

## 2018-07-30 NOTE — ASSESSMENT & PLAN NOTE
Pt denies any side effects and reports 100%  Pt does see Dr Eli Peralta next month  HIV Counseling:    Viral Load: <20    CD4 Count: 212    ART: Nyla Pump    Denies side effects  Stressed the importance of adherence  Continue follow up in the ID clinic with Dr Eli Peralta  Reviewed the most recent labs, including the Cd4 and viral load  Discussed the risks and benefits of treatment options, instructions for management, importance of treatment adherence, and reduction of risk factors  Educated on possible medication side effects  Counseled on routes of HIV transmission, including the risk of  infection  Emphasized that viral suppression is the best method to prevent HIV transmission  At this time the pt denies the need for HIV testing of anyone in their life  Total encounter time was greater than 35 minutes  Greater than 20 minutes were spent on counseling and patient education  Pt voices understanding and agreement with treatment plan

## 2018-07-30 NOTE — ASSESSMENT & PLAN NOTE
Total cholesterol: 321, 164    Total triglycerides: 333, 158  LIPIDS:     Pt is currently not on a statin  CRNP will place pt Pravachol 20 mg daily for elevated lipids  Eat a low fat/low cholesterol diet  Follow up with dietician for additional education

## 2018-07-30 NOTE — ASSESSMENT & PLAN NOTE
Pt is in the process of changing mental health  Pt did not have a good relationship with psychologist and has not seen the psychiatrist   Pt is currently not taking medication  Stable but stressed over finding housing

## 2018-07-30 NOTE — PROGRESS NOTES
[address] 65 y o  female MRN: [id number]  Encounter: [id number]    Assessment/Plan     Assessment:  1  DM  2  Neuropathy    Plan:  - Patient was seen/examined   - discussed the importance of glycemic control, shoe gear, and proper diabetic foot care  - Rx for diabetic shoes and inserts again because patient lost her last prescription  - instructed patient to speak with PCP about increasing dose for gabapentin due increased neuropathy pain  - dispensed instructions on proper diabetic foot care  - all questions and concerns addressed  - RTC in 6 months for re-evaluation      History of Present Illness     HPI:  Radha Friedman is a 65 y o  female who returns to clinic for diabetic risk assessment  Pt states her sugar this morning was 100  Her last visit to her PCP was this morning  Pt reports burning, numbness and tingling  Denies any complaints to lower extremities  Pt denies any recent nausea, fever, vomiting, chills, SOB or chest pain  Pt ambulates to clinic in sneakers      Review of Systems   Constitutional: Negative  HENT: Negative  Eyes: Negative  Respiratory: Negative  Cardiovascular: Negative  Gastrointestinal: Negative  Musculoskeletal: Negative   Skin: Negative  Neurological: Negative          Historical Information   Past Medical History:   Diagnosis Date    Arthritis     Cellulitis     Colon polyp     [facility]     Fibromyalgia     Fibromyalgia     HIV (human immunodeficiency virus infection) ([facility] )     Hyperlipidemia     Pinguecula of both eyes     Presbyopia of both eyes      Past Surgical History:   Procedure Laterality Date    BREAST SURGERY      lumpectomy    COLONOSCOPY      last year, polyps, every 6 months    HYSTERECTOMY      INCISION AND DRAINAGE PERIRECTAL ABSCESS      TUBAL LIGATION       Social History   History   Alcohol Use    Yes     Comment: occasionally      History   Drug Use No     History Smoking Status    Current Every Day Smoker    Types: Cigarettes   Smokeless Tobacco    Never Used     Family History:   Family History   Problem Relation Age of Onset    Heart disease Mother     Diabetes Mother         type 1    Hypertension Mother     Cancer Sister        Meds/Allergies     (Not in a hospital admission)  No Known Allergies    Objective     Current Vitals:   Blood Pressure: 110/80 (07/30/18 1315)  Pulse: 84 (07/30/18 1315)  Temperature: 98 3 °F (36 8 °C) (07/30/18 1315)  Temp Source: Oral (07/30/18 1315)  Height: 5' 5" (165 1 cm) (07/30/18 1315)  Weight - Scale: 77 2 kg (170 lb 3 1 oz) (07/30/18 1315)        /80 (BP Location: Left arm, Patient Position: Sitting, Cuff Size: Standard)   Pulse 84   Temp 98 3 °F (36 8 °C) (Oral)   Ht 5' 5" (1 651 m)   Wt 77 2 kg (170 lb 3 1 oz)   BMI 28 32 kg/m²     General Appearance:    Alert, cooperative, no distress           Extremities:   MMT is 5/5 to all compartments of the LE, +0/4 edema B/L, Digital ROM is intact,    Pulses:   R DP is +2/4, R PT is +2/4, L DP is +2/4, L PT is +2/4, CFT< 3sec to all digits   Skin: No open Lesions  Skin of the LE is normal texture, turgor  No edema, no erythema; no clinical signs of infection  No maceration in interspaces  Neurologic:   CNII-XII intact  Normal strength, sensation and reflexes       Throughout  Gross sensation is intact   Protective sensation is Diminished

## 2018-07-31 ENCOUNTER — OFFICE VISIT (OUTPATIENT)
Dept: PHYSICAL THERAPY | Facility: CLINIC | Age: 58
End: 2018-07-31
Payer: COMMERCIAL

## 2018-07-31 DIAGNOSIS — M25.562 ACUTE PAIN OF LEFT KNEE: Primary | ICD-10-CM

## 2018-07-31 DIAGNOSIS — M25.512 CHRONIC LEFT SHOULDER PAIN: ICD-10-CM

## 2018-07-31 DIAGNOSIS — G89.29 CHRONIC LEFT SHOULDER PAIN: ICD-10-CM

## 2018-07-31 DIAGNOSIS — M25.462 EFFUSION OF LEFT KNEE: ICD-10-CM

## 2018-07-31 DIAGNOSIS — M75.41 IMPINGEMENT SYNDROME OF RIGHT SHOULDER: ICD-10-CM

## 2018-07-31 DIAGNOSIS — F51.01 PRIMARY INSOMNIA: ICD-10-CM

## 2018-07-31 DIAGNOSIS — E78.2 ELEVATED TRIGLYCERIDES WITH HIGH CHOLESTEROL: Primary | ICD-10-CM

## 2018-07-31 LAB
HIV1 RNA # SERPL NAA+PROBE: 50 COPIES/ML
HIV1 RNA SERPL NAA+PROBE-LOG#: 1.7 LOG10COPY/ML

## 2018-07-31 PROCEDURE — 97112 NEUROMUSCULAR REEDUCATION: CPT | Performed by: PHYSICAL THERAPIST

## 2018-07-31 PROCEDURE — 97530 THERAPEUTIC ACTIVITIES: CPT | Performed by: PHYSICAL THERAPIST

## 2018-07-31 PROCEDURE — 97140 MANUAL THERAPY 1/> REGIONS: CPT | Performed by: PHYSICAL THERAPIST

## 2018-07-31 PROCEDURE — 97010 HOT OR COLD PACKS THERAPY: CPT | Performed by: PHYSICAL THERAPIST

## 2018-07-31 PROCEDURE — 97110 THERAPEUTIC EXERCISES: CPT | Performed by: PHYSICAL THERAPIST

## 2018-07-31 RX ORDER — PRAVASTATIN SODIUM 20 MG
20 TABLET ORAL DAILY
Qty: 30 TABLET | Refills: 3 | Status: SHIPPED | OUTPATIENT
Start: 2018-07-31 | End: 2018-09-27 | Stop reason: DRUGHIGH

## 2018-07-31 RX ORDER — CHOLECALCIFEROL (VITAMIN D3) 125 MCG
CAPSULE ORAL
Qty: 90 TABLET | Refills: 3 | Status: SHIPPED | OUTPATIENT
Start: 2018-07-31 | End: 2020-09-01 | Stop reason: ALTCHOICE

## 2018-07-31 NOTE — PROGRESS NOTES
Daily Note     Today's date: 2018  Patient name: Radha Friedman  : 1960  MRN: [id number]  Referring provider: Starlin Sicard, MD  Dx:   Encounter Diagnosis     ICD-10-CM    1  Acute pain of left knee M25 562    2  Effusion of left knee M25 462    3  Impingement syndrome of right shoulder M75 41    4  Chronic left shoulder pain M25 512     G89 29                   Subjective: Pt reports L knee pain is 1/10 at start of session, L shoulder pain 8/10 and radiating to L elbow         Objective: See treatment diary below    Precautions: DM, Fibromyalgia, HIV, HLD        Daily Treatment Diary         Manuals 18       L knee PROM RZ -10min    nv  PK  PK    nv  np       L gastroc and hamstring stretch      nv        nv  np       L hip passive abd and ER RZ  NC  nv        nv  np        L shoulder PROM     nv  PK  PK  PK in SL w/ scap PROM   nv  ED       Retrograde STM to L shoulder      PK nv np     Exercise Diary  18  718       Gastroc Stretch w/Strap 30"x3  30" x 3  nv  np      nv  np       Step ups            2R 15x       Quad Set 3"x20    nv  np  3" x 20    nv  np       Heel Slide 5"15  5" x 20  nv  np  5" x 20    nv  np       SAQ 1# 3" x 20  2# 3" x 20  nv  np  2# 3" x 20    nv  np       LAQ 1# 3" x 20  2# 3" hold x 20  nv  np  2# 3" x 20    nv  2# 3"x20       Weight shifts:  M/L &   A/P (tandem stance) 20x ea  20 ea  nv  np  np    nv  rockerboard AP/ML 15x ea       Heel Raises/TR 30x ea  30 ea  nv  np  30x ea    nv  np       Heel taps onto step 0R 15x ea  20 ea  nv  np  np    nv  dc       Mini squats      nv  np  15x    nv  3"x20       Clamshells w/ bolster in hooklying org 3"x15     nv  np  np    nv  np       Std L hip flex/abd/ext 20ea  20  nv  np  2# 10x ea    nv  2# 15x ea        pulleys     nv  5 mins  5 mins  5 mins  nv  5 min        wall ladder     5"x10  5"x10  np    nv  5"x10        table slides: flex, scap, ER     5"x10 ea  5"x10 ea   5"x10 ea  5"x10 ea  nv  5"x10 ea        scap retraction     5"x10  5"x15  np    nv  5"x20        isometrics:flx, abd, IR, ER     nv  5"x5  5"x5 ea    nv  np        wand flx, ER supine     nv  5" x10 ea  np    nv  5"x10 ea        wand IR, abd     nv  5"x10 ea  np    nv  5"x10 ea                                                                               Modalities 6/28 7/12/18 7/17/18 7/19/19 7/24/18 7/26/18 7/31/18       CP L knee & L shoulder 10min   10 min  10 min shoulder only  10 mins  both  w/ TENS to L shoulder today  L shoulder 10 min  L shoulder 10 min                                       Assessment: Tolerated treatment fair  Patient demonstrated fatigue post treatment and would benefit from continued PT Pt was challenged with progression of LE exercises but denies increase in pain  Pt had pain with active and passive ROM of L shoulder and required VC to limit ROM to range that did not increase her pain  Pt demonstrates tightness and empty end feel with all planes of PROM of L shoulder and elbow  Concluded with CP to L shoulder, pt noting decreased pain to 7/10  L knee pain still 1/10 at end of session  Pt returns to MD next week; will await advisement  Plan: Continue per plan of care  Progress treatment as tolerated

## 2018-08-02 ENCOUNTER — OFFICE VISIT (OUTPATIENT)
Dept: PHYSICAL THERAPY | Facility: CLINIC | Age: 58
End: 2018-08-02
Payer: COMMERCIAL

## 2018-08-02 DIAGNOSIS — M25.562 ACUTE PAIN OF LEFT KNEE: Primary | ICD-10-CM

## 2018-08-02 DIAGNOSIS — G89.29 CHRONIC LEFT SHOULDER PAIN: ICD-10-CM

## 2018-08-02 DIAGNOSIS — M75.41 IMPINGEMENT SYNDROME OF RIGHT SHOULDER: ICD-10-CM

## 2018-08-02 DIAGNOSIS — M25.512 CHRONIC LEFT SHOULDER PAIN: ICD-10-CM

## 2018-08-02 DIAGNOSIS — M25.462 EFFUSION OF LEFT KNEE: ICD-10-CM

## 2018-08-02 PROCEDURE — 97014 ELECTRIC STIMULATION THERAPY: CPT

## 2018-08-02 PROCEDURE — 97010 HOT OR COLD PACKS THERAPY: CPT

## 2018-08-02 PROCEDURE — 97140 MANUAL THERAPY 1/> REGIONS: CPT

## 2018-08-02 PROCEDURE — G0283 ELEC STIM OTHER THAN WOUND: HCPCS

## 2018-08-02 PROCEDURE — 97150 GROUP THERAPEUTIC PROCEDURES: CPT

## 2018-08-02 NOTE — PROGRESS NOTES
Daily Note     Today's date: 2018  Patient name: Juju Perez  : 1960  MRN: [id number]  Referring provider: Monika Carranza MD  Dx:   Encounter Diagnosis     ICD-10-CM    1  Acute pain of left knee M25 562    2  Effusion of left knee M25 462    3  Impingement syndrome of right shoulder M75 41    4  Chronic left shoulder pain M25 512     G89 29                   Subjective: Pt reports with c/o significant L shldr pain graded 7/10 and L knee graded 1/10  She denied any increased pain or adverse reaction after last visit      Objective: See treatment diary below    Precautions: DM, Fibromyalgia, HIV, HLD        Daily Treatment Diary         Manuals 18     L knee PROM RZ -10min    nv  PK  PK    nv  np  perf TE     L gastroc and hamstring stretch      nv        nv  np       L hip passive abd and ER RZ  NC  nv        nv  np        L shoulder PROM     nv  PK  PK  PK in SL w/ scap PROM   nv  ED hold     Retrograde STM to L shoulder      PK nv np     Exercise Diary  18  718     Gastroc Stretch w/Strap 30"x3  30" x 3  nv  np      nv  np       Step ups            2R 15x  2R 20x     Quad Set 3"x20    nv  np  3" x 20    nv  np       Heel Slide 5"15  5" x 20  nv  np  5" x 20    nv  np       SAQ 1# 3" x 20  2# 3" x 20  nv  np  2# 3" x 20    nv  np       LAQ 1# 3" x 20  2# 3" hold x 20  nv  np  2# 3" x 20    nv  2# 3"x20  2#  3"x20     Weight shifts:  M/L &   A/P (tandem stance) 20x ea  20 ea  nv  np  np    nv  rockerboard AP/ML 15x ea  rockerboard AP/ML 15 ea     Heel Raises/TR 30x ea  30 ea  nv  np  30x ea    nv  np np     Heel taps onto step 0R 15x ea  20 ea  nv  np  np    nv  dc  ----     Mini squats      nv  np  15x    nv  3"x20  3"x20     Clamshells w/ perry in hooklying org 3"x15     nv  np  np    nv  np       Std L hip flex/abd/ext 20ea  20  nv  np  2# 10x ea    nv  2# 15x ea  2# 15 ea      pulleys     nv  5 mins  5 mins  5 mins  nv  5 min  5 min      wall ladder     5"x10  5"x10  np    nv  5"x10  5"x10      table slides: flex, scap, ER     5"x10 ea  5"x10 ea   5"x10 ea  5"x10 ea  nv  5"x10 ea  5"x10      scap retraction     5"x10  5"x15  np    nv  5"x20  5"x15 p!      isometrics:flx, abd, IR, ER     nv  5"x5  5"x5 ea    nv  np np      wand flx, ER supine     nv  5" x10 ea  np    nv  5"x10 ea  5"x10      wand IR, abd     nv  5"x10 ea  np    nv  5"x10 ea  10x                                                                             Modalities 6/28 7/12/18 7/17/18 7/19/19 7/24/18 7/26/18 7/31/18 8/2/18     CP L knee & L shoulder 10min   10 min  10 min shoulder only  10 mins  both  w/ TENS to L shoulder today  L shoulder 10 min  L shoulder 10 min  L shldr x10'                                     Assessment: Tolerated treatment fair  Patient demonstrated fatigue post treatment and would benefit from continued PT Great amount of pain noted with shoulder exercises, but able to complete as prescribed  Held shldr PROM due to increased pain last visit, but good tolerance with knee PROM  Good response with knee exercises and denied increased pain throughout treatment  Concluded with CP/TENS to L shldr post treatment; no change in pain level  Plan: Continue per plan of care  Progress treatment as tolerated

## 2018-08-06 ENCOUNTER — OFFICE VISIT (OUTPATIENT)
Dept: OBGYN CLINIC | Facility: MEDICAL CENTER | Age: 58
End: 2018-08-06
Payer: COMMERCIAL

## 2018-08-06 VITALS
HEIGHT: 65 IN | BODY MASS INDEX: 28.49 KG/M2 | SYSTOLIC BLOOD PRESSURE: 145 MMHG | WEIGHT: 171 LBS | HEART RATE: 75 BPM | RESPIRATION RATE: 12 BRPM | DIASTOLIC BLOOD PRESSURE: 87 MMHG

## 2018-08-06 DIAGNOSIS — M25.512 ACUTE PAIN OF LEFT SHOULDER: Primary | ICD-10-CM

## 2018-08-06 PROCEDURE — 99213 OFFICE O/P EST LOW 20 MIN: CPT | Performed by: FAMILY MEDICINE

## 2018-08-06 NOTE — PROGRESS NOTES
1  Acute pain of left shoulder  MRI shoulder left wo contrast     Orders Placed This Encounter   Procedures    MRI shoulder left wo contrast        Imaging Studies (I personally reviewed results in PACS):    Past Diagnostics:  X-ray left shoulder 06/14/2018:  No acute osseous abnormality    IMPRESSION:  Left Shoulder pain x 4 months s/p fall April 2018  Refractory to corticosteroid injection subacromial and PT x 4 weeks  PMH significant for HIV    DDX  Cuff tear  Left Frozen Shoulder  Left shoulder impingement tendinopathy      Return for   Follow-up after MRI  Patient Instructions    The explained the patient that due to her persistent left shoulder pain we will order MRI since she is currently fail conservative interventions including corticosteroid injection subacromial as well as 4 weeks of physical therapy  She will return after MRI to discuss next steps  For now, patient is to continue physical therapy  CHIEF COMPLAINT:  Left shoulder pain and stiffness    HPI:  Efrem King is a 65 y o  female  who presents for       Visit 06/27/2018:  Evaluation of left shoulder pain and stiffness ongoing 1 month  Patient does state that she had injury prior April 23, 2018 but denies any pain developing after then  She points to her humeral head and lateral shoulder  She describes constant burning stinging leg pain as well as pulling on a cord sensation that is worst when reaching overhead  She also has associated nighttime pain in her left shoulder  She has been taking Tylenol with codeine which does offer some relief  She has also been taking ibuprofen  There is radiation of her pain down her arm into her biceps  Associated symptoms do include left-sided neck pain  Patient has been performing physical therapy for knee and is currently under treatment with Dr Dmitriy Dowell      08/6/18:   interpretation today provided by medical assistant Carey Breen    Follow-up right shoulder pain: Uncontrolled  Patient continues to have pain  She points to left trapezius lateral shoulder and states radiation to her left elbow but not to her fingers  Patient denies any numbness and tingling  She has been compliant with physical therapy 2 days per week approximately 4 weeks  She states that she feels the same since this pain 1st began  Review of Systems   Constitutional: Negative for chills and fever  Neurological: Negative for weakness and numbness  Following history reviewed and update:    Past Medical History:   Diagnosis Date    Arthritis     Cellulitis     Colon polyp     [facility] mellitus ([facility] )     Fibromyalgia     Fibromyalgia     HIV (human immunodeficiency virus infection) ([facility] )     Hyperlipidemia     Pinguecula of both eyes     Presbyopia of both eyes      Past Surgical History:   Procedure Laterality Date    BREAST SURGERY      lumpectomy    COLONOSCOPY      last year, polyps, every 6 months    HYSTERECTOMY      INCISION AND DRAINAGE PERIRECTAL ABSCESS      TUBAL LIGATION       Social History   History   Alcohol Use    Yes     Comment: occasionally      History   Drug Use No     History   Smoking Status    Current Every Day Smoker    Types: Cigarettes   Smokeless Tobacco    Never Used     Family History   Problem Relation Age of Onset    Heart disease Mother     Diabetes Mother         type 1    Hypertension Mother     Cancer Sister      No Known Allergies       Physical Exam  Constitutional:  see vital signs  Gen: well-developed, normocephalic/atraumatic, well-groomed  Eyes: No inflammation or discharge of conjunctiva or lids; sclera clear   Pharynx: no inflammation, lesion, or mass of lips  Neck: supple, no masses, non-distended  MSK: no inflammation, lesion, mass, or clubbing of nails and digits except for other than mentioned below  SKIN: no visible rashes or skin lesions  Pulmonary/Chest: Effort normal  No respiratory distress     NEURO: cranial nerves grossly intact  PSYCH:  Alert and oriented to person, place, and time; recent and remote memory intact; mood normal, no depression, anxiety, or agitation, judgment and insight good and intact    Ortho Exam  LEFT SHOULDER:  Erythema: no  Swelling: no  Increased Warmth: no    Tenderness: + lateral shoulder, trapezius    ROM  Touchdown sign:  Diminished left abduction to 100  Appley Scratch Test:  Asymmetric left beltline right T7  Passive:  Asymmetric left diminished    Strength  Abduction: 5/5  ER: 5/5  IR: 5/5    Drop-Arm: negative  Emptycan: +  Belly Press: negative  Lift-off Test:    Ness: +  Neer: +  Cross-Arm:   Speeds:     RIGHT SHOULDER:  Strength  Abduction: 5/5  ER: 5/5  IR: 5/5    ROM  Touchdown sign: intact  Appley Scratch Test: symmetric  Passive: symmetric    Empty can: negative      Procedures

## 2018-08-06 NOTE — PATIENT INSTRUCTIONS
The explained the patient that due to her persistent left shoulder pain we will order MRI since she is currently fail conservative interventions including corticosteroid injection subacromial as well as 4 weeks of physical therapy  She will return after MRI to discuss next steps  For now, patient is to continue physical therapy

## 2018-08-07 ENCOUNTER — OFFICE VISIT (OUTPATIENT)
Dept: OBGYN CLINIC | Facility: MEDICAL CENTER | Age: 58
End: 2018-08-07
Payer: COMMERCIAL

## 2018-08-07 VITALS
HEART RATE: 70 BPM | DIASTOLIC BLOOD PRESSURE: 80 MMHG | SYSTOLIC BLOOD PRESSURE: 135 MMHG | WEIGHT: 171 LBS | BODY MASS INDEX: 28.49 KG/M2 | HEIGHT: 65 IN

## 2018-08-07 DIAGNOSIS — S86.912S KNEE STRAIN, LEFT, SEQUELA: Primary | ICD-10-CM

## 2018-08-07 PROCEDURE — 99213 OFFICE O/P EST LOW 20 MIN: CPT | Performed by: ORTHOPAEDIC SURGERY

## 2018-08-07 NOTE — PROGRESS NOTES
Orthopaedic Surgery - Office Note  Javon Rehman (84 y o  female)   : 1960   MRN: [id number]  Encounter Date: 2018    Chief complaint:  Right knee pain    Assessment / Plan  R knee pain due to patellar bone contusion MPFL strain resolving with PT     · Patient has improved significantly with PT for L knee ROM and strengthening, she will continue with physical therapy as she is also doing it for her left shoulder currently being treated by Dr Melissa Tirado  · Continue short hinged knee brace as needed  · Continue anti-inflammatories and ice as needed  Return in about 6 weeks (around 2018)  History of Present Illness  Renetta Fried is a 65 y o  female who presents as a follow up for her L knee pain after a fall on 18  She has been doing physical therapy outpatient and feeling that she made good progress it with her knee  At this time she has mild tenderness in the superior lateral anterior knee mainly with palpation otherwise she has regained her full range of motion at this time  She feels her strength is also been improving but she still has some soreness with stairs  Denies instability or locking  Overall she is very happy with her improvement with physical therapy  She did stop wearing a brace about a week ago and has not had any issues  She is currently being treated with PT for her left shoulder pain with Dr Melissa Tirado scheduled to undergo an MRI next week  Review of Systems  Pertinent items are noted in HPI  All other systems were reviewed and are negative  Physical Exam  /80   Pulse 70   Ht 5' 5" (1 651 m)   Wt 77 6 kg (171 lb)   BMI 28 46 kg/m²   Cons: Appears well  No apparent distress  Psych: Alert  Oriented x3  Mood and affect normal   Eyes: PERRLA, EOMI  Resp: Normal effort  No audible wheezing or stridor  CV: Palpable pulse  No discernable arrhythmia  Trace LE edema    Lymph:  No palpable cervical, axillary, or inguinal lymphadenopathy  Skin: Warm  No palpable masses  No visible lesions  Neuro: Normal muscle tone  Normal and symmetric DTR's  Left Knee Exam  Alignment:  Normal knee alignment  Inspection:  mild knee swelling  mild edema  No ecchymosis  No muscle atrophy  No deformity  Palpation:  Very mild tenderness at lateral quad insertion only  No effusion  No tenderness on medial retinaculum  No joint line tenderness  ROM:  Knee Extension 0  Knee Flexion 120°  Strength:  5/5 quadriceps and hamstrings  Stability:  No objective knee instability  Stable Varus / Valgus stress, Lachman, and Posterior drawer  Tests:  No pertinent positive or negative tests  (-) Jose  (-) Straight leg raise  Patella:  Patella tracks centrally without crepitus  Neurovascular:  Sensation intact in DP/SP/Gonzales/Sa/T nerve distributions  2+ DP & PT pulses  Gait:  Normal     Studies Reviewed  MRI of left knee - From 05/06/2018, Show moderate bone contusion operative fall medial patella facet and strain and partial tear of the patellar attachment of the MPFL  No osteochondral fragment or fracture noted  Meniscus and ligaments are intact  Procedures  No procedures today  Medical, Surgical, Family, and Social History  The patient's medical history, family history, and social history, were reviewed and updated as appropriate      Past Medical History:   Diagnosis Date    Arthritis     Cellulitis     Colon polyp     Diabetes mellitus ([address] )     Fibromyalgia     Fibromyalgia     [facility]     Hyperlipidemia     Pinguecula of both eyes     Presbyopia of both eyes        Past Surgical History:   Procedure Laterality Date    BREAST SURGERY      lumpectomy    COLONOSCOPY      last year, polyps, every 6 months    HYSTERECTOMY      INCISION AND DRAINAGE PERIRECTAL ABSCESS      TUBAL LIGATION         Family History   Problem Relation Age of Onset    Heart disease Mother     Diabetes Mother         type 1    Hypertension Mother     Cancer Sister        Social History     Occupational History    Not on file  Social History Main Topics    Smoking status: Current Every Day Smoker     Types: Cigarettes    Smokeless tobacco: Never Used    Alcohol use Yes      Comment: occasionally     Drug use: No    Sexual activity: No       No Known Allergies      Current Outpatient Prescriptions:     acetaminophen (TYLENOL) 500 mg tablet, TAKE TWO TABLETS BY MOUTH EVERY EIGHT HOURS, Disp: 60 tablet, Rfl: 1    albuterol (2 5 mg/3 mL) 0 083 % nebulizer solution, Inhale 3 mL, Disp: , Rfl:     albuterol (PROVENTIL HFA,VENTOLIN HFA) 90 mcg/act inhaler, Inhale 2 puffs, Disp: , Rfl:     ammonium lactate (LAC-HYDRIN) 12 % lotion, , Disp: , Rfl:     BANOPHEN 50 MG capsule, TAKE 1 CAPSULE BY MOUTH DAILY AT BEDTIME AS NEEDED FOR SLEEP, Disp: 30 capsule, Rfl: 1    bisacodyl (DULCOLAX) 5 mg EC tablet, Take 30 mg by mouth, Disp: , Rfl:     Blood Glucose Monitoring Suppl (TRUE METRIX METER) GIL, daily  , Disp: , Rfl:     Blood Glucose Monitoring Suppl GIL, daily  , Disp: , Rfl:     budesonide-formoterol (SYMBICORT) 160-4 5 mcg/act inhaler, Inhale 2 puffs, Disp: , Rfl:     clotrimazole (LOTRIMIN) 1 % cream, Apply topically 2 (two) times a day, Disp: , Rfl:     ergocalciferol (DRISDOL) 02727 units capsule, Take 50,000 Units by mouth, Disp: , Rfl:     gabapentin (NEURONTIN) 800 mg tablet, TAKE 1 TABLET BY MOUTH THREE TIMES A DAY, Disp: 90 tablet, Rfl: 3    GENVOYA tablet, TAKE 1 TABLET BY MOUTH DAILY, Disp: 30 tablet, Rfl: 3    Glucose Blood (BLOOD GLUCOSE TEST STRIPS) STRP, 1 each, Disp: , Rfl:     glucose blood test strip, 1 each by Other route 4 (four) times a day, Disp: 100 each, Rfl: 3    ibuprofen (MOTRIN) 800 mg tablet, TAKE 1 TABLET BY MOUTH EVERY EIGHT HOURS, Disp: 30 tablet, Rfl: 1    Melatonin 5 MG TABS, Take 1 tablet by mouth at bedtime daily, Disp: 90 tablet, Rfl: 3    metFORMIN (GLUCOPHAGE) 500 mg tablet, TAKE 1 TABLET BY MOUTH TWICE A DAY, Disp: 60 tablet, Rfl: 3    naproxen (NAPROSYN) 500 mg tablet, Take 250 mg by mouth 2 (two) times a day with meals, Disp: , Rfl:     polyethylene glycol (GLYCOLAX) powder, Drink per MD recommendations, Disp: , Rfl:     pravastatin (PRAVACHOL) 20 mg tablet, Take 1 tablet (20 mg total) by mouth daily, Disp: 30 tablet, Rfl: 3    sulfaSALAzine (AZULFIDINE-EN) 500 mg EC tablet, , Disp: , Rfl:     SUPER THIN LANCETS MISC, by Percutaneous route, Disp: , Rfl:     SUPER THIN LANCETS MISC, by Percutaneous route, Disp: , Rfl:     Na Sulfate-K Sulfate-Mg Sulf (SUPREP BOWEL PREP KIT) 17 5-3 13-1 6 GM/180ML SOLN, Take 1 Bottle by mouth once for 1 dose, Disp: 1 Bottle, Rfl: 0      Chris Parmar PA-C    Scribe Attestation    I,:    am acting as a scribe while in the presence of the attending physician :        I,:    personally performed the services described in this documentation    as scribed in my presence :

## 2018-08-09 ENCOUNTER — OFFICE VISIT (OUTPATIENT)
Dept: PHYSICAL THERAPY | Facility: CLINIC | Age: 58
End: 2018-08-09
Payer: COMMERCIAL

## 2018-08-09 DIAGNOSIS — M25.512 CHRONIC LEFT SHOULDER PAIN: ICD-10-CM

## 2018-08-09 DIAGNOSIS — M25.462 EFFUSION OF LEFT KNEE: Primary | ICD-10-CM

## 2018-08-09 DIAGNOSIS — G89.29 CHRONIC LEFT SHOULDER PAIN: ICD-10-CM

## 2018-08-09 DIAGNOSIS — M25.562 ACUTE PAIN OF LEFT KNEE: ICD-10-CM

## 2018-08-09 DIAGNOSIS — M75.41 IMPINGEMENT SYNDROME OF RIGHT SHOULDER: ICD-10-CM

## 2018-08-09 PROCEDURE — 97112 NEUROMUSCULAR REEDUCATION: CPT

## 2018-08-09 PROCEDURE — 97110 THERAPEUTIC EXERCISES: CPT

## 2018-08-09 NOTE — PROGRESS NOTES
Daily Note     Today's date: 2018  Patient name: Anastacio Garber  : 1960  MRN: [id number]  Referring provider: Millicent Rob MD  Dx:   Encounter Diagnosis     ICD-10-CM    1  Effusion of left knee M25 462    2  Acute pain of left knee M25 562    3  Impingement syndrome of right shoulder M75 41    4  Chronic left shoulder pain M25 512     G89 29                   Subjective: Patient c/o pain level 9/10 in the left shoulder this a m  Reports she is going to get an MRI and will be following up with her MD   L knee pain today is a 1/10 pain level      Objective: See treatment diary below      Daily Treatment Diary         Manuals 18  718   L knee PROM RZ -10min    nv  PK  PK    nv  np  perf TE  np   L gastroc and hamstring stretch      nv        nv  np    np   L hip passive abd and ER RZ  NC  nv        nv  np    np    L shoulder PROM     nv  PK  PK  PK in SL w/ scap PROM   nv  ED hold  hold   Retrograde STM to L shoulder      PK nv np  np   Exercise Diary  18  718   Gastroc Stretch w/Strap 30"x3  30" x 3  nv  np      nv  np   np   Step ups            2R 15x  2R 20x  2R 25x   Quad Set 3"x20    nv  np  3" x 20    nv  np    np   Heel Slide 5"15  5" x 20  nv  np  5" x 20    nv  np    np   SAQ 1# 3" x 20  2# 3" x 20  nv  np  2# 3" x 20    nv  np   np   LAQ 1# 3" x 20  2# 3" hold x 20  nv  np  2# 3" x 20    nv  2# 3"x20  2#  3"x20  2#  3"x30   Weight shifts:  M/L &   A/P (tandem stance) 20x ea  20 ea  nv  np  np    nv  rockerboard AP/ML 15x ea  rockerboard AP/ML 15 ea  15 ea   Heel Raises/TR 30x ea  30 ea  nv  np  30x ea    nv  np np  30x   Heel taps onto step 0R 15x ea  20 ea  nv  np  np    nv  dc  ----  dc   Mini squats      nv  np  15x    nv  3"x20  3"x20  3"x20   Pati chavez/ perry in hooklying org 3"x15     nv  np  np    nv  np    np   Std L hip flex/abd/ext 20ea  20  nv  np  2# 10x ea    nv  2# 15x ea  2# 15 ea  2#x15 ea    pulleys     nv  5 mins  5 mins  5 mins  nv  5 min  5 min  5 min    wall ladder     5"x10  5"x10  np    nv  5"x10  5"x10  5"x10    table slides: flex, scap, ER     5"x10 ea  5"x10 ea   5"x10 ea  5"x10 ea  nv  5"x10 ea  5"x10  5"x10    scap retraction     5"x10  5"x15  np    nv  5"x20  5"x15 p!  5"x15    isometrics:flx, abd, IR, ER     nv  5"x5  5"x5 ea    nv  np np  np    wand flx, ER supine     nv  5" x10 ea  np    nv  5"x10 ea  5"x10  5"x10    wand IR, abd     nv  5"x10 ea  np    nv  5"x10 ea  10x 10x                                                                           Modalities 6/28 7/12/18 7/17/18 7/19/19 7/24/18 7/26/18 7/31/18 8/2/18 8/9/18   CP L knee & L shoulder 10min   10 min  10 min shoulder only  10 mins  Both   w/ TENS to L shoulder today  L shoulder 10 min  L shoulder 10 min  L shldr x10'  L SH  X 10'                                   Assessment: Tolerated treatment fairly well  Patient demonstrated considerable amount of pain with shoulder exercises  No complaints with the knee exercises  Applied CP x 10 min for the L shoulder at the end of the session  Plan: Continue therapy as tolerated per plan of care

## 2018-08-10 ENCOUNTER — TELEPHONE (OUTPATIENT)
Dept: OBGYN CLINIC | Facility: MEDICAL CENTER | Age: 58
End: 2018-08-10

## 2018-08-11 ENCOUNTER — HOSPITAL ENCOUNTER (EMERGENCY)
Facility: HOSPITAL | Age: 58
Discharge: HOME | End: 2018-08-11
Payer: COMMERCIAL

## 2018-08-11 VITALS
DIASTOLIC BLOOD PRESSURE: 87 MMHG | HEART RATE: 83 BPM | WEIGHT: 171 LBS | RESPIRATION RATE: 16 BRPM | OXYGEN SATURATION: 100 % | SYSTOLIC BLOOD PRESSURE: 140 MMHG | TEMPERATURE: 98 F | BODY MASS INDEX: 28.46 KG/M2

## 2018-08-11 DIAGNOSIS — M25.512 LEFT SHOULDER PAIN: Primary | ICD-10-CM

## 2018-08-11 PROCEDURE — 99283 EMERGENCY DEPT VISIT LOW MDM: CPT

## 2018-08-11 PROCEDURE — 96372 THER/PROPH/DIAG INJ SC/IM: CPT

## 2018-08-11 RX ORDER — LIDOCAINE 40 MG/G
CREAM TOPICAL AS NEEDED
Qty: 30 G | Refills: 0 | Status: SHIPPED | OUTPATIENT
Start: 2018-08-11

## 2018-08-11 RX ORDER — CYCLOBENZAPRINE HCL 10 MG
10 TABLET ORAL 2 TIMES DAILY PRN
Qty: 20 TABLET | Refills: 0 | Status: SHIPPED | OUTPATIENT
Start: 2018-08-11 | End: 2018-08-19

## 2018-08-11 RX ORDER — KETOROLAC TROMETHAMINE 30 MG/ML
60 INJECTION, SOLUTION INTRAMUSCULAR; INTRAVENOUS ONCE
Status: COMPLETED | OUTPATIENT
Start: 2018-08-11 | End: 2018-08-11

## 2018-08-11 RX ORDER — CYCLOBENZAPRINE HCL 10 MG
10 TABLET ORAL 2 TIMES DAILY PRN
Qty: 15 TABLET | Refills: 0 | Status: ON HOLD | OUTPATIENT
Start: 2018-08-11 | End: 2018-11-01

## 2018-08-11 RX ADMIN — KETOROLAC TROMETHAMINE 60 MG: 30 INJECTION, SOLUTION INTRAMUSCULAR at 13:12

## 2018-08-11 NOTE — ED PROVIDER NOTES
History  Chief Complaint   Patient presents with    Shoulder Pain     Left shoulder pain  Has been in PT for past 3 weeks, but worsening pain since yesterday  Had a fall in April     Patient presents to the emergency department today with family  Essentially this is a 50-year-old female who sustained a fall in 2018  Had plain films which were negative according to records  She underwent physical therapy which she currently is still attending  She sought orthopedics this past week who ordered her an MRI  The MRI scheduled for this week  She continues to have pain and is here for pain control  She denies any acute traumatic events  She is centrally complains of pain with all range of motion  No history of fever  No history of warmth of the joint  Patient is self treating with ibuprofen as well as Tylenol based products with very little relief  Prior to Admission Medications   Prescriptions Last Dose Informant Patient Reported? Taking? BANOPHEN 50 MG capsule   No No   Sig: TAKE 1 CAPSULE BY MOUTH DAILY AT BEDTIME AS NEEDED FOR SLEEP   Blood Glucose Monitoring Suppl (TRUE METRIX METER) GIL  Self Yes No   Sig: daily  Blood Glucose Monitoring Suppl GIL   Yes No   Sig: daily     GENVOYA tablet   No No   Sig: TAKE 1 TABLET BY MOUTH DAILY   Glucose Blood (BLOOD GLUCOSE TEST STRIPS) STRP   Yes No   Si each   Melatonin 5 MG TABS   No No   Sig: Take 1 tablet by mouth at bedtime daily   Na Sulfate-K Sulfate-Mg Sulf (SUPREP BOWEL PREP KIT) 17 5-3 13-1 6 GM/180ML SOLN   No No   Sig: Take 1 Bottle by mouth once for 1 dose   SUPER THIN LANCETS MISC  Self Yes No   Sig: by Percutaneous route   SUPER THIN LANCETS MISC   Yes No   Sig: by Percutaneous route   acetaminophen (TYLENOL) 500 mg tablet   No No   Sig: TAKE TWO TABLETS BY MOUTH EVERY EIGHT HOURS   albuterol (2 5 mg/3 mL) 0 083 % nebulizer solution  Self Yes No   Sig: Inhale 3 mL   albuterol (PROVENTIL HFA,VENTOLIN HFA) 90 mcg/act inhaler  Self Yes No   Sig: Inhale 2 puffs   ammonium lactate (LAC-HYDRIN) 12 % lotion   Yes No   bisacodyl (DULCOLAX) 5 mg EC tablet  Self Yes No   Sig: Take 30 mg by mouth   budesonide-formoterol (SYMBICORT) 160-4 5 mcg/act inhaler  Self Yes No   Sig: Inhale 2 puffs   clotrimazole (LOTRIMIN) 1 % cream  Self Yes No   Sig: Apply topically 2 (two) times a day   ergocalciferol (DRISDOL) 21160 units capsule   Yes No   Sig: Take 50,000 Units by mouth   gabapentin (NEURONTIN) 800 mg tablet   No No   Sig: TAKE 1 TABLET BY MOUTH THREE TIMES A DAY   glucose blood test strip   No No   Si each by Other route 4 (four) times a day   ibuprofen (MOTRIN) 800 mg tablet   No No   Sig: TAKE 1 TABLET BY MOUTH EVERY EIGHT HOURS   metFORMIN (GLUCOPHAGE) 500 mg tablet   No No   Sig: TAKE 1 TABLET BY MOUTH TWICE A DAY   naproxen (NAPROSYN) 500 mg tablet  Self Yes No   Sig: Take 250 mg by mouth 2 (two) times a day with meals   polyethylene glycol (GLYCOLAX) powder  Self Yes No   Sig: Drink per MD recommendations   pravastatin (PRAVACHOL) 20 mg tablet   No No   Sig: Take 1 tablet (20 mg total) by mouth daily   sulfaSALAzine (AZULFIDINE-EN) 500 mg EC tablet  Self Yes No      Facility-Administered Medications: None       Past Medical History:   Diagnosis Date    Arthritis     Cellulitis     Colon polyp     Diabetes mellitus (HCC)     Fibromyalgia     Fibromyalgia     HIV (human immunodeficiency virus infection) ([facility])     Hyperlipidemia     Pinguecula of both eyes     Presbyopia of both eyes        Past Surgical History:   Procedure Laterality Date    BREAST SURGERY      lumpectomy    COLONOSCOPY      last year, polyps, every 6 months    HYSTERECTOMY      INCISION AND DRAINAGE PERIRECTAL ABSCESS      TUBAL LIGATION         Family History   Problem Relation Age of Onset    Heart disease Mother     Diabetes Mother         type 1    Hypertension Mother     Cancer Sister      I have reviewed and agree with the history as documented  Social History   Substance Use Topics    Smoking status: Current Every Day Smoker     Types: Cigarettes    Smokeless tobacco: Never Used    Alcohol use Yes      Comment: occasionally         Review of Systems   Constitutional: Negative  HENT: Negative  Eyes: Negative  Respiratory: Negative  Cardiovascular: Negative  Endocrine: Negative  Genitourinary: Negative  Musculoskeletal:        Left shoulder pain   Skin: Negative  Allergic/Immunologic: Negative  Neurological: Negative  Hematological: Negative  Psychiatric/Behavioral: Negative  All other systems reviewed and are negative  Physical Exam  Physical Exam   Constitutional: She is oriented to person, place, and time  She appears well-developed and well-nourished  No distress  HENT:   Head: Normocephalic  Eyes: Pupils are equal, round, and reactive to light  Neck: Normal range of motion  No JVD present  No tracheal deviation present  No thyromegaly present  Cardiovascular: Normal rate, regular rhythm, normal heart sounds and intact distal pulses  Exam reveals no gallop and no friction rub  No murmur heard  Pulmonary/Chest: Effort normal and breath sounds normal  No stridor  No respiratory distress  She has no wheezes  She has no rales  She exhibits no tenderness  Musculoskeletal: She exhibits tenderness  Patient exhibits tenderness of the left anterior shoulder  She exhibits pain with both adduction and abduction  She exhibits normal neurovascular motor exams distally  No evidence of contusion abrasion swelling or warmth upon physical examination  Lymphadenopathy:     She has no cervical adenopathy  Neurological: She is alert and oriented to person, place, and time  Skin: Skin is warm  Capillary refill takes less than 2 seconds  She is not diaphoretic  Psychiatric: She has a normal mood and affect  Vitals reviewed        Vital Signs  ED Triage Vitals [08/11/18 1247]   Temperature Pulse Respirations Blood Pressure SpO2   98 °F (36 7 °C) 83 16 140/87 100 %      Temp Source Heart Rate Source Patient Position - Orthostatic VS BP Location FiO2 (%)   Oral -- -- -- --      Pain Score       Worst Possible Pain           Vitals:    08/11/18 1247   BP: 140/87   Pulse: 83       Visual Acuity      ED Medications  Medications   ketorolac (TORADOL) injection 60 mg (60 mg Intramuscular Given 8/11/18 1312)       Diagnostic Studies  Results Reviewed     None                 No orders to display              Procedures  Procedures       Phone Contacts  ED Phone Contact    ED Course  ED Course as of Aug 11 1316   Sat Aug 11, 2018   1256 SpO2: 100 %   1256 Respirations: 16   1256 Temperature: 98 °F (36 7 °C)   1256 Pulse: 83   1256 Blood Pressure: 140/87   1258 Ortho impression from 8/6/18:    Past Diagnostics:  X-ray left shoulder 06/14/2018:  No acute osseous abnormality     IMPRESSION:  Left Shoulder pain x 4 months s/p fall April 2018  Refractory to corticosteroid injection subacromial and PT x 4 weeks  PMH significant for HIV     DDX  Cuff tear  Left Frozen Shoulder  Left shoulder impingement tendinopathy        Return for   Follow-up after MRI  MDM  CritCare Time    Disposition  Final diagnoses:   Left shoulder pain     Time reflects when diagnosis was documented in both MDM as applicable and the Disposition within this note     Time User Action Codes Description Comment    8/11/2018  1:10 PM Roosevelt ASHBY Add [P57 675] Left shoulder pain       ED Disposition     ED Disposition Condition Comment    Discharge  Barbie Constantino discharge to home/self care  Condition at discharge: Good        Follow-up Information     Follow up With Specialties Details Why [address], DO Internal Medicine Schedule an appointment as soon as possible for a visit  [phone] E   [address] [facility]  [phone]      MRI  Go to            Patient's Medications   Discharge Prescriptions    CYCLOBENZAPRINE (FLEXERIL) 10 MG TABLET    Take 1 tablet (10 mg total) by mouth 2 (two) times a day as needed for muscle spasms       Start Date: 8/11/2018 End Date: --       Order Dose: 10 mg       Quantity: 20 tablet    Refills: 0    CYCLOBENZAPRINE (FLEXERIL) 10 MG TABLET    Take 1 tablet (10 mg total) by mouth 2 (two) times a day as needed for muscle spasms       Start Date: 8/11/2018 End Date: --       Order Dose: 10 mg       Quantity: 15 tablet    Refills: 0    LIDOCAINE (LMX) 4 % CREAM    Apply topically as needed for mild pain       Start Date: 8/11/2018 End Date: --       Order Dose: --       Quantity: 30 g    Refills: 0     No discharge procedures on file      ED Provider  Electronically Signed by           Cecilia Lujan PA-C  08/11/18 [address] [address] Norma Bobby PA-C  08/11/18 1091

## 2018-08-11 NOTE — DISCHARGE INSTRUCTIONS
Dolor de hombro, cuidados ambulatorios   INFORMACIÓN GENERAL:   El dolor de hombro  es un problema común y puede afectar john actividades diarias  El dolor puede ser causado por un problema dentro de arenas hombro  El dolor de hombro también puede ser causado por el dolor que se propaga hacia arenas hombro de otra parte de arenas cuerpo  Busque atención médica inmediata al presentar los siguientes síntomas:   · Dolor intenso    · Incapacidad de  arenas hombro o brazo    · Entumecimiento o cosquilleo en el hombro o en el brazo  El tratamiento para el dolor de hombro  puede incluir alguno de los siguientes:  · El acetaminofén  disminuye el dolor y la [place]  Está disponible sin receta médica  Pregunte la cantidad que debe jacquie y con qué frecuencia debe tomarlo  [address] [place] [address] Road  El acetaminofén puede causar daño al hígado si no se merly correctamente  · Los antiiflamatorios no esteroideos (AINEs) ayudan a reducir inflamación y dolor o [place]  [place] medicamento esta disponible con o sin pratima receta médica  Los AINEs podrían causar sangrado estomacal o problemas en los riñones en [employer]  Si usted esta tomando un anticoágulante, siempre  pregunte a arenas proveedor de amari si los AINEs son seguros para usted  Antes de [employer], mahogany siempre la etiqueta de información y siga john indicaciones  · Pratima inyección de esteroides  puede ayudar a disminuir el dolor y la inflamación  · Puede ser necesaria la cirugía  para el dolor crónico y por la pérdida de función  Controle john síntomas:   · Aplique hielo  sobre el hombro tyler 20 a 30 minutos cada 2 horas o según las indicaciones  Use pratima compresa de hielo o coloque hielo haider en pratima bolsa plástica y cúbrala con pratima toalla  El hielo ayuda a prevenir el daño a los tejidos y disminuye la inflamación y el dolor  · Aplicar calor si el hielo no ayuda a john síntomas    Aplique calor sobre el hombro tyler 20 a 30 minutos cada 2 horas tyler los días indicados  El calor ayuda a disminuir el dolor y los espasmos musculares  · Acuda a rehabilitación o terapia ocupacional sarah se le indique  Un fisioterapeuta le enseñará ejercicios para ayudar a mejorar el rango de movimiento y la fuerza con el fin de disminuir el dolor  Un terapeuta ocupacional le enseñará habilidades para ayudar con john actividades diarias  Prevenir el dolor de hombro:   · Estirar y fortalecer arenas hombro  Use la técnica apropiada tyler los ejercicios y deportes  · Limite las [employer] sarah se le indique  Trate de evitar los movimientos repetitivos por encima de la calderon  Doreen Gaunt a arenas consulta de control con arenas proveedor de amari o con el ortopedista sarah se le indique:  Anote john preguntas para que recuerde hacerlas tyler john citas médicas  ACUERDOS SOBRE ARENAS CUIDADO:   Usted tiene el derecho de participar en la planificación de arenas cuidado  Aprenda todo lo que pueda sobre arenas condición y sarah darle tratamiento  Discuta con john médicos john opciones de tratamiento para juntos decidir el cuidado que usted quiere recibir  Usted siempre tiene el derecho a rechazar arenas tratamiento  Esta información es sólo para uso en educación  Arenas intención no es darle un consejo médico sobre enfermedades o tratamientos  Colsulte con arenas Melven Rimes farmacéutico antes de seguir cualquier régimen médico para saber si es seguro y efectivo para usted  © 2014 [address] is for End User's use only and may not be sold, redistributed or otherwise used for commercial purposes  All illustrations and images included in CareNotes® are the copyrighted property of A D A M , Inc  or Victoriano Yevgeniy

## 2018-08-14 ENCOUNTER — OFFICE VISIT (OUTPATIENT)
Dept: PHYSICAL THERAPY | Facility: CLINIC | Age: 58
End: 2018-08-14
Payer: COMMERCIAL

## 2018-08-14 DIAGNOSIS — G89.29 CHRONIC LEFT SHOULDER PAIN: ICD-10-CM

## 2018-08-14 DIAGNOSIS — M25.512 CHRONIC LEFT SHOULDER PAIN: ICD-10-CM

## 2018-08-14 DIAGNOSIS — M25.562 ACUTE PAIN OF LEFT KNEE: ICD-10-CM

## 2018-08-14 DIAGNOSIS — M25.462 EFFUSION OF LEFT KNEE: Primary | ICD-10-CM

## 2018-08-14 DIAGNOSIS — M75.41 IMPINGEMENT SYNDROME OF RIGHT SHOULDER: ICD-10-CM

## 2018-08-14 PROCEDURE — 97110 THERAPEUTIC EXERCISES: CPT

## 2018-08-14 PROCEDURE — 97014 ELECTRIC STIMULATION THERAPY: CPT

## 2018-08-14 PROCEDURE — 97010 HOT OR COLD PACKS THERAPY: CPT

## 2018-08-14 PROCEDURE — G0283 ELEC STIM OTHER THAN WOUND: HCPCS

## 2018-08-14 NOTE — PROGRESS NOTES
Daily Note     Today's date: 2018  Patient name: Astrid Montanez  : 1960  MRN: [id number]  Referring provider: Momo Fitzgerald MD  Dx:   Encounter Diagnosis     ICD-10-CM    1  Effusion of left knee M25 462    2  Acute pain of left knee M25 562    3  Impingement syndrome of right shoulder M75 41    4  Chronic left shoulder pain M25 512     G89 29                   Subjective: Patient reports with c/o significant pain in L shldr graded 9/10 pre-tx  She stated going to ER on Sat due to elevated pain levels  She denied change in activity or falls sinc elast visit  She reported receiving Tramadol injection during ER visit and issued Rx for Flexiril with no relief  She noted undergoing MRI tomorrow and returns to orthopedic MD next Wed , Aug 22nd      Objective: See treatment diary below      Daily Treatment Diary         Manuals 18   L knee PROM RZ -10min    nv  PK  PK    nv  np  perf TE  np    L gastroc and hamstring stretch      nv        nv  np    np    L hip passive abd and ER RZ  NC  nv        nv  np    np     L shoulder PROM     nv  PK  PK  PK in SL w/ scap PROM   nv  ED hold  hold    Retrograde STM to L shoulder      PK nv np  np    Exercise Diary  18   Gastroc Stretch w/Strap 30"x3  30" x 3  nv  np      nv  np   np    Step ups            2R 15x  2R 20x  2R 25x 2R 25   Quad Set 3"x20    nv  np  3" x 20    nv  np    np    Heel Slide 5"15  5" x 20  nv  np  5" x 20    nv  np    np    SAQ 1# 3" x 20  2# 3" x 20  nv  np  2# 3" x 20    nv  np   np    LAQ 1# 3" x 20  2# 3" hold x 20  nv  np  2# 3" x 20    nv  2# 3"x20  2#  3"x20  2#  3"x30 2# 3"x30   Weight shifts:  M/L &   A/P (tandem stance) 20x ea  20 ea  nv  np  np    nv  rockerboard AP/ML 15x ea  rockerboard AP/ML 15 ea  15 ea 15 ea   Heel Raises/TR 30x ea  30 ea  nv  np  30x ea    nv  np np  30x Missed   Heel taps onto step 0R 15x ea  20 ea  nv  np  np    nv  dc  ----  dc    Mini squats      nv  np  15x    nv  3"x20  3"x20  3"x20 Missed    Clamshells w/ bolster in hooklying org 3"x15     nv  np  np    nv  np    np np   Std L hip flex/abd/ext 20ea  20  nv  np  2# 10x ea    nv  2# 15x ea  2# 15 ea  2#x15 ea 2# 15 ea    pulleys     nv  5 mins  5 mins  5 mins  nv  5 min  5 min  5 min 5 min    wall ladder     5"x10  5"x10  np    nv  5"x10  5"x10  5"x10 5"x10    table slides: flex, scap, ER     5"x10 ea  5"x10 ea   5"x10 ea  5"x10 ea  nv  5"x10 ea  5"x10  5"x10 5"x10    scap retraction     5"x10  5"x15  np    nv  5"x20  5"x15 p!  5"x15 5"x10    isometrics:flx, abd, IR, ER     nv  5"x5  5"x5 ea    nv  np np  np     wand flx, ER supine     nv  5" x10 ea  np    nv  5"x10 ea  5"x10  5"x10 5"x10    wand IR, abd     nv  5"x10 ea  np    nv  5"x10 ea  10x 10x 10x                                                                              Modalities 6/28 7/12/18 7/17/18 7/19/19 7/24/18 7/26/18 7/31/18 8/2/18 8/9/18 8/14   CP L knee & L shoulder 10min   10 min  10 min shoulder only  10 mins  Both   w/ TENS to L shoulder today  L shoulder 10 min  L shoulder 10 min  L shldr x10'  L SH  X 10' L shldr x10'                                    Assessment: Tolerated shoulder treatment poorly, but able to complete majority of exercises    Patient demonstrated significant amount of pain with shoulder exercises  No complaints with the knee exercises and tolerated well  Applied CP and TENS x 10 min for the L shoulder at the end of the session  Plan: Continue therapy as tolerated per plan of care

## 2018-08-15 ENCOUNTER — HOSPITAL ENCOUNTER (OUTPATIENT)
Dept: MRI IMAGING | Facility: HOSPITAL | Age: 58
Discharge: HOME | End: 2018-08-15
Attending: FAMILY MEDICINE
Payer: COMMERCIAL

## 2018-08-15 DIAGNOSIS — M25.512 ACUTE PAIN OF LEFT SHOULDER: ICD-10-CM

## 2018-08-15 PROCEDURE — 73221 MRI JOINT UPR EXTREM W/O DYE: CPT

## 2018-08-16 ENCOUNTER — OFFICE VISIT (OUTPATIENT)
Dept: PHYSICAL THERAPY | Facility: CLINIC | Age: 58
End: 2018-08-16
Payer: COMMERCIAL

## 2018-08-16 DIAGNOSIS — M25.512 CHRONIC LEFT SHOULDER PAIN: ICD-10-CM

## 2018-08-16 DIAGNOSIS — M25.562 ACUTE PAIN OF LEFT KNEE: ICD-10-CM

## 2018-08-16 DIAGNOSIS — G89.29 CHRONIC LEFT SHOULDER PAIN: ICD-10-CM

## 2018-08-16 DIAGNOSIS — M75.41 IMPINGEMENT SYNDROME OF RIGHT SHOULDER: ICD-10-CM

## 2018-08-16 DIAGNOSIS — M25.462 EFFUSION OF LEFT KNEE: Primary | ICD-10-CM

## 2018-08-16 PROCEDURE — 97150 GROUP THERAPEUTIC PROCEDURES: CPT | Performed by: PHYSICAL THERAPIST

## 2018-08-16 PROCEDURE — 97140 MANUAL THERAPY 1/> REGIONS: CPT | Performed by: PHYSICAL THERAPIST

## 2018-08-16 NOTE — PROGRESS NOTES
Daily Note     Today's date: 2018  Patient name: Inocencia Corral  : 1960  MRN: [id number]  Referring provider: Areli Carvajal MD  Dx:   Encounter Diagnosis     ICD-10-CM    1  Effusion of left knee M25 462    2  Acute pain of left knee M25 562    3  Impingement syndrome of right shoulder M75 41    4  Chronic left shoulder pain M25 512     G89 29                   Subjective: Pt reports 9 5/10 pain at start of session in L shoulder  Pt denies pain in knee, therefore only shoulder exercises performed  Pt had MRI and has follow up with MD for results on Wednesday, 18        Objective: See treatment diary below    Precautions: DM, Fibromyalgia, HIV, HLD        Daily Treatment Diary       Manuals 18   L knee PROM np          np    L gastroc and hamstring stretch np          np    L hip passive abd and ER np          np     L shoulder PROM np          hold    Mild nerve glides: rad and uln ED             Retrograde STM to L shoulder np         np    Exercise Diary  18   Gastroc Stretch w/Strap np         np    Step ups np          2R 25x 2R 25   Quad Set np          np    Heel Slide np          np    SAQ np         np    LAQ np          2#  3"x30 2# 3"x30   Weight shifts:  M/L &   A/P (tandem stance) np          15 ea 15 ea   Heel Raises/TR np          30x Missed   Heel taps onto step np          dc    Mini squats np          3"x20 Missed    Clamshells w/ bolster in hooklying np          np np   Std L hip flex/abd/ext np          2#x15 ea 2# 15 ea    pulleys  5 min           5 min 5 min    wall ladder  5"x10           5"x10 5"x10    table slides: flex, scap, ER  nv           5"x10 5"x10    scap retraction  nv           5"x15 5"x10    isometrics:flx, abd, IR, ER  np           np     wand flx, ER supine             5"x10 5"x10    wand IR, abd  5"x15 ea          10x 10x    TB extension 3"x10, peach                                                                        Modalities 8/16/18 8/9/18 8/14   CP L knee & L shoulder np- cream          L SH  X 10' L shldr x10'                                 Assessment: Tolerated treatment poor  Patient demonstrated fatigue post treatment and would benefit from continued PT Pt states that she was given Lidocaine cream from MD but it does not provide any relief  Pt used cream prior to visit today, therefore CP not performed  Pt continues to have pain in L shoulder and periscapular muscles with all active and passive motion  Plan: Continue per plan of care  Progress treatment as tolerated

## 2018-08-19 ENCOUNTER — HOSPITAL ENCOUNTER (EMERGENCY)
Facility: HOSPITAL | Age: 58
Discharge: HOME | End: 2018-08-19
Attending: EMERGENCY MEDICINE
Payer: COMMERCIAL

## 2018-08-19 VITALS
DIASTOLIC BLOOD PRESSURE: 76 MMHG | HEART RATE: 84 BPM | WEIGHT: 171.08 LBS | TEMPERATURE: 97.2 F | OXYGEN SATURATION: 97 % | BODY MASS INDEX: 28.47 KG/M2 | SYSTOLIC BLOOD PRESSURE: 114 MMHG | RESPIRATION RATE: 16 BRPM

## 2018-08-19 DIAGNOSIS — M25.519 SHOULDER PAIN: ICD-10-CM

## 2018-08-19 DIAGNOSIS — M75.22 BICEPS TENDINITIS ON LEFT: Primary | ICD-10-CM

## 2018-08-19 PROCEDURE — 99283 EMERGENCY DEPT VISIT LOW MDM: CPT

## 2018-08-19 PROCEDURE — 96372 THER/PROPH/DIAG INJ SC/IM: CPT

## 2018-08-19 RX ORDER — LIDOCAINE 50 MG/G
1 PATCH TOPICAL ONCE
Status: DISCONTINUED | OUTPATIENT
Start: 2018-08-19 | End: 2018-08-19 | Stop reason: HOSPADM

## 2018-08-19 RX ORDER — IBUPROFEN 600 MG/1
600 TABLET ORAL EVERY 6 HOURS PRN
Qty: 30 TABLET | Refills: 0 | Status: SHIPPED | OUTPATIENT
Start: 2018-08-19 | End: 2019-04-11 | Stop reason: ALTCHOICE

## 2018-08-19 RX ORDER — METHOCARBAMOL 500 MG/1
500 TABLET, FILM COATED ORAL 4 TIMES DAILY
Qty: 40 TABLET | Refills: 0 | Status: SHIPPED | OUTPATIENT
Start: 2018-08-19 | End: 2018-09-21

## 2018-08-19 RX ORDER — LIDOCAINE 50 MG/G
1 PATCH TOPICAL DAILY
Qty: 30 PATCH | Refills: 0 | Status: SHIPPED | OUTPATIENT
Start: 2018-08-19 | End: 2018-08-19

## 2018-08-19 RX ORDER — IBUPROFEN 600 MG/1
600 TABLET ORAL EVERY 6 HOURS PRN
Qty: 30 TABLET | Refills: 0 | Status: SHIPPED | OUTPATIENT
Start: 2018-08-19 | End: 2018-08-19

## 2018-08-19 RX ORDER — KETOROLAC TROMETHAMINE 30 MG/ML
15 INJECTION, SOLUTION INTRAMUSCULAR; INTRAVENOUS ONCE
Status: COMPLETED | OUTPATIENT
Start: 2018-08-19 | End: 2018-08-19

## 2018-08-19 RX ORDER — METHOCARBAMOL 500 MG/1
500 TABLET, FILM COATED ORAL 4 TIMES DAILY
Qty: 40 TABLET | Refills: 0 | Status: SHIPPED | OUTPATIENT
Start: 2018-08-19 | End: 2018-08-19

## 2018-08-19 RX ORDER — LIDOCAINE 50 MG/G
1 PATCH TOPICAL DAILY
Qty: 30 PATCH | Refills: 0 | Status: SHIPPED | OUTPATIENT
Start: 2018-08-19

## 2018-08-19 RX ADMIN — LIDOCAINE 1 PATCH: 50 PATCH TOPICAL at 15:18

## 2018-08-19 RX ADMIN — KETOROLAC TROMETHAMINE 15 MG: 30 INJECTION, SOLUTION INTRAMUSCULAR at 15:19

## 2018-08-19 NOTE — ED PROVIDER NOTES
History  Chief Complaint   Patient presents with    Shoulder Pain     pt fell on April and hurt left shoulder pt has been seeing an orthopedic doctor and had and MRI to left shoulder done on Wednesday  pt states pain gotten worse for the last 2 weeks  has been taking tramadol, motrin and lidocaine patches as prescribed with no relief  Patient presents emergency department with left shoulder pain  She is being seen by our Orthopedics and recently had an MRI  She has a follow-up next week to find out the results  She came into the emergency department today because she is just having significant pain and can't control the pain  Patient taking ibuprofen and tramadol and using a lidocaine cream without any relief  She also states she has tried Flexeril  Patient had a fall back in April no injuries since  Prior to Admission Medications   Prescriptions Last Dose Informant Patient Reported? Taking? BANOPHEN 50 MG capsule   No No   Sig: TAKE 1 CAPSULE BY MOUTH DAILY AT BEDTIME AS NEEDED FOR SLEEP   Blood Glucose Monitoring Suppl (TRUE METRIX METER) GIL  Self Yes No   Sig: daily  Blood Glucose Monitoring Suppl GIL   Yes No   Sig: daily     GENVOYA tablet   No No   Sig: TAKE 1 TABLET BY MOUTH DAILY   Glucose Blood (BLOOD GLUCOSE TEST STRIPS) STRP   Yes No   Si each   Melatonin 5 MG TABS   No No   Sig: Take 1 tablet by mouth at bedtime daily   Na Sulfate-K Sulfate-Mg Sulf (SUPREP BOWEL PREP KIT) 17 5-3 13-1 6 GM/180ML SOLN   No No   Sig: Take 1 Bottle by mouth once for 1 dose   SUPER THIN LANCETS MISC  Self Yes No   Sig: by Percutaneous route   SUPER THIN LANCETS MISC   Yes No   Sig: by Percutaneous route   acetaminophen (TYLENOL) 500 mg tablet   No No   Sig: TAKE TWO TABLETS BY MOUTH EVERY EIGHT HOURS   albuterol (2 5 mg/3 mL) 0 083 % nebulizer solution  Self Yes No   Sig: Inhale 3 mL   albuterol (PROVENTIL HFA,VENTOLIN HFA) 90 mcg/act inhaler  Self Yes No   Sig: Inhale 2 puffs   ammonium lactate (LAC-HYDRIN) 12 % lotion   Yes No   bisacodyl (DULCOLAX) 5 mg EC tablet  Self Yes No   Sig: Take 30 mg by mouth   budesonide-formoterol (SYMBICORT) 160-4 5 mcg/act inhaler  Self Yes No   Sig: Inhale 2 puffs   clotrimazole (LOTRIMIN) 1 % cream  Self Yes No   Sig: Apply topically 2 (two) times a day   cyclobenzaprine (FLEXERIL) 10 mg tablet   No No   Sig: Take 1 tablet (10 mg total) by mouth 2 (two) times a day as needed for muscle spasms   cyclobenzaprine (FLEXERIL) 10 mg tablet   No No   Sig: Take 1 tablet (10 mg total) by mouth 2 (two) times a day as needed for muscle spasms   ergocalciferol (DRISDOL) 40828 units capsule   Yes No   Sig: Take 50,000 Units by mouth   gabapentin (NEURONTIN) 800 mg tablet   No No   Sig: TAKE 1 TABLET BY MOUTH THREE TIMES A DAY   glucose blood test strip   No No   Si each by Other route 4 (four) times a day   ibuprofen (MOTRIN) 800 mg tablet   No No   Sig: TAKE 1 TABLET BY MOUTH EVERY EIGHT HOURS   lidocaine (LMX) 4 % cream   No No   Sig: Apply topically as needed for mild pain   metFORMIN (GLUCOPHAGE) 500 mg tablet   No No   Sig: TAKE 1 TABLET BY MOUTH TWICE A DAY   naproxen (NAPROSYN) 500 mg tablet  Self Yes No   Sig: Take 250 mg by mouth 2 (two) times a day with meals   polyethylene glycol (GLYCOLAX) powder  Self Yes No   Sig: Drink per MD recommendations   pravastatin (PRAVACHOL) 20 mg tablet   No No   Sig: Take 1 tablet (20 mg total) by mouth daily   sulfaSALAzine (AZULFIDINE-EN) 500 mg EC tablet  Self Yes No      Facility-Administered Medications: None       Past Medical History:   Diagnosis Date    Arthritis     Cellulitis     Colon polyp     Diabetes mellitus (HCC)     Fibromyalgia     Fibromyalgia     HIV (human immunodeficiency virus infection) (HCC)     Hyperlipidemia     Pinguecula of both eyes     Presbyopia of both eyes        Past Surgical History:   Procedure Laterality Date    BREAST SURGERY      lumpectomy    COLONOSCOPY      last year, polyps, every 6 months    HYSTERECTOMY      INCISION AND DRAINAGE PERIRECTAL ABSCESS      TUBAL LIGATION         Family History   Problem Relation Age of Onset    Heart disease Mother     Diabetes Mother         type 1    Hypertension Mother     Cancer Sister      I have reviewed and agree with the history as documented  Social History   Substance Use Topics    Smoking status: Current Every Day Smoker     Types: Cigarettes    Smokeless tobacco: Never Used    Alcohol use Yes      Comment: occasionally         Review of Systems   Respiratory: Negative for shortness of breath  Cardiovascular: Negative for chest pain  All other systems reviewed and are negative  Physical Exam  Physical Exam   Constitutional: She appears well-developed and well-nourished  HENT:   Head: Normocephalic  Eyes: Conjunctivae are normal    Neck: Neck supple  Cardiovascular: Normal rate, regular rhythm and normal heart sounds  Pulmonary/Chest: Effort normal    Musculoskeletal:        Left shoulder: She exhibits decreased range of motion, tenderness, bony tenderness, pain and spasm  She exhibits no laceration and normal pulse  Neurological: She is alert  Skin: Skin is warm  Psychiatric: She has a normal mood and affect  Her behavior is normal    Nursing note and vitals reviewed        Vital Signs  ED Triage Vitals [08/19/18 1501]   Temperature Pulse Respirations Blood Pressure SpO2   (!) 97 2 °F (36 2 °C) 84 16 114/76 97 %      Temp Source Heart Rate Source Patient Position - Orthostatic VS BP Location FiO2 (%)   Temporal Monitor Sitting Right arm --      Pain Score       Worst Possible Pain           Vitals:    08/19/18 1501   BP: 114/76   Pulse: 84   Patient Position - Orthostatic VS: Sitting       Visual Acuity      ED Medications  Medications   lidocaine (LIDODERM) 5 % patch 1 patch (1 patch Transdermal Medication Applied 8/19/18 1518)   ketorolac (TORADOL) injection 15 mg (15 mg Intramuscular Given 8/19/18 1519) Diagnostic Studies  Results Reviewed     None                 No orders to display              Procedures  Procedures       Phone Contacts  ED Phone Contact    ED Course                               MDM  Number of Diagnoses or Management Options  Biceps tendinitis on left: established and worsening  Shoulder pain: established and worsening  Diagnosis management comments: Sling applied- left arm NV intact instructions reviewed  Amount and/or Complexity of Data Reviewed  Tests in the radiology section of CPT®: (Reviewed MRI shoulder report)    Risk of Complications, Morbidity, and/or Mortality  General comments: Instructions reviewed  Patient Progress  Patient progress: improved    CritCare Time    Disposition  Final diagnoses:   Biceps tendinitis on left   Shoulder pain     Time reflects when diagnosis was documented in both MDM as applicable and the Disposition within this note     Time User Action Codes Description Comment    8/19/2018  3:53 PM Gail Hawley [M75 22] Biceps tendinitis on left     8/19/2018  3:53 PM Gail Hawley [M25 519] Shoulder pain       ED Disposition     ED Disposition Condition Comment    Discharge  Lawerance Certain discharge to home/self care      Condition at discharge: Good        Follow-up Information     Follow up With Specialties Details Why [address] [facility] [place] [facility]   [facility] [address] [phone]          Discharge Medication List as of 8/19/2018  4:01 PM      START taking these medications    Details   lidocaine (LIDODERM) 5 % Place 1 patch on the skin daily Remove & Discard patch within 12 hours or as directed by MD, Starting Sun 8/19/2018, Print      methocarbamol (ROBAXIN) 500 mg tablet Take 1 tablet (500 mg total) by mouth 4 (four) times a day for 10 days, Starting Sun 8/19/2018, Until Wed 8/29/2018, Print         CONTINUE these medications which have CHANGED    Details   ibuprofen (MOTRIN) 600 mg tablet Take 1 tablet (600 mg total) by mouth every 6 (six) hours as needed for mild pain for up to 10 days, Starting Sun 8/19/2018, Until Wed 8/29/2018, Print         CONTINUE these medications which have NOT CHANGED    Details   acetaminophen (TYLENOL) 500 mg tablet TAKE TWO TABLETS BY MOUTH EVERY EIGHT HOURS, Normal      albuterol (2 5 mg/3 mL) 0 083 % nebulizer solution Inhale 3 mL, Starting Thu 1/19/2017, Historical Med      albuterol (PROVENTIL HFA,VENTOLIN HFA) 90 mcg/act inhaler Inhale 2 puffs, Starting Thu 1/19/2017, Historical Med      ammonium lactate (LAC-HYDRIN) 12 % lotion Starting Tue 3/20/2018, Historical Med      BANOPHEN 50 MG capsule TAKE 1 CAPSULE BY MOUTH DAILY AT BEDTIME AS NEEDED FOR SLEEP, Normal      bisacodyl (DULCOLAX) 5 mg EC tablet Take 30 mg by mouth, Starting Wed 1/25/2017, Historical Med      !! Blood Glucose Monitoring Suppl (TRUE METRIX METER) GIL daily  , Historical Med      !! Blood Glucose Monitoring Suppl GIL daily  , Historical Med      budesonide-formoterol (SYMBICORT) 160-4 5 mcg/act inhaler Inhale 2 puffs, Starting Tue 12/27/2016, Historical Med      clotrimazole (LOTRIMIN) 1 % cream Apply topically 2 (two) times a day, Starting Fri 11/17/2017, Historical Med      cyclobenzaprine (FLEXERIL) 10 mg tablet Take 1 tablet (10 mg total) by mouth 2 (two) times a day as needed for muscle spasms, Starting Sat 8/11/2018, Print      ergocalciferol (DRISDOL) 36208 units capsule Take 50,000 Units by mouth, Starting Mon 7/16/2018, Until Mon 10/8/2018, Historical Med      gabapentin (NEURONTIN) 800 mg tablet TAKE 1 TABLET BY MOUTH THREE TIMES A DAY, Normal      GENVOYA tablet TAKE 1 TABLET BY MOUTH DAILY, Normal      !! Glucose Blood (BLOOD GLUCOSE TEST STRIPS) STRP 1 each, Starting Wed 6/13/2018, Historical Med      !! glucose blood test strip 1 each by Other route 4 (four) times a day, Starting Wed 6/13/2018, Normal      lidocaine (LMX) 4 % cream Apply topically as needed for mild pain, Starting Sat 8/11/2018, Print      Melatonin 5 MG TABS Take 1 tablet by mouth at bedtime daily, Normal      metFORMIN (GLUCOPHAGE) 500 mg tablet TAKE 1 TABLET BY MOUTH TWICE A DAY, Normal      Na Sulfate-K Sulfate-Mg Sulf (SUPREP BOWEL PREP KIT) 17 5-3 13-1 6 GM/180ML SOLN Take 1 Bottle by mouth once for 1 dose, Starting Wed 2/14/2018, Normal      naproxen (NAPROSYN) 500 mg tablet Take 250 mg by mouth 2 (two) times a day with meals, Historical Med      polyethylene glycol (GLYCOLAX) powder Drink per MD recommendations, Historical Med      pravastatin (PRAVACHOL) 20 mg tablet Take 1 tablet (20 mg total) by mouth daily, Starting Tue 7/31/2018, Normal      sulfaSALAzine (AZULFIDINE-EN) 500 mg EC tablet Starting Mon 2/19/2018, Historical Med      !! SUPER THIN LANCETS MISC by Percutaneous route, Starting Thu 1/19/2017, Historical Med      !! SUPER THIN LANCETS MISC by Percutaneous route, Starting Thu 1/19/2017, Historical Med       !! - Potential duplicate medications found  Please discuss with provider  No discharge procedures on file      ED Provider  Electronically Signed by           Raoul Ren PA-C  08/19/18 1340

## 2018-08-19 NOTE — DISCHARGE INSTRUCTIONS
Use sling as needed for 1-3 days, bring arm through range of motion - multiple x a day  Medication as needed  Keep FU with ortho  Tendinitis   LO QUE NECESITA SABER:   La tendinitis es ricky inflamación dolorosa o desprendimiento de los tendones  También se le conoce sarah tendinopatía  La tendinitis bill siempre ocurre en la rodilla, hombro, tobillo, cadera o codo  INSTRUCCIONES SOBRE EL SANTO HOSPITALARIA:   Medicamentos:   · Los [employer] AINEs y el acetaminofén podrían disminuir la inflamación y el dolor  Estos medicamentos están disponibles sin receta médica  Pregunte qué medicina jacquie  Pregunte cuánto jacquie y cuándo  [facility]  Los AINES y el acetaminofén podrían causar daños en el hígado o riñón si no se frieda correctamente  Si usted merly un medicamento anticoagulante, asegúrese de preguntarle a arenas médico si los NAZARIO son seguros para usted  Erum siempre las instrucciones en la etiqueta del medicamento y [place] indicaciones antes de usar el [employer]  · [place] john medicamentos sarah se le haya indicado  Consulte con arenas médico si usted nelia que arenas medicamento no le está ayudando o si presenta efectos secundarios  Infórmele si es alérgico a cualquier medicamento  Mantenga ricky lista actualizada de los [employer], las vitaminas y los productos herbales que merly  Incluya los siguientes datos de los medicamentos: cantidad, frecuencia y motivo de administración  Traiga con usted la lista o los envases de la píldoras a john citas de seguimiento  Lleve la lista de los medicamentos con usted en lulu de ricky emergencia  Control:   · Descanse  el tendón según las indicaciones de arenas médico para ayudarlo a sanar  Pregúntele a arenas médico si debe evitar apoyar [place]  · El hielo  ayuda a disminuir la inflamación y el dolor  El hielo también puede contribuir a evitar el daño de los tejidos   Use un paquete de hielo o ponga hielo molido dentro de [facility] plástica  [place] la bolsa con ricky tolla y colóquela sobre el área afectada por 10 a 15 minutos por hora o según indique el médico     · Los dispositivos de [employer] un bastón, ricky [place], ricky plantilla para zapatos o un yeso podrían ayudar a reducir el dolor  · Fisioterapia  es posible que arenas médico se lo pida  La terapia física podría usarse para enseñarle a usted ejercicios para ayudarle a mejorar el movimiento y la fuerza, y para disminuir el dolor  Usted también podría aprender a mejorar arenas postura y formas de levantarse o ejercitarse correctamente  Prevención:   · Estire o caliente  antes de realizar ricky actividad física  · Belinda ejercicio regularmente  para fortalecer los músculos que están alrededor de arenas articulación  Acostúmbrese a ricky rutina de ejercicios por las primeras 3 semanas para evitar otra lesión  Pregunte a arenas médico acerca del mejor plan de ejercicio para usted  Descanse por completo después de cada actividad que realice  · Use el equipo correcto  para practicar deportes y hacer ejercicio  Use ricky férula o cinta especial para envolver john articulaciones débiles según indicaciones médicas  Acuda a john consultas de control con arenas médico según le indicaron  Anote john preguntas para que se acuerde de hacerlas tyler john visitas  Pregúntele a arenas Wenda Gamma vitaminas y minerales son adecuados para usted  · Usted tiene más dolor aun después de tomarse john medicamentos  · Usted tiene preguntas o inquietudes acerca de arenas condición o cuidado  Regrese a la kristine de emergencias si:   · Usted tiene más enrojecimiento alrededor de la articulación o inflamación en la articulación  · De repente, no puede  arenas articulación  © 2017 [address] Information is for End User's use only and may not be sold, redistributed or otherwise used for commercial purposes   All illustrations and images included in CareNotes® are the copyrighted property of A D A M , Inc  or [employer]  Esta información es sólo para uso en educación  Arenas intención no es darle un consejo médico sobre enfermedades o tratamientos  Colsulte con arenas Mardell Letty farmacéutico antes de seguir cualquier régimen médico para saber si es seguro y efectivo para usted  Dolor de hombro   CUIDADO AMBULATORIO:   El dolor de hombro  es un problema común y puede afectar john actividades diarias  El dolor puede ser causado por un problema en arenas hombro  El dolor de hombro también puede ser causado por el dolor que se propaga hacia arenas hombro de otra parte de arenas cuerpo  Busque atención médica de inmediato si:   · Usted tiene dolor intenso  · Usted no puede  arenas brazo ni arenas hombro  · Usted tiene entumecimiento u hormigueo en el hombro o en el brazo  Pregúntele a arenas Sherol Mince vitaminas y minerales son adecuados para usted  · Arenas dolor empeora o no desaparece con el tratamiento  · Usted tiene dificultad para  el brazo o el hombro  · Usted tiene preguntas o inquietudes acerca de arenas condición o cuidado  El tratamiento para el dolor de hombro:  podría incluir cualquiera de los siguientes:  · El acetaminofén  sharron el dolor y baja la fiebre  Está disponible sin receta médica  Pregunte la cantidad y la frecuencia con que debe tomarlos  [id number]  El acetaminofén puede causar daño en el hígado cuando no se merly de forma correcta  · AINEs (Analgésicos antiinflamatorios no esteroides) sarah el ibuprofeno, ayudan a disminuir la inflamación, el dolor y la [place]  [place] medicamento esta disponible con o sin pratima receta médica  Los AINEs pueden causar sangrado estomacal o problemas renales en ciertas personas  Si usted merly un medicamento anticoagulante, siempre pregúntele a arenas médico si los NAZARIO son seguros para usted  Siempre mahogany la etiqueta de [place] medicamento y [place] instrucciones      · Pratima inyección de esteroides  podrían ayudar a reducir el dolor y la inflamación  · Cirugía  puede ser necesaria para un dolor a mirna plazo y por la pérdida de función  El manejo de arenas síntomas:   · Aplique hielo  en el hombro de 20 a 30 minutos cada 2 horas o sarah se lo indiquen  Use un paquete de hielo o ponga hielo molido dentro de [employer]  Cúbrala con ricky toalla  El hielo ayuda a evitar daño al tejido y a disminuir la inflamación y el dolor  · Use calor si el hielo no le está ayudando con john síntomas  Aplique calor sobre el hombro tyler 20 a 30 minutos cada 2 horas tyler tantos días sarah le indiquen  El calor ayuda a disminuir el dolor y los espasmos musculares  · Asista a terapia física u ocupacional sarah se le indique  Un fisioterapeuta le puede enseñar ejercicios para ayudarle a mejorar el movimiento y la fuerza, y para disminuir el dolor  Un terapeuta ocupacional le enseña habilidades para ayudarlo con john actividades diarias  Prevenga el dolor de hombro:   · Haciendo ejercicios de estiramiento y fortalecimiento para arenas hombro  Use la técnica apropiada tyler los ejercicios y deportes  · Limite las [address] sarah se le indique  Trate de evitar movimientos repetidos arriba de la calderon  Belinda ricky lety de seguimiento con arenas médico u ortopedista sarah se le indique:  Anote john preguntas para que se acuerde de hacerlas tyler john visitas  © 2017 [facility] Information is for End User's use only and may not be sold, redistributed or otherwise used for commercial purposes  All illustrations and images included in CareNotes® are the copyrighted property of A D A M , Inc  or Victoriano Yevgeniy  Esta información es sólo para uso en educación  Arenas intención no es darle un consejo médico sobre enfermedades o tratamientos  Colsulte con arenas Catherne Tenzin farmacéutico antes de seguir cualquier régimen médico para saber si es seguro y efectivo para usted

## 2018-08-21 ENCOUNTER — OFFICE VISIT (OUTPATIENT)
Dept: PHYSICAL THERAPY | Facility: CLINIC | Age: 58
End: 2018-08-21
Payer: COMMERCIAL

## 2018-08-21 DIAGNOSIS — M75.41 IMPINGEMENT SYNDROME OF RIGHT SHOULDER: Primary | ICD-10-CM

## 2018-08-21 DIAGNOSIS — M25.512 CHRONIC LEFT SHOULDER PAIN: ICD-10-CM

## 2018-08-21 DIAGNOSIS — G89.29 CHRONIC LEFT SHOULDER PAIN: ICD-10-CM

## 2018-08-21 NOTE — PROGRESS NOTES
8/21/18- Pt arrived for therapy with L arm in sling and Lidocaine patch over L shoulder  Pt states through  that she went to ER several days ago secondary to extreme shoulder pain  Pt had injection and was given medication  Pt reports 10/10 pain and has been compliant with sling wear since visiting ER  Pt states that she has followup with ortho MD tomorrow to review results of MRI  Secondary to change in status and upcoming follow up with MD, pt is not appropriate for therapy today  Pt was agreeable with this plan and will contact office following MD visit

## 2018-08-22 ENCOUNTER — APPOINTMENT (OUTPATIENT)
Dept: RADIOLOGY | Facility: CLINIC | Age: 58
End: 2018-08-22
Payer: COMMERCIAL

## 2018-08-22 ENCOUNTER — OFFICE VISIT (OUTPATIENT)
Dept: OBGYN CLINIC | Facility: MEDICAL CENTER | Age: 58
End: 2018-08-22
Payer: COMMERCIAL

## 2018-08-22 VITALS
HEART RATE: 81 BPM | HEIGHT: 64 IN | BODY MASS INDEX: 29.19 KG/M2 | DIASTOLIC BLOOD PRESSURE: 85 MMHG | SYSTOLIC BLOOD PRESSURE: 135 MMHG | WEIGHT: 171 LBS

## 2018-08-22 DIAGNOSIS — M25.512 LEFT SHOULDER PAIN, UNSPECIFIED CHRONICITY: ICD-10-CM

## 2018-08-22 DIAGNOSIS — M25.512 LEFT SHOULDER PAIN, UNSPECIFIED CHRONICITY: Primary | ICD-10-CM

## 2018-08-22 PROCEDURE — 99214 OFFICE O/P EST MOD 30 MIN: CPT | Performed by: FAMILY MEDICINE

## 2018-08-22 PROCEDURE — 73030 X-RAY EXAM OF SHOULDER: CPT

## 2018-08-22 RX ORDER — OXYCODONE HYDROCHLORIDE AND ACETAMINOPHEN 5; 325 MG/1; MG/1
1 TABLET ORAL EVERY 6 HOURS PRN
Qty: 20 TABLET | Refills: 0 | Status: SHIPPED | OUTPATIENT
Start: 2018-08-22 | End: 2018-09-21

## 2018-08-22 NOTE — PATIENT INSTRUCTIONS
I explained to patient and family friend that she may have a fracture in her arm not seen on her current xray due to her new traumatic fall  As such, I recommended treating as possible fracture with repeat xray and assessment in 1 week  Wear sling in case of fracture left shoulder  No lifting arm overhead   Start pendulum exercises- swirls, bowling, elephant trunk to prevent developing frozen shoulder  No lifting with affected extremity or weightbearing  Continue sling and sleep with sling on back  Recommend against driving due to risk of motor vehicle accident    I recommend against taking anti-inflammatory medications also known NSAIDs  These medications include but not limited to Motrin, ibuprofen, Advil, Aleve, naproxen, meloxicam   These medications should not be used in the setting of known high blood pressure as well as kidney issues, stomach ulcers, gastrointestinal bleeding from the rectum or the stomach, or simultaneously with blood thinners  Risks of taking NSAIDs include severely elevated blood pressure that can lead to stroke and heart attack, kidney failure, and severe internal bleeding  If you have no liver problems, you may take Tylenol (also known as acetaminophen) at a  maximum of 1,000  Mg per dose every 6 hours as needed for pain but no more 3 doses or 3,000 mg per day  If you are taking other medications which include tylenol (acetaminophen) such as hydrocodone-acetaminophen then you must factor in the amount of tylenol (acetamionphen) into your 3,000mg maximum dose  Patient expressed understanding and agreed to plan  Stop muscle relaxer flexeril and robaxin  Stop methocarbamol  Stop lidocaine patch    I have given you a prescription for percocet which is a opiate narcotic drug used for pain and can be addictive as well as risk for respiratory depression and death if take too much or mix with other medications   Please do not mix with any muscle relaxers, benzodiazepines, benadryl or other pain medications  Please ask your HIV doctor if it is ok to take percocet with Brea Spore as Brea Spore raises amount of percocet in your blood and places at risk for medical complications

## 2018-08-22 NOTE — PROGRESS NOTES
1  Left shoulder pain, unspecified chronicity  XR shoulder 2+ vw left    oxyCODONE-acetaminophen (PERCOCET) 5-325 mg per tablet     Orders Placed This Encounter   Procedures    XR shoulder 2+ vw left        Imaging Studies (I personally reviewed results in PACS):    MRI left shoulder 08/15/2018:  Rotator cuff tendinosis with partial tear supraspinatus tendon    Past Diagnostics:  X-ray left shoulder 06/14/2018:  No acute osseous abnormality    IMPRESSION:  New recent ipsilateral left shoulder trauma after MRI  Date of new injury:  08/19/2018   MRI prior injury does show partial tear supraspinatus tendon with chronic tendinosis      DDX  Cuff tear  Occult proximal humerus fracture    Interventions:  Corticosteroid injection right subacromial bursa-06/27/2018      Return in about 1 week (around 8/29/2018)  Patient Instructions   I explained to patient and family friend that she may have a fracture in her arm not seen on her current xray due to her new traumatic fall  As such, I recommended treating as possible fracture with repeat xray and assessment in 1 week  Wear sling in case of fracture left shoulder  No lifting arm overhead   Start pendulum exercises- swirls, bowling, elephant trunk to prevent developing frozen shoulder  No lifting with affected extremity or weightbearing  Continue sling and sleep with sling on back  Recommend against driving due to risk of motor vehicle accident    I recommend against taking anti-inflammatory medications also known NSAIDs  These medications include but not limited to Motrin, ibuprofen, Advil, Aleve, naproxen, meloxicam   These medications should not be used in the setting of known high blood pressure as well as kidney issues, stomach ulcers, gastrointestinal bleeding from the rectum or the stomach, or simultaneously with blood thinners   Risks of taking NSAIDs include severely elevated blood pressure that can lead to stroke and heart attack, kidney failure, and severe internal bleeding  If you have no liver problems, you may take Tylenol (also known as acetaminophen) at a  maximum of 1,000  Mg per dose every 6 hours as needed for pain but no more 3 doses or 3,000 mg per day  If you are taking other medications which include tylenol (acetaminophen) such as hydrocodone-acetaminophen then you must factor in the amount of tylenol (acetamionphen) into your 3,000mg maximum dose  Patient expressed understanding and agreed to plan  Stop muscle relaxer flexeril and robaxin  Stop methocarbamol  Stop lidocaine patch    I have given you a prescription for percocet which is a opiate narcotic drug used for pain and can be addictive as well as risk for respiratory depression and death if take too much or mix with other medications  Please do not mix with any muscle relaxers, benzodiazepines, benadryl or other pain medications  Please ask your HIV doctor if it is ok to take percocet with Nino Juan as Nino Juan raises amount of percocet in your blood and places at risk for medical complications  CHIEF COMPLAINT:  Follow-up left shoulder chronic pain and complains of new left shoulder injury    HPI:  Caitlyn Cartagena is a 65 y o  female  who presents for       Visit 06/27/2018:  Evaluation of left shoulder pain and stiffness ongoing 1 month  Patient does state that she had injury prior April 23, 2018 but denies any pain developing after then  She points to her humeral head and lateral shoulder  She describes constant burning stinging leg pain as well as pulling on a cord sensation that is worst when reaching overhead  She also has associated nighttime pain in her left shoulder  She has been taking Tylenol with codeine which does offer some relief  She has also been taking ibuprofen  There is radiation of her pain down her arm into her biceps  Associated symptoms do include left-sided neck pain   Patient has been performing physical therapy for knee and is currently under treatment with Dr Piero Perkins  08/6/18:   interpretation today provided by medical assistant Armando Lowry    Follow-up right shoulder pain:  Uncontrolled  Patient continues to have pain  She points to left trapezius lateral shoulder and states radiation to her left elbow but not to her fingers  Patient denies any numbness and tingling  She has been compliant with physical therapy 2 days per week approximately 4 weeks  She states that she feels the same since this pain 1st began  Visit 08/22/2018:  Patient here for follow-up with her family friend who provides interpretation  MRI ordered for persistent left shoulder pain ongoing since April 2018 did reveal chronic tendinosis as well as partial rotator cuff tear supraspinatus tendon  Since her last visit patient has had new onset traumatic injury occurring on approximately 08/19/2018 after her MRI was performed resulting in new left shoulder injury  Patient points to lateral shoulder and proximal humerus as source of greatest pain  She has pain with overhead motion as difficulty raising her left arm  She denies any pain radiating down to her hands  She denies any numbness or tingling  Pain is moderate intensity intermittent sharp exacerbated by overhead motion  She presents today in sling which does provide some relief  Review of Systems   Constitutional: Negative for chills and fever  Neurological: Negative for weakness and numbness           Following history reviewed and update:    Past Medical History:   Diagnosis Date    Arthritis     Cellulitis     Colon polyp     [facility] mellitus [facility] [address] )     Fibromyalgia     Fibromyalgia     HIV (human immunodeficiency virus infection) ([facility] [address] [facility]     Hyperlipidemia     Pinguecula of both eyes     Presbyopia of both eyes      Past Surgical History:   Procedure Laterality Date    BREAST SURGERY      lumpectomy    COLONOSCOPY      last year, polyps, every 6 months    HYSTERECTOMY      INCISION AND DRAINAGE PERIRECTAL ABSCESS      TUBAL LIGATION       Social History   History   Alcohol Use    Yes     Comment: occasionally      History   Drug Use No     History   Smoking Status    Current Every Day Smoker    Types: Cigarettes   Smokeless Tobacco    Never Used     Family History   Problem Relation Age of Onset    Heart disease Mother     Diabetes Mother         type 1    Hypertension Mother     Cancer Sister      No Known Allergies       Physical Exam  Constitutional:  see vital signs  Gen: well-developed, normocephalic/atraumatic, well-groomed  Eyes: No inflammation or discharge of conjunctiva or lids; sclera clear   Pharynx: no inflammation, lesion, or mass of lips  Neck: supple, no masses, non-distended  MSK: no inflammation, lesion, mass, or clubbing of nails and digits except for other than mentioned below  SKIN: no visible rashes or skin lesions  Pulmonary/Chest: Effort normal  No respiratory distress     NEURO: cranial nerves grossly intact  PSYCH:  Alert and oriented to person, place, and time; recent and remote memory intact; mood normal, no depression, anxiety, or agitation, judgment and insight good and intact    Ortho Exam  LEFT SHOULDER:  Erythema: no  Swelling: no  Increased Warmth: no    Tenderness: + lateral proximal humerus    ROM  Active abduction 45    Strength  Abduction: 3/5  ER: 4-/5  IR: 5/5    Drop-Arm: negative    RIGHT SHOULDER:  Strength  Abduction: 5/5  ER: 5/5  IR: 5/5    ROM  Abduction full             Procedures

## 2018-08-23 ENCOUNTER — OFFICE VISIT (OUTPATIENT)
Dept: PHYSICAL THERAPY | Facility: CLINIC | Age: 58
End: 2018-08-23
Payer: COMMERCIAL

## 2018-08-23 DIAGNOSIS — M25.512 ACUTE PAIN OF LEFT SHOULDER: ICD-10-CM

## 2018-08-23 PROCEDURE — G8979 MOBILITY GOAL STATUS: HCPCS | Performed by: PHYSICAL THERAPIST

## 2018-08-23 PROCEDURE — G8980 MOBILITY D/C STATUS: HCPCS | Performed by: PHYSICAL THERAPIST

## 2018-08-23 NOTE — PROGRESS NOTES
Pt  came to [facility] with still ongoing c/o L shoulder pain due to a recent fx  Observation: L UE sling in place  Pt is scheduled to follow up with ortho at this point for further recommendations of treatment  Refusal of L knee PT treatment at this time  As per pt   -  no pain in L knee, no functional limitations in B LE

## 2018-08-29 ENCOUNTER — APPOINTMENT (OUTPATIENT)
Dept: RADIOLOGY | Facility: CLINIC | Age: 58
End: 2018-08-29
Payer: COMMERCIAL

## 2018-08-29 ENCOUNTER — OFFICE VISIT (OUTPATIENT)
Dept: OBGYN CLINIC | Facility: MEDICAL CENTER | Age: 58
End: 2018-08-29
Payer: COMMERCIAL

## 2018-08-29 VITALS
DIASTOLIC BLOOD PRESSURE: 77 MMHG | BODY MASS INDEX: 29.19 KG/M2 | WEIGHT: 171 LBS | HEIGHT: 64 IN | HEART RATE: 86 BPM | SYSTOLIC BLOOD PRESSURE: 130 MMHG

## 2018-08-29 DIAGNOSIS — M25.512 LEFT SHOULDER PAIN, UNSPECIFIED CHRONICITY: Primary | ICD-10-CM

## 2018-08-29 DIAGNOSIS — M25.512 LEFT SHOULDER PAIN, UNSPECIFIED CHRONICITY: ICD-10-CM

## 2018-08-29 PROCEDURE — 99213 OFFICE O/P EST LOW 20 MIN: CPT | Performed by: FAMILY MEDICINE

## 2018-08-29 PROCEDURE — 73030 X-RAY EXAM OF SHOULDER: CPT

## 2018-08-29 NOTE — PATIENT INSTRUCTIONS
Explained the patient that there is no definitive evidence of fracture on her x-ray today  Explained that she does have some strength in her left arm however there is suspicion for rotator cuff contusion versus complete tear of the supraspinatus tendon  Explained need for surgical intervention for a complete tear rupture and that if there is only a partial tear at the most likely would improve with conservative measures  At this time will continue conservative measures for possible occult fracture proximal humerus with range-of-motion exercises and will follow up in 1 week with repeat x-ray  If no evidence of fracture and continued no improvement that will order new MRI to evaluate her new injury      Discontinue formal physical therapy for now  Continue home exercises as below  No lifting arm overhead   Start pendulum exercises- swirls, bowling, elephant trunk  No lifting with affected extremity or weightbearing  Continue sling and sleep with sling lying on back  Target to progress out of sling by 3-4 weeks  Recommend against driving due to risk of motor vehicle accident

## 2018-08-29 NOTE — PROGRESS NOTES
1  Left shoulder pain, unspecified chronicity  XR shoulder 2+ vw left     Orders Placed This Encounter   Procedures    XR shoulder 2+ vw left        Imaging Studies (I personally reviewed results in PACS):   X-ray left shoulder 08/29/2018:  No acute osseous abnormality    Past diagnostics:  MRI left shoulder 08/15/2018 (before recent injury 08/19/2018):  Rotator cuff tendinosis with partial tear supraspinatus tendon  X-ray left shoulder 06/14/2018:  No acute osseous abnormality    IMPRESSION:  New recent ipsilateral left shoulder trauma after MRI  Date of new injury:  08/19/2018   MRI prior to new injury does show partial tear supraspinatus tendon with chronic tendinosis  F/u from new injury: 1 week 3 days    DDX  Cuff tear  Cuff  contusion  Occult proximal humerus fracture    Interventions:  Corticosteroid injection right subacromial bursa-06/27/2018      Return in about 1 week (around 9/5/2018)  Patient Instructions     Explained the patient that there is no definitive evidence of fracture on her x-ray today  Explained that she does have some strength in her left arm however there is suspicion for rotator cuff contusion versus complete tear of the supraspinatus tendon  Explained need for surgical intervention for a complete tear rupture and that if there is only a partial tear at the most likely would improve with conservative measures  At this time will continue conservative measures for possible occult fracture proximal humerus with range-of-motion exercises and will follow up in 1 week with repeat x-ray  If no evidence of fracture and continued no improvement that will order new MRI to evaluate her new injury      Discontinue formal physical therapy for now  Continue home exercises as below  No lifting arm overhead   Start pendulum exercises- swirls, bowling, elephant trunk  No lifting with affected extremity or weightbearing  Continue sling and sleep with sling lying on back  Target to progress out of sling by 3-4 weeks  Recommend against driving due to risk of motor vehicle accident            CHIEF COMPLAINT:  Follow-up left shoulder chronic pain and complains of new left shoulder injury    HPI:  Cb Mccallum is a 65 y o  female  who presents for       Visit 06/27/2018:  Evaluation of left shoulder pain and stiffness ongoing 1 month  Patient does state that she had injury prior April 23, 2018 but denies any pain developing after then  She points to her humeral head and lateral shoulder  She describes constant burning stinging leg pain as well as pulling on a cord sensation that is worst when reaching overhead  She also has associated nighttime pain in her left shoulder  She has been taking Tylenol with codeine which does offer some relief  She has also been taking ibuprofen  There is radiation of her pain down her arm into her biceps  Associated symptoms do include left-sided neck pain  Patient has been performing physical therapy for knee and is currently under treatment with Dr Say Benitez  08/6/18:   interpretation today provided by medical assistant Mary Lou Thomas    Follow-up right shoulder pain:  Uncontrolled  Patient continues to have pain  She points to left trapezius lateral shoulder and states radiation to her left elbow but not to her fingers  Patient denies any numbness and tingling  She has been compliant with physical therapy 2 days per week approximately 4 weeks  She states that she feels the same since this pain 1st began  Visit 08/22/2018:  Patient here for follow-up with her family friend who provides interpretation  MRI ordered for persistent left shoulder pain ongoing since April 2018 did reveal chronic tendinosis as well as partial rotator cuff tear supraspinatus tendon  Since her last visit patient has had new onset traumatic injury occurring on approximately 08/19/2018 after her MRI was performed resulting in new left shoulder injury    Patient points to lateral shoulder and proximal humerus as source of greatest pain  She has pain with overhead motion as difficulty raising her left arm  She denies any pain radiating down to her hands  She denies any numbness or tingling  Pain is moderate intensity intermittent sharp exacerbated by overhead motion  She presents today in sling which does provide some relief  Follow-up 08/29/2018: Follow-up left shoulder pain  This is for a a new injury that occurred status post her recent MRI  Last visit she was started on arm sling with precaution range-of-motion exercises for possible occult proximal humerus fracture  Today, she feels no better than last visit  She continues to point to the lateral aspect of her shoulder as source of greatest pain  She continues to have difficulty lifting her arm overhead  She has been compliant with range-of-motion exercises  Interpretation today provided by medical assistance Curt Kayser    Review of Systems   Constitutional: Negative for chills and fever  Neurological: Positive for weakness (Left shoulder abduction)  Negative for numbness           Following history reviewed and update:    Past Medical History:   Diagnosis Date    Arthritis     Cellulitis     Colon polyp     [facility] )     Fibromyalgia     Fibromyalgia     [facility]     Hyperlipidemia     Pinguecula of both eyes     Presbyopia of both eyes      Past Surgical History:   Procedure Laterality Date    BREAST SURGERY      lumpectomy    COLONOSCOPY      last year, polyps, every 6 months    HYSTERECTOMY      INCISION AND DRAINAGE PERIRECTAL ABSCESS      TUBAL LIGATION       Social History   History   Alcohol Use    Yes     Comment: occasionally      History   Drug Use No     History   Smoking Status    Current Every Day Smoker    Types: Cigarettes   Smokeless Tobacco    Never Used     Family History   Problem Relation Age of Onset    Heart disease Mother     Diabetes Mother         type 1    Hypertension Mother     Cancer Sister      No Known Allergies       Physical Exam  Constitutional:  see vital signs  Gen: well-developed, normocephalic/atraumatic, well-groomed  Eyes: No inflammation or discharge of conjunctiva or lids; sclera clear   Pharynx: no inflammation, lesion, or mass of lips  Neck: supple, no masses, non-distended  MSK: no inflammation, lesion, mass, or clubbing of nails and digits except for other than mentioned below  SKIN: no visible rashes or skin lesions  Pulmonary/Chest: Effort normal  No respiratory distress     NEURO: cranial nerves grossly intact  PSYCH:  Alert and oriented to person, place, and time; recent and remote memory intact; mood normal, no depression, anxiety, or agitation, judgment and insight good and intact    Ortho Exam  LEFT SHOULDER:  Erythema: no  Swelling: no  Increased Warmth: no    Tenderness: + lateral subacromial over proximal humerus    ROM  Abduction:  80° active    Strength  Abduction: 4-/5  ER: 5/5  IR: 5/5    Drop-Arm: negative  Emptycan: +  Belly Press: negative  Lift-off Test:    Thi: +  Neer: +  Cross-Arm:   Speeds:     RIGHT SHOULDER:  Strength  Abduction: 5/5  ER: 5/5  IR: 5/5    ROM  Touchdown sign: intact  Appley Scratch Test: symmetric  Passive: symmetric    Empty can: negative            Procedures

## 2018-09-05 DIAGNOSIS — E11.8 TYPE 2 DIABETES MELLITUS WITH COMPLICATION, WITH LONG-TERM CURRENT USE OF INSULIN (HCC): ICD-10-CM

## 2018-09-05 DIAGNOSIS — Z79.4 TYPE 2 DIABETES MELLITUS WITH COMPLICATION, WITH LONG-TERM CURRENT USE OF INSULIN (HCC): ICD-10-CM

## 2018-09-06 ENCOUNTER — OFFICE VISIT (OUTPATIENT)
Dept: OBGYN CLINIC | Facility: MEDICAL CENTER | Age: 58
End: 2018-09-06
Payer: COMMERCIAL

## 2018-09-06 VITALS
HEIGHT: 64 IN | DIASTOLIC BLOOD PRESSURE: 84 MMHG | WEIGHT: 171 LBS | SYSTOLIC BLOOD PRESSURE: 139 MMHG | BODY MASS INDEX: 29.19 KG/M2 | HEART RATE: 79 BPM

## 2018-09-06 DIAGNOSIS — M25.512 LEFT SHOULDER PAIN, UNSPECIFIED CHRONICITY: Primary | ICD-10-CM

## 2018-09-06 PROCEDURE — 99213 OFFICE O/P EST LOW 20 MIN: CPT | Performed by: FAMILY MEDICINE

## 2018-09-06 NOTE — PATIENT INSTRUCTIONS
evaluate left shoulder new injury with MRI  Continue range of motion exercises  May discontinue sling  No lifting arm overhead

## 2018-09-06 NOTE — PROGRESS NOTES
1  Left shoulder pain, unspecified chronicity  MRI shoulder left wo contrast    CANCELED: XR shoulder 2+ vw left     Orders Placed This Encounter   Procedures    MRI shoulder left wo contrast        Imaging Studies (I personally reviewed results in PACS):    Past diagnostics:   X-ray left shoulder 08/29/2018:  No acute osseous abnormality    Past diagnostics:  MRI left shoulder 08/15/2018 (before recent injury 08/19/2018):  Rotator cuff tendinosis with partial tear supraspinatus tendon  X-ray left shoulder 06/14/2018:  No acute osseous abnormality    IMPRESSION:  New recent ipsilateral left shoulder trauma after MRI  Date of new injury:  08/19/2018   MRI prior to new injury does show partial tear supraspinatus tendon with chronic tendinosis  F/u from new injury: 2 weeks 4 days    DDX  Cuff tear  Cuff  contusion  Occult proximal humerus fracture    Interventions:  Corticosteroid injection left subacromial bursa-06/27/2018  Addendum 09/17/2018:  Clarification left shoulder injection on 06/27/2018    Return for follow-up after MRI  Patient Instructions   evaluate left shoulder new injury with MRI  Continue range of motion exercises  May discontinue sling  No lifting arm overhead          CHIEF COMPLAINT:  Follow-up left shoulder chronic pain and complains of new left shoulder injury    HPI:  Astrid Montanez is a 65 y o  female  who presents for       Visit 06/27/2018:  Evaluation of left shoulder pain and stiffness ongoing 1 month  Patient does state that she had injury prior April 23, 2018 but denies any pain developing after then  She points to her humeral head and lateral shoulder  She describes constant burning stinging leg pain as well as pulling on a cord sensation that is worst when reaching overhead  She also has associated nighttime pain in her left shoulder  She has been taking Tylenol with codeine which does offer some relief  She has also been taking ibuprofen    There is radiation of her pain down her arm into her biceps  Associated symptoms do include left-sided neck pain  Patient has been performing physical therapy for knee and is currently under treatment with Dr Khris Dumont  08/6/18:   interpretation today provided by medical assistant Zoraida Díaz    Follow-up left shoulder pain:  Uncontrolled  Patient continues to have pain  She points to left trapezius lateral shoulder and states radiation to her left elbow but not to her fingers  Patient denies any numbness and tingling  She has been compliant with physical therapy 2 days per week approximately 4 weeks  She states that she feels the same since this pain 1st began  Visit 08/22/2018:  Patient here for follow-up left shoulder pain with her family friend who provides interpretation  MRI ordered for persistent left shoulder pain ongoing since April 2018 did reveal chronic tendinosis as well as partial rotator cuff tear supraspinatus tendon  Since her last visit patient has had new onset traumatic injury occurring on approximately 08/19/2018 after her MRI was performed resulting in new left shoulder injury  Patient points to lateral shoulder and proximal humerus as source of greatest pain  She has pain with overhead motion as difficulty raising her left arm  She denies any pain radiating down to her hands  She denies any numbness or tingling  Pain is moderate intensity intermittent sharp exacerbated by overhead motion  She presents today in sling which does provide some relief  Follow-up 08/29/2018: Follow-up left shoulder pain  This is for a a new injury that occurred status post her recent MRI  Last visit she was started on arm sling with precaution range-of-motion exercises for possible occult proximal humerus fracture  Today, she feels no better than last visit  She continues to point to the lateral aspect of her shoulder as source of greatest pain  She continues to have difficulty lifting her arm overhead  She has been compliant with range-of-motion exercises  Interpretation today provided by medical assistance Celeste Gio    Visit 09/06/2018: Follow-up evaluation left shoulder pain:  Worsening since last visit  Patient points to lateral shoulder as source of greatest pain  Associated symptoms do include spasm of upper trapezius muscle on left side as well as neck  This is a new injury that occurred after her recent MRI of the ipsilateral shoulder  Interpretation today provided by medical assistant Fadia Solorzano      Review of Systems   Constitutional: Negative for chills and fever  Neurological: Negative for weakness and numbness         Following history reviewed and update:    Past Medical History:   Diagnosis Date    Arthritis     Cellulitis     Colon polyp     [facility]     Fibromyalgia     Fibromyalgia     [facility]     Hyperlipidemia     Pinguecula of both eyes     Presbyopia of both eyes      Past Surgical History:   Procedure Laterality Date    BREAST SURGERY      lumpectomy    COLONOSCOPY      last year, polyps, every 6 months    HYSTERECTOMY      INCISION AND DRAINAGE PERIRECTAL ABSCESS      TUBAL LIGATION       Social History   History   Alcohol Use    Yes     Comment: occasionally      History   Drug Use No     History   Smoking Status    Current Every Day Smoker    Types: Cigarettes   Smokeless Tobacco    Never Used     Family History   Problem Relation Age of Onset    Heart disease Mother     Diabetes Mother         type 1    Hypertension Mother     Cancer Sister      No Known Allergies       Physical Exam    Constitutional:  see vital signs  Gen: well-developed, normocephalic/atraumatic, well-groomed  Eyes: No inflammation or discharge of conjunctiva or lids; sclera clear   Pharynx: no inflammation, lesion, or mass of lips  Neck: supple, no masses, non-distended  MSK: no inflammation, lesion, mass, or clubbing of nails and digits except for other than mentioned below  SKIN: no visible rashes or skin lesions  Pulmonary/Chest: Effort normal  No respiratory distress     NEURO: cranial nerves grossly intact  PSYCH:  Alert and oriented to person, place, and time; recent and remote memory intact; mood normal, no depression, anxiety, or agitation, judgment and insight good and intact      Ortho Exam    LEFT SHOULDER:  Erythema: no  Swelling: no  Increased Warmth: no    Tenderness: + lateral proximal humerus and left upper trapezius    ROM  Abduction 80° active    Strength  Abduction: 3/5  ER: 4-/5  IR: 5/5    Drop-Arm:   Negative  Emptycan: +  Belly Press: negative  Lift-off Test:      RIGHT SHOULDER:  Strength  Abduction: 5/5  ER: 5/5  IR: 5/5    ROM  Touchdown sign: intact    Empty can: negative     Cervical  ROM: intact  Tenderness: no spinous process tenderness;   Sensation UE Bilateral:  C5: normal  C6: normal  C7: normal  C8: normal  T1: normal         Procedures

## 2018-09-10 ENCOUNTER — OFFICE VISIT (OUTPATIENT)
Dept: SURGERY | Facility: CLINIC | Age: 58
End: 2018-09-10
Payer: COMMERCIAL

## 2018-09-10 VITALS
HEART RATE: 72 BPM | OXYGEN SATURATION: 99 % | TEMPERATURE: 97.9 F | BODY MASS INDEX: 29.12 KG/M2 | SYSTOLIC BLOOD PRESSURE: 148 MMHG | WEIGHT: 170.6 LBS | DIASTOLIC BLOOD PRESSURE: 80 MMHG | HEIGHT: 64 IN

## 2018-09-10 DIAGNOSIS — E11.9 TYPE 2 DIABETES MELLITUS WITHOUT COMPLICATION, WITHOUT LONG-TERM CURRENT USE OF INSULIN (HCC): ICD-10-CM

## 2018-09-10 DIAGNOSIS — B20 HIV (HUMAN IMMUNODEFICIENCY VIRUS INFECTION) (HCC): Primary | ICD-10-CM

## 2018-09-10 DIAGNOSIS — E11.9 TYPE 2 DIABETES MELLITUS WITHOUT COMPLICATION, WITH LONG-TERM CURRENT USE OF INSULIN (HCC): ICD-10-CM

## 2018-09-10 DIAGNOSIS — F32.3 MDD (MAJOR DEPRESSIVE DISORDER), SINGLE EPISODE, SEVERE WITH PSYCHOTIC FEATURES (HCC): ICD-10-CM

## 2018-09-10 DIAGNOSIS — Z79.4 TYPE 2 DIABETES MELLITUS WITHOUT COMPLICATION, WITH LONG-TERM CURRENT USE OF INSULIN (HCC): ICD-10-CM

## 2018-09-10 DIAGNOSIS — M75.42 IMPINGEMENT SYNDROME OF LEFT SHOULDER: ICD-10-CM

## 2018-09-10 DIAGNOSIS — F17.219 CIGARETTE NICOTINE DEPENDENCE WITH NICOTINE-INDUCED DISORDER: ICD-10-CM

## 2018-09-10 PROCEDURE — 99215 OFFICE O/P EST HI 40 MIN: CPT | Performed by: INTERNAL MEDICINE

## 2018-09-10 RX ORDER — LANCETS
EACH MISCELLANEOUS 4 TIMES DAILY
Qty: 100 EACH | Refills: 3 | Status: SHIPPED | OUTPATIENT
Start: 2018-09-10 | End: 2018-11-02 | Stop reason: SDUPTHER

## 2018-09-10 RX ORDER — BUSPIRONE HYDROCHLORIDE 5 MG/1
5 TABLET ORAL 2 TIMES DAILY
COMMUNITY
End: 2021-05-28 | Stop reason: ALTCHOICE

## 2018-09-10 RX ORDER — TRAZODONE HYDROCHLORIDE 50 MG/1
TABLET ORAL
Refills: 0 | COMMUNITY
Start: 2018-09-04 | End: 2021-05-28 | Stop reason: ALTCHOICE

## 2018-09-10 RX ORDER — FLUOXETINE 20 MG/1
20 TABLET, FILM COATED ORAL DAILY
COMMUNITY
End: 2019-04-11 | Stop reason: ALTCHOICE

## 2018-09-12 ENCOUNTER — ANESTHESIA EVENT (OUTPATIENT)
Dept: GASTROENTEROLOGY | Facility: HOSPITAL | Age: 58
End: 2018-09-12
Payer: COMMERCIAL

## 2018-09-12 ENCOUNTER — TELEPHONE (OUTPATIENT)
Dept: GASTROENTEROLOGY | Facility: AMBULARY SURGERY CENTER | Age: 58
End: 2018-09-12

## 2018-09-12 NOTE — TELEPHONE ENCOUNTER
Dr Cantu's PT      Pts niece called to resched pts procedure that is sched for tomorrow 09/13/18   Please call Andreina Galeana to resched [phone]

## 2018-09-13 ENCOUNTER — ANESTHESIA (OUTPATIENT)
Dept: GASTROENTEROLOGY | Facility: HOSPITAL | Age: 58
End: 2018-09-13
Payer: COMMERCIAL

## 2018-09-16 ENCOUNTER — HOSPITAL ENCOUNTER (OUTPATIENT)
Dept: MRI IMAGING | Facility: HOSPITAL | Age: 58
Discharge: HOME | End: 2018-09-16
Attending: FAMILY MEDICINE
Payer: COMMERCIAL

## 2018-09-16 DIAGNOSIS — M25.512 LEFT SHOULDER PAIN, UNSPECIFIED CHRONICITY: ICD-10-CM

## 2018-09-16 PROCEDURE — 73221 MRI JOINT UPR EXTREM W/O DYE: CPT

## 2018-09-18 ENCOUNTER — OFFICE VISIT (OUTPATIENT)
Dept: OBGYN CLINIC | Facility: MEDICAL CENTER | Age: 58
End: 2018-09-18
Payer: COMMERCIAL

## 2018-09-18 VITALS
DIASTOLIC BLOOD PRESSURE: 79 MMHG | HEIGHT: 65 IN | HEART RATE: 73 BPM | WEIGHT: 170 LBS | BODY MASS INDEX: 28.32 KG/M2 | SYSTOLIC BLOOD PRESSURE: 127 MMHG

## 2018-09-18 DIAGNOSIS — S86.912S KNEE STRAIN, LEFT, SEQUELA: Primary | ICD-10-CM

## 2018-09-18 PROCEDURE — 99213 OFFICE O/P EST LOW 20 MIN: CPT | Performed by: ORTHOPAEDIC SURGERY

## 2018-09-18 NOTE — PROGRESS NOTES
Orthopaedic Surgery - Office Note  Lam Roman (51 y o  female)   : 1960   MRN: [id number]  Encounter Date: 2018    Chief complaint:  Right knee pain    Assessment / Plan  R  MPFL strain, improved with PT and rest    · Continue with HEP  · Activity as tolerated  · Continue anti-inflammatories and ice as needed  Return if symptoms worsen or fail to improve  History of Present Illness  Danna Sullivan is a 65 y o  female who presents as a follow up for her L knee pain after a fall on 18  She is feeling significantly improved  She has stopped PT and has weaned out of her knee brace  She has minimal discomfort  She has occasional swelling  She had 1 episode of the knee giving way  Denies locking or catching  Review of Systems  Pertinent items are noted in HPI  All other systems were reviewed and are negative  Physical Exam  /79   Pulse 73   Ht 5' 5" (1 651 m)   Wt 77 1 kg (170 lb)   BMI 28 29 kg/m²   Cons: Appears well  No apparent distress  Psych: Alert  Oriented x3  Mood and affect normal   Eyes: PERRLA, EOMI  Resp: Normal effort  No audible wheezing or stridor  CV: Palpable pulse  No discernable arrhythmia  Trace LE edema  Lymph:  No palpable cervical, axillary, or inguinal lymphadenopathy  Skin: Warm  No palpable masses  No visible lesions  Neuro: Normal muscle tone  Normal and symmetric DTR's  Left Knee Exam  Alignment:  Normal knee alignment  Inspection:  mild knee swelling  mild edema  No ecchymosis  No muscle atrophy  No deformity  Palpation:  No tenderness  ROM:  Knee Extension 0  Knee Flexion 135  Strength:  5/5 quadriceps and hamstrings  Stability:  No objective knee instability  Stable Varus / Valgus stress, Lachman, and Posterior drawer  Tests:  No pertinent positive or negative tests  (-) Jose  (-) Straight leg raise  Patella:  Patella tracks laterally  Normal patellar mobility    Neurovascular:  Sensation intact in DP/SP/Gonzales/Sa/T nerve distributions  2+ DP & PT pulses  Gait:  Normal     Studies Reviewed  No studies to review    Procedures  No procedures today  Medical, Surgical, Family, and Social History  The patient's medical history, family history, and social history, were reviewed and updated as appropriate  Past Medical History:   Diagnosis Date    Arthritis     Cellulitis     Colon polyp     [facility] )     Fibromyalgia     Fibromyalgia     HIV [facility] immunodeficiency virus infection) ([facility] )     Hyperlipidemia     Pinguecula of both eyes     Presbyopia of both eyes        Past Surgical History:   Procedure Laterality Date    BREAST SURGERY      lumpectomy    COLONOSCOPY      last year, polyps, every 6 months    HYSTERECTOMY      INCISION AND DRAINAGE PERIRECTAL ABSCESS      TUBAL LIGATION         Family History   Problem Relation Age of Onset    Heart disease Mother     Diabetes Mother         type 1    Hypertension Mother     Cancer Sister        Social History     Occupational History    Not on file  Social History Main Topics    Smoking status: Current Every Day Smoker     Types: Cigarettes    Smokeless tobacco: Never Used    Alcohol use Yes      Comment: occasionally     Drug use: No    Sexual activity: No       No Known Allergies      Current Outpatient Prescriptions:     albuterol (2 5 mg/3 mL) 0 083 % nebulizer solution, Inhale 3 mL, Disp: , Rfl:     albuterol (PROVENTIL HFA,VENTOLIN HFA) 90 mcg/act inhaler, Inhale 2 puffs, Disp: , Rfl:     ammonium lactate (LAC-HYDRIN) 12 % lotion, , Disp: , Rfl:     BANOPHEN 50 MG capsule, TAKE 1 CAPSULE BY MOUTH DAILY AT BEDTIME AS NEEDED FOR SLEEP, Disp: 30 capsule, Rfl: 1    bisacodyl (DULCOLAX) 5 mg EC tablet, Take 30 mg by mouth, Disp: , Rfl:     Blood Glucose Monitoring Suppl (TRUE METRIX METER) GIL, daily  , Disp: , Rfl:     Blood Glucose Monitoring Suppl GIL, daily  , Disp: , Rfl:     budesonide-formoterol (SYMBICORT) 160-4 5 mcg/act inhaler, Inhale 2 puffs, Disp: , Rfl:     busPIRone (BUSPAR) 5 mg tablet, Take 5 mg by mouth 2 (two) times a day, Disp: , Rfl:     clotrimazole (LOTRIMIN) 1 % cream, Apply topically 2 (two) times a day, Disp: , Rfl:     cyclobenzaprine (FLEXERIL) 10 mg tablet, Take 1 tablet (10 mg total) by mouth 2 (two) times a day as needed for muscle spasms, Disp: 15 tablet, Rfl: 0    ergocalciferol (DRISDOL) 49348 units capsule, Take 50,000 Units by mouth, Disp: , Rfl:     FLUoxetine (PROzac) 20 MG tablet, Take 20 mg by mouth daily, Disp: , Rfl:     gabapentin (NEURONTIN) 800 mg tablet, TAKE 1 TABLET BY MOUTH THREE TIMES A DAY, Disp: 90 tablet, Rfl: 3    GENVOYA tablet, TAKE 1 TABLET BY MOUTH DAILY, Disp: 30 tablet, Rfl: 3    Glucose Blood (BLOOD GLUCOSE TEST STRIPS) STRP, 1 each, Disp: , Rfl:     glucose blood test strip, 1 each by Other route 4 (four) times a day, Disp: 100 each, Rfl: 0    lidocaine (LIDODERM) 5 %, Place 1 patch on the skin daily Remove & Discard patch within 12 hours or as directed by MD, Disp: 30 patch, Rfl: 0    lidocaine (LMX) 4 % cream, Apply topically as needed for mild pain, Disp: 30 g, Rfl: 0    Melatonin 5 MG TABS, Take 1 tablet by mouth at bedtime daily, Disp: 90 tablet, Rfl: 3    metFORMIN (GLUCOPHAGE) 500 mg tablet, TAKE 1 TABLET BY MOUTH TWICE A DAY, Disp: 60 tablet, Rfl: 1    naproxen (NAPROSYN) 500 mg tablet, Take 250 mg by mouth 2 (two) times a day with meals, Disp: , Rfl:     oxyCODONE-acetaminophen (PERCOCET) 5-325 mg per tablet, Take 1 tablet by mouth every 6 (six) hours as needed for moderate pain Max Daily Amount: 4 tablets, Disp: 20 tablet, Rfl: 0    polyethylene glycol (GLYCOLAX) powder, Drink per MD recommendations, Disp: , Rfl:     pravastatin (PRAVACHOL) 20 mg tablet, Take 1 tablet (20 mg total) by mouth daily, Disp: 30 tablet, Rfl: 3    sulfaSALAzine (AZULFIDINE-EN) 500 mg EC tablet, , Disp: , Rfl:     SUPER THIN LANCETS MISC, by Percutaneous route, Disp: , Rfl:     SUPER THIN LANCETS MISC, by Percutaneous route 4 (four) times a day, Disp: 100 each, Rfl: 3    traZODone (DESYREL) 50 mg tablet, , Disp: , Rfl: 0    ibuprofen (MOTRIN) 600 mg tablet, Take 1 tablet (600 mg total) by mouth every 6 (six) hours as needed for mild pain for up to 10 days, Disp: 30 tablet, Rfl: 0    methocarbamol (ROBAXIN) 500 mg tablet, Take 1 tablet (500 mg total) by mouth 4 (four) times a day for 10 days, Disp: 40 tablet, Rfl: 0    Na Sulfate-K Sulfate-Mg Sulf (SUPREP BOWEL PREP KIT) 17 5-3 13-1 6 GM/180ML SOLN, Take 1 Bottle by mouth once for 1 dose, Disp: 1 Bottle, Rfl: 0      Mary Grace Chairez MD    Scribe Attestation    I,:    am acting as a scribe while in the presence of the attending physician :        I,:    personally performed the services described in this documentation    as scribed in my presence :

## 2018-09-19 ENCOUNTER — OFFICE VISIT (OUTPATIENT)
Dept: OBGYN CLINIC | Facility: MEDICAL CENTER | Age: 58
End: 2018-09-19
Payer: COMMERCIAL

## 2018-09-19 VITALS
SYSTOLIC BLOOD PRESSURE: 119 MMHG | HEART RATE: 80 BPM | DIASTOLIC BLOOD PRESSURE: 80 MMHG | WEIGHT: 170 LBS | BODY MASS INDEX: 28.32 KG/M2 | HEIGHT: 65 IN

## 2018-09-19 DIAGNOSIS — Z79.4 TYPE 2 DIABETES MELLITUS WITHOUT COMPLICATION, WITH LONG-TERM CURRENT USE OF INSULIN (HCC): Primary | ICD-10-CM

## 2018-09-19 DIAGNOSIS — M25.512 LEFT SHOULDER PAIN, UNSPECIFIED CHRONICITY: ICD-10-CM

## 2018-09-19 DIAGNOSIS — M75.41 IMPINGEMENT SYNDROME OF RIGHT SHOULDER: Primary | ICD-10-CM

## 2018-09-19 DIAGNOSIS — E11.9 TYPE 2 DIABETES MELLITUS WITHOUT COMPLICATION, WITH LONG-TERM CURRENT USE OF INSULIN (HCC): Primary | ICD-10-CM

## 2018-09-19 PROCEDURE — 99213 OFFICE O/P EST LOW 20 MIN: CPT | Performed by: FAMILY MEDICINE

## 2018-09-19 NOTE — PROGRESS NOTES
1  Impingement syndrome of right shoulder     2  Left shoulder pain, unspecified chronicity       No orders of the defined types were placed in this encounter  Imaging Studies (I personally reviewed results in PACS):   MRI left shoulder 09/16/2018: Chronic supraspinatus tendinopathy with superimposed partial thickness tear    Past diagnostics:   X-ray left shoulder 08/29/2018:  No acute osseous abnormality    Past diagnostics:  MRI left shoulder 08/15/2018 (before recent injury 08/19/2018):  Rotator cuff tendinosis with partial tear supraspinatus tendon  X-ray left shoulder 06/14/2018:  No acute osseous abnormality    IMPRESSION:  Left Partial supraspinatus tear  Pain refractory to PT, injection    New recent ipsilateral left shoulder trauma after MRI  Date of new injury:  08/19/2018   MRI prior to new injury does show partial tear supraspinatus tendon with chronic tendinosis  F/u from new injury: 4 weeks    Interventions:  Corticosteroid injection left subacromial bursa-06/27/2018  Addendum 09/17/2018:  Clarification left shoulder injection on 06/27/2018    Return for  follow-up with Dr Letitia Hand  Patient Instructions     After review of patient's medical record does appear that she did have limited abduction on her 1st visit 06/27/2018 physical examination  I did refer patient to physical therapy as well as trial corticosteroid injection  Today, patient states that she did not have any significant improvement prior to her recent fall despite no change in her 2nd MRI of her left shoulder  After new traumatic event  As such patient currently failing conservative management and I will refer patient to surgeon to consider invasive intervention  CHIEF COMPLAINT:   left shoulder injury    HPI:  Surya Marina is a 65 y o  female  who presents for       Visit 06/27/2018:  Evaluation of left shoulder pain and stiffness ongoing 1 month    Patient does state that she had injury prior April 23, 2018 but denies any pain developing after then  She points to her humeral head and lateral shoulder  She describes constant burning stinging leg pain as well as pulling on a cord sensation that is worst when reaching overhead  She also has associated nighttime pain in her left shoulder  She has been taking Tylenol with codeine which does offer some relief  She has also been taking ibuprofen  There is radiation of her pain down her arm into her biceps  Associated symptoms do include left-sided neck pain  Patient has been performing physical therapy for knee and is currently under treatment with Dr Danyell Pinto  08/6/18:   interpretation today provided by medical assistant Tracy Vazquez    Follow-up left shoulder pain:  Uncontrolled  Patient continues to have pain  She points to left trapezius lateral shoulder and states radiation to her left elbow but not to her fingers  Patient denies any numbness and tingling  She has been compliant with physical therapy 2 days per week approximately 4 weeks  She states that she feels the same since this pain 1st began  Visit 08/22/2018:  Patient here for follow-up left shoulder pain with her family friend who provides interpretation  MRI ordered for persistent left shoulder pain ongoing since April 2018 did reveal chronic tendinosis as well as partial rotator cuff tear supraspinatus tendon  Since her last visit patient has had new onset traumatic injury occurring on approximately 08/19/2018 after her MRI was performed resulting in new left shoulder injury  Patient points to lateral shoulder and proximal humerus as source of greatest pain  She has pain with overhead motion as difficulty raising her left arm  She denies any pain radiating down to her hands  She denies any numbness or tingling  Pain is moderate intensity intermittent sharp exacerbated by overhead motion  She presents today in sling which does provide some relief      Follow-up 08/29/2018: Follow-up left shoulder pain  This is for a a new injury that occurred status post her recent MRI  Last visit she was started on arm sling with precaution range-of-motion exercises for possible occult proximal humerus fracture  Today, she feels no better than last visit  She continues to point to the lateral aspect of her shoulder as source of greatest pain  She continues to have difficulty lifting her arm overhead  She has been compliant with range-of-motion exercises  Interpretation today provided by medical assistance Mary Lou Garcia    Visit 09/06/2018: Follow-up evaluation left shoulder pain:  Worsening since last visit  Patient points to lateral shoulder as source of greatest pain  Associated symptoms do include spasm of upper trapezius muscle on left side as well as neck  This is a new injury that occurred after her recent MRI of the ipsilateral shoulder  Interpretation today provided by medical assistant Oliver Ledesma    Visit 09/19/2018: Follow-up left shoulder pain:  No change last visit  Patient still has pain in her left shoulder lateral aspect  She still has significant decrease in range of motion and demonstrates in room she was only able to forward flex to approximately 90°  Last visit patient did have MRI ordered which does show a chronic supraspinatus tendinopathy as well as partial thickness tear of her supraspinatus tendon without retraction or muscle atrophy  After review of patient's medical record does appear that she did have limited abduction on her 1st visit 06/27/2018 physical examination  I did refer patient to physical therapy as well as trial corticosteroid injection  Today, patient states that she did not have any significant improvement prior to her recent fall despite no change in her 2nd MRI of her left shoulder  Review of Systems   Constitutional: Negative for chills and fever  Neurological: Negative for weakness and numbness  Following history reviewed and update:    Past Medical History:   Diagnosis Date    Arthritis     Cellulitis     Colon polyp     [facility]     Fibromyalgia     Fibromyalgia     [facility] immunodeficiency virus infection[facility]     Hyperlipidemia     Pinguecula of both eyes     Presbyopia of both eyes      Past Surgical History:   Procedure Laterality Date    BREAST SURGERY      lumpectomy    COLONOSCOPY      last year, polyps, every 6 months    HYSTERECTOMY      INCISION AND DRAINAGE PERIRECTAL ABSCESS      TUBAL LIGATION       Social History   History   Alcohol Use    Yes     Comment: occasionally      History   Drug Use No     History   Smoking Status    Current Every Day Smoker    Types: Cigarettes   Smokeless Tobacco    Never Used     Family History   Problem Relation Age of Onset    Heart disease Mother     Diabetes Mother         type 1    Hypertension Mother     Cancer Sister      No Known Allergies       Physical Exam    Constitutional:  see vital signs  Gen: well-developed, normocephalic/atraumatic, well-groomed  Eyes: No inflammation or discharge of conjunctiva or lids; sclera clear   Pharynx: no inflammation, lesion, or mass of lips  Neck: supple, no masses, non-distended  MSK: no inflammation, lesion, mass, or clubbing of nails and digits except for other than mentioned below  SKIN: no visible rashes or skin lesions  Pulmonary/Chest: Effort normal  No respiratory distress     NEURO: cranial nerves grossly intact  PSYCH:  Alert and oriented to person, place, and time; recent and remote memory intact; mood normal, no depression, anxiety, or agitation, judgment and insight good and intact    Ortho Exam    LEFT SHOULDER:  Erythema: no  Swelling: no  Increased Warmth: no    Tenderness: + tender lateral subacromial    ROM   abduction:  90°   For flexion: 90°    Strength  Abduction: 4-/5  ER: 4/5  IR: 5/5    Drop-Arm: negative  Emptycan: +  Belly Press:   Lift-off Test:    Alex Winston: +  Neer: +  Cross-Arm: negative    RIGHT SHOULDER:  Strength  Abduction: 5/5  ER: 5/5  IR: 5/5    ROM  Touchdown sign: intact  Appley Scratch Test: symmetric  Passive: symmetric    Empty can: negative       Procedures

## 2018-09-19 NOTE — PATIENT INSTRUCTIONS
After review of patient's medical record does appear that she did have limited abduction on her 1st visit 06/27/2018 physical examination  I did refer patient to physical therapy as well as trial corticosteroid injection  Today, patient states that she did not have any significant improvement prior to her recent fall despite no change in her 2nd MRI of her left shoulder  After new traumatic event  As such patient currently failing conservative management and I will refer patient to surgeon to consider invasive intervention

## 2018-09-20 RX ORDER — BLOOD-GLUCOSE METER
KIT MISCELLANEOUS
Qty: 100 EACH | Refills: 3 | Status: SHIPPED | OUTPATIENT
Start: 2018-09-20 | End: 2019-02-21 | Stop reason: ALTCHOICE

## 2018-09-21 ENCOUNTER — OFFICE VISIT (OUTPATIENT)
Dept: OBGYN CLINIC | Facility: MEDICAL CENTER | Age: 58
End: 2018-09-21
Payer: COMMERCIAL

## 2018-09-21 VITALS
HEART RATE: 70 BPM | HEIGHT: 65 IN | DIASTOLIC BLOOD PRESSURE: 83 MMHG | BODY MASS INDEX: 28.32 KG/M2 | WEIGHT: 170 LBS | SYSTOLIC BLOOD PRESSURE: 152 MMHG

## 2018-09-21 DIAGNOSIS — M75.02 ADHESIVE CAPSULITIS OF LEFT SHOULDER: ICD-10-CM

## 2018-09-21 DIAGNOSIS — M75.112 INCOMPLETE TEAR OF LEFT ROTATOR CUFF: Primary | ICD-10-CM

## 2018-09-21 PROCEDURE — 99214 OFFICE O/P EST MOD 30 MIN: CPT | Performed by: ORTHOPAEDIC SURGERY

## 2018-09-21 PROCEDURE — 20610 DRAIN/INJ JOINT/BURSA W/O US: CPT | Performed by: ORTHOPAEDIC SURGERY

## 2018-09-21 RX ORDER — METHYLPREDNISOLONE ACETATE 40 MG/ML
1 INJECTION, SUSPENSION INTRA-ARTICULAR; INTRALESIONAL; INTRAMUSCULAR; SOFT TISSUE
Status: COMPLETED | OUTPATIENT
Start: 2018-09-21 | End: 2018-09-21

## 2018-09-21 RX ORDER — BUPIVACAINE HYDROCHLORIDE 2.5 MG/ML
4 INJECTION, SOLUTION INFILTRATION; PERINEURAL
Status: COMPLETED | OUTPATIENT
Start: 2018-09-21 | End: 2018-09-21

## 2018-09-21 RX ADMIN — METHYLPREDNISOLONE ACETATE 1 ML: 40 INJECTION, SUSPENSION INTRA-ARTICULAR; INTRALESIONAL; INTRAMUSCULAR; SOFT TISSUE at 09:52

## 2018-09-21 RX ADMIN — BUPIVACAINE HYDROCHLORIDE 4 ML: 2.5 INJECTION, SOLUTION INFILTRATION; PERINEURAL at 09:52

## 2018-09-21 NOTE — PROGRESS NOTES
Orthopaedic Surgery - Office Note  Barbie Constantino (53 y o  female)   : 1960   MRN: [id number]  Encounter Date: 2018    Chief Complaint   Patient presents with    Left Shoulder - Pain       Assessment / Plan  Left shoulder pain secondary to adhesive capsulitis and partial thickness supraspinatus tear    · MRI of left shoulder reviewed today  Results show partial thickness tear of the supraspinatus tendon  · Steroid injection given at the glenohumeral joint  Please see procedure below  · Home exercise program reviewed  · Focus on progressing ROM for adhesive capsulitis  Return in about 6 weeks (around 2018) for Recheck  History of Present Illness  Christine Jad Resendiz is a 65 y o  female who presents with left shoulder pain  She has been following with Dr Angelita Devi for her left shoulder pain  She had a left shoulder MRI completed which showed a partial thickness supraspinatus tendon tear  She states that she has tried physical therapy twice per week for 4 weeks without any relief  Dr Angelita Devi had also given her a trial of corticosteroid injection which only provided relief for 1 day  She states that she occasionally gets numbness and tingling down her arm  She states the majority of her pain is on the anterior shoulder as well as the trapezius area  Review of Systems  Pertinent items are noted in HPI  All other systems were reviewed and are negative  Physical Exam  /83   Pulse 70   Ht 5' 5" (1 651 m)   Wt 77 1 kg (170 lb)   BMI 28 29 kg/m²   Cons: Appears well  No apparent distress  Psych: Alert  Oriented x3  Mood and affect normal   Eyes: PERRLA, EOMI  Resp: Normal effort  No audible wheezing or stridor  CV: Palpable pulse  No discernable arrhythmia  No LE edema  Lymph:  No palpable cervical, axillary, or inguinal lymphadenopathy  Skin: Warm  No palpable masses  No visible lesions  Neuro: Normal muscle tone  Normal and symmetric DTR's  Left Shoulder Exam  Alignment / Posture:  Normal cervical alignment  Normal shoulder posture  Inspection:  No swelling  No edema  No erythema  No ecchymosis  No muscle atrophy  No deformity  Palpation:  Point tenderness at Glenohumeral joint, and subacromial bursa  ROM:  Limited neck rotation to the left  Shoulder FE 90° actively, and 90° passively  Patient is guarding due to pain on PROM  Shoulder ER 40  Shoulder IR L4  Normal elbow ROM  Strength:  Supraspinatus 4-/5  Infraspinatus 4-/5  Rotator cuff 4/5  Stability:  No objective shoulder instability  Tests: (-) Belly press  Neurovascular:  Sensation intact in Ax/R/M/U nerve distributions  2+ radial pulse  Studies Reviewed  MRI of left shoulder - chronic tendinosis with partial supraspinatus tendon tear    Large joint arthrocentesis  Date/Time: 9/21/2018 9:52 AM  Consent given by: patient  Site marked: site marked  Timeout: Immediately prior to procedure a time out was called to verify the correct patient, procedure, equipment, support staff and site/side marked as required   Supporting Documentation  Indications: pain   Procedure Details  Location: shoulder - L glenohumeral  Preparation: Patient was prepped and draped in the usual sterile fashion  Needle size: 25 G  Ultrasound guidance: no  Approach: anteromedial  Medications administered: 4 mL bupivacaine 0 25 %; 1 mL methylPREDNISolone acetate 40 mg/mL    Patient tolerance: patient tolerated the procedure well with no immediate complications  Dressing:  Sterile dressing applied        Medical, Surgical, Family, and Social History  The patient's medical history, family history, and social history, were reviewed and updated as appropriate      Past Medical History:   Diagnosis Date    Arthritis     Cellulitis     Colon polyp     [facility]     Fibromyalgia     Fibromyalgia     [facility]     Hyperlipidemia     Pinguecula of both eyes     Presbyopia of both eyes        Past Surgical History:   Procedure Laterality Date    BREAST SURGERY      lumpectomy    COLONOSCOPY      last year, polyps, every 6 months    HYSTERECTOMY      INCISION AND DRAINAGE PERIRECTAL ABSCESS      TUBAL LIGATION         Family History   Problem Relation Age of Onset    Heart disease Mother     Diabetes Mother         type 1    Hypertension Mother     Cancer Sister        Social History     Occupational History    Not on file  Social History Main Topics    Smoking status: Current Every Day Smoker     Types: Cigarettes    Smokeless tobacco: Never Used    Alcohol use Yes      Comment: occasionally     Drug use: No    Sexual activity: No       No Known Allergies      Current Outpatient Prescriptions:     albuterol (2 5 mg/3 mL) 0 083 % nebulizer solution, Inhale 3 mL, Disp: , Rfl:     albuterol (PROVENTIL HFA,VENTOLIN HFA) 90 mcg/act inhaler, Inhale 2 puffs, Disp: , Rfl:     ammonium lactate (LAC-HYDRIN) 12 % lotion, , Disp: , Rfl:     BANOPHEN 50 MG capsule, TAKE 1 CAPSULE BY MOUTH DAILY AT BEDTIME AS NEEDED FOR SLEEP, Disp: 30 capsule, Rfl: 1    bisacodyl (DULCOLAX) 5 mg EC tablet, Take 30 mg by mouth, Disp: , Rfl:     Blood Glucose Monitoring Suppl (TRUE METRIX METER) GIL, daily  , Disp: , Rfl:     Blood Glucose Monitoring Suppl GIL, daily  , Disp: , Rfl:     budesonide-formoterol (SYMBICORT) 160-4 5 mcg/act inhaler, Inhale 2 puffs, Disp: , Rfl:     busPIRone (BUSPAR) 5 mg tablet, Take 5 mg by mouth 2 (two) times a day, Disp: , Rfl:     clotrimazole (LOTRIMIN) 1 % cream, Apply topically 2 (two) times a day, Disp: , Rfl:     cyclobenzaprine (FLEXERIL) 10 mg tablet, Take 1 tablet (10 mg total) by mouth 2 (two) times a day as needed for muscle spasms, Disp: 15 tablet, Rfl: 0    ergocalciferol (DRISDOL) 54607 units capsule, Take 50,000 Units by mouth, Disp: , Rfl:     FLUoxetine (PROzac) 20 MG tablet, Take 20 mg by mouth daily, Disp: , Rfl:    FREESTYLE LITE test strip, USE TO TEST BLOOD SUGAR FOUR TIMES A DAY, Disp: 100 each, Rfl: 3    gabapentin (NEURONTIN) 800 mg tablet, TAKE 1 TABLET BY MOUTH THREE TIMES A DAY, Disp: 90 tablet, Rfl: 3    GENVOYA tablet, TAKE 1 TABLET BY MOUTH DAILY, Disp: 30 tablet, Rfl: 3    Glucose Blood (BLOOD GLUCOSE TEST STRIPS) STRP, 1 each, Disp: , Rfl:     glucose blood test strip, 1 each by Other route 4 (four) times a day, Disp: 100 each, Rfl: 0    lidocaine (LIDODERM) 5 %, Place 1 patch on the skin daily Remove & Discard patch within 12 hours or as directed by MD, Disp: 30 patch, Rfl: 0    lidocaine (LMX) 4 % cream, Apply topically as needed for mild pain, Disp: 30 g, Rfl: 0    Melatonin 5 MG TABS, Take 1 tablet by mouth at bedtime daily, Disp: 90 tablet, Rfl: 3    metFORMIN (GLUCOPHAGE) 500 mg tablet, TAKE 1 TABLET BY MOUTH TWICE A DAY, Disp: 60 tablet, Rfl: 1    naproxen (NAPROSYN) 500 mg tablet, Take 250 mg by mouth 2 (two) times a day with meals, Disp: , Rfl:     polyethylene glycol (GLYCOLAX) powder, Drink per MD recommendations, Disp: , Rfl:     pravastatin (PRAVACHOL) 20 mg tablet, Take 1 tablet (20 mg total) by mouth daily, Disp: 30 tablet, Rfl: 3    sulfaSALAzine (AZULFIDINE-EN) 500 mg EC tablet, , Disp: , Rfl:     SUPER THIN LANCETS MISC, by Percutaneous route, Disp: , Rfl:     SUPER THIN LANCETS MISC, by Percutaneous route 4 (four) times a day, Disp: 100 each, Rfl: 3    traZODone (DESYREL) 50 mg tablet, , Disp: , Rfl: 0    ibuprofen (MOTRIN) 600 mg tablet, Take 1 tablet (600 mg total) by mouth every 6 (six) hours as needed for mild pain for up to 10 days, Disp: 30 tablet, Rfl: 0      Jaime Primmer, DPM    Scribe Attestation    I,:    am acting as a scribe while in the presence of the attending physician :        I,:    personally performed the services described in this documentation    as scribed in my presence :

## 2018-09-25 ENCOUNTER — PATIENT OUTREACH (OUTPATIENT)
Dept: SURGERY | Facility: CLINIC | Age: 58
End: 2018-09-25

## 2018-09-25 ENCOUNTER — OFFICE VISIT (OUTPATIENT)
Dept: SURGERY | Facility: CLINIC | Age: 58
End: 2018-09-25
Payer: COMMERCIAL

## 2018-09-25 VITALS
TEMPERATURE: 98 F | BODY MASS INDEX: 27.86 KG/M2 | OXYGEN SATURATION: 98 % | DIASTOLIC BLOOD PRESSURE: 83 MMHG | WEIGHT: 167.2 LBS | HEIGHT: 65 IN | SYSTOLIC BLOOD PRESSURE: 129 MMHG | HEART RATE: 81 BPM

## 2018-09-25 DIAGNOSIS — B20 HIV (HUMAN IMMUNODEFICIENCY VIRUS INFECTION) (HCC): Primary | ICD-10-CM

## 2018-09-25 DIAGNOSIS — E11.9 TYPE 2 DIABETES MELLITUS WITHOUT COMPLICATION, WITHOUT LONG-TERM CURRENT USE OF INSULIN (HCC): ICD-10-CM

## 2018-09-25 DIAGNOSIS — R20.2 NUMBNESS AND TINGLING OF BOTH LOWER EXTREMITIES: ICD-10-CM

## 2018-09-25 DIAGNOSIS — R20.0 NUMBNESS AND TINGLING OF BOTH LOWER EXTREMITIES: ICD-10-CM

## 2018-09-25 DIAGNOSIS — M25.562 ACUTE PAIN OF LEFT KNEE: Primary | ICD-10-CM

## 2018-09-25 DIAGNOSIS — E78.2 ELEVATED TRIGLYCERIDES WITH HIGH CHOLESTEROL: ICD-10-CM

## 2018-09-25 DIAGNOSIS — Z91.14 NONCOMPLIANCE WITH MEDICATIONS: ICD-10-CM

## 2018-09-25 PROCEDURE — 99215 OFFICE O/P EST HI 40 MIN: CPT | Performed by: NURSE PRACTITIONER

## 2018-09-25 RX ORDER — NAPROXEN 500 MG/1
250 TABLET ORAL 2 TIMES DAILY WITH MEALS
Qty: 30 TABLET | Refills: 0 | Status: SHIPPED | OUTPATIENT
Start: 2018-09-25 | End: 2018-10-03 | Stop reason: SDUPTHER

## 2018-09-25 NOTE — ASSESSMENT & PLAN NOTE
Non compliant  CRNP encouraged pt restart taking Symbicort inhaler 2 puffs daily  Continue using albuterol inhaler when needed for sob or wheezing  CRNP instructed the pt to only use the albuterol nebs when she is sick or has a exacerbation of asthma

## 2018-09-25 NOTE — PATIENT INSTRUCTIONS
Asthma   AMBULATORY CARE:   Asthma  is a lung disease that makes breathing difficult  Chronic inflammation and reactions to triggers narrow the airways in your lungs  Asthma can become life-threatening if it is not managed  Cough-variant asthma  is a type of asthma that causes a dry cough that keeps coming back  A dry cough may be your only symptom, or you may also have chest tightness  These symptoms may be caused by exercise or exposure to odors, allergens, or respiratory tract infections  Cough-variant asthma is treated the same way as typical asthma  Common symptoms include the following:   · Coughing     · Wheezing     · Shortness of breath     · Chest tightness  Seek care immediately if:   · You have severe shortness of breath  · Your lips or nails turn blue or gray  · The skin around your neck and ribs pulls in with each breath  · You have shortness of breath, even after you take your short-term medicine as directed  · Your peak flow numbers are in the red zone of your AAP  Contact your healthcare provider if:   · You run out of medicine before your next refill is due  · Your symptoms get worse  · You need to take more medicine than usual to control your symptoms  · You have questions or concerns about your condition or care  Treatment for asthma  will depend on how severe your asthma is  Medicine may decrease inflammation, open airways, and make it easier to breathe  Medicines may be inhaled, taken as a pill, or injected  Short-term medicines relieve your symptoms quickly  Long-term medicines are used to prevent future attacks  You may also need medicine to help control your allergies  Manage and prevent future asthma attacks:   · Follow your asthma action plan  This is a written plan that you and your healthcare provider create  It explains which medicine you need and when to change doses if necessary   It also explains how you can monitor symptoms and use a peak flow meter  The meter measures how well your lungs are working  · Manage other health conditions , such as allergies, acid reflux, and sleep apnea  · Identify and avoid triggers  These may include pets, dust mites, mold, and cockroaches  · Do not smoke or be around others who smoke  Nicotine and other chemicals in cigarettes and cigars can cause lung damage  Ask your healthcare provider for information if you currently smoke and need help to quit  E-cigarettes or smokeless tobacco still contain nicotine  Talk to your healthcare provider before you use these products  · Ask about the flu vaccine  The flu can make your asthma worse  You may need a yearly flu shot  Follow up with your healthcare provider as directed: You will need to return to make sure your medicine is working and your symptoms are controlled  You may be referred to an asthma or allergy specialist  Carlylefelipe Kuhn may be asked to keep a record of your peak flow values and bring it with you to your appointments  Write down your questions so you remember to ask them during your visits  © 2017 [facility] [facility] Information is for End User's use only and may not be sold, redistributed or otherwise used for commercial purposes  All illustrations and images included in CareNotes® are the copyrighted property of A D A M , Inc  or Victoriano Vuong  The above information is an  only  It is not intended as medical advice for individual conditions or treatments  Talk to your doctor, nurse or pharmacist before following any medical regimen to see if it is safe and effective for you  Cigarette Smoking and Your Health   AMBULATORY CARE:   Risks to your health if you smoke:  Nicotine and other chemicals found in tobacco damage every cell in your body  Even if you are a light smoker, you have an increased risk for cancer, heart disease, and lung disease   If you are pregnant or have diabetes, smoking increases your risk for complications  Benefits to your health if you stop smoking:   · You decrease respiratory symptoms such as coughing, wheezing, and shortness of breath  · You reduce your risk for cancers of the lung, mouth, throat, kidney, bladder, pancreas, stomach, and cervix  If you already have cancer, you increase the benefits of chemotherapy  You also reduce your risk for cancer returning or a second cancer from developing  · You reduce your risk for heart disease, blood clots, heart attack, and stroke  · You reduce your risk for lung infections, and diseases such as pneumonia, asthma, chronic bronchitis, and emphysema  · Your circulation improves  More oxygen can be delivered to your body  If you have diabetes, you lower your risk for complications, such as kidney, artery, and eye diseases  You also lower your risk for nerve damage  Nerve damage can lead to amputations, poor vision, and blindness  · You improve your body's ability to heal and to fight infections  Benefits to the health of others if you stop smoking:  Tobacco is harmful to nonsmokers who breathe in your secondhand smoke  The following are ways the health of others around you may improve when you stop smoking:  · You lower the risks for lung cancer and heart disease in nonsmoking adults  · If you are pregnant, you lower the risk for miscarriage, early delivery, low birth weight, and stillbirth  You also lower your baby's risk for SIDS, obesity, developmental delay, and neurobehavioral problems, such as ADHD  · If you have children, you lower their risk for ear infections, colds, pneumonia, bronchitis, and asthma  For more information and support to stop smoking:   · Smokefree  gov  Phone: 2- 808 - 412-8452  Web Address: www smokefrLoginza  Follow up with your healthcare provider as directed:  Write down your questions so you remember to ask them during your visits     © 2017 [address] [employer] Information is for End User's use only and may not be sold, redistributed or otherwise used for commercial purposes  All illustrations and images included in CareNotes® are the copyrighted property of A D A M , Inc  or Victoriano Vuong  The above information is an  only  It is not intended as medical advice for individual conditions or treatments  Talk to your doctor, nurse or pharmacist before following any medical regimen to see if it is safe and effective for you  Hyperlipidemia   AMBULATORY CARE:   Hyperlipidemia  is a high level of lipids (fats) in your blood  These lipids include cholesterol or triglycerides  Lipids are made by your body  They also come from the foods you eat  Your body needs lipids to work properly, but high levels increase your risk for heart disease, heart attack, and stroke  Call 911 for any of the following:   · You have any of the following signs of a heart attack:      ¨ Squeezing, pressure, or pain in your chest that lasts longer than 5 minutes or returns    ¨ Discomfort or pain in your back, neck, jaw, stomach, or arm     ¨ Trouble breathing    ¨ Nausea or vomiting    ¨ Lightheadedness or a sudden cold sweat, especially with chest pain or trouble breathing    · You have any of the following signs of a stroke:      ¨ Numbness or drooping on one side of your face     ¨ Weakness in an arm or leg    ¨ Confusion or difficulty speaking    ¨ Dizziness, a severe headache, or vision loss  Contact your healthcare provider if:   · You have questions or concerns about your condition or care  Treatment of hyperlipidemia  may first include lifestyle changes to help decrease your lipid levels  You may also need to take medicine to lower your lipid levels  Some of the lifestyle changes you may need to make include the following:  · Maintain a healthy weight  Ask your healthcare provider how much you should weigh  Ask him or her to help you create a weight loss plan if you are overweight  Weight loss can decrease your cholesterol and triglyceride levels  · Exercise as directed  Exercise lowers your cholesterol levels and helps you maintain a healthy weight  Get 40 minutes or more of moderate exercise 3 to 4 days each week  You can split your exercise into four 10-minute workouts instead of 40 minutes at one time  Examples of moderate exercises include walking briskly, swimming, or riding a bike  Work with your healthcare provider to plan the best exercise program for you  · Do not smoke  Nicotine and other chemicals in cigarettes and cigars can increase your risk for a heart attack and stroke  Ask your healthcare provider for information if you currently smoke and need help to quit  E-cigarettes or smokeless tobacco still contain nicotine  Talk to your healthcare provider before you use these products  · Eat heart-healthy foods  Talk to your dietitian about a heart-healthy diet  The following will help you manage hyperlipidemia:     ¨ Decrease the total amount of fat you eat  Choose lean meats, fat-free or 1% fat milk, and low-fat dairy products, such as yogurt and cheese  Limit or do not eat red meat  Red meats are high in fat and cholesterol  ¨ Replace unhealthy fats with healthy fats  Unhealthy fats include saturated fat, trans fat, and cholesterol  Choose soft margarines that are low in saturated fat and have little or no trans fat  Monounsaturated fats are healthy fats  These are found in olive oil, canola oil, avocado, and nuts  Polyunsaturated fats are also healthy  These are found in fish, flaxseed, walnuts, and soybeans  ¨ Eat fruits and vegetables every day  They are low in calories and fat and a good source of essential vitamins  Include dark green, red, and orange vegetables  Examples include spinach, kale, broccoli, and carrots  ¨ Eat foods high in fiber  Choose whole grain, high-fiber foods   Good choices include whole-wheat breads or cereals, beans, peas, fruits, and vegetables  · Ask your healthcare provider if it is safe for you to drink alcohol  Alcohol can increase your cholesterol and triglyceride levels  A drink of alcohol is 12 ounces of beer, 5 ounces of wine, or 1½ ounces of liquor  Follow up with your healthcare provider as directed: You may need to return for more tests  Your healthcare provider may refer you to a dietitian  Write down your questions so you remember to ask them during your visits  © 2017 [address] [employer] Information is for End User's use only and may not be sold, redistributed or otherwise used for commercial purposes  All illustrations and images included in CareNotes® are the copyrighted property of A D A M , Inc  or Victoriano Vuong  The above information is an  only  It is not intended as medical advice for individual conditions or treatments  Talk to your doctor, nurse or pharmacist before following any medical regimen to see if it is safe and effective for you

## 2018-09-25 NOTE — ASSESSMENT & PLAN NOTE
Lab Results   Component Value Date    HGBA1C 6 3 11/10/2017     Pt reports having elevated BS: 130's to > 200's  CRNP will increase metformin to 1000 mg  CRNP will encourage pt to complete A1C lab work  Last A1C was Nov 2017  CRNP will refer pt to [facility] Diabetes Education Classes

## 2018-09-25 NOTE — ASSESSMENT & PLAN NOTE
Stable  Pt was encouraged to stop smoking  Pt is still smoking several cigarettes per day or 1 pack will last 3 days  Nicotine Dependency - Primary    Counseled for greater than 15 minutes on the importance of smoking cessation  Education was given regarding the cardiovascular effects of how nicotine affects the heart, lungs, kidneys,and peripheral vascular system    Referred to [facility] for enrollment in smoking cessation program

## 2018-09-25 NOTE — ASSESSMENT & PLAN NOTE
Worsening  Worse when pt is in bed at night with toes cramping and coolness  CRNP will order vascular arterial studies

## 2018-09-25 NOTE — ASSESSMENT & PLAN NOTE
New onset  CRNP explained the importance of continuing medication as ordered and to talk with the provider to discuss alternatives  Pt verbalized understanding

## 2018-09-25 NOTE — ASSESSMENT & PLAN NOTE
HIV Counseling:    Viral Load: 50 copies    CD4 Count: 272    ART: Nevin Steward    Denies side effects  Stressed the importance of adherence  Continue follow up in the ID clinic with Dr Chon Brown  Reviewed the most recent labs, including the Cd4 and viral load  Discussed the risks and benefits of treatment options, instructions for management, importance of treatment adherence, and reduction of risk factors  Educated on possible medication side effects  Counseled on routes of HIV transmission, including the risk of  infection  Emphasized that viral suppression is the best method to prevent HIV transmission  At this time the pt denies the need for HIV testing of anyone in their life  Total encounter time was greater than 35 minutes  Greater than 20 minutes were spent on counseling and patient education  Pt voices understanding and agreement with treatment plan

## 2018-09-25 NOTE — PROGRESS NOTES
Assessment/Plan:    HIV disease ([facility] )      HIV Counseling:    Viral Load: 50 copies    CD4 Count: 272    ART: Carla Tejeda    Denies side effects  Stressed the importance of adherence  Continue follow up in the ID clinic with Dr Lani Álvarez  Reviewed the most recent labs, including the Cd4 and viral load  Discussed the risks and benefits of treatment options, instructions for management, importance of treatment adherence, and reduction of risk factors  Educated on possible medication side effects  Counseled on routes of HIV transmission, including the risk of  infection  Emphasized that viral suppression is the best method to prevent HIV transmission  At this time the pt denies the need for HIV testing of anyone in their life  Total encounter time was greater than 35 minutes  Greater than 20 minutes were spent on counseling and patient education  Pt voices understanding and agreement with treatment plan  Type 2 diabetes mellitus without complication, without long-term current use of insulin ([place] [id number] )  Lab Results   Component Value Date    HGBA1C 6 3 11/10/2017     Pt reports having elevated BS: 130's to > 200's  CRNP will increase metformin to 1000 mg  CRNP will encourage pt to complete A1C lab work  Last A1C was 2017  CRNP will refer pt to [facility] Diabetes Education Classes  Elevated triglycerides with high cholesterol      CRNP will start fenofibrate 145 mg tablet daily  Asthma  Non compliant  CRNP encouraged pt restart taking Symbicort inhaler 2 puffs daily  Continue using albuterol inhaler when needed for sob or wheezing  CRNP instructed the pt to only use the albuterol nebs when she is sick or has a exacerbation of asthma  Nicotine dependence  Stable  Pt was encouraged to stop smoking  Pt is still smoking several cigarettes per day or 1 pack will last 3 days  Nicotine Dependency - Primary    Counseled for greater than 15 minutes on the importance of smoking cessation  Education was given regarding the cardiovascular effects of how nicotine affects the heart, lungs, kidneys,and peripheral vascular system  Referred to [facility] for enrollment in smoking cessation program       Acute pain of left shoulder  Ongoing  CRNP referred pt back to orthopedic to manage pain and concerns with injection  Neuropathy  Worsening      CRNP will order vascular studies of bilateral lower extremities  Noncompliance with medications  New onset  CRNP explained the importance of continuing medication as ordered and to talk with the provider to discuss alternatives  Pt verbalized understanding  Numbness and tingling of both lower extremities  Worsening  Worse when pt is in bed at night with toes cramping and coolness  CRNP will order vascular arterial studies       Lab Results   Component Value Date     07/26/2018    K 4 0 07/26/2018     07/26/2018    CO2 27 07/26/2018    BUN 8 07/26/2018    CREATININE 0 69 07/26/2018    GLUF 234 (H) 07/26/2018    CALCIUM 9 3 07/26/2018    AST 14 07/26/2018    ALT 19 07/26/2018    ALKPHOS 67 07/26/2018    EGFR 98 07/26/2018     Lab Results   Component Value Date    WBC 4 67 07/26/2018    WBC 4 6 07/26/2018    HGB 14 4 07/26/2018    HGB 13 3 07/26/2018    HCT 44 0 07/26/2018    HCT 42 4 07/26/2018     (H) 07/26/2018     (H) 07/26/2018     07/26/2018     07/26/2018     Patient Active Problem List   Diagnosis    Asthma    Colon polyps    Type 2 diabetes mellitus without complication, without long-term current use of insulin ([address] )    Fibromyalgia    MDD (major depressive disorder), single episode, severe with psychotic features ([address] )    Multiple environmental allergies    Nicotine dependence    Poor dentition    H/O abdominal hysterectomy    Hidradenitis suppurativa    Effusion of left knee    High cholesterol    HIV disease ([address] )    Mild intermittent asthma without complication    Neuropathy    Osteopenia    Periodontal disease    Acute pain of left shoulder    Primary insomnia    Polyp of colon    Elevated triglycerides with high cholesterol    Knee strain, left, sequela    Incomplete tear of left rotator cuff    Adhesive capsulitis of left shoulder    Noncompliance with medications    Numbness and tingling of both lower extremities        Diagnoses and all orders for this visit:    HIV (human immunodeficiency virus infection) ([facility] [address] )    Elevated triglycerides with high cholesterol    Type 2 diabetes mellitus without complication, without long-term current use of insulin ([facility])  -     Lipid panel; Future  -     Ambulatory referral to Diabetic Education; Future    Noncompliance with medications    Numbness and tingling of both lower extremities  -     VAS lower limb arterial duplex, complete bilateral; Future          Subjective:      Patient ID: Surya Marina is a 65 y o  female  Pt is being seen in the office today for complaints of "swollen lower legs and elevated blood sugars "  Pt reports "it started off good with the injection in the left arm but now it is worse and I ran out of medication: percocet "  Pt is requesting pain medication but the other doctor would not give any other medication and the doctor would not give any other medication  CRNP told the pt to call the doctors office back and tell the about the pain she is having after the injection and to ask them for pain medication  Pt reports that the orthopedic doctor told her she is going to have pain up to 6 months or a year of pain  CRNP asked pt what instructions were given to her and she was exercises to complete at home  Pt denies any swelling to area  CRNP encouraged pt to try heat to area  CRNP recommend OTC the Salonpas patches or icy cold for pain  Pt rated her pain level a "9" out of 10    Pt expressed concern about not taking her metformin 500 mg 2 times per day compared to being ordered on 500 mg daily at this practice  Pt was previously seen in [place] for treatment  CRNP told pt she has had a A1C lab ordered back in July 2018  Current A1C was done a year ago and is 6 3 within normal limits  The goal of is to have the pt on the least amount of medication and it was probably decreased because he A1C level had improved  CRNP explained if her next A1C level is elevated about the recommended guidelines then the metformin can be increased along with dietary and lifestyle management changes  CRNP explained that medication alone will not keep her BS under control  CRNP also explained about follow the evidenced based guidelines for managing Type11 DM  CRNP explained the goal is to lower her A1c and keep her from needing insulin in the future  Pt report already making dietary changes  Pt reports BS running 134 last week and this am 212 before breakfast   CRNP the elevated BS in am is reflective of what she ate yesterday  Pt reports eating pasta and cheese and ground beef at dinner and water and soda at bedtime  CRNP the reasons with her dinner from last night had elevated her BS this am by having too many carbohydrates and soda even 4 oz can affect the BS  Pt verbalized understanding  CRNP suggested keeping a food journal   [facility] Diabetic Educational Classes and pt is agreeing but has transportation issues  Pt is in the process of getting a bus approval since she has moved  Pt is going to  after exam   Pt is concerned about having swollen feet when she walks a lot but is urinating fine  Pt reports this does not happen every day  CRNP suspects some of the swelling may be due to dietary intact because pt does eat salt or process food  Pt does eat a lot of process foods and salty foods like ham for breakfast   Pt reports elevated her feet and the swelling does go down    Pt does report some tingling but more like a "tickling sensation" and at night my toes cramp up and my feet are really cooled  CRNP explained about ordering vascular studies on her legs  Pt reports she stopped taking her gabapentin because she does not think it was working  Pt stopped taking a little over 2 weeks  Pt also reports she taking flexeril, and Symbicort  CRNP explained the cardiovascular risk associated wit smoking and all of her comorbidity  CRNP reviewed pt's weight and noted 3 lb loss  Pt speaks on [place]an and interpretor was present during examination  The following portions of the patient's history were reviewed and updated as appropriate: allergies, current medications, past medical history, past surgical history and problem list      Review of Systems   Constitutional: Negative  HENT: Negative  Respiratory: Negative  Cardiovascular: Negative  Gastrointestinal: Negative  Genitourinary: Negative  Musculoskeletal: Positive for arthralgias and joint swelling  Skin: Negative  Psychiatric/Behavioral: Negative  Objective:      /83 (BP Location: Right arm, Patient Position: Sitting, Cuff Size: Standard)   Pulse 81   Temp 98 °F (36 7 °C)   Ht 5' 5" (1 651 m)   Wt 75 8 kg (167 lb 3 2 oz)   SpO2 98%   BMI 27 82 kg/m²          Physical Exam   Constitutional: She is oriented to person, place, and time  She appears well-developed and well-nourished  Cardiovascular: Normal rate, regular rhythm, normal heart sounds and intact distal pulses  Pulmonary/Chest: Effort normal and breath sounds normal    Musculoskeletal:        Left shoulder: She exhibits decreased range of motion, tenderness and pain  She exhibits no swelling, no effusion, no crepitus, normal pulse and normal strength  Arms:  Painful to light palpation on anterior and posterior shoulder joint  Neurological: She is oriented to person, place, and time  Skin: Skin is warm and dry  Psychiatric: She has a normal mood and affect  Her behavior is normal    Nursing note and vitals reviewed

## 2018-09-26 ENCOUNTER — PATIENT OUTREACH (OUTPATIENT)
Dept: SURGERY | Facility: CLINIC | Age: 58
End: 2018-09-26

## 2018-09-26 ENCOUNTER — APPOINTMENT (OUTPATIENT)
Dept: LAB | Facility: CLINIC | Age: 58
End: 2018-09-26
Payer: COMMERCIAL

## 2018-09-26 DIAGNOSIS — E11.9 TYPE 2 DIABETES MELLITUS WITHOUT COMPLICATION, WITH LONG-TERM CURRENT USE OF INSULIN (HCC): ICD-10-CM

## 2018-09-26 DIAGNOSIS — B20 HIV (HUMAN IMMUNODEFICIENCY VIRUS INFECTION) (HCC): ICD-10-CM

## 2018-09-26 DIAGNOSIS — E11.8 TYPE 2 DIABETES MELLITUS WITH COMPLICATION, WITHOUT LONG-TERM CURRENT USE OF INSULIN (HCC): ICD-10-CM

## 2018-09-26 DIAGNOSIS — Z79.4 TYPE 2 DIABETES MELLITUS WITHOUT COMPLICATION, WITH LONG-TERM CURRENT USE OF INSULIN (HCC): ICD-10-CM

## 2018-09-26 DIAGNOSIS — E11.9 TYPE 2 DIABETES MELLITUS WITHOUT COMPLICATION, WITHOUT LONG-TERM CURRENT USE OF INSULIN (HCC): ICD-10-CM

## 2018-09-26 LAB
ALBUMIN SERPL BCP-MCNC: 3.8 G/DL (ref 3.5–5)
ALP SERPL-CCNC: 73 U/L (ref 46–116)
ALT SERPL W P-5'-P-CCNC: 20 U/L (ref 12–78)
ANION GAP SERPL CALCULATED.3IONS-SCNC: 9 MMOL/L (ref 4–13)
AST SERPL W P-5'-P-CCNC: 8 U/L (ref 5–45)
BASOPHILS # BLD AUTO: 0.07 THOUSANDS/ΜL (ref 0–0.1)
BASOPHILS NFR BLD AUTO: 1 % (ref 0–1)
BILIRUB SERPL-MCNC: 0.63 MG/DL (ref 0.2–1)
BUN SERPL-MCNC: 16 MG/DL (ref 5–25)
CALCIUM SERPL-MCNC: 9 MG/DL (ref 8.3–10.1)
CHLORIDE SERPL-SCNC: 104 MMOL/L (ref 100–108)
CHOLEST SERPL-MCNC: 234 MG/DL (ref 50–200)
CO2 SERPL-SCNC: 26 MMOL/L (ref 21–32)
CREAT SERPL-MCNC: 0.72 MG/DL (ref 0.6–1.3)
EOSINOPHIL # BLD AUTO: 0.12 THOUSAND/ΜL (ref 0–0.61)
EOSINOPHIL NFR BLD AUTO: 2 % (ref 0–6)
ERYTHROCYTE [DISTWIDTH] IN BLOOD BY AUTOMATED COUNT: 13.2 % (ref 11.6–15.1)
EST. AVERAGE GLUCOSE BLD GHB EST-MCNC: 174 MG/DL
GFR SERPL CREATININE-BSD FRML MDRD: 94 ML/MIN/1.73SQ M
GLUCOSE P FAST SERPL-MCNC: 234 MG/DL (ref 65–99)
HBA1C MFR BLD: 7.7 % (ref 4.2–6.3)
HCT VFR BLD AUTO: 47.8 % (ref 34.8–46.1)
HDLC SERPL-MCNC: 54 MG/DL (ref 40–60)
HGB BLD-MCNC: 15.3 G/DL (ref 11.5–15.4)
IMM GRANULOCYTES # BLD AUTO: 0.03 THOUSAND/UL (ref 0–0.2)
IMM GRANULOCYTES NFR BLD AUTO: 1 % (ref 0–2)
LDLC SERPL CALC-MCNC: 130 MG/DL (ref 0–100)
LYMPHOCYTES # BLD AUTO: 1.87 THOUSANDS/ΜL (ref 0.6–4.47)
LYMPHOCYTES NFR BLD AUTO: 31 % (ref 14–44)
MCH RBC QN AUTO: 32.1 PG (ref 26.8–34.3)
MCHC RBC AUTO-ENTMCNC: 32 G/DL (ref 31.4–37.4)
MCV RBC AUTO: 100 FL (ref 82–98)
MONOCYTES # BLD AUTO: 0.37 THOUSAND/ΜL (ref 0.17–1.22)
MONOCYTES NFR BLD AUTO: 6 % (ref 4–12)
NEUTROPHILS # BLD AUTO: 3.63 THOUSANDS/ΜL (ref 1.85–7.62)
NEUTS SEG NFR BLD AUTO: 59 % (ref 43–75)
NONHDLC SERPL-MCNC: 180 MG/DL
NRBC BLD AUTO-RTO: 0 /100 WBCS
PLATELET # BLD AUTO: 334 THOUSANDS/UL (ref 149–390)
PMV BLD AUTO: 10.3 FL (ref 8.9–12.7)
POTASSIUM SERPL-SCNC: 4.3 MMOL/L (ref 3.5–5.3)
PROT SERPL-MCNC: 8.1 G/DL (ref 6.4–8.2)
RBC # BLD AUTO: 4.77 MILLION/UL (ref 3.81–5.12)
SODIUM SERPL-SCNC: 139 MMOL/L (ref 136–145)
TRIGL SERPL-MCNC: 251 MG/DL
WBC # BLD AUTO: 6.09 THOUSAND/UL (ref 4.31–10.16)

## 2018-09-26 PROCEDURE — 36415 COLL VENOUS BLD VENIPUNCTURE: CPT

## 2018-09-26 PROCEDURE — 80061 LIPID PANEL: CPT

## 2018-09-26 PROCEDURE — 83036 HEMOGLOBIN GLYCOSYLATED A1C: CPT

## 2018-09-26 PROCEDURE — 80053 COMPREHEN METABOLIC PANEL: CPT

## 2018-09-26 PROCEDURE — 86361 T CELL ABSOLUTE COUNT: CPT

## 2018-09-26 PROCEDURE — 85025 COMPLETE CBC W/AUTO DIFF WBC: CPT

## 2018-09-26 PROCEDURE — 87536 HIV-1 QUANT&REVRSE TRNSCRPJ: CPT

## 2018-09-26 PROCEDURE — 84443 ASSAY THYROID STIM HORMONE: CPT

## 2018-09-26 NOTE — PROGRESS NOTES
FOLLOW-UP Nutrition/ Diabetes Education note:  Weight today: 75 8 kg, patient has lost 1 9 kg over past few months (desirable weight loss)No results found  7/26 FBS: 234  Patient reports that she is checking her BGs daily, and they are usually in the "100s", this morning her sugar was "in the 200s"  Upon reviewing diet recall: Breakfast: ham& cheese s/w, coffee  Lunch: often skips, sometimes a hot dog  Dinner last night: Large plate of spaghetti with meat , no green leafy vegetables  Before bed patient states she drank a glass of regular coca cola  Asked patient about other beverages she consumes, and she states she tries to drink water, but also drinks juice, lemonaide, and "[place]"  Reviewed with patient that water is best beverage choice  Discussed that juice, lemonaide, soda and Mert are all high sugar drinks  Also encouraged patient to try some more low-carb vegetables  Gave patient the "My Plate" instruction booklet, in [place]  Also gave patient list of herbs and seasonings low in salt, reminding her that "Adobo" is high in salt  Provided patient with schedule of Diabetes classes, specifically when the next [place] class will be held  Discussed this with her PCP, Hardik Martinez and her , Yash Noel  Will call patient to remind her of importance of attending DSMT classes    Ivan Cabrera, ALEENAN, LDN, CDE

## 2018-09-26 NOTE — PROGRESS NOTES
PT came in person, signed SCPs for CM  Assisted ct with calling Neda Leslie to be picked up  Ct reported that hopes to move out of the apartment where she lives by March/April of next year  [address]  [address] SURAJ GONZALEZ   At [facility]   T  [phone]

## 2018-09-27 DIAGNOSIS — Z79.4 TYPE 2 DIABETES MELLITUS WITH COMPLICATION, WITH LONG-TERM CURRENT USE OF INSULIN (HCC): ICD-10-CM

## 2018-09-27 DIAGNOSIS — Z91.89 AT RISK FOR CARDIOVASCULAR EVENT: ICD-10-CM

## 2018-09-27 DIAGNOSIS — E78.00 HYPERCHOLESTEREMIA: Primary | ICD-10-CM

## 2018-09-27 DIAGNOSIS — E11.9 TYPE 2 DIABETES MELLITUS WITHOUT COMPLICATION, WITHOUT LONG-TERM CURRENT USE OF INSULIN (HCC): Primary | ICD-10-CM

## 2018-09-27 DIAGNOSIS — E11.8 TYPE 2 DIABETES MELLITUS WITH COMPLICATION, WITH LONG-TERM CURRENT USE OF INSULIN (HCC): ICD-10-CM

## 2018-09-27 DIAGNOSIS — E78.2 HYPERCHOLESTEROLEMIA WITH HYPERTRIGLYCERIDEMIA: ICD-10-CM

## 2018-09-27 LAB
BASOPHILS # BLD AUTO: 0 X10E3/UL (ref 0–0.2)
BASOPHILS NFR BLD AUTO: 1 %
CD3+CD4+ CELLS # BLD: 340 /UL (ref 359–1519)
CD3+CD4+ CELLS NFR BLD: 17.9 % (ref 30.8–58.5)
EOSINOPHIL # BLD AUTO: 0.1 X10E3/UL (ref 0–0.4)
EOSINOPHIL NFR BLD AUTO: 2 %
ERYTHROCYTE [DISTWIDTH] IN BLOOD BY AUTOMATED COUNT: 13.9 % (ref 12.3–15.4)
HCT VFR BLD AUTO: 47.4 % (ref 34–46.6)
HGB BLD-MCNC: 15.5 G/DL (ref 11.1–15.9)
IMM GRANULOCYTES # BLD: 0 X10E3/UL (ref 0–0.1)
IMM GRANULOCYTES NFR BLD: 0 %
LYMPHOCYTES # BLD AUTO: 1.9 X10E3/UL (ref 0.7–3.1)
LYMPHOCYTES NFR BLD AUTO: 31 %
MCH RBC QN AUTO: 32 PG (ref 26.6–33)
MCHC RBC AUTO-ENTMCNC: 32.7 G/DL (ref 31.5–35.7)
MCV RBC AUTO: 98 FL (ref 79–97)
MONOCYTES # BLD AUTO: 0.4 X10E3/UL (ref 0.1–0.9)
MONOCYTES NFR BLD AUTO: 6 %
NEUTROPHILS # BLD AUTO: 3.7 X10E3/UL (ref 1.4–7)
NEUTROPHILS NFR BLD AUTO: 60 %
PLATELET # BLD AUTO: 313 X10E3/UL (ref 150–379)
RBC # BLD AUTO: 4.84 X10E6/UL (ref 3.77–5.28)
TSH SERPL DL<=0.05 MIU/L-ACNC: 0.69 UIU/ML (ref 0.36–3.74)
WBC # BLD AUTO: 6.1 X10E3/UL (ref 3.4–10.8)

## 2018-09-27 NOTE — PROGRESS NOTES
ESTEFANY reviewed recent A1C lab work: increase from 6 3 to 7 7  ESTEFANY did speak ASC diabetic educuator for further pt education  Metformin will increased to 500 mg BID and recheck in 3 months; if remains elevated will consider starting lantus insulin at bedtime and coverage with meals  Lipid panel also reviewed and remains elevated: chol 234, trig 251, HDL 54, and   Will increase pravastatin to 40 mg daily and will also add ASA 81 mg for cardiovascular risk  Pt will need to stop taking naprosyn when starting ASA  [address] will have staff call pt and update her on treatment plan

## 2018-09-28 ENCOUNTER — TELEPHONE (OUTPATIENT)
Dept: FAMILY MEDICINE CLINIC | Facility: CLINIC | Age: 58
End: 2018-09-28

## 2018-09-28 LAB
HIV1 RNA # SERPL NAA+PROBE: <20 COPIES/ML
HIV1 RNA SERPL NAA+PROBE-LOG#: NORMAL LOG10COPY/ML

## 2018-09-28 RX ORDER — PRAVASTATIN SODIUM 40 MG
40 TABLET ORAL DAILY
Qty: 90 TABLET | Refills: 2 | Status: SHIPPED | OUTPATIENT
Start: 2018-09-28 | End: 2018-11-19 | Stop reason: SDUPTHER

## 2018-09-28 RX ORDER — ASPIRIN 81 MG/1
81 TABLET ORAL DAILY
Qty: 90 TABLET | Refills: 2 | Status: SHIPPED | OUTPATIENT
Start: 2018-09-28 | End: 2019-04-03 | Stop reason: ALTCHOICE

## 2018-10-01 ENCOUNTER — PATIENT OUTREACH (OUTPATIENT)
Dept: SURGERY | Facility: CLINIC | Age: 58
End: 2018-10-01

## 2018-10-01 DIAGNOSIS — J45.20 MILD INTERMITTENT ASTHMA, UNSPECIFIED WHETHER COMPLICATED: Primary | ICD-10-CM

## 2018-10-01 RX ORDER — ALBUTEROL SULFATE 2.5 MG/3ML
2.5 SOLUTION RESPIRATORY (INHALATION) EVERY 6 HOURS PRN
Qty: 60 VIAL | Refills: 3 | Status: SHIPPED | OUTPATIENT
Start: 2018-10-01 | End: 2018-11-01 | Stop reason: SDUPTHER

## 2018-10-01 NOTE — PROGRESS NOTES
Ct called needs assistance to schedule transportation for Diabetes class for 10/3/18 from 1-3pm     [facility] [phone]    Transportation was scheduled  [address]  [address]   At [facility]  [phone]

## 2018-10-01 NOTE — PROGRESS NOTES
Ct will stop by to see cm on 10/3/18  [address]  [address] [facility]   At [facility]   T  [phone]

## 2018-10-02 ENCOUNTER — PATIENT OUTREACH (OUTPATIENT)
Dept: SURGERY | Facility: CLINIC | Age: 58
End: 2018-10-02

## 2018-10-02 NOTE — PROGRESS NOTES
Letter mailed to patient, reminder that recertification is due on November 2018 for case management  [address]  [address]   At [facility]  [phone]

## 2018-10-03 ENCOUNTER — OFFICE VISIT (OUTPATIENT)
Dept: FAMILY MEDICINE CLINIC | Facility: CLINIC | Age: 58
End: 2018-10-03
Payer: COMMERCIAL

## 2018-10-03 ENCOUNTER — PATIENT OUTREACH (OUTPATIENT)
Dept: SURGERY | Facility: CLINIC | Age: 58
End: 2018-10-03

## 2018-10-03 DIAGNOSIS — M25.562 ACUTE PAIN OF LEFT KNEE: ICD-10-CM

## 2018-10-03 DIAGNOSIS — E11.8 TYPE 2 DIABETES MELLITUS WITH COMPLICATION, WITHOUT LONG-TERM CURRENT USE OF INSULIN (HCC): Primary | ICD-10-CM

## 2018-10-03 PROCEDURE — G0109 DIAB MANAGE TRN IND/GROUP: HCPCS | Performed by: DIETITIAN, REGISTERED

## 2018-10-03 RX ORDER — NAPROXEN 500 MG/1
TABLET ORAL
Qty: 30 TABLET | Refills: 1 | Status: SHIPPED | OUTPATIENT
Start: 2018-10-03 | End: 2018-12-05 | Stop reason: SDUPTHER

## 2018-10-03 NOTE — PROGRESS NOTES
Samira Thorne came to my office today after attending to the Diabetes class   Vanessa Patel feels uncomfortable taking a class for 2 hours and doesnt like to be in a closed room  She reported that she prefers to meet with Rowena Favre individually for coaching  Cm informed diabetes instructor Ivan  [address]  [address]   At [facility]   T  [phone]

## 2018-10-03 NOTE — PROGRESS NOTES
Living Well with Diabetes Group Class #2    Jaydary Cummins attended the Living Well with Diabetes Group Class #2  During class, Meredith was instructed on the following topics: Macronutrients, Carbohydrate sources, What one serving of carbohydrate equals, effects of diet on blood glucose levels, effect of carbohydrates on blood glucose levels, basics of meal planning: balance, portions, meal times, measurements, reading food labels to determine carbohydrates  Haylee Elizondo participated in group activities of reading labels together and completing the meal planning activity  Hayleeaddie Elizondo will follow up with class #3  Will call with any questions or concerns prior to next session  The patient's history was reviewed and updated as appropriate: allergies, current medications  Lab Results   Component Value Date    HGBA1C 7 7 (H) 09/26/2018       Diabetes Education Record  Haylee Elizondo was provided Living Well with Diabetes Class #2 book, in [facility] [place]  Patient response to instruction    Comprehensiongood  Motivationfair  Expected Compliancefair    Begin Time: 1pm  End Time:3pm  Referring Provider: Marybeth Doddank you for referring your patient to [facility], it was a pleasure working with them today  Please feel free to call with any questions or concerns      [address] [place] [id number]

## 2018-10-03 NOTE — Clinical Note
Patient attended DSMT class 2  Info reviewed in [address], and pt  Verbalized understanding  Not sure of patient's commitment for follow-through

## 2018-10-04 ENCOUNTER — PATIENT OUTREACH (OUTPATIENT)
Dept: SURGERY | Facility: CLINIC | Age: 58
End: 2018-10-04

## 2018-10-04 NOTE — PROGRESS NOTES
Cm sent mail to client regarding financial seminar from [facility] on 10/29/18 at 6pm

## 2018-10-09 ENCOUNTER — PATIENT OUTREACH (OUTPATIENT)
Dept: SURGERY | Facility: CLINIC | Age: 58
End: 2018-10-09

## 2018-10-09 ENCOUNTER — OFFICE VISIT (OUTPATIENT)
Dept: SURGERY | Facility: CLINIC | Age: 58
End: 2018-10-09

## 2018-10-09 ENCOUNTER — TELEPHONE (OUTPATIENT)
Dept: SURGERY | Facility: CLINIC | Age: 58
End: 2018-10-09

## 2018-10-09 DIAGNOSIS — E11.8 TYPE 2 DIABETES MELLITUS WITH COMPLICATION, WITHOUT LONG-TERM CURRENT USE OF INSULIN (HCC): Primary | ICD-10-CM

## 2018-10-09 NOTE — PROGRESS NOTES
Assessment:     1:1 T2DM counseling     Nutrition Diagnosis    Problem: Excessive CHO intake    Related to: preference for high CHO foods    As Evidenced By: Dietary Recall and Elevated HbA1C    Nutrition Education Intervention: Provided     Person Educated: patient    Topics Discussed: Dietary preferences, sources of CHO from grains & breads, tracking     Teaching Method: Verbal, Written and Demonstration    Goals    Goal #1 Pt will track diet and blood sugar levels for 2 weeks    Education started - reinforce in follow-up  Visit Summary    Pt is seen for 1:1 counseling on diabetes  Pt is seen w/ assistance of her CM &  Lis Diaz  Pt is very motivated and engaged  Pt expressed concern regarding family hx of T2DM and family members requiring amputations due to uncontrolled T2DM  Pt also expressed frustration due to previously taking metformin 2x daily w/ controlled BS, currently taking 1x daily w/ uncontrolled BS  Educated on importance of controlling DM w/ diet & exercise  Pt states her morning BS is typically over 200  Educated pt on why morning BS would be high  Extensive dietary recall taken - showing very CHO heavy meals as well as high fat  Educated pt on sources and portion size of items such as rice, tortillas, plantains, potatoes  Educated pt on ways to put together favorite meals while limiting CHO  Educated pt on tracking diet and BS  Pt will come back in 2 weeks to review food diary & BS readings       Yuli Leon RDN, [place], [facility]

## 2018-10-10 NOTE — PROGRESS NOTES
Provided interpreting services for dietician and client  [address]  [address]   At [facility]  [phone]

## 2018-10-10 NOTE — PROGRESS NOTES
Ct  Contacted CM in regards to a medication that she did not know what it was for  CM encouraged client to call the clinic for more information  [address]  [address]   At [address] [place] [address]  [phone]

## 2018-10-11 ENCOUNTER — PATIENT OUTREACH (OUTPATIENT)
Dept: SURGERY | Facility: CLINIC | Age: 58
End: 2018-10-11

## 2018-10-11 NOTE — PROGRESS NOTES
Cm reminded ct of dental appt on 10/15, pt confirmed  Cm emailed dental clinic to confirm appt  Cm assisted ct with transportation arrangement with Dagoberto Mari  [address]  [address]   [facility]  [phone]

## 2018-10-12 ENCOUNTER — OFFICE VISIT (OUTPATIENT)
Dept: OBGYN CLINIC | Facility: MEDICAL CENTER | Age: 58
End: 2018-10-12

## 2018-10-12 ENCOUNTER — PATIENT OUTREACH (OUTPATIENT)
Dept: SURGERY | Facility: CLINIC | Age: 58
End: 2018-10-12

## 2018-10-12 VITALS
HEIGHT: 65 IN | DIASTOLIC BLOOD PRESSURE: 77 MMHG | SYSTOLIC BLOOD PRESSURE: 124 MMHG | BODY MASS INDEX: 27.49 KG/M2 | HEART RATE: 87 BPM | WEIGHT: 165 LBS

## 2018-10-12 DIAGNOSIS — M75.02 ADHESIVE CAPSULITIS OF LEFT SHOULDER: Primary | ICD-10-CM

## 2018-10-12 DIAGNOSIS — M75.112 INCOMPLETE TEAR OF LEFT ROTATOR CUFF: ICD-10-CM

## 2018-10-12 PROCEDURE — 99213 OFFICE O/P EST LOW 20 MIN: CPT | Performed by: ORTHOPAEDIC SURGERY

## 2018-10-12 NOTE — PROGRESS NOTES
Orthopaedic Surgery - Office Note  Jesusita Blandon (20 y o  female)   : 1960   MRN: [id number]  Encounter Date: 10/12/2018    Chief Complaint   Patient presents with    Left Shoulder - Follow-up       Assessment / Plan  Left shoulder adhesive capsulitis and partial thickness supraspinatus tear    · Weightbearing as tolerated left upper extremity  · Continue home therapy regimen  Return in about 6 weeks (around 2018)  History of Present Illness  Jessica Balderas is a 65 y o  female who presents Follow-up for left shoulder partial-thickness supraspinatus tear and adhesive capsulitis  Glenohumeral corticosteroid injection gave her significant pain relief  She is complaining a home exercise program and is experiencing pain relief and improved strength and range of motion  Review of Systems  Pertinent items are noted in HPI  All other systems were reviewed and are negative  Physical Exam  /77   Pulse 87   Ht 5' 5" (1 651 m)   Wt 74 8 kg (165 lb)   BMI 27 46 kg/m²   Cons: Appears well  No apparent distress  Psych: Alert  Oriented x3  Mood and affect normal   Eyes: PERRLA, EOMI  Resp: Normal effort  No audible wheezing or stridor  CV: Palpable pulse  No discernable arrhythmia  No LE edema  Lymph:  No palpable cervical, axillary, or inguinal lymphadenopathy  Skin: Warm  No palpable masses  No visible lesions  Neuro: Normal muscle tone  Normal and symmetric DTR's  Left Shoulder Exam  Alignment / Posture:  Normal shoulder posture  Inspection:  No swelling  Palpation:  No tenderness  ROM:  Forward elevation to 100 and 20° active  External rotation of 40°  Internal rotation L4  Strength:  4+/5 supraspinatus, infraspinatus, subscapularis  Stability:  No objective shoulder instability  Tests: No pertinent positive or negative tests  Neurovascular:  Sensation is intact distally    Extremities warm well perfused      Studies Reviewed  No studies to review    Procedures  No procedures today  Medical, Surgical, Family, and Social History  The patient's medical history, family history, and social history, were reviewed and updated as appropriate  Past Medical History:   Diagnosis Date    Arthritis     Cellulitis     Colon polyp     Diabetes mellitus ([facility] )     Fibromyalgia     Fibromyalgia     HIV (human immunodeficiency virus infection) ([facility] )     Hyperlipidemia     Pinguecula of both eyes     Presbyopia of both eyes        Past Surgical History:   Procedure Laterality Date    BREAST SURGERY      lumpectomy    COLONOSCOPY      last year, polyps, every 6 months    HYSTERECTOMY      INCISION AND DRAINAGE PERIRECTAL ABSCESS      TUBAL LIGATION         Family History   Problem Relation Age of Onset    Heart disease Mother     Diabetes Mother         type 1    Hypertension Mother     Cancer Sister        Social History     Occupational History    Not on file       Social History Main Topics    Smoking status: Current Every Day Smoker     Types: Cigarettes    Smokeless tobacco: Never Used    Alcohol use Yes      Comment: occasionally     Drug use: No    Sexual activity: No       No Known Allergies      Current Outpatient Prescriptions:     albuterol (2 5 mg/3 mL) 0 083 % nebulizer solution, Take 1 vial (2 5 mg total) by nebulization every 6 (six) hours as needed for wheezing or shortness of breath, Disp: 60 vial, Rfl: 3    albuterol (PROVENTIL HFA,VENTOLIN HFA) 90 mcg/act inhaler, Inhale 2 puffs, Disp: , Rfl:     ammonium lactate (LAC-HYDRIN) 12 % lotion, , Disp: , Rfl:     aspirin (ECOTRIN LOW STRENGTH) 81 mg EC tablet, Take 1 tablet (81 mg total) by mouth daily, Disp: 90 tablet, Rfl: 2    BANOPHEN 50 MG capsule, TAKE 1 CAPSULE BY MOUTH DAILY AT BEDTIME AS NEEDED FOR SLEEP, Disp: 30 capsule, Rfl: 1    bisacodyl (DULCOLAX) 5 mg EC tablet, Take 30 mg by mouth, Disp: , Rfl:     Blood Glucose Monitoring Suppl (TRUE METRIX METER) GIL daily , Disp: , Rfl:     Blood Glucose Monitoring Suppl GIL, daily  , Disp: , Rfl:     budesonide-formoterol (SYMBICORT) 160-4 5 mcg/act inhaler, Inhale 2 puffs, Disp: , Rfl:     busPIRone (BUSPAR) 5 mg tablet, Take 5 mg by mouth 2 (two) times a day, Disp: , Rfl:     clotrimazole (LOTRIMIN) 1 % cream, Apply topically 2 (two) times a day, Disp: , Rfl:     FLUoxetine (PROzac) 20 MG tablet, Take 20 mg by mouth daily, Disp: , Rfl:     FREESTYLE LITE test strip, USE TO TEST BLOOD SUGAR FOUR TIMES A DAY, Disp: 100 each, Rfl: 3    GENVOYA tablet, TAKE 1 TABLET BY MOUTH DAILY, Disp: 30 tablet, Rfl: 3    Glucose Blood (BLOOD GLUCOSE TEST STRIPS) STRP, 1 each, Disp: , Rfl:     glucose blood test strip, 1 each by Other route 4 (four) times a day, Disp: 100 each, Rfl: 0    lidocaine (LIDODERM) 5 %, Place 1 patch on the skin daily Remove & Discard patch within 12 hours or as directed by MD, Disp: 30 patch, Rfl: 0    lidocaine (LMX) 4 % cream, Apply topically as needed for mild pain, Disp: 30 g, Rfl: 0    metFORMIN (GLUCOPHAGE) 500 mg tablet, Take 1 tablet (500 mg total) by mouth 2 (two) times a day, Disp: 180 tablet, Rfl: 2    naproxen (NAPROSYN) 500 mg tablet, TAKE 1/2 TABLET (250MG TOTAL) BY MOUTH TWICE A DAY WITH MEALS, Disp: 30 tablet, Rfl: 1    polyethylene glycol (GLYCOLAX) powder, Drink per MD recommendations, Disp: , Rfl:     pravastatin (PRAVACHOL) 40 mg tablet, Take 1 tablet (40 mg total) by mouth daily, Disp: 90 tablet, Rfl: 2    sulfaSALAzine (AZULFIDINE-EN) 500 mg EC tablet, , Disp: , Rfl:     SUPER THIN LANCETS MISC, by Percutaneous route, Disp: , Rfl:     SUPER THIN LANCETS MISC, by Percutaneous route 4 (four) times a day, Disp: 100 each, Rfl: 3    traZODone (DESYREL) 50 mg tablet, , Disp: , Rfl: 0    cyclobenzaprine (FLEXERIL) 10 mg tablet, Take 1 tablet (10 mg total) by mouth 2 (two) times a day as needed for muscle spasms (Patient not taking: Reported on 9/25/2018 ), Disp: 15 tablet, Rfl: 0    ergocalciferol (DRISDOL) 85190 units capsule, Take 50,000 Units by mouth, Disp: , Rfl:     gabapentin (NEURONTIN) 800 mg tablet, TAKE 1 TABLET BY MOUTH THREE TIMES A DAY (Patient not taking: Reported on 9/25/2018), Disp: 90 tablet, Rfl: 3    ibuprofen (MOTRIN) 600 mg tablet, Take 1 tablet (600 mg total) by mouth every 6 (six) hours as needed for mild pain for up to 10 days, Disp: 30 tablet, Rfl: 0    Melatonin 5 MG TABS, Take 1 tablet by mouth at bedtime daily (Patient not taking: Reported on 9/25/2018 ), Disp: 90 tablet, Rfl: 3      Faraz Gomes    I,:    am acting as a scribe while in the presence of the attending physician :        I,:    personally performed the services described in this documentation    as scribed in my presence :

## 2018-10-12 NOTE — PROGRESS NOTES
Ct called asking cm to please call dental office to confirm appt  Cm told ct that she had confirmed the appt with the dental clinic when they arranged the transportation  [address]  [address]   [facility]  [phone]

## 2018-10-15 ENCOUNTER — PATIENT OUTREACH (OUTPATIENT)
Dept: SURGERY | Facility: CLINIC | Age: 58
End: 2018-10-15

## 2018-10-15 NOTE — PROGRESS NOTES
Ct called cm was inside [employer], running late for appt  Dental clinic will not see her after 15 min late  CM notified clinic  Cm emailed dental clinic to reschedule appt  Cm provided dental clinic phone number to client  [address]  [address]   At [facility]  [phone]

## 2018-10-16 ENCOUNTER — PATIENT OUTREACH (OUTPATIENT)
Dept: SURGERY | Facility: CLINIC | Age: 58
End: 2018-10-16

## 2018-10-16 NOTE — PROGRESS NOTES
Appt scheduled for dental on 10/18 at 2pm      Assisted ct with scheduling transportation with [facility]  Provided ct with information for scheduling future appts as they have a French team that can assist her in scheduling transportation appts  [address]  [address]   At [facility]  [phone]

## 2018-10-18 ENCOUNTER — PATIENT OUTREACH (OUTPATIENT)
Dept: SURGERY | Facility: CLINIC | Age: 58
End: 2018-10-18

## 2018-10-18 NOTE — PROGRESS NOTES
Cm mailed to ct letter reminder of upcoming appt with Dr Bernice Son and date to do labs  [address]  [address]   At [facility]  [phone]

## 2018-10-24 ENCOUNTER — PATIENT OUTREACH (OUTPATIENT)
Dept: SURGERY | Facility: CLINIC | Age: 58
End: 2018-10-24

## 2018-10-24 ENCOUNTER — OFFICE VISIT (OUTPATIENT)
Dept: SURGERY | Facility: CLINIC | Age: 58
End: 2018-10-24

## 2018-10-24 VITALS — WEIGHT: 166.6 LBS | BODY MASS INDEX: 27.72 KG/M2

## 2018-10-24 DIAGNOSIS — E11.9 TYPE 2 DIABETES MELLITUS WITHOUT COMPLICATION, WITHOUT LONG-TERM CURRENT USE OF INSULIN (HCC): Primary | ICD-10-CM

## 2018-10-24 NOTE — PROGRESS NOTES
Assessment:     T2DM management     Nutrition Diagnosis    Problem: Excessive CHO intake    Related to: Estimated intake inconsistent with measured nutritional needs and Cultural beliefs about food and nutrition    As Evidenced By: Patient Interview and Elevated HbA1C    Nutrition Education Intervention: Provided     Person Educated: patient    Topics Discussed: Tracking foods, portion sizes, fiber     Teaching Method: Verbal and Demonstration    Goals    Goal #1 Pt will decrease bread intake by half during breakfast and meals with sandwiches    Education started - reinforce in follow-up  Goal #2 Pt will track her diet/blood sugar daily     Goal met - education reinforced  Details: Pt will repeat for next appt  Visit Summary    Met w/ pt for f/u on T2DM management  Pt successfully tracked her food/blood sugar readings for 2 weeks  Blood glucose is typically around 150 - 170 but did increase to the 200's after CHO heavy meals/days  Educated pt on importance of portion control - limiting toast to 1 piece, sandwiches to half sandwiches  Pt states if she cuts out bread, she will be hungry  Encouraged to increase vegetables into meals  (in omelets, on sandwiches) to see if this helps w/ hunger  Pt understood  Also educated pt on her bread choice - her current bread is a white bread w/ no fiber  Pt states she will seek an alternative bread choice  Pt is going to continue to track her diet and f/u in 2 weeks       Libia Mcclendon RDN, LDN, [facility]

## 2018-10-24 NOTE — PROGRESS NOTES
Ct came in person, CM assisted with interpreting nutrition meeting with Felicia Chavarria  Ct also requested phone number for Roberto Berry CM explained that Cm has previously emailed the information  CM emailed info to ct  And provided ct phone number to contact [facility]  CM lvm to [facility] to please call ct  [address]  [address]   At [facility]/FREDY     [phone]

## 2018-10-25 DIAGNOSIS — B20 HIV (HUMAN IMMUNODEFICIENCY VIRUS INFECTION) (HCC): ICD-10-CM

## 2018-10-25 RX ORDER — ELVITEGRAVIR, COBICISTAT, EMTRICITABINE, AND TENOFOVIR ALAFENAMIDE 150; 150; 200; 10 MG/1; MG/1; MG/1; MG/1
TABLET ORAL
Qty: 30 TABLET | Refills: 3 | Status: SHIPPED | OUTPATIENT
Start: 2018-10-25 | End: 2019-03-26 | Stop reason: ALTCHOICE

## 2018-10-31 ENCOUNTER — TELEPHONE (OUTPATIENT)
Dept: GASTROENTEROLOGY | Facility: AMBULARY SURGERY CENTER | Age: 58
End: 2018-10-31

## 2018-10-31 DIAGNOSIS — E78.2 ELEVATED TRIGLYCERIDES WITH HIGH CHOLESTEROL: ICD-10-CM

## 2018-10-31 DIAGNOSIS — G62.9 NEUROPATHY: ICD-10-CM

## 2018-10-31 RX ORDER — GABAPENTIN 800 MG/1
TABLET ORAL
Qty: 90 TABLET | OUTPATIENT
Start: 2018-10-31

## 2018-10-31 RX ORDER — ONDANSETRON 2 MG/ML
4 INJECTION INTRAMUSCULAR; INTRAVENOUS ONCE AS NEEDED
Status: CANCELLED | OUTPATIENT
Start: 2018-10-31

## 2018-11-01 ENCOUNTER — PATIENT OUTREACH (OUTPATIENT)
Dept: SURGERY | Facility: CLINIC | Age: 58
End: 2018-11-01

## 2018-11-01 ENCOUNTER — HOSPITAL ENCOUNTER (OUTPATIENT)
Facility: HOSPITAL | Age: 58
Setting detail: OUTPATIENT SURGERY
Discharge: HOME | End: 2018-11-01
Attending: INTERNAL MEDICINE | Admitting: INTERNAL MEDICINE
Payer: COMMERCIAL

## 2018-11-01 VITALS
HEIGHT: 65 IN | BODY MASS INDEX: 27.66 KG/M2 | TEMPERATURE: 98.3 F | OXYGEN SATURATION: 99 % | HEART RATE: 86 BPM | DIASTOLIC BLOOD PRESSURE: 78 MMHG | WEIGHT: 166 LBS | SYSTOLIC BLOOD PRESSURE: 140 MMHG | RESPIRATION RATE: 16 BRPM

## 2018-11-01 DIAGNOSIS — J45.20 MILD INTERMITTENT ASTHMA, UNSPECIFIED WHETHER COMPLICATED: ICD-10-CM

## 2018-11-01 LAB — GLUCOSE SERPL-MCNC: 248 MG/DL (ref 65–140)

## 2018-11-01 PROCEDURE — 82948 REAGENT STRIP/BLOOD GLUCOSE: CPT

## 2018-11-01 PROCEDURE — 45378 DIAGNOSTIC COLONOSCOPY: CPT | Performed by: INTERNAL MEDICINE

## 2018-11-01 RX ORDER — SODIUM CHLORIDE 9 MG/ML
125 INJECTION, SOLUTION INTRAVENOUS CONTINUOUS
Status: DISCONTINUED | OUTPATIENT
Start: 2018-11-01 | End: 2018-11-01 | Stop reason: HOSPADM

## 2018-11-01 RX ORDER — ALBUTEROL SULFATE 2.5 MG/3ML
2.5 SOLUTION RESPIRATORY (INHALATION) EVERY 6 HOURS PRN
Qty: 60 VIAL | Refills: 0 | Status: SHIPPED | OUTPATIENT
Start: 2018-11-01 | End: 2019-03-12

## 2018-11-01 RX ORDER — PROPOFOL 10 MG/ML
INJECTION, EMULSION INTRAVENOUS AS NEEDED
Status: DISCONTINUED | OUTPATIENT
Start: 2018-11-01 | End: 2018-11-01 | Stop reason: SURG

## 2018-11-01 RX ADMIN — PROPOFOL 40 MG: 10 INJECTION, EMULSION INTRAVENOUS at 07:41

## 2018-11-01 RX ADMIN — LIDOCAINE HYDROCHLORIDE 50 MG: 20 INJECTION, SOLUTION INTRAVENOUS at 07:39

## 2018-11-01 RX ADMIN — PROPOFOL 160 MG: 10 INJECTION, EMULSION INTRAVENOUS at 07:39

## 2018-11-01 RX ADMIN — SODIUM CHLORIDE 125 ML/HR: 0.9 INJECTION, SOLUTION INTRAVENOUS at 07:13

## 2018-11-01 RX ADMIN — PROPOFOL 30 MG: 10 INJECTION, EMULSION INTRAVENOUS at 07:46

## 2018-11-01 NOTE — DISCHARGE INSTR - AVS FIRST PAGE
OPERATIVE REPORT  PATIENT NAME: Nova Wright    :  1960  MRN: [id number]  Pt Location: [facility] GI ROOM 01    SURGERY DATE: 2018    Surgeon(s) and Role:     * Imani Krause, DO - Primary    Preop Diagnosis:  Polyp of colon, unspecified part of colon, unspecified type [K63 5]    Post-Op Diagnosis Codes:     * Polyp of colon, unspecified part of colon, unspecified type [K63 5]    Procedure(s) (LRB):  COLONOSCOPY (N/A)    Specimen(s):  * No specimens in log *    Estimated Blood Loss:   Minimal    Drains:       Anesthesia Type:   Choice    Operative Indications:  Polyp of colon, unspecified part of colon, unspecified type [K63 5]      Operative Findings:    Colonoscopy Procedure Note    Procedure: Colonoscopy    Sedation: Monitored anesthesia care, check anesthesia records      ASA Class: 2    INDICATIONS: colon polyps    POST-OP DIAGNOSIS: See the impression below    Procedure Details     Prior colonoscopy: Less than 3 years ago  It is being repeated at an interval of less than 3 years because: This colonoscopy is being performed for a diagnostic indication, prior polyps, pt was given a one year follow up from her prior GI doctor     Informed consent was obtained for the procedure, including sedation  Risks of perforation, hemorrhage, adverse drug reaction and aspiration were discussed  The patient was placed in the left lateral decubitus position  Based on the pre-procedure assessment, including review of the patient's medical history, medications, allergies, and review of systems, she had been deemed to be an appropriate candidate for conscious sedation; she was therefore sedated with the medications listed below  The patient was monitored continuously with telemetry, pulse oximetry, blood pressure monitoring, and direct observations  A rectal examination was performed    The colonoscope was inserted into the rectum and advanced under direct vision to the cecum, which was identified by the ileocecal valve and appendiceal orifice  The quality of the colonic preparation was poor  A careful inspection was made as the colonoscope was withdrawn, including a retroflexed view of the rectum; findings and interventions are described below  Findings:  Solid stool throughout the colon  No polyps identified today  Complications: None; patient tolerated the procedure well  Impression:    Solid stool throughout the colon  No polyps identified today  Recommendations:  Repeat colonoscopy at a procedure date to be scheduled, would do a two day prep  Repeat colonoscopy is being recommended at an interval of less than 10 years, this is because of an inadequate bowel preparation          SIGNATURE: Ursula Ness DO  DATE: November 1, 2018  TIME: 7:48 AM

## 2018-11-01 NOTE — DISCHARGE INSTRUCTIONS
Colonoscopia   LO QUE NECESITA SABER:   Ricky colonoscopia es un procedimiento para examinar con un endoscopio el interior de arenas colon (intestino)  Valencia ricky colonoscopia, es posible que le retiren pólipos o crecimientos de tejidos  Es normal que se sienta inflamado o que tenga molestia abdominal  Usted debería estar expulsando los gases  Si tiene hemorroides o si le removieron pólipos, usted podría presentar ricky pequeña cantidad de sangrado  INSTRUCCIONES SOBRE EL SANTO HOSPITALARIA:   Busque atención médica de inmediato si:   · Usted presenta ricky cantidad marcellus de ara celsa brillante en john evacuaciones intestinales  · Arenas abdomen está justin y firme y usted siente dolor intenso  · Usted tiene dificultad repentina para respirar  Pregúntele a arenas [place] vitaminas y minerales son adecuados para usted  · Usted presenta sarpullido o urticaria  · Usted tiene fiebre dentro de las 24 horas después de arenas procedimiento  · Usted no ha tenido ricky evacuación intestinal después de 3 días de arenas procedimiento  · Usted tiene preguntas o inquietudes acerca de arenas condición o cuidado  Actividad:   · No levante nada, no se esfuerce o corra  por 3 días después de arenas procedimiento  · Descanse después de arenas procedimiento  A usted le clifford administrado medicamento para relajarse  No  maneje o tome decisiones importantes hasta el día siguiente de arenas procedimiento  Regrese a john actividades normales según le indiquen  · Alivie los gases y la incomodidad de la inflamación  acostándose en arenas costado derecho con ricky almohada térmica sobre arenas abdomen  Es posible que necesite caminar un poco para ayudar a eliminar los gases  Coma comidas pequeñas hasta que se alivie de la inflamación  Si a usted le removieron pólipos:  Por 7 días después de arenas procedimiento:  · No  tome aspirina  · No  realice paseos largos en jessica  Ayude a prevenir el estreñimiento:   · Consuma alimentos saludables y variados    Los alimentos saludables incluyen fruta, vegetales, panes integrales, productos lácteos bajo en grasa, frijoles, alessandro sin grasa, y pescado  Pregunte si necesita seguir ricky dieta especial  Arenas médico puede recomendarle que coma alimentos ricos en fibra, sraah frijoles cocidos  La fibra lo ayuda a tener evacuaciones intestinales regulares  · [address] se le haya indicado  Los adultos deberían de beber entre 9 a 13 vasos de 8 onzas de líquidos cada día  Pregunte cuál es la cantidad [place] para usted  Para [facility] Campus[facility], los mejores líquidos son Brooklynn Annie, y [place]  · Ejercítese según indicaciones  Consulte con arenas médico acerca de cuál es el mejor régimen de ejercicio para usted  El ejercicio puede ayudar a prevenir estreñimiento, reducir arenas presión arterial y [employer]  Acuda a john consultas de control con arenas médico según le indicaron  Anote john preguntas para que se acuerde de hacerlas tyler john visitas  © 2017 [address] Information is for End User's use only and may not be sold, redistributed or otherwise used for commercial purposes  All illustrations and images included in CareNotes® are the copyrighted property of A D A M , Inc  or Victoriano Vuong  Esta información es sólo para uso en educación  Arenas intención no es darle un consejo médico sobre enfermedades o tratamientos  Colsulte con arenas [place] farmacéutico antes de seguir cualquier régimen médico para saber si es seguro y efectivo para usted

## 2018-11-01 NOTE — PROGRESS NOTES
Cm assisted ct to schedule Sana Handler [phone]     Transportation was scheduled for Monday 11/5/18  [address]  [address]   At [facility]  [phone]

## 2018-11-01 NOTE — ANESTHESIA PREPROCEDURE EVALUATION
Review of Systems/Medical History  Patient summary reviewed  Chart reviewed  No history of anesthetic complications     Cardiovascular  Hyperlipidemia,    Pulmonary  Smoker cigarette smoker  , Asthma , well controlled/ stable Last rescue: < 1 year ago Asthma type of rescue: PRN inhaler,        GI/Hepatic    Bowel prep  Comment: HCP          Endo/Other  Diabetes well controlled type 2 Oral agent,   Comment: HIV POSITIVE    GYN       Hematology   Musculoskeletal    Arthritis     Neurology    Diabetic neuropathy, Fibromyalgia   Psychology   Depression , depressed and being treated for depression,              Physical Exam    Airway    Mallampati score: II  TM Distance: >3 FB  Neck ROM: full     Dental   No notable dental hx     Cardiovascular  Rhythm: regular, Rate: normal, Cardiovascular exam normal    Pulmonary  Pulmonary exam normal Breath sounds clear to auscultation,     Other Findings        Anesthesia Plan  ASA Score- 3     Anesthesia Type- IV sedation with anesthesia with ASA Monitors  Additional Monitors:   Airway Plan:         Plan Factors-Patient not instructed to abstain from smoking on day of procedure  Patient did not smoke on day of surgery  Induction- intravenous  Postoperative Plan-     Informed Consent- Anesthetic plan and risks discussed with patient (NIECE TRANSLATING)

## 2018-11-01 NOTE — H&P
History and Physical - [facility]  Jerrod Steward 65 y o  female MRN: [id number]      HPI: Jerrod Steward is a 65y o  year old female who presents for colonoscopy due to prior polyps on her last two colonoscopies  Her last colonoscopy was one year ago and she tells me she was told to have a repeat colonoscopy in one year  She has HIV and is on treatment  REVIEW OF SYSTEMS: Per the HPI, and otherwise unremarkable      Historical Information   Past Medical History:   Diagnosis Date    Arthritis     Cellulitis     Colon polyp     Diabetes mellitus ([facility] )     Fibromyalgia     Fibromyalgia     HIV (human immunodeficiency virus infection) ([facility] )     Hyperlipidemia     Pinguecula of both eyes     Presbyopia of both eyes      Past Surgical History:   Procedure Laterality Date    BREAST SURGERY      lumpectomy    COLONOSCOPY      last year, polyps, every 6 months    HYSTERECTOMY      INCISION AND DRAINAGE PERIRECTAL ABSCESS      TUBAL LIGATION       Social History   History   Alcohol Use    Yes     Comment: occasionally      History   Drug Use No     History   Smoking Status    Current Every Day Smoker    Types: Cigarettes   Smokeless Tobacco    Never Used     Family History   Problem Relation Age of Onset    Heart disease Mother     Diabetes Mother         type 1    Hypertension Mother     Cancer Sister        Meds/Allergies     Prescriptions Prior to Admission   Medication    albuterol (2 5 mg/3 mL) 0 083 % nebulizer solution    albuterol (PROVENTIL HFA,VENTOLIN HFA) 90 mcg/act inhaler    ammonium lactate (LAC-HYDRIN) 12 % lotion    aspirin (ECOTRIN LOW STRENGTH) 81 mg EC tablet    BANOPHEN 50 MG capsule    bisacodyl (DULCOLAX) 5 mg EC tablet    Blood Glucose Monitoring Suppl (TRUE METRIX METER) GIL    Blood Glucose Monitoring Suppl GIL    budesonide-formoterol (SYMBICORT) 160-4 5 mcg/act inhaler    busPIRone (BUSPAR) 5 mg tablet    clotrimazole (LOTRIMIN) 1 % cream    ergocalciferol (DRISDOL) 13095 units capsule    FLUoxetine (PROzac) 20 MG tablet    FREESTYLE LITE test strip    gabapentin (NEURONTIN) 800 mg tablet    GENVOYA tablet    Glucose Blood (BLOOD GLUCOSE TEST STRIPS) STRP    glucose blood test strip    ibuprofen (MOTRIN) 600 mg tablet    lidocaine (LIDODERM) 5 %    lidocaine (LMX) 4 % cream    Melatonin 5 MG TABS    metFORMIN (GLUCOPHAGE) 500 mg tablet    naproxen (NAPROSYN) 500 mg tablet    polyethylene glycol (GLYCOLAX) powder    pravastatin (PRAVACHOL) 40 mg tablet    sulfaSALAzine (AZULFIDINE-EN) 500 mg EC tablet    SUPER THIN LANCETS MISC    SUPER THIN LANCETS MISC    traZODone (DESYREL) 50 mg tablet       No Known Allergies    Objective     Blood pressure 122/78, pulse 96, temperature 98 3 °F (36 8 °C), temperature source Temporal, resp  rate 16, height 5' 5" (1 651 m), weight 75 3 kg (166 lb), SpO2 99 %, not currently breastfeeding  PHYSICAL EXAM    Gen: NAD  CV: RRR  CHEST: Clear  ABD: soft, NT/ND  EXT: no edema      ASSESSMENT/PLAN:  This is a 65y o  year old female here for colonoscopy, and she is stable and optimized for her procedure

## 2018-11-01 NOTE — OP NOTE
OPERATIVE REPORT  PATIENT NAME: Jesusita Blandon    :  1960  MRN: [id number]  Pt Location: [facility]    SURGERY DATE: 2018    Surgeon(s) and Role:     * Beau John, DO - Primary    Preop Diagnosis:  Polyp of colon, unspecified part of colon, unspecified type [K63 5]    Post-Op Diagnosis Codes:     * Polyp of colon, unspecified part of colon, unspecified type [K63 5]    Procedure(s) (LRB):  COLONOSCOPY (N/A)    Specimen(s):  * No specimens in log *    Estimated Blood Loss:   Minimal    Drains:       Anesthesia Type:   Choice    Operative Indications:  Polyp of colon, unspecified part of colon, unspecified type [K63 5]      Operative Findings:    Colonoscopy Procedure Note    Procedure: Colonoscopy    Sedation: Monitored anesthesia care, check anesthesia records      ASA Class: 2    INDICATIONS: colon polyps    POST-OP DIAGNOSIS: See the impression below    Procedure Details     Prior colonoscopy: Less than 3 years ago  It is being repeated at an interval of less than 3 years because: This colonoscopy is being performed for a diagnostic indication, prior polyps, pt was given a one year follow up from her prior GI doctor     Informed consent was obtained for the procedure, including sedation  Risks of perforation, hemorrhage, adverse drug reaction and aspiration were discussed  The patient was placed in the left lateral decubitus position  Based on the pre-procedure assessment, including review of the patient's medical history, medications, allergies, and review of systems, she had been deemed to be an appropriate candidate for conscious sedation; she was therefore sedated with the medications listed below  The patient was monitored continuously with telemetry, pulse oximetry, blood pressure monitoring, and direct observations  A rectal examination was performed    The colonoscope was inserted into the rectum and advanced under direct vision to the cecum, which was identified by the ileocecal valve and appendiceal orifice  The quality of the colonic preparation was poor  A careful inspection was made as the colonoscope was withdrawn, including a retroflexed view of the rectum; findings and interventions are described below  Findings:  Solid stool throughout the colon  No polyps identified today  Complications: None; patient tolerated the procedure well  Impression:    Solid stool throughout the colon  No polyps identified today  Recommendations:  Repeat colonoscopy at a procedure date to be scheduled, would do a two day prep  Repeat colonoscopy is being recommended at an interval of less than 10 years, this is because of an inadequate bowel preparation          SIGNATURE: Jennifer Morales DO  DATE: November 1, 2018  TIME: 7:48 AM

## 2018-11-01 NOTE — PROGRESS NOTES
Ct concerned about falling, and sugar today 216 and went up to 248  Ct had colonoscopy today  Cm talked to [facility]- she scheduled appt with Ladkarishma Garcia for Monday  Spoke to nutritionist, will call ct - explained increase level of sugar could be due to stress  Cm explained to ct  [address]  [facility]   At [facility]   T  [phone]

## 2018-11-02 ENCOUNTER — PATIENT OUTREACH (OUTPATIENT)
Dept: SURGERY | Facility: CLINIC | Age: 58
End: 2018-11-02

## 2018-11-02 ENCOUNTER — TELEPHONE (OUTPATIENT)
Dept: SURGERY | Facility: CLINIC | Age: 58
End: 2018-11-02

## 2018-11-02 DIAGNOSIS — Z79.4 TYPE 2 DIABETES MELLITUS WITHOUT COMPLICATION, WITH LONG-TERM CURRENT USE OF INSULIN (HCC): ICD-10-CM

## 2018-11-02 DIAGNOSIS — E11.9 TYPE 2 DIABETES MELLITUS WITHOUT COMPLICATION, WITH LONG-TERM CURRENT USE OF INSULIN (HCC): ICD-10-CM

## 2018-11-02 RX ORDER — PRAVASTATIN SODIUM 20 MG
TABLET ORAL
Qty: 90 TABLET | Refills: 2 | OUTPATIENT
Start: 2018-11-02

## 2018-11-02 RX ORDER — LANCETS
EACH MISCELLANEOUS 4 TIMES DAILY
Qty: 100 EACH | Refills: 0 | Status: SHIPPED | OUTPATIENT
Start: 2018-11-02 | End: 2019-03-04 | Stop reason: SDUPTHER

## 2018-11-02 NOTE — PROGRESS NOTES
Cm met with nutritionist provided translation in Trin[place] for patient's eating log  [address]  [facility]   At [facility]  [phone]

## 2018-11-02 NOTE — TELEPHONE ENCOUNTER
Called pt review her food/blood sugar log  Pt is resistance to change  Pt's blood sugar is regularly between 150 - 200+, occasionally over 250  Pt eats very carb heavy meals - rice, processed foods  Pt will schedule a f/u office visit week of 11/12       Lizbeth Wagoner RDN, LDN, [facility]

## 2018-11-02 NOTE — PROGRESS NOTES
Cm provided interpreting services for dietician to ct  [address]  [facility]   At [facility]  [phone]

## 2018-11-05 ENCOUNTER — OFFICE VISIT (OUTPATIENT)
Dept: SURGERY | Facility: CLINIC | Age: 58
End: 2018-11-05
Payer: COMMERCIAL

## 2018-11-05 ENCOUNTER — PATIENT OUTREACH (OUTPATIENT)
Dept: SURGERY | Facility: CLINIC | Age: 58
End: 2018-11-05

## 2018-11-05 VITALS
OXYGEN SATURATION: 99 % | BODY MASS INDEX: 27.92 KG/M2 | SYSTOLIC BLOOD PRESSURE: 126 MMHG | WEIGHT: 167.6 LBS | TEMPERATURE: 98.1 F | HEART RATE: 67 BPM | HEIGHT: 65 IN | RESPIRATION RATE: 18 BRPM | DIASTOLIC BLOOD PRESSURE: 64 MMHG
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
## 2018-11-05 DIAGNOSIS — B20 HIV (HUMAN IMMUNODEFICIENCY VIRUS INFECTION) (HCC): Primary | ICD-10-CM

## 2018-11-05 DIAGNOSIS — Z23 NEED FOR INFLUENZA VACCINATION: ICD-10-CM

## 2018-11-05 DIAGNOSIS — F17.219 CIGARETTE NICOTINE DEPENDENCE WITH NICOTINE-INDUCED DISORDER: ICD-10-CM

## 2018-11-05 DIAGNOSIS — E11.9 TYPE 2 DIABETES MELLITUS WITHOUT COMPLICATION, WITHOUT LONG-TERM CURRENT USE OF INSULIN (HCC): ICD-10-CM

## 2018-11-05 DIAGNOSIS — E78.2 ELEVATED TRIGLYCERIDES WITH HIGH CHOLESTEROL: ICD-10-CM

## 2018-11-05 PROCEDURE — 99215 OFFICE O/P EST HI 40 MIN: CPT | Performed by: NURSE PRACTITIONER

## 2018-11-05 NOTE — ASSESSMENT & PLAN NOTE
Pt reports taking medication daily and pt reports missing 1 dose on the day of her colonoscopy  CRNP stressed the importance of taking medication 100% of the time  HIV Counseling:    Viral Load:    CD4 Count:    ART: Diane Salmon    Denies side effects  Stressed the importance of adherence  Continue follow up in the ID clinic with Dr Nannette Keller  Reviewed the most recent labs, including the Cd4 and viral load  Discussed the risks and benefits of treatment options, instructions for management, importance of treatment adherence, and reduction of risk factors  Educated on possible medication side effects  Counseled on routes of HIV transmission, including the risk of  infection  Emphasized that viral suppression is the best method to prevent HIV transmission  At this time the pt denies the need for HIV testing of anyone in their life  Total encounter time was greater than 35 minutes  Greater than 20 minutes were spent on counseling and patient education  Pt voices understanding and agreement with treatment plan

## 2018-11-05 NOTE — ASSESSMENT & PLAN NOTE
Ongoing  Pt reports 1 pack over 3 days  Nicotine Dependency - Primary    Counseled for greater than 15 minutes on the importance of smoking cessation  Education was given regarding the cardiovascular effects of how nicotine affects the heart, lungs, kidneys,and peripheral vascular system    Referred to Oli Leon [address] [place] [address] for enrollment in smoking cessation program

## 2018-11-05 NOTE — ASSESSMENT & PLAN NOTE
Lab Results   Component Value Date    HGBA1C 7 7 (H) 09/26/2018     Reviewed pt bs and diet log:  BS running 130's to 190's  Pt does check bs 3 times per day  CRNP stressed the importance of changing her dietary intake otherwise she end up on taking insulin  Pt will continue with bs checks and diet log  Pt reports not taking metformin bid only daily  CRNP stressed the importance of taking medication BID  Staff will call pharmacy to make sure pt is being dispense as prescribed  Pt reports metformin is 500 mg  daily  Continue to take metformin 500 mg BID  CRNP will have dietician work with pt on portion control and healthier snack options and meal planning  Pt verbalized understanding

## 2018-11-05 NOTE — ASSESSMENT & PLAN NOTE
Pt reports falling down stairs 3 times after right knee giving out  Pt has not had any further issues  Pt is following up with orthopedics  Will continue to monitor

## 2018-11-05 NOTE — PROGRESS NOTES
Pt came in person to complete re-certification and reassessment  Pt is not sexually active, heterosexual,  Reports that understands that if sexually active to use condoms  Ct reports to be taking her medications as prescribed and attending to medical appointments  Cm educated ct about prevention and risk reduction  ct lives with family member however she reports being independent and paying rent, receives social security,has medicaid gateway  Ct sees Dr Skinny Scott for ID care and Beth ALLISON for primary care  Not active with SA  Attending to counseling at [facility]  Denies any domestic violence  [address]  [facility] [address] [facility]   [facility]  [phone]

## 2018-11-05 NOTE — PROGRESS NOTES
Assessment/Plan:    Type 2 diabetes mellitus without complication, without long-term current use of insulin (HCC)  Lab Results   Component Value Date    HGBA1C 7 7 (H) 2018     Reviewed pt bs and diet log:  BS running 130's to 190's  Pt does check bs 3 times per day  CRNP stressed the importance of changing her dietary intake otherwise she end up on taking insulin  Pt will continue with bs checks and diet log  Pt reports not taking metformin bid only daily  CRNP stressed the importance of taking medication BID  Staff will call pharmacy to make sure pt is being dispense as prescribed  Pt reports metformin is 500 mg  daily  Continue to take metformin 500 mg BID  CRNP will have dietician work with pt on portion control and healthier snack options and meal planning  Pt verbalized understanding  Nicotine dependence  Ongoing  Pt reports 1 pack over 3 days  Nicotine Dependency - Primary    Counseled for greater than 15 minutes on the importance of smoking cessation  Education was given regarding the cardiovascular effects of how nicotine affects the heart, lungs, kidneys,and peripheral vascular system  Referred to [facility] for enrollment in smoking cessation program         Elevated triglycerides with high cholesterol  Ongoing  ASCVD risk; 15 8%  Continue taking pravastatin 40 mg daily  Pt was not interested in starting Fenofibrate  Lipids:   Chol: 234, 321  Tri, 333  HDL: 54, and 41  LDL: 130, and 213    HIV disease ([address] )  Pt reports taking medication daily and pt reports missing 1 dose on the day of her colonoscopy  CRNP stressed the importance of taking medication 100% of the time  HIV Counseling:    Viral Load:    CD4 Count:    ART: Aron Mondragon    Denies side effects  Stressed the importance of adherence  Continue follow up in the ID clinic with Dr Enio Rouse  Reviewed the most recent labs, including the Cd4 and viral load    Discussed the risks and benefits of treatment options, instructions for management, importance of treatment adherence, and reduction of risk factors  Educated on possible medication side effects  Counseled on routes of HIV transmission, including the risk of  infection  Emphasized that viral suppression is the best method to prevent HIV transmission  At this time the pt denies the need for HIV testing of anyone in their life  Total encounter time was greater than 35 minutes  Greater than 20 minutes were spent on counseling and patient education  Pt voices understanding and agreement with treatment plan  Falls, initial encounter      Pt reports falling down stairs 3 times after right knee giving out  Pt has not had any further issues  Pt is following up with orthopedics  Will continue to monitor  Diagnoses and all orders for this visit:    HIV (human immunodeficiency virus infection) ([facility] [address] )    Need for influenza vaccination  -     Cancel: influenza vaccine, [id number], quadrivalent, recombinant, PF, 0 5 mL, for patients 18 yr+ (FLUBLOK)    Cigarette nicotine dependence with nicotine-induced disorder    Elevated triglycerides with high cholesterol    Type 2 diabetes mellitus without complication, without long-term current use of insulin ([facility] )  -     HEMOGLOBIN A1C W/ EAG ESTIMATION;  Future    Falls, initial encounter        Lab Results   Component Value Date    K 4 3 2018     2018    CO2 26 2018    BUN 16 2018    CREATININE 0 72 2018    GLUF 234 (H) 2018    CALCIUM 9 0 2018    AST 8 2018    ALT 20 2018    ALKPHOS 73 2018    EGFR 94 2018     Lab Results   Component Value Date    WBC 6 09 2018    WBC 6 1 2018    HGB 15 3 2018    HGB 15 5 2018    HCT 47 8 (H) 2018    HCT 47 4 (H) 2018     (H) 2018    MCV 98 (H) 2018     2018     2018     Patient Active Problem List   Diagnosis    Asthma    Colon polyps    Type 2 diabetes mellitus without complication, without long-term current use of insulin (HCC)    Fibromyalgia    MDD (major depressive disorder), single episode, severe with psychotic features ([facility] )    Multiple environmental allergies    Nicotine dependence    Poor dentition    H/O abdominal hysterectomy    Hidradenitis suppurativa    Effusion of left knee    High cholesterol    HIV disease (HCC)    Mild intermittent asthma without complication    Neuropathy    Osteopenia    Periodontal disease    Acute pain of left shoulder    Primary insomnia    Polyp of colon    Elevated triglycerides with high cholesterol    Knee strain, left, sequela    Incomplete tear of left rotator cuff    Adhesive capsulitis of left shoulder    Noncompliance with medications    Numbness and tingling of both lower extremities    Falls, initial encounter     Subjective:      Patient ID: Damon Gould is a 65 y o  female  Pt is being seen in the office today for follow up appointment with PCP for "falling 3 times in one day "  Pt reports being good  Pt had fallen down the stairs 3 times within 24 hours before colonoscopy and fel ldown the last 4 steps and injured her right foot and she is seeing the orthopedic specialist on Nov 15th  Pt has picture of foot and swollen toes  Pt has fallen down steps aafter her knee gave outand hit foot on steps to prevent fall by grabbing the railing and hit toes on foot very hard  Pt was able to wear closed toes shoes and was able to walk to pharmacy without any problems  Pt reports small area of ecchymosis on right foot joint at 2nd and 3rd toe and across arch of foot  Pt reports not having any pain and walking ok without pain  Pt reports taking naprosyn the day of the injury with good results  CRNP reviewed notes from phone conversation with Ayaka Frey this past Friday  Pt continues to be noncompliant with diet management of Type 2 DM    CRNP reviewed pt's daily BS and Diet log  Pt continues to drink soda for breakfast and most meals  Pt's main dietary intake includes: processed ham, cheese and bread for breakfast, rice and beans with fried eggs, chicken, cheese and fried plantains, and marinade sweetbread plain  Pt does use pork and beef sometimes and does exchange rice for mashed potatoes or french fries  A1C went from 6 3 to 7 7 almost over a year  Pt has been meeting with dietician to change to a healthier dietary intake but pt is unwilling to make necessary changes  The pt brought in the past weeks bs and diet log and the bs were running higher 184-220  Pt is asking to stop eating candy and ESTEFANY is happy with that acknowledgement  CNRP is attempting to explain to pt that her base dietary intake in a 24 hours in 75 % carbohydrates  Pt is focusing on sugar intake and not understanding how important it is to eat regular meals and eat snacks at bedtime  Pt is basically eating 2 meals per day and the pancrease is working harder  CNRP stressed the importance of using [place] to help with snacks and over production of glucose floating around in the body  Pt acknowledged and understand what she was told  CRNP explained how uncontrolled BS effect the blood vessels in the body and effect every organ and eventually can lead to increase risk for stroke or heart attack  Pt verbalized understanding  CRNP just wants to keep the pt healthy and prevent all of these complications from happening  Pt is German speaking and interpretor was present during the entire conversation  Pt has a strong baldomero history of DM and maternal grandmother had DM also  The following portions of the patient's history were reviewed and updated as appropriate: allergies, current medications, past medical history and problem list     Review of Systems   Respiratory: Negative  Cardiovascular: Negative  Musculoskeletal: Positive for myalgias  Psychiatric/Behavioral: Negative  Objective:      /64 (BP Location: Right arm, Patient Position: Sitting, Cuff Size: Standard)   Pulse 67   Temp 98 1 °F (36 7 °C)   Resp 18   Ht 5' 5" (1 651 m)   Wt 76 kg (167 lb 9 6 oz)   SpO2 99%   BMI 27 89 kg/m²          Physical Exam   Constitutional: She is oriented to person, place, and time  She appears well-developed and well-nourished  Cardiovascular: Normal rate, regular rhythm and normal heart sounds  Pulmonary/Chest: Effort normal and breath sounds normal    Musculoskeletal: Normal range of motion  Neurological: She is oriented to person, place, and time  Skin: Skin is warm and dry  Ecchymosis noted  Psychiatric: She has a normal mood and affect  Her behavior is normal    Nursing note and vitals reviewed

## 2018-11-05 NOTE — PATIENT INSTRUCTIONS
Cigarette Smoking and Your Health   AMBULATORY CARE:   Risks to your health if you smoke:  Nicotine and other chemicals found in tobacco damage every cell in your body  Even if you are a light smoker, you have an increased risk for cancer, heart disease, and lung disease  If you are pregnant or have diabetes, smoking increases your risk for complications  Benefits to your health if you stop smoking:   · You decrease respiratory symptoms such as coughing, wheezing, and shortness of breath  · You reduce your risk for cancers of the lung, mouth, throat, kidney, bladder, pancreas, stomach, and cervix  If you already have cancer, you increase the benefits of chemotherapy  You also reduce your risk for cancer returning or a second cancer from developing  · You reduce your risk for heart disease, blood clots, heart attack, and stroke  · You reduce your risk for lung infections, and diseases such as pneumonia, asthma, chronic bronchitis, and emphysema  · Your circulation improves  More oxygen can be delivered to your body  If you have diabetes, you lower your risk for complications, such as kidney, artery, and eye diseases  You also lower your risk for nerve damage  Nerve damage can lead to amputations, poor vision, and blindness  · You improve your body's ability to heal and to fight infections  Benefits to the health of others if you stop smoking:  Tobacco is harmful to nonsmokers who breathe in your secondhand smoke  The following are ways the health of others around you may improve when you stop smoking:  · You lower the risks for lung cancer and heart disease in nonsmoking adults  · If you are pregnant, you lower the risk for miscarriage, early delivery, low birth weight, and stillbirth  You also lower your baby's risk for SIDS, obesity, developmental delay, and neurobehavioral problems, such as ADHD       · If you have children, you lower their risk for ear infections, colds, pneumonia, bronchitis, and asthma  For more information and support to stop smoking:   · Smokefree  gov  Phone: 5- 036 - 149-8800  Web Address: www smokefree  gov  Follow up with your healthcare provider as directed:  Write down your questions so you remember to ask them during your visits  © 2017 [employer] Information is for End User's use only and may not be sold, redistributed or otherwise used for commercial purposes  All illustrations and images included in CareNotes® are the copyrighted property of [employer]  or Victoriano Vuong  The above information is an  only  It is not intended as medical advice for individual conditions or treatments  Talk to your doctor, nurse or pharmacist before following any medical regimen to see if it is safe and effective for you  Hyperlipidemia   AMBULATORY CARE:   Hyperlipidemia  is a high level of lipids (fats) in your blood  These lipids include cholesterol or triglycerides  Lipids are made by your body  They also come from the foods you eat  Your body needs lipids to work properly, but high levels increase your risk for heart disease, heart attack, and stroke  Call 911 for any of the following:   · You have any of the following signs of a heart attack:      ¨ Squeezing, pressure, or pain in your chest that lasts longer than 5 minutes or returns    ¨ Discomfort or pain in your back, neck, jaw, stomach, or arm     ¨ Trouble breathing    ¨ Nausea or vomiting    ¨ Lightheadedness or a sudden cold sweat, especially with chest pain or trouble breathing    · You have any of the following signs of a stroke:      ¨ Numbness or drooping on one side of your face     ¨ Weakness in an arm or leg    ¨ Confusion or difficulty speaking    ¨ Dizziness, a severe headache, or vision loss  Contact your healthcare provider if:   · You have questions or concerns about your condition or care      Treatment of hyperlipidemia  may first include lifestyle changes to help decrease your lipid levels  You may also need to take medicine to lower your lipid levels  Some of the lifestyle changes you may need to make include the following:  · Maintain a healthy weight  Ask your healthcare provider how much you should weigh  Ask him or her to help you create a weight loss plan if you are overweight  Weight loss can decrease your cholesterol and triglyceride levels  · Exercise as directed  Exercise lowers your cholesterol levels and helps you maintain a healthy weight  Get 40 minutes or more of moderate exercise 3 to 4 days each week  You can split your exercise into four 10-minute workouts instead of 40 minutes at one time  Examples of moderate exercises include walking briskly, swimming, or riding a bike  Work with your healthcare provider to plan the best exercise program for you  · Do not smoke  Nicotine and other chemicals in cigarettes and cigars can increase your risk for a heart attack and stroke  Ask your healthcare provider for information if you currently smoke and need help to quit  E-cigarettes or smokeless tobacco still contain nicotine  Talk to your healthcare provider before you use these products  · Eat heart-healthy foods  Talk to your dietitian about a heart-healthy diet  The following will help you manage hyperlipidemia:     ¨ Decrease the total amount of fat you eat  Choose lean meats, fat-free or 1% fat milk, and low-fat dairy products, such as yogurt and cheese  Limit or do not eat red meat  Red meats are high in fat and cholesterol  ¨ Replace unhealthy fats with healthy fats  Unhealthy fats include saturated fat, trans fat, and cholesterol  Choose soft margarines that are low in saturated fat and have little or no trans fat  Monounsaturated fats are healthy fats  These are found in olive oil, canola oil, avocado, and nuts  Polyunsaturated fats are also healthy  These are found in fish, flaxseed, walnuts, and soybeans       ¨ Eat fruits and vegetables every day  They are low in calories and fat and a good source of essential vitamins  Include dark green, red, and orange vegetables  Examples include spinach, kale, broccoli, and carrots  ¨ Eat foods high in fiber  Choose whole grain, high-fiber foods  Good choices include whole-wheat breads or cereals, beans, peas, fruits, and vegetables  · Ask your healthcare provider if it is safe for you to drink alcohol  Alcohol can increase your cholesterol and triglyceride levels  A drink of alcohol is 12 ounces of beer, 5 ounces of wine, or 1½ ounces of liquor  Follow up with your healthcare provider as directed: You may need to return for more tests  Your healthcare provider may refer you to a dietitian  Write down your questions so you remember to ask them during your visits  © 2017 [employer] Information is for End User's use only and may not be sold, redistributed or otherwise used for commercial purposes  All illustrations and images included in CareNotes® are the copyrighted property of [employer]  or Victoriano Vuong  The above information is an  only  It is not intended as medical advice for individual conditions or treatments  Talk to your doctor, nurse or pharmacist before following any medical regimen to see if it is safe and effective for you

## 2018-11-05 NOTE — ASSESSMENT & PLAN NOTE
Ongoing  ASCVD risk; 15 8%  Continue taking pravastatin 40 mg daily  Pt was not interested in starting Fenofibrate    Lipids:   Chol: 234, 321  Tri, 333  HDL: 54, and 41  LDL: 130, and 213

## 2018-11-08 DIAGNOSIS — G62.9 NEUROPATHY: ICD-10-CM

## 2018-11-08 RX ORDER — GABAPENTIN 800 MG/1
800 TABLET ORAL 3 TIMES DAILY
Qty: 270 TABLET | Refills: 2 | Status: SHIPPED | OUTPATIENT
Start: 2018-11-08 | End: 2018-11-19 | Stop reason: SDUPTHER

## 2018-11-15 ENCOUNTER — PATIENT OUTREACH (OUTPATIENT)
Dept: SURGERY | Facility: CLINIC | Age: 58
End: 2018-11-15

## 2018-11-15 NOTE — PROGRESS NOTES
Ct called back  Approved for food stamps  [address]   At [facility]   T  [phone]

## 2018-11-15 NOTE — PROGRESS NOTES
Cm sent email to ct following up with fs renewal the ct completed at [facility] office  [address]  [address]   At [address] [facility]  [phone]

## 2018-11-19 DIAGNOSIS — E78.2 ELEVATED TRIGLYCERIDES WITH HIGH CHOLESTEROL: ICD-10-CM

## 2018-11-19 DIAGNOSIS — G62.9 NEUROPATHY: ICD-10-CM

## 2018-11-19 DIAGNOSIS — E78.00 HYPERCHOLESTEREMIA: ICD-10-CM

## 2018-11-19 RX ORDER — GABAPENTIN 800 MG/1
800 TABLET ORAL 3 TIMES DAILY
Qty: 270 TABLET | Refills: 2 | Status: SHIPPED | OUTPATIENT
Start: 2018-11-19 | End: 2019-04-11 | Stop reason: SDUPTHER

## 2018-11-19 RX ORDER — PRAVASTATIN SODIUM 40 MG
40 TABLET ORAL DAILY
Qty: 90 TABLET | Refills: 2 | Status: SHIPPED | OUTPATIENT
Start: 2018-11-19 | End: 2019-08-05 | Stop reason: ALTCHOICE

## 2018-11-21 ENCOUNTER — PATIENT OUTREACH (OUTPATIENT)
Dept: SURGERY | Facility: CLINIC | Age: 58
End: 2018-11-21

## 2018-11-21 ENCOUNTER — OFFICE VISIT (OUTPATIENT)
Dept: SURGERY | Facility: CLINIC | Age: 58
End: 2018-11-21

## 2018-11-21 VITALS — BODY MASS INDEX: 28.02 KG/M2 | WEIGHT: 168.4 LBS

## 2018-11-21 DIAGNOSIS — E11.9 TYPE 2 DIABETES MELLITUS WITHOUT COMPLICATION, WITHOUT LONG-TERM CURRENT USE OF INSULIN (HCC): Primary | ICD-10-CM

## 2018-11-21 NOTE — PROGRESS NOTES
Assessment:     T2DM Management     Nutrition Diagnosis    Problem: Excessive CHO intake     Related to: Estimated intake inconsistent with measured nutritional needs    As Evidenced By: Patient Interview and Elevated HbA1C    Nutrition Education Intervention: Provided     Person Educated: patient    Topics Discussed: Portion size, physical activity     Teaching Method: Verbal    Goals    Goal #1 Pt will continue to monitor and decrease portion sizes of high CHO foods     Education started - reinforce in follow-up  Goal #2 Pt will complete provided chair exercises 1x/day at least 3x/week      Education started - reinforce in follow-up  Visit Summary    Met w/ pt for f/u on T2DM management  Pt again tracked her diet/blood sugar  Pt has made positive changes in terms of reducing portion sizes  In turn, her blood sugars levels have decreased to between 100 - 150s  Pt is happy about positive results  Pt is resistant to other changes regarding diet - she states she is not interested in whole grains and/or adding vegetables to her diet  Today, we dicussed physical activity as a benefit to diabetes management  Pt deals w/ fibromyalgia and arthritis but states she will not let that limit her in terms of being active  Pt states she does walk about 8 blocks 2x/week  Provided pt w/ chair exercises - leg lifts, bicep curls, etc  Pt demonstrated understanding and will try exercises at home  Pt is leaving for [place] on 12/4 and not returning until 1/9  Told pt I will likely be out on maternity leave but she contact other health  (Sujatha Walter) w/ questions  Will continue to monitor weight and labs and provide interventions as needed       Aby Mayer, ALEENAN, LDN, [address]

## 2018-11-21 NOTE — PROGRESS NOTES
Provided interpreting services to ct during dietician appt  Also called ccn ct to receive meds on Wednesday  [address]  [address]   At [facility]  [phone]

## 2018-11-27 ENCOUNTER — PATIENT OUTREACH (OUTPATIENT)
Dept: SURGERY | Facility: CLINIC | Age: 58
End: 2018-11-27

## 2018-11-27 ENCOUNTER — OFFICE VISIT (OUTPATIENT)
Dept: OBGYN CLINIC | Facility: MEDICAL CENTER | Age: 58
End: 2018-11-27
Payer: COMMERCIAL

## 2018-11-27 VITALS
WEIGHT: 165.6 LBS | DIASTOLIC BLOOD PRESSURE: 74 MMHG | BODY MASS INDEX: 27.59 KG/M2 | HEIGHT: 65 IN | SYSTOLIC BLOOD PRESSURE: 124 MMHG | HEART RATE: 80 BPM

## 2018-11-27 DIAGNOSIS — M75.112 INCOMPLETE TEAR OF LEFT ROTATOR CUFF: ICD-10-CM

## 2018-11-27 DIAGNOSIS — M75.02 ADHESIVE CAPSULITIS OF LEFT SHOULDER: Primary | ICD-10-CM

## 2018-11-27 PROCEDURE — 99213 OFFICE O/P EST LOW 20 MIN: CPT | Performed by: ORTHOPAEDIC SURGERY

## 2018-11-27 NOTE — PROGRESS NOTES
Orthopaedic Surgery - Office Note  Jennifer Rabago (89 y o  female)   : 1960   MRN: [id number]  Encounter Date: 2018    Chief Complaint   Patient presents with    Left Shoulder - Follow-up       Assessment / Plan  Left shoulder adhesive capsulitis and partial thickness supraspinatus tear, appropriate progress    · Activity as tolerated left shoulder  · Continue home therapy regimen  · Anti-inflammatories or ice as needed  Return in about 3 months (around 2019)  History of Present Illness  Eliot Genao Kamila Cobb is a 65 y o  female who presents for follow-up for left shoulder partial-thickness supraspinatus tear and adhesive capsulitis  She has continued with a HEP and is having slow but steady improvements  She did trip a few days ago and strained her shoulder which has caused a recent increase in shoulder pain  Otherwise no new complaints  Review of Systems  Pertinent items are noted in HPI  All other systems were reviewed and are negative  Physical Exam  /74   Pulse 80   Ht 5' 5" (1 651 m)   Wt 75 1 kg (165 lb 9 6 oz)   BMI 27 56 kg/m²   Cons: Appears well  No apparent distress  Psych: Alert  Oriented x3  Mood and affect normal   Eyes: PERRLA, EOMI  Resp: Normal effort  No audible wheezing or stridor  CV: Palpable pulse  No discernable arrhythmia  No LE edema  Lymph:  No palpable cervical, axillary, or inguinal lymphadenopathy  Skin: Warm  No palpable masses  No visible lesions  Neuro: Normal muscle tone  Normal and symmetric DTR's  Left Shoulder Exam  Alignment / Posture:  Normal shoulder posture  Inspection:  No swelling  Palpation:  No tenderness  ROM:  Forward elevation to 140° active  External rotation of 60°  Internal rotation L1  Strength:  4+/5 supraspinatus, 4/5 infraspinatus, 5/5 subscapularis  Stability:  No objective shoulder instability  Tests: (+) Ness  (-) Drop arm  (-) Belly press    Neurovascular:  Sensation is intact distally  Extremities warm well perfused      Studies Reviewed  No studies to review    Procedures  No procedures today  Medical, Surgical, Family, and Social History  The patient's medical history, family history, and social history, were reviewed and updated as appropriate  Past Medical History:   Diagnosis Date    Arthritis     Cellulitis     Colon polyp     [facility]     Fibromyalgia     Fibromyalgia     HIV [facility] immunodeficiency virus infection) ([facility]     Hyperlipidemia     Pinguecula of both eyes     Presbyopia of both eyes        Past Surgical History:   Procedure Laterality Date    BREAST SURGERY      lumpectomy    COLONOSCOPY      last year, polyps, every 6 months    COLONOSCOPY N/A 11/1/2018    Procedure: COLONOSCOPY;  Surgeon: Beau John DO;  Location: [facility]; Service: Gastroenterology    HYSTERECTOMY      INCISION AND DRAINAGE PERIRECTAL ABSCESS      TUBAL LIGATION         Family History   Problem Relation Age of Onset    Heart disease Mother     Diabetes Mother         type 1    Hypertension Mother     Cancer Sister        Social History     Occupational History    Not on file       Social History Main Topics    Smoking status: Current Every Day Smoker     Types: Cigarettes    Smokeless tobacco: Never Used    Alcohol use Yes      Comment: occasionally     Drug use: No    Sexual activity: No       No Known Allergies      Current Outpatient Prescriptions:     albuterol (2 5 mg/3 mL) 0 083 % nebulizer solution, Take 1 vial (2 5 mg total) by nebulization every 6 (six) hours as needed for wheezing or shortness of breath, Disp: 60 vial, Rfl: 0    albuterol (PROVENTIL HFA,VENTOLIN HFA) 90 mcg/act inhaler, Inhale 2 puffs, Disp: , Rfl:     ammonium lactate (LAC-HYDRIN) 12 % lotion, , Disp: , Rfl:     aspirin (ECOTRIN LOW STRENGTH) 81 mg EC tablet, Take 1 tablet (81 mg total) by mouth daily, Disp: 90 tablet, Rfl: 2    BANOPHEN 50 MG capsule, TAKE 1 CAPSULE BY MOUTH DAILY AT BEDTIME AS NEEDED FOR SLEEP, Disp: 30 capsule, Rfl: 1    bisacodyl (DULCOLAX) 5 mg EC tablet, Take 30 mg by mouth, Disp: , Rfl:     Blood Glucose Monitoring Suppl (TRUE METRIX METER) GIL, daily  , Disp: , Rfl:     Blood Glucose Monitoring Suppl GIL, daily  , Disp: , Rfl:     budesonide-formoterol (SYMBICORT) 160-4 5 mcg/act inhaler, Inhale 2 puffs, Disp: , Rfl:     busPIRone (BUSPAR) 5 mg tablet, Take 5 mg by mouth 2 (two) times a day, Disp: , Rfl:     clotrimazole (LOTRIMIN) 1 % cream, Apply topically 2 (two) times a day, Disp: , Rfl:     ergocalciferol (DRISDOL) 09996 units capsule, Take 50,000 Units by mouth, Disp: , Rfl:     FLUoxetine (PROzac) 20 MG tablet, Take 20 mg by mouth daily, Disp: , Rfl:     FREESTYLE LITE test strip, USE TO TEST BLOOD SUGAR FOUR TIMES A DAY, Disp: 100 each, Rfl: 3    gabapentin (NEURONTIN) 800 mg tablet, Take 1 tablet (800 mg total) by mouth 3 (three) times a day, Disp: 270 tablet, Rfl: 2    GENVOYA tablet, TAKE 1 TABLET BY MOUTH DAILY, Disp: 30 tablet, Rfl: 3    Glucose Blood (BLOOD GLUCOSE TEST STRIPS) STRP, 1 each, Disp: , Rfl:     glucose blood test strip, 1 each by Other route 4 (four) times a day, Disp: 100 each, Rfl: 0    ibuprofen (MOTRIN) 600 mg tablet, Take 1 tablet (600 mg total) by mouth every 6 (six) hours as needed for mild pain for up to 10 days, Disp: 30 tablet, Rfl: 0    lidocaine (LIDODERM) 5 %, Place 1 patch on the skin daily Remove & Discard patch within 12 hours or as directed by MD, Disp: 30 patch, Rfl: 0    lidocaine (LMX) 4 % cream, Apply topically as needed for mild pain, Disp: 30 g, Rfl: 0    Melatonin 5 MG TABS, Take 1 tablet by mouth at bedtime daily (Patient not taking: Reported on 9/25/2018 ), Disp: 90 tablet, Rfl: 3    metFORMIN (GLUCOPHAGE) 500 mg tablet, Take 1 tablet (500 mg total) by mouth 2 (two) times a day, Disp: 180 tablet, Rfl: 2    naproxen (NAPROSYN) 500 mg tablet, TAKE 1/2 TABLET (250MG TOTAL) BY MOUTH TWICE A DAY WITH MEALS, Disp: 30 tablet, Rfl: 1    polyethylene glycol (GLYCOLAX) powder, Drink per MD recommendations, Disp: , Rfl:     pravastatin (PRAVACHOL) 40 mg tablet, Take 1 tablet (40 mg total) by mouth daily, Disp: 90 tablet, Rfl: 2    sulfaSALAzine (AZULFIDINE-EN) 500 mg EC tablet, , Disp: , Rfl:     SUPER THIN LANCETS MISC, by Percutaneous route 4 (four) times a day, Disp: 100 each, Rfl: 0    traZODone (DESYREL) 50 mg tablet, , Disp: , Rfl: 0      Simpson Olszewski, MD    Scribe Attestation    I,:    am acting as a scribe while in the presence of the attending physician :        I,:    personally performed the services described in this documentation    as scribed in my presence :

## 2018-12-03 ENCOUNTER — PATIENT OUTREACH (OUTPATIENT)
Dept: SURGERY | Facility: CLINIC | Age: 58
End: 2018-12-03

## 2018-12-04 NOTE — PROGRESS NOTES
Ct received her medications, will go to [place] tomorrow for a month  [address]  [address]   At [facility]  [phone]

## 2018-12-05 DIAGNOSIS — S86.912S KNEE STRAIN, LEFT, SEQUELA: Primary | ICD-10-CM

## 2018-12-05 DIAGNOSIS — M25.562 ACUTE PAIN OF LEFT KNEE: ICD-10-CM

## 2018-12-05 RX ORDER — NAPROXEN 500 MG/1
500 TABLET ORAL AS NEEDED
Qty: 30 TABLET | Refills: 0 | Status: SHIPPED | OUTPATIENT
Start: 2018-12-05 | End: 2020-01-14 | Stop reason: ALTCHOICE

## 2018-12-05 RX ORDER — NAPROXEN 500 MG/1
TABLET ORAL
Qty: 30 TABLET | Status: CANCELLED | OUTPATIENT
Start: 2018-12-05

## 2018-12-05 NOTE — ANESTHESIA POSTPROCEDURE EVALUATION
Post-Op Assessment Note      CV Status:  Stable    Mental Status:  Alert and awake    Hydration Status:  Euvolemic    PONV Controlled:  Controlled    Airway Patency:  Patent    Post Op Vitals Reviewed: Yes          Staff: Anesthesiologist, CRNA           BP      Temp      Pulse     Resp      SpO2 Complex Repair And Z Plasty Text: The defect edges were debeveled with a #15 scalpel blade.  The primary defect was closed partially with a complex linear closure.  Given the location of the remaining defect, shape of the defect and the proximity to free margins a Z plasty was deemed most appropriate for complete closure of the defect.  Using a sterile surgical marker, an appropriate advancement flap was drawn incorporating the defect and placing the expected incisions within the relaxed skin tension lines where possible.    The area thus outlined was incised deep to adipose tissue with a #15 scalpel blade.  The skin margins were undermined to an appropriate distance in all directions utilizing iris scissors.

## 2018-12-06 ENCOUNTER — PATIENT OUTREACH (OUTPATIENT)
Dept: SURGERY | Facility: CLINIC | Age: 58
End: 2018-12-06

## 2018-12-06 NOTE — PROGRESS NOTES
Ct called [facility]  on 2019  Morris needs letter from medical provider in order to extend the services  [address]  [address]   At [facility]  [phone]

## 2018-12-10 ENCOUNTER — PATIENT OUTREACH (OUTPATIENT)
Dept: SURGERY | Facility: CLINIC | Age: 58
End: 2018-12-10

## 2018-12-10 NOTE — PROGRESS NOTES
Informed Ct Jose Dow will write letter either Wednesday 12/12/18 or Thursday for transportation services to be continued after January  [address]  [address]   At [facility]   T  [phone]

## 2018-12-12 ENCOUNTER — PATIENT OUTREACH (OUTPATIENT)
Dept: SURGERY | Facility: CLINIC | Age: 58
End: 2018-12-12

## 2018-12-18 ENCOUNTER — PATIENT OUTREACH (OUTPATIENT)
Dept: SURGERY | Facility: CLINIC | Age: 58
End: 2018-12-18

## 2018-12-18 RX ORDER — GABAPENTIN 800 MG/1
TABLET ORAL
Qty: 90 TABLET | Refills: 2 | Status: SHIPPED | OUTPATIENT
Start: 2018-12-18 | End: 2019-05-29 | Stop reason: SDUPTHER

## 2018-12-18 RX ORDER — PRAVASTATIN SODIUM 20 MG
TABLET ORAL
Qty: 90 TABLET | Refills: 2 | Status: SHIPPED | OUTPATIENT
Start: 2018-12-18 | End: 2019-03-12 | Stop reason: ALTCHOICE

## 2018-12-18 NOTE — PROGRESS NOTES
Collected med letter faxed to Alexi Carolina in order to extend medical transportation services  [address]  [address]   At [facility]     [phone]

## 2018-12-26 ENCOUNTER — PATIENT OUTREACH (OUTPATIENT)
Dept: SURGERY | Facility: CLINIC | Age: 58
End: 2018-12-26

## 2018-12-26 NOTE — PROGRESS NOTES
Ct called reported that was approved for [employer] transportation, ct is still in [address] on vacation  [address]  [address]   At [facility]   T  [phone]

## 2018-12-28 ENCOUNTER — PATIENT OUTREACH (OUTPATIENT)
Dept: SURGERY | Facility: CLINIC | Age: 58
End: 2018-12-28

## 2018-12-28 NOTE — PROGRESS NOTES
Ct completed survey  Family issues in [place]  Ct received medicare, cm asked ct to please bring card to office  [address]  [address]   At [facility]     [phone]

## 2019-01-03 ENCOUNTER — PATIENT OUTREACH (OUTPATIENT)
Dept: SURGERY | Facility: CLINIC | Age: 59
End: 2019-01-03

## 2019-01-03 NOTE — PROGRESS NOTES
January nutrition letter mailed  [address]  [address]   At [facility]   T  [phone]

## 2019-01-09 DIAGNOSIS — M25.562 ACUTE PAIN OF LEFT KNEE: ICD-10-CM

## 2019-01-11 ENCOUNTER — PATIENT OUTREACH (OUTPATIENT)
Dept: SURGERY | Facility: CLINIC | Age: 59
End: 2019-01-11

## 2019-01-11 RX ORDER — NAPROXEN 500 MG/1
TABLET ORAL
Qty: 30 TABLET | OUTPATIENT
Start: 2019-01-11

## 2019-01-11 NOTE — PROGRESS NOTES
Ct  And cm discussed upcoming appointments  Everything is stable  [address]  [address]   At [facility]  [phone]

## 2019-01-14 ENCOUNTER — PATIENT OUTREACH (OUTPATIENT)
Dept: SURGERY | Facility: CLINIC | Age: 59
End: 2019-01-14

## 2019-01-14 NOTE — PROGRESS NOTES
Cm mailed to [facility] horacio letter  [address]  [address]   At [facility]   T  [phone]

## 2019-01-18 ENCOUNTER — PATIENT OUTREACH (OUTPATIENT)
Dept: SURGERY | Facility: CLINIC | Age: 59
End: 2019-01-18

## 2019-01-18 DIAGNOSIS — E11.9 TYPE 2 DIABETES MELLITUS WITHOUT COMPLICATION, WITHOUT LONG-TERM CURRENT USE OF INSULIN (HCC): Primary | ICD-10-CM

## 2019-01-18 NOTE — PROGRESS NOTES
Ct called reported has not received meds, cm called ccn left vm  [address]  [address]   At [facility]   T  [phone]

## 2019-01-23 ENCOUNTER — PATIENT OUTREACH (OUTPATIENT)
Dept: SURGERY | Facility: CLINIC | Age: 59
End: 2019-01-23

## 2019-01-23 DIAGNOSIS — Z79.4 TYPE 2 DIABETES MELLITUS WITHOUT COMPLICATION, WITH LONG-TERM CURRENT USE OF INSULIN (HCC): ICD-10-CM

## 2019-01-23 DIAGNOSIS — E11.9 TYPE 2 DIABETES MELLITUS WITHOUT COMPLICATION, WITH LONG-TERM CURRENT USE OF INSULIN (HCC): ICD-10-CM

## 2019-01-24 NOTE — PROGRESS NOTES
Ct came in person assisted with process of changing address with postal office  Ct has dental clinic appt today  [address]  [address]   At [address] [facility]   T  [phone]

## 2019-01-25 ENCOUNTER — PATIENT OUTREACH (OUTPATIENT)
Dept: SURGERY | Facility: CLINIC | Age: 59
End: 2019-01-25

## 2019-01-25 NOTE — PROGRESS NOTES
Ct reported wanting to be part of an extracurricular activity to keep her busy  [facility] centers for the client in the [facility] area, was able to get in touch with active life  Karlos [place] coordinator will provide ct with an appt date  [address]  [address]   At [facility]   T  [phone]

## 2019-01-28 ENCOUNTER — PATIENT OUTREACH (OUTPATIENT)
Dept: SURGERY | Facility: CLINIC | Age: 59
End: 2019-01-28

## 2019-01-28 NOTE — PROGRESS NOTES
Cm provided ct with contact information for [facility] via email  [address]  [address]   At [address] [facility]   T  [phone]

## 2019-01-29 ENCOUNTER — PATIENT OUTREACH (OUTPATIENT)
Dept: SURGERY | Facility: CLINIC | Age: 59
End: 2019-01-29

## 2019-01-29 NOTE — PROGRESS NOTES
Ct came in person, was not aware that has appt with Elvia Mares, kiko assisted ct with calling [facility]  to schedule transportation appointment  Ct also reported that is interested in searching for apartments  Cm assisted with searching local apartments in the area  [address]  [address]   At [facility]  [phone]

## 2019-01-30 ENCOUNTER — PATIENT OUTREACH (OUTPATIENT)
Dept: SURGERY | Facility: CLINIC | Age: 59
End: 2019-01-30

## 2019-01-30 DIAGNOSIS — E11.65 UNCONTROLLED TYPE 2 DIABETES MELLITUS WITH HYPERGLYCEMIA (HCC): Primary | ICD-10-CM

## 2019-01-30 DIAGNOSIS — R73.09 OTHER ABNORMAL GLUCOSE: ICD-10-CM

## 2019-01-30 DIAGNOSIS — B20 HIV (HUMAN IMMUNODEFICIENCY VIRUS INFECTION) (HCC): ICD-10-CM

## 2019-01-31 NOTE — PROGRESS NOTES
Ct called reported that needed to cancel med appt  [address]  [address]   At [facility]  [phone]

## 2019-02-01 ENCOUNTER — OFFICE VISIT (OUTPATIENT)
Dept: OBGYN CLINIC | Facility: MEDICAL CENTER | Age: 59
End: 2019-02-01
Payer: COMMERCIAL

## 2019-02-01 VITALS
WEIGHT: 167 LBS | BODY MASS INDEX: 27.82 KG/M2 | HEIGHT: 65 IN | DIASTOLIC BLOOD PRESSURE: 80 MMHG | SYSTOLIC BLOOD PRESSURE: 121 MMHG | HEART RATE: 84 BPM

## 2019-02-01 DIAGNOSIS — M25.512 LEFT SHOULDER PAIN, UNSPECIFIED CHRONICITY: ICD-10-CM

## 2019-02-01 DIAGNOSIS — M75.112 INCOMPLETE TEAR OF LEFT ROTATOR CUFF: ICD-10-CM

## 2019-02-01 DIAGNOSIS — M75.02 ADHESIVE CAPSULITIS OF LEFT SHOULDER: Primary | ICD-10-CM

## 2019-02-01 PROCEDURE — 20610 DRAIN/INJ JOINT/BURSA W/O US: CPT | Performed by: ORTHOPAEDIC SURGERY

## 2019-02-01 PROCEDURE — 99213 OFFICE O/P EST LOW 20 MIN: CPT | Performed by: ORTHOPAEDIC SURGERY

## 2019-02-01 RX ORDER — ALBUTEROL SULFATE 90 UG/1
2 AEROSOL, METERED RESPIRATORY (INHALATION)
COMMUNITY
Start: 2017-01-19 | End: 2019-03-12

## 2019-02-01 RX ORDER — FLUOXETINE HYDROCHLORIDE 20 MG/1
CAPSULE ORAL
COMMUNITY
Start: 2019-01-18 | End: 2021-05-28 | Stop reason: ALTCHOICE

## 2019-02-01 RX ORDER — BUPIVACAINE HYDROCHLORIDE 2.5 MG/ML
4 INJECTION, SOLUTION INFILTRATION; PERINEURAL
Status: COMPLETED | OUTPATIENT
Start: 2019-02-01 | End: 2019-02-01

## 2019-02-01 RX ORDER — METHYLPREDNISOLONE ACETATE 40 MG/ML
1 INJECTION, SUSPENSION INTRA-ARTICULAR; INTRALESIONAL; INTRAMUSCULAR; SOFT TISSUE
Status: COMPLETED | OUTPATIENT
Start: 2019-02-01 | End: 2019-02-01

## 2019-02-01 RX ADMIN — BUPIVACAINE HYDROCHLORIDE 4 ML: 2.5 INJECTION, SOLUTION INFILTRATION; PERINEURAL at 10:08

## 2019-02-01 RX ADMIN — METHYLPREDNISOLONE ACETATE 1 ML: 40 INJECTION, SUSPENSION INTRA-ARTICULAR; INTRALESIONAL; INTRAMUSCULAR; SOFT TISSUE at 10:08

## 2019-02-01 NOTE — PROGRESS NOTES
Orthopaedic Surgery - Office Note  Ludwin Demarco (32 y o  female)   : 1960   MRN: [id number]  Encounter Date: 2019    Chief Complaint   Patient presents with    Left Shoulder - Follow-up       Assessment / Plan  Left shoulder adhesive capsulitis, partial thickness supraspinatus tear, re occurent pain     · CSI of left subacromial bursa  was performed  · Activity as tolerated  · Home exercise program reviewed  · Begin outpatient PT for Rotator cuff and scapular strengthening and adhesive capsulitis  Return in about 6 weeks (around 3/15/2019) for Recheck of left shoulder  History of Present Illness  Miguelina Claritza Ocasio is a 63 y o  female who presents today for a follow up visit for her left shoulder  She states that her symptoms were improving since her initial visit in September but she states that over the past 1-2 months she has noted an increase in her symptoms with no noted mechanism of injury  She continues to point to the anterolateral and posterior aspect of her shoulder as the main source of pain  She notes that her symptoms are worse with any motions of the shoulder  She denies instability  Denies numbness tingling, fevers, chills  Review of Systems  Pertinent items are noted in HPI  All other systems were reviewed and are negative  Physical Exam  /80   Pulse 84   Ht 5' 5" (1 651 m)   Wt 75 8 kg (167 lb)   BMI 27 79 kg/m²   Cons: Appears well  No apparent distress  Psych: Alert  Oriented x3  Mood and affect normal   Eyes: PERRLA, EOMI  Resp: Normal effort  No audible wheezing or stridor  CV: Palpable pulse  No discernable arrhythmia  No LE edema  Lymph:  No palpable cervical, axillary, or inguinal lymphadenopathy  Skin: Warm  No palpable masses  No visible lesions  Neuro: Normal muscle tone  Normal and symmetric DTR's  Left Shoulder Exam  Alignment / Posture:  Normal shoulder posture  Normal scapular position  Inspection:  No swelling  No edema  No erythema  No ecchymosis  Palpation:  Subacromial bursa tenderness  ROM:  Shoulder FE Active 90  Passive 100 limited by pain  Shoulder ER 60  Shoulder IR Sacrum  Strength:  Supraspinatus 4+/5  Infraspinatus 4/5  Subscapularis 5/5  Stability:  No objective shoulder instability  Tests: (+) Ness  (+) Painful arc  (-) Belly press  Neurovascular:  Sensation intact in Ax/R/M/U nerve distributions  2+ radial pulse  Studies Reviewed  No studies to review    Large joint arthrocentesis  Date/Time: 2/1/2019 10:08 AM  Consent given by: patient  Site marked: site marked  Timeout: Immediately prior to procedure a time out was called to verify the correct patient, procedure, equipment, support staff and site/side marked as required   Supporting Documentation  Indications: pain and diagnostic evaluation   Procedure Details  Location: shoulder - L subacromial bursa  Preparation: Patient was prepped and draped in the usual sterile fashion  Needle size: 22 G  Ultrasound guidance: no  Approach: lateral  Medications administered: 4 mL bupivacaine 0 25 %; 1 mL methylPREDNISolone acetate 40 mg/mL    Patient tolerance: patient tolerated the procedure well with no immediate complications  Dressing:  Sterile dressing applied           Medical, Surgical, Family, and Social History  The patient's medical history, family history, and social history, were reviewed and updated as appropriate      Past Medical History:   Diagnosis Date    Arthritis     Cellulitis     Colon polyp     Diabetes mellitus ([facility] )     Fibromyalgia     Fibromyalgia     HIV (human immunodeficiency virus infection) ([facility] [address] )     Hyperlipidemia     Pinguecula of both eyes     Presbyopia of both eyes        Past Surgical History:   Procedure Laterality Date    BREAST SURGERY      lumpectomy    COLONOSCOPY      last year, polyps, every 6 months    COLONOSCOPY N/A 11/1/2018    Procedure: COLONOSCOPY;  Surgeon: Bull Merchant DO;  Location: [facility]; Service: Gastroenterology    HYSTERECTOMY      INCISION AND DRAINAGE PERIRECTAL ABSCESS      TUBAL LIGATION         Family History   Problem Relation Age of Onset    Heart disease Mother     Diabetes Mother         type 1    Hypertension Mother     Cancer Sister        Social History     Occupational History    Not on file  Social History Main Topics    Smoking status: Current Every Day Smoker     Types: Cigarettes    Smokeless tobacco: Never Used    Alcohol use Yes      Comment: occasionally     Drug use: No    Sexual activity: No       No Known Allergies      Current Outpatient Prescriptions:     albuterol (2 5 mg/3 mL) 0 083 % nebulizer solution, Take 1 vial (2 5 mg total) by nebulization every 6 (six) hours as needed for wheezing or shortness of breath, Disp: 60 vial, Rfl: 0    albuterol (PROVENTIL HFA,VENTOLIN HFA) 90 mcg/act inhaler, Inhale 2 puffs, Disp: , Rfl:     albuterol (VENTOLIN HFA) 90 mcg/act inhaler, Inhale 2 puffs, Disp: , Rfl:     ammonium lactate (LAC-HYDRIN) 12 % lotion, , Disp: , Rfl:     aspirin (ECOTRIN LOW STRENGTH) 81 mg EC tablet, Take 1 tablet (81 mg total) by mouth daily, Disp: 90 tablet, Rfl: 2    BANOPHEN 50 MG capsule, TAKE 1 CAPSULE BY MOUTH DAILY AT BEDTIME AS NEEDED FOR SLEEP, Disp: 30 capsule, Rfl: 1    bisacodyl (DULCOLAX) 5 mg EC tablet, Take 30 mg by mouth, Disp: , Rfl:     Blood Glucose Monitoring Suppl (TRUE METRIX METER) GIL, daily  , Disp: , Rfl:     Blood Glucose Monitoring Suppl GIL, daily  , Disp: , Rfl:     budesonide-formoterol (SYMBICORT) 160-4 5 mcg/act inhaler, Inhale 2 puffs, Disp: , Rfl:     busPIRone (BUSPAR) 5 mg tablet, Take 5 mg by mouth 2 (two) times a day, Disp: , Rfl:     clotrimazole (LOTRIMIN) 1 % cream, Apply topically 2 (two) times a day, Disp: , Rfl:     FLUoxetine (PROzac) 20 mg capsule, , Disp: , Rfl:     FLUoxetine (PROzac) 20 MG tablet, Take 20 mg by mouth daily, Disp: , Rfl:     FREESTYLE LITE test strip, USE TO TEST BLOOD SUGAR FOUR TIMES A DAY, Disp: 100 each, Rfl: 3    gabapentin (NEURONTIN) 800 mg tablet, TAKE 1 TABLET BY MOUTH THREE TIMES A DAY, Disp: 90 tablet, Rfl: 2    gabapentin (NEURONTIN) 800 mg tablet, Take 1 tablet (800 mg total) by mouth 3 (three) times a day, Disp: 270 tablet, Rfl: 2    GENVOYA tablet, TAKE 1 TABLET BY MOUTH DAILY, Disp: 30 tablet, Rfl: 3    Glucose Blood (BLOOD GLUCOSE TEST STRIPS) STRP, 1 each, Disp: , Rfl:     glucose blood test strip, 1 each by Other route 4 (four) times a day, Disp: 100 each, Rfl: 0    lidocaine (LIDODERM) 5 %, Place 1 patch on the skin daily Remove & Discard patch within 12 hours or as directed by MD, Disp: 30 patch, Rfl: 0    lidocaine (LMX) 4 % cream, Apply topically as needed for mild pain, Disp: 30 g, Rfl: 0    Melatonin 5 MG TABS, Take 1 tablet by mouth at bedtime daily, Disp: 90 tablet, Rfl: 3    metFORMIN (GLUCOPHAGE) 500 mg tablet, Take 1 tablet (500 mg total) by mouth 2 (two) times a day, Disp: 180 tablet, Rfl: 2    naproxen (NAPROSYN) 500 mg tablet, Take 1 tablet (500 mg total) by mouth as needed for mild pain Please take with food and not more than 2 times per day , Disp: 30 tablet, Rfl: 0    polyethylene glycol (GLYCOLAX) powder, Drink per MD recommendations, Disp: , Rfl:     pravastatin (PRAVACHOL) 20 mg tablet, TAKE 1 TABLET BY MOUTH DAILY, Disp: 90 tablet, Rfl: 2    pravastatin (PRAVACHOL) 40 mg tablet, Take 1 tablet (40 mg total) by mouth daily, Disp: 90 tablet, Rfl: 2    sulfaSALAzine (AZULFIDINE-EN) 500 mg EC tablet, , Disp: , Rfl:     SUPER THIN LANCETS MISC, by Percutaneous route 4 (four) times a day, Disp: 100 each, Rfl: 0    traZODone (DESYREL) 50 mg tablet, , Disp: , Rfl: 0    ergocalciferol (DRISDOL) 94628 units capsule, Take 50,000 Units by mouth, Disp: , Rfl:     ibuprofen (MOTRIN) 600 mg tablet, Take 1 tablet (600 mg total) by mouth every 6 (six) hours as needed for mild pain for up to 10 days, Disp: 30 tablet, Rfl: 0      Lucia Jenkins Ezekiel    Scribe Attestation    I,:   Ghazala Soto am acting as a scribe while in the presence of the attending physician :        I,:   Spkie Terrazas MD personally performed the services described in this documentation    as scribed in my presence :

## 2019-02-05 ENCOUNTER — APPOINTMENT (OUTPATIENT)
Dept: LAB | Facility: HOSPITAL | Age: 59
End: 2019-02-05
Attending: INTERNAL MEDICINE
Payer: COMMERCIAL

## 2019-02-05 DIAGNOSIS — B20 HIV (HUMAN IMMUNODEFICIENCY VIRUS INFECTION) (HCC): ICD-10-CM

## 2019-02-05 DIAGNOSIS — R73.09 OTHER ABNORMAL GLUCOSE: ICD-10-CM

## 2019-02-05 LAB
ALBUMIN SERPL BCP-MCNC: 3.6 G/DL (ref 3.5–5)
ALP SERPL-CCNC: 53 U/L (ref 46–116)
ALT SERPL W P-5'-P-CCNC: 15 U/L (ref 12–78)
ANION GAP SERPL CALCULATED.3IONS-SCNC: 9 MMOL/L (ref 4–13)
AST SERPL W P-5'-P-CCNC: 8 U/L (ref 5–45)
BASOPHILS # BLD AUTO: 0.05 THOUSANDS/ΜL (ref 0–0.1)
BASOPHILS NFR BLD AUTO: 1 % (ref 0–1)
BILIRUB SERPL-MCNC: 0.3 MG/DL (ref 0.2–1)
BUN SERPL-MCNC: 17 MG/DL (ref 5–25)
CALCIUM SERPL-MCNC: 9 MG/DL (ref 8.3–10.1)
CHLORIDE SERPL-SCNC: 103 MMOL/L (ref 100–108)
CO2 SERPL-SCNC: 26 MMOL/L (ref 21–32)
CREAT SERPL-MCNC: 0.6 MG/DL (ref 0.6–1.3)
CREAT UR-MCNC: 164 MG/DL
EOSINOPHIL # BLD AUTO: 0.09 THOUSAND/ΜL (ref 0–0.61)
EOSINOPHIL NFR BLD AUTO: 1 % (ref 0–6)
ERYTHROCYTE [DISTWIDTH] IN BLOOD BY AUTOMATED COUNT: 13 % (ref 11.6–15.1)
EST. AVERAGE GLUCOSE BLD GHB EST-MCNC: 154 MG/DL
GFR SERPL CREATININE-BSD FRML MDRD: 102 ML/MIN/1.73SQ M
GLUCOSE P FAST SERPL-MCNC: 150 MG/DL (ref 65–99)
HBA1C MFR BLD: 7 % (ref 4.2–6.3)
HCT VFR BLD AUTO: 45.4 % (ref 34.8–46.1)
HGB BLD-MCNC: 14.2 G/DL (ref 11.5–15.4)
IMM GRANULOCYTES # BLD AUTO: 0.03 THOUSAND/UL (ref 0–0.2)
IMM GRANULOCYTES NFR BLD AUTO: 0 % (ref 0–2)
LYMPHOCYTES # BLD AUTO: 2.19 THOUSANDS/ΜL (ref 0.6–4.47)
LYMPHOCYTES NFR BLD AUTO: 30 % (ref 14–44)
MCH RBC QN AUTO: 31.8 PG (ref 26.8–34.3)
MCHC RBC AUTO-ENTMCNC: 31.3 G/DL (ref 31.4–37.4)
MCV RBC AUTO: 102 FL (ref 82–98)
MICROALBUMIN UR-MCNC: 17.8 MG/L (ref 0–20)
MICROALBUMIN/CREAT 24H UR: 11 MG/G CREATININE (ref 0–30)
MONOCYTES # BLD AUTO: 0.63 THOUSAND/ΜL (ref 0.17–1.22)
MONOCYTES NFR BLD AUTO: 9 % (ref 4–12)
NEUTROPHILS # BLD AUTO: 4.44 THOUSANDS/ΜL (ref 1.85–7.62)
NEUTS SEG NFR BLD AUTO: 59 % (ref 43–75)
NRBC BLD AUTO-RTO: 0 /100 WBCS
PLATELET # BLD AUTO: 316 THOUSANDS/UL (ref 149–390)
PMV BLD AUTO: 9.7 FL (ref 8.9–12.7)
POTASSIUM SERPL-SCNC: 3.7 MMOL/L (ref 3.5–5.3)
PROT SERPL-MCNC: 7.9 G/DL (ref 6.4–8.2)
RBC # BLD AUTO: 4.47 MILLION/UL (ref 3.81–5.12)
SODIUM SERPL-SCNC: 138 MMOL/L (ref 136–145)
WBC # BLD AUTO: 7.43 THOUSAND/UL (ref 4.31–10.16)

## 2019-02-05 PROCEDURE — 83036 HEMOGLOBIN GLYCOSYLATED A1C: CPT

## 2019-02-05 PROCEDURE — 87536 HIV-1 QUANT&REVRSE TRNSCRPJ: CPT

## 2019-02-05 PROCEDURE — 82043 UR ALBUMIN QUANTITATIVE: CPT

## 2019-02-05 PROCEDURE — 82570 ASSAY OF URINE CREATININE: CPT

## 2019-02-05 PROCEDURE — 36415 COLL VENOUS BLD VENIPUNCTURE: CPT

## 2019-02-05 PROCEDURE — 80053 COMPREHEN METABOLIC PANEL: CPT

## 2019-02-05 PROCEDURE — 86361 T CELL ABSOLUTE COUNT: CPT

## 2019-02-05 PROCEDURE — 85025 COMPLETE CBC W/AUTO DIFF WBC: CPT

## 2019-02-06 LAB
BASOPHILS # BLD AUTO: 0 X10E3/UL (ref 0–0.2)
BASOPHILS NFR BLD AUTO: 1 %
CD3+CD4+ CELLS # BLD: 430 /UL (ref 359–1519)
CD3+CD4+ CELLS NFR BLD: 18.7 % (ref 30.8–58.5)
EOSINOPHIL # BLD AUTO: 0.1 X10E3/UL (ref 0–0.4)
EOSINOPHIL NFR BLD AUTO: 1 %
ERYTHROCYTE [DISTWIDTH] IN BLOOD BY AUTOMATED COUNT: 14.1 % (ref 12.3–15.4)
HCT VFR BLD AUTO: 43.4 % (ref 34–46.6)
HGB BLD-MCNC: 14.3 G/DL (ref 11.1–15.9)
IMM GRANULOCYTES # BLD: 0 X10E3/UL (ref 0–0.1)
IMM GRANULOCYTES NFR BLD: 0 %
LYMPHOCYTES # BLD AUTO: 2.3 X10E3/UL (ref 0.7–3.1)
LYMPHOCYTES NFR BLD AUTO: 30 %
MCH RBC QN AUTO: 31.6 PG (ref 26.6–33)
MCHC RBC AUTO-ENTMCNC: 32.9 G/DL (ref 31.5–35.7)
MCV RBC AUTO: 96 FL (ref 79–97)
MONOCYTES # BLD AUTO: 0.7 X10E3/UL (ref 0.1–0.9)
MONOCYTES NFR BLD AUTO: 9 %
NEUTROPHILS # BLD AUTO: 4.6 X10E3/UL (ref 1.4–7)
NEUTROPHILS NFR BLD AUTO: 59 %
PLATELET # BLD AUTO: 329 X10E3/UL (ref 150–379)
RBC # BLD AUTO: 4.53 X10E6/UL (ref 3.77–5.28)
WBC # BLD AUTO: 7.6 X10E3/UL (ref 3.4–10.8)

## 2019-02-07 LAB
HIV1 RNA # SERPL NAA+PROBE: 30 COPIES/ML
HIV1 RNA SERPL NAA+PROBE-LOG#: 1.48 LOG10COPY/ML

## 2019-02-08 RX ORDER — LANCETS 28 GAUGE
EACH MISCELLANEOUS
Qty: 100 EACH | OUTPATIENT
Start: 2019-02-08

## 2019-02-20 DIAGNOSIS — E11.9 TYPE 2 DIABETES MELLITUS WITHOUT COMPLICATION, WITH LONG-TERM CURRENT USE OF INSULIN (HCC): ICD-10-CM

## 2019-02-20 DIAGNOSIS — Z79.4 TYPE 2 DIABETES MELLITUS WITHOUT COMPLICATION, WITH LONG-TERM CURRENT USE OF INSULIN (HCC): ICD-10-CM

## 2019-02-21 DIAGNOSIS — E11.9 TYPE 2 DIABETES MELLITUS WITHOUT COMPLICATION, WITHOUT LONG-TERM CURRENT USE OF INSULIN (HCC): Primary | ICD-10-CM

## 2019-03-01 RX ORDER — BLOOD-GLUCOSE METER
KIT MISCELLANEOUS
OUTPATIENT
Start: 2019-03-01

## 2019-03-04 DIAGNOSIS — Z79.4 TYPE 2 DIABETES MELLITUS WITHOUT COMPLICATION, WITH LONG-TERM CURRENT USE OF INSULIN (HCC): ICD-10-CM

## 2019-03-04 DIAGNOSIS — E11.9 TYPE 2 DIABETES MELLITUS WITHOUT COMPLICATION, WITH LONG-TERM CURRENT USE OF INSULIN (HCC): ICD-10-CM

## 2019-03-04 RX ORDER — LANCETS
EACH MISCELLANEOUS 4 TIMES DAILY
Qty: 100 EACH | Refills: 6 | Status: SHIPPED | OUTPATIENT
Start: 2019-03-04 | End: 2020-10-12 | Stop reason: SDUPTHER

## 2019-03-12 ENCOUNTER — TELEPHONE (OUTPATIENT)
Dept: SURGERY | Facility: CLINIC | Age: 59
End: 2019-03-12

## 2019-03-12 ENCOUNTER — OFFICE VISIT (OUTPATIENT)
Dept: SURGERY | Facility: CLINIC | Age: 59
End: 2019-03-12
Payer: COMMERCIAL

## 2019-03-12 VITALS
RESPIRATION RATE: 18 BRPM | WEIGHT: 164.6 LBS | HEART RATE: 88 BPM | SYSTOLIC BLOOD PRESSURE: 138 MMHG | HEIGHT: 65 IN | OXYGEN SATURATION: 99 % | DIASTOLIC BLOOD PRESSURE: 78 MMHG | BODY MASS INDEX: 27.42 KG/M2 | TEMPERATURE: 98 F

## 2019-03-12 DIAGNOSIS — E78.2 ELEVATED TRIGLYCERIDES WITH HIGH CHOLESTEROL: ICD-10-CM

## 2019-03-12 DIAGNOSIS — L02.31 ABSCESS OF BUTTOCK, LEFT: ICD-10-CM

## 2019-03-12 DIAGNOSIS — Z12.39 ENCOUNTER FOR SCREENING FOR MALIGNANT NEOPLASM OF BREAST: ICD-10-CM

## 2019-03-12 DIAGNOSIS — J06.9 VIRAL UPPER RESPIRATORY INFECTION: Primary | ICD-10-CM

## 2019-03-12 DIAGNOSIS — J45.20 MILD INTERMITTENT ASTHMA, UNSPECIFIED WHETHER COMPLICATED: ICD-10-CM

## 2019-03-12 DIAGNOSIS — F17.219 CIGARETTE NICOTINE DEPENDENCE WITH NICOTINE-INDUCED DISORDER: ICD-10-CM

## 2019-03-12 DIAGNOSIS — J06.9 VIRAL UPPER RESPIRATORY INFECTION: ICD-10-CM

## 2019-03-12 DIAGNOSIS — E11.9 TYPE 2 DIABETES MELLITUS WITHOUT COMPLICATION, WITHOUT LONG-TERM CURRENT USE OF INSULIN (HCC): ICD-10-CM

## 2019-03-12 DIAGNOSIS — E11.628 TYPE 2 DIABETES MELLITUS WITH OTHER SKIN COMPLICATION, WITHOUT LONG-TERM CURRENT USE OF INSULIN (HCC): ICD-10-CM

## 2019-03-12 DIAGNOSIS — E11.9 COMPREHENSIVE DIABETIC FOOT EXAMINATION, TYPE 2 DM, ENCOUNTER FOR (HCC): ICD-10-CM

## 2019-03-12 DIAGNOSIS — B20 HIV DISEASE (HCC): ICD-10-CM

## 2019-03-12 DIAGNOSIS — Z23 NEED FOR INFLUENZA VACCINATION: ICD-10-CM

## 2019-03-12 DIAGNOSIS — R10.821 RIGHT UPPER QUADRANT ABDOMINAL TENDERNESS WITH REBOUND TENDERNESS: ICD-10-CM

## 2019-03-12 DIAGNOSIS — J45.20 MILD INTERMITTENT ASTHMA WITHOUT COMPLICATION: ICD-10-CM

## 2019-03-12 DIAGNOSIS — Z23 NEED FOR HEPATITIS B VACCINATION: Primary | ICD-10-CM

## 2019-03-12 DIAGNOSIS — E78.00 HIGH CHOLESTEROL: ICD-10-CM

## 2019-03-12 PROCEDURE — 99214 OFFICE O/P EST MOD 30 MIN: CPT | Performed by: NURSE PRACTITIONER

## 2019-03-12 RX ORDER — PREDNISONE 20 MG/1
40 TABLET ORAL DAILY
Qty: 10 TABLET | Refills: 0 | Status: SHIPPED | OUTPATIENT
Start: 2019-03-12 | End: 2019-03-17

## 2019-03-12 RX ORDER — OMEGA-3-ACID ETHYL ESTERS 1 G/1
2 CAPSULE, LIQUID FILLED ORAL 2 TIMES DAILY
Qty: 180 CAPSULE | Refills: 1 | Status: SHIPPED | OUTPATIENT
Start: 2019-03-12 | End: 2020-03-10

## 2019-03-12 RX ORDER — ALBUTEROL SULFATE 90 UG/1
2 AEROSOL, METERED RESPIRATORY (INHALATION) EVERY 6 HOURS PRN
Qty: 18 G | Refills: 2 | Status: SHIPPED | OUTPATIENT
Start: 2019-03-12 | End: 2019-04-11 | Stop reason: SDUPTHER

## 2019-03-12 RX ORDER — ALBUTEROL SULFATE 2.5 MG/3ML
2.5 SOLUTION RESPIRATORY (INHALATION) EVERY 6 HOURS PRN
Qty: 60 VIAL | Refills: 0 | Status: SHIPPED | OUTPATIENT
Start: 2019-03-12 | End: 2019-04-11 | Stop reason: SDUPTHER

## 2019-03-12 RX ORDER — POLYETHYLENE GLYCOL 3350 17 G
4 POWDER IN PACKET (EA) ORAL AS NEEDED
Qty: 100 EACH | Refills: 5 | Status: SHIPPED | OUTPATIENT
Start: 2019-03-12 | End: 2019-06-11 | Stop reason: ALTCHOICE

## 2019-03-12 RX ORDER — MOMETASONE FUROATE 50 UG/1
2 SPRAY, METERED NASAL DAILY
Qty: 1 ACT | Refills: 3 | Status: SHIPPED | OUTPATIENT
Start: 2019-03-12 | End: 2019-03-12 | Stop reason: ALTCHOICE

## 2019-03-12 RX ORDER — NICOTINE 21 MG/24HR
1 PATCH, TRANSDERMAL 24 HOURS TRANSDERMAL EVERY 24 HOURS
Qty: 28 PATCH | Refills: 6 | Status: SHIPPED | OUTPATIENT
Start: 2019-03-12 | End: 2019-08-13 | Stop reason: SDUPTHER

## 2019-03-12 RX ORDER — FLUTICASONE PROPIONATE 50 MCG
1 SPRAY, SUSPENSION (ML) NASAL DAILY
Qty: 1 BOTTLE | Refills: 2 | Status: SHIPPED | OUTPATIENT
Start: 2019-03-12 | End: 2019-05-30 | Stop reason: SDUPTHER

## 2019-03-12 NOTE — PROGRESS NOTES
Diabetic Foot Exam    Patient's shoes and socks removed  Right Foot/Ankle   Right Foot Inspection  Skin Exam: skin normal, skin intact, dry skin, callus and callus no warmth, no erythema, no maceration, no abnormal color, no pre-ulcer and no ulcer                          Toe Exam: ROM and strength within normal limits  Sensory   Vibration: intact  Proprioception: intact   Monofilament testing: intact  Vascular  Capillary refills: < 3 seconds  The right DP pulse is 2+  The right PT pulse is 2+  Left Foot/Ankle  Left Foot Inspection  Skin Exam: skin normal, skin intact and dry skinno warmth, no erythema, no maceration, normal color, no pre-ulcer and no ulcer                         Toe Exam: ROM and strength within normal limits                   Sensory   Vibration: intact  Proprioception: intact  Monofilament: intact  Vascular  Capillary refills: < 3 seconds  The left DP pulse is 2+  The left PT pulse is 2+  Assign Risk Category:  No deformity present; No loss of protective sensation; No weak pulses       Risk: 0  Assessment/Plan:    HIV disease ([facility] )  Patient reports no missed doses  CRNP  patient on taking her medication at the same time every day to help with 100% medication adherence  HIV Counseling:    Viral Load: 30 copies     CD4 Count: 430    ART: Babs Chase    Denies side effects  Stressed the importance of adherence  Continue follow up in the ID clinic with Dr Naheed Kim  Reviewed the most recent labs, including the Cd4 and viral load  Discussed the risks and benefits of treatment options, instructions for management, importance of treatment adherence, and reduction of risk factors  Educated on possible medication side effects  Counseled on routes of HIV transmission, including the risk of  infection  Emphasized that viral suppression is the best method to prevent HIV transmission  At this time the pt denies the need for HIV testing of anyone in their life      Total encounter time was greater than 35 minutes  Greater than 20 minutes were spent on counseling and patient education  Pt voices understanding and agreement with treatment plan  Nicotine dependence    Patient reports smoking 1/2 ppd  CRNP  patient on smoking sensation in the importance of  Pt is interested in nicotine patch 21 mg Q 24/hr  Nicotine Gum 4 mg as needed for nicotine breakthrough need  Nicotine Dependency - Primary    Counseled for greater than 15 minutes on the importance of smoking cessation  Education was given regarding the cardiovascular effects of how nicotine affects the heart, lungs, kidneys,and peripheral vascular system  Referred to [facility] for enrollment in smoking cessation program       Type 2 diabetes mellitus with skin complication, without long-term current use of insulin ([address] )  Lab Results   Component Value Date    HGBA1C 7 0 (H) 02/05/2019     Doing better:  Current A1c is 7 0 and is down from last A1c of 7 7  Pt reports BS are running 150-170's  Patient has been working with [facility] dietitian and attending diabetes classes  Patient reports taking metformin BID daily  CRNP stressed importance of eating smaller portions, eating more fruits and vegetables and controlling amount of carbohydrates patient is eating her meal, and exercising for 30 minutes 5  times a week per recommended guidelines  CRNP stressed importance of cutting out sodas and thus we like drinks  Repeat A1c in 3 months  Elevated triglycerides with high cholesterol    Ongoing  CRNP stressed importance of complete blood work as ordered  Lovaza 2 g twice a day by mouth  High cholesterol    Ongoing  Patient has not completed required a blood work  Continue pravastatin 40 mg tablet by mouth daily  Repeat lipid panel  Viral upper respiratory infection    Pt recently returned from [place]   Pt reports cough with phlegm, sore throat, HA, and post nasal gtt     Pt has been taking Cheryl's Day and Night with  so-so results  No fever, chills, and no sick contacts  Pt does not appear sickly  Pt is most bothered by the cough  Pt has been using Albuterol nebulizer every 4 hrs to help to cough and chest tightness in addition to the albuterol inhaler  CRNP explained to the pt the importance of how and when to use the medication to prevent overdosing  Mometasone spray 2 to each nostril daily as needed for congestion  Prednisone 40 mg PO daily x 5 days for wheezing  Abscess of buttock, left    Pt report having wound that was full of puss while in [facility] about 3 weeks ago  Left inner buttock toward groin fold  Pt was seen and received IV abx at home for 5 days  Abcess was drained by nursing, not sure about culture  Pt drained abscess last night prior to coming to visit  Pt has been using triple abx ointment  Not able to express any puss from wound  No signs of infection  No abx at this time  Pt to call office and to be seen for wound culture  Apply warm soaks BID and to keep area clean with mild soap  Will send to general surgery for abscess vs sebaceous cyst removal if wound does not heal      Mild intermittent asthma without complication    Pt has been using albuterol inhaler and nebulizer for cough and chest tightness  CRNP explained importance of not over using the medication and side effects of too much albuterol  Pt verbalized understanding  Reorder albuterol inhaler and nebulizer  Right upper quadrant abdominal tenderness with rebound tenderness    New Onset:  RUQ rebound tenderness during exam   No recent nausea, vomiting, or diarrhea  Pt does drink ETOH  Hep C Ab negative  Will check RUQ US          Diagnoses and all orders for this visit:    Need for hepatitis B vaccination  -     Cancel: HEPATITIS B VACCINE ADULT IM    Need for influenza vaccination  -     Cancel: influenza vaccine, [id number], quadrivalent, recombinant, PF, 0 5 mL, for patients 18 yr+ (FLUBLOK)    HIV disease ([facility] [address] )  -     Ambulatory referral to Ophthalmology; Future    Cigarette nicotine dependence with nicotine-induced disorder  -     nicotine (NICODERM CQ) 14 mg/24hr TD 24 hr patch; Place 1 patch on the skin every 24 hours  -     nicotine polacrilex (COMMIT) 4 MG lozenge; Apply 1 lozenge (4 mg total) to the mouth or throat as needed for smoking cessation    Type 2 diabetes mellitus without complication, without long-term current use of insulin ([facility])  -     HEMOGLOBIN A1C W/ EAG ESTIMATION; Future    Elevated triglycerides with high cholesterol  -     omega-3-acid ethyl esters (LOVAZA) 1 g capsule; Take 2 capsules (2 g total) by mouth 2 (two) times a day    High cholesterol    Viral upper respiratory infection  -     predniSONE 20 mg tablet; Take 2 tablets (40 mg total) by mouth daily for 5 days  -     Discontinue: mometasone (NASONEX) 50 mcg/act nasal spray; 2 sprays into each nostril daily    Comprehensive diabetic foot examination, type 2 DM, encounter for [facility])    Type 2 diabetes mellitus with other skin complication, without long-term current use of insulin ([facility])    Mild intermittent asthma without complication  -     predniSONE 20 mg tablet; Take 2 tablets (40 mg total) by mouth daily for 5 days    Abscess of buttock, left  -     Ambulatory referral to General Surgery; Future    Encounter for screening for malignant neoplasm of breast  -     Mammo diagnostic bilateral w 3d & cad; Future    Mild intermittent asthma, unspecified whether complicated  -     albuterol (2 5 mg/3 mL) 0 083 % nebulizer solution; Take 1 vial (2 5 mg total) by nebulization every 6 (six) hours as needed for wheezing or shortness of breath  -     albuterol (VENTOLIN HFA) 90 mcg/act inhaler; Inhale 2 puffs every 6 (six) hours as needed for wheezing    Right upper quadrant abdominal tenderness with rebound tenderness  -     Amylase; Future  -     Lipase; Future  -     US right upper quadrant;  Future        Patient Active Problem List   Diagnosis    Asthma    Colon polyps    Type 2 diabetes mellitus with skin complication, without long-term current use of insulin (HCC)    Fibromyalgia    MDD (major depressive disorder), single episode, severe with psychotic features ([address] )    Multiple environmental allergies    Nicotine dependence    Poor dentition    H/O abdominal hysterectomy    Hidradenitis suppurativa    Effusion of left knee    High cholesterol    HIV disease ([facility] [address] )    Mild intermittent asthma without complication    Neuropathy    Osteopenia    Periodontal disease    Acute pain of left shoulder    Primary insomnia    Polyp of colon    Elevated triglycerides with high cholesterol    Knee strain, left, sequela    Incomplete tear of left rotator cuff    Adhesive capsulitis of left shoulder    Noncompliance with medications    Numbness and tingling of both lower extremities    Falls, initial encounter    Viral upper respiratory infection    Abscess of buttock, left    Right upper quadrant abdominal tenderness with rebound tenderness     Lab Results   Component Value Date    K 3 7 02/05/2019     02/05/2019    CO2 26 02/05/2019    BUN 17 02/05/2019    CREATININE 0 60 02/05/2019    GLUF 150 (H) 02/05/2019    CALCIUM 9 0 02/05/2019    AST 8 02/05/2019    ALT 15 02/05/2019    ALKPHOS 53 02/05/2019    EGFR 102 02/05/2019     Lab Results   Component Value Date    WBC 7 43 02/05/2019    WBC 7 6 02/05/2019    HGB 14 2 02/05/2019    HGB 14 3 02/05/2019    HCT 45 4 02/05/2019    HCT 43 4 02/05/2019     (H) 02/05/2019    MCV 96 02/05/2019     02/05/2019     02/05/2019       Nicotine Dependency - Primary    Counseled for greater than 15 minutes on the importance of smoking cessation  Education was given regarding the cardiovascular effects of how nicotine affects the heart, lungs, kidneys,and peripheral vascular system    Referred to [facility] for enrollment in smoking cessation program  Subjective:      Patient ID: Aspen Burr is a 63 y o  female  HPI  Patient is being seen in the office today for 2 month follow-up  Patient reports "doing 50/50 " Pt does want to get the Flu vaccine due to being concerned about due to having asthma  CRNP educated pt on the Flu vaccine and the importance of receiving the flu shot  Pt is Indonesian speaking and interpretor is present during the entire exam   Pt was recently sexually active while on vacation is [place]  Condom was used  The following portions of the patient's history were reviewed and updated as appropriate: allergies, current medications, past medical history, past social history and problem list     Review of Systems   HENT: Positive for congestion and postnasal drip  Negative for rhinorrhea  Respiratory: Negative  Cardiovascular: Negative  Gastrointestinal: Negative  Skin: Positive for wound  Left inner groin cyst      Psychiatric/Behavioral: Negative  Objective:      /78 (BP Location: Right arm, Patient Position: Sitting, Cuff Size: Standard)   Pulse 88   Temp 98 °F (36 7 °C)   Resp 18   Ht 5' 5" (1 651 m)   Wt 74 7 kg (164 lb 9 6 oz)   SpO2 99%   BMI 27 39 kg/m²          Physical Exam   Constitutional: She is oriented to person, place, and time  She appears well-developed and well-nourished  HENT:   Right Ear: External ear normal    Left Ear: External ear normal    Nose: Rhinorrhea present  No mucosal edema  Right sinus exhibits no maxillary sinus tenderness and no frontal sinus tenderness  Left sinus exhibits no maxillary sinus tenderness and no frontal sinus tenderness  Mouth/Throat: No posterior oropharyngeal edema  erythema   Cardiovascular: Normal rate, regular rhythm, normal heart sounds and intact distal pulses  Pulses are no weak pulses  Pulses:       Dorsalis pedis pulses are 2+ on the right side, and 2+ on the left side          Posterior tibial pulses are 2+ on the right side, and 2+ on the left side  Pulmonary/Chest: Effort normal  She has wheezes in the right lower field and the left lower field  Expiratory wheeze  Abdominal: Soft  Bowel sounds are normal  There is tenderness  There is rebound  Genitourinary:         Musculoskeletal: Normal range of motion  Feet:   Right Foot:   Skin Integrity: Positive for callus and dry skin  Negative for ulcer, skin breakdown, erythema or warmth  Left Foot:   Skin Integrity: Positive for dry skin  Negative for ulcer, skin breakdown, erythema or warmth  Lymphadenopathy:     She has no cervical adenopathy  Neurological: She is alert and oriented to person, place, and time  Skin: Skin is warm and dry  Capillary refill takes 2 to 3 seconds  Psychiatric: She has a normal mood and affect  Her behavior is normal  Judgment and thought content normal    Nursing note and vitals reviewed

## 2019-03-12 NOTE — ASSESSMENT & PLAN NOTE
Lab Results   Component Value Date    HGBA1C 7 0 (H) 02/05/2019     Doing better:  Current A1c is 7 0 and is down from last A1c of 7 7  Pt reports BS are running 150-170's  Patient has been working with [place] dietitian and attending diabetes classes  Patient reports taking metformin BID daily  CRNP stressed importance of eating smaller portions, eating more fruits and vegetables and controlling amount of carbohydrates patient is eating her meal, and exercising for 30 minutes 5  times a week per recommended guidelines  CRNP stressed importance of cutting out sodas and thus we like drinks  Repeat A1c in 3 months 
New Onset:  RUQ rebound tenderness during exam   No recent nausea, vomiting, or diarrhea  Pt does drink ETOH  Hep C Ab negative  Will check RUQ US 
Ongoing  CRNP stressed importance of complete blood work as ordered  Lovaza 2 g twice a day by mouth 
Ongoing  Patient has not completed required a blood work  Continue pravastatin 40 mg tablet by mouth daily  Repeat lipid panel 
Patient reports no missed doses  CRNP  patient on taking her medication at the same time every day to help with 100% medication adherence  HIV Counseling:    Viral Load: 30 copies     CD4 Count: 430    ART: Derrick Speaks    Denies side effects  Stressed the importance of adherence  Continue follow up in the ID clinic with Dr Zabrina Conrad  Reviewed the most recent labs, including the Cd4 and viral load  Discussed the risks and benefits of treatment options, instructions for management, importance of treatment adherence, and reduction of risk factors  Educated on possible medication side effects  Counseled on routes of HIV transmission, including the risk of  infection  Emphasized that viral suppression is the best method to prevent HIV transmission  At this time the pt denies the need for HIV testing of anyone in their life  Total encounter time was greater than 35 minutes  Greater than 20 minutes were spent on counseling and patient education  Pt voices understanding and agreement with treatment plan 
Patient reports smoking 1/2 ppd  CRNP  patient on smoking sensation in the importance of  Pt is interested in nicotine patch 21 mg Q 24/hr  Nicotine Gum 4 mg as needed for nicotine breakthrough need  Nicotine Dependency - Primary    Counseled for greater than 15 minutes on the importance of smoking cessation  Education was given regarding the cardiovascular effects of how nicotine affects the heart, lungs, kidneys,and peripheral vascular system    Referred to [facility] for enrollment in smoking cessation program 
Pt has been using albuterol inhaler and nebulizer for cough and chest tightness  CRNP explained importance of not over using the medication and side effects of too much albuterol  Pt verbalized understanding  Reorder albuterol inhaler and nebulizer 
Pt recently returned from [place]   Pt reports cough with phlegm, sore throat, HA, and post nasal gtt  Pt has been taking Cheryl's Day and Night with  so-so results  No fever, chills, and no sick contacts  Pt does not appear sickly  Pt is most bothered by the cough  Pt has been using Albuterol nebulizer every 4 hrs to help to cough and chest tightness in addition to the albuterol inhaler  ESTEFANY explained to the pt the importance of how and when to use the medication to prevent overdosing  Mometasone spray 2 to each nostril daily as needed for congestion  Prednisone 40 mg PO daily x 5 days for wheezing 
Pt report having wound that was full of puss while in [facility] about 3 weeks ago  Left inner buttock toward groin fold  Pt was seen and received IV abx at home for 5 days  Abcess was drained by nursing, not sure about culture  Pt drained abscess last night prior to coming to visit  Pt has been using triple abx ointment  Not able to express any puss from wound  No signs of infection  No abx at this time  Pt to call office and to be seen for wound culture  Apply warm soaks BID and to keep area clean with mild soap    Will send to general surgery for abscess vs sebaceous cyst removal if wound does not heal 
68

## 2019-03-15 ENCOUNTER — PATIENT OUTREACH (OUTPATIENT)
Dept: SURGERY | Facility: CLINIC | Age: 59
End: 2019-03-15

## 2019-03-15 ENCOUNTER — OFFICE VISIT (OUTPATIENT)
Dept: SURGERY | Facility: CLINIC | Age: 59
End: 2019-03-15
Payer: COMMERCIAL

## 2019-03-15 VITALS
WEIGHT: 163 LBS | BODY MASS INDEX: 27.16 KG/M2 | HEART RATE: 77 BPM | TEMPERATURE: 98.7 F | DIASTOLIC BLOOD PRESSURE: 77 MMHG | OXYGEN SATURATION: 98 % | SYSTOLIC BLOOD PRESSURE: 118 MMHG | HEIGHT: 65 IN

## 2019-03-15 DIAGNOSIS — F17.219 CIGARETTE NICOTINE DEPENDENCE WITH NICOTINE-INDUCED DISORDER: ICD-10-CM

## 2019-03-15 DIAGNOSIS — E11.628 TYPE 2 DIABETES MELLITUS WITH OTHER SKIN COMPLICATION, WITHOUT LONG-TERM CURRENT USE OF INSULIN (HCC): ICD-10-CM

## 2019-03-15 DIAGNOSIS — Z20.2 CONTACT WITH AND (SUSPECTED) EXPOSURE TO INFECTIONS WITH A PREDOMINANTLY SEXUAL MODE OF TRANSMISSION: ICD-10-CM

## 2019-03-15 DIAGNOSIS — L02.31 ABSCESS OF BUTTOCK, LEFT: ICD-10-CM

## 2019-03-15 DIAGNOSIS — B20 HIV DISEASE (HCC): Primary | ICD-10-CM

## 2019-03-15 DIAGNOSIS — Z72.89 OTHER PROBLEMS RELATED TO LIFESTYLE: ICD-10-CM

## 2019-03-15 DIAGNOSIS — Z11.3 ROUTINE SCREENING FOR STI (SEXUALLY TRANSMITTED INFECTION): ICD-10-CM

## 2019-03-15 DIAGNOSIS — E78.00 HIGH CHOLESTEROL: ICD-10-CM

## 2019-03-15 DIAGNOSIS — Z23 NEED FOR INFLUENZA VACCINATION: ICD-10-CM

## 2019-03-15 PROCEDURE — 90682 RIV4 VACC RECOMBINANT DNA IM: CPT

## 2019-03-15 PROCEDURE — 90471 IMMUNIZATION ADMIN: CPT

## 2019-03-15 PROCEDURE — 99215 OFFICE O/P EST HI 40 MIN: CPT | Performed by: INTERNAL MEDICINE

## 2019-03-15 NOTE — PROGRESS NOTES
Progress Note - Infectious Disease   Aundrea Snyder 63 y o  female MRN: [id number]  Unit/Bed#:  Encounter: [id number]      Impression/Plan:  1  HIV/aids-doing well on Genvoya with a near undetectable viral load but with a CD4 count that is in the 400s  Continue antiretrovirals    Will recheck labs in 2 months and follow up in 3 months  Stressed adherence        2  Nicotine dependence-continue intervention by our tobacco cessation program    Patient is now using nicotine patches  Encourage complete tobacco cessation     3  Diabetes mellitus-poorly controlled  Patient is aggressively being treated with metformin, dietary interventions, and education  Discussed in detail with the primary     4    Buttock abscess-status post IV antibiotics while in [facility] and spontaneous drainage  Lesion seems to have improved  General surgery evaluation is pending  No additional antibiotic treatment is indicated    Discussed the patient in detail with the primary and examined the patient with the primary  Patient was provided medication, adherence and prevention education    Subjective:  Routine follow-up for HIV  Patient claims 100% adherence with Genvoya    Patient denies any notable side effects  Overall the feeling well  The patient denies any fever chills or sweats, denies any nausea vomiting or diarrhea, denies any cough or shortness of breath  ROS: A complete 12 point ROS is negative other than that noted in the HPI    Followup portions patient history reviewed and updated as:   Allergies, current medications, past medical history, past social history, past surgical history, and the problem list    Objective:  Vitals:  Vitals:    03/15/19 0907   BP: 118/77   BP Location: Right arm   Patient Position: Sitting   Cuff Size: Standard   Pulse: 77   Temp: 98 7 °F (37 1 °C)   SpO2: 98%   Weight: 73 9 kg (163 lb)   Height: 5' 5" (1 651 m)       Physical Exam:   General Appearance:  Alert, interactive, appearing well,  nontoxic, no acute distress  Neck:   Supple without lymphadenopathy, no thyromegaly or masses   Throat: Oropharynx moist without lesions  Lungs:   Clear to auscultation bilaterally; no wheezes, rhonchi or rales; respirations unlabored   Heart:  RRR; no murmur, rub or gallop   Abdomen:   Soft, non-tender, non-distended, positive bowel sounds  Extremities: No clubbing, cyanosis or edema   Skin: No new rashes or lesions  Medial buttock area of induration, but no erythema or drainage  Labs, Imaging, & Other studies:   All pertinent labs and imaging studies were personally reviewed        Lab Results   Component Value Date    WBC 7 43 02/05/2019    WBC 7 6 02/05/2019    HGB 14 2 02/05/2019    HGB 14 3 02/05/2019    HCT 45 4 02/05/2019    HCT 43 4 02/05/2019     (H) 02/05/2019    MCV 96 02/05/2019     02/05/2019     02/05/2019     Lab Results   Component Value Date    HEPCAB Non-reactive 07/26/2018     Lab Results   Component Value Date    HAV Reactive (A) 11/10/2017    HEPCAB Non-reactive 07/26/2018     Lab Results   Component Value Date    RPR Non-Reactive 07/26/2018     CD4 ABS   Date/Time Value Ref Range Status   02/05/2019 09:16  359 - 1519 /uL Final     HIV-1 RNA by PCR, Qn   Date/Time Value Ref Range Status   02/05/2019 09:16 AM 30 copies/mL Final     Comment:     The reportable range for this assay is 20 to 10,000,000  copies HIV-1 RNA/mL             Current Outpatient Medications:     albuterol (2 5 mg/3 mL) 0 083 % nebulizer solution, Take 1 vial (2 5 mg total) by nebulization every 6 (six) hours as needed for wheezing or shortness of breath, Disp: 60 vial, Rfl: 0    albuterol (VENTOLIN HFA) 90 mcg/act inhaler, Inhale 2 puffs every 6 (six) hours as needed for wheezing, Disp: 18 g, Rfl: 2    ammonium lactate (LAC-HYDRIN) 12 % lotion, , Disp: , Rfl:     aspirin (ECOTRIN LOW STRENGTH) 81 mg EC tablet, Take 1 tablet (81 mg total) by mouth daily, Disp: 90 tablet, Rfl: 2    BANOPHEN 50 MG capsule, TAKE 1 CAPSULE BY MOUTH DAILY AT BEDTIME AS NEEDED FOR SLEEP, Disp: 30 capsule, Rfl: 1    bisacodyl (DULCOLAX) 5 mg EC tablet, Take 30 mg by mouth, Disp: , Rfl:     Blood Glucose Monitoring Suppl (TRUE METRIX METER) GIL, daily  , Disp: , Rfl:     Blood Glucose Monitoring Suppl GIL, daily  , Disp: , Rfl:     budesonide-formoterol (SYMBICORT) 160-4 5 mcg/act inhaler, Inhale 2 puffs, Disp: , Rfl:     busPIRone (BUSPAR) 5 mg tablet, Take 5 mg by mouth 2 (two) times a day, Disp: , Rfl:     clotrimazole (LOTRIMIN) 1 % cream, Apply topically 2 (two) times a day, Disp: , Rfl:     ergocalciferol (DRISDOL) 04125 units capsule, Take 50,000 Units by mouth, Disp: , Rfl:     FLUoxetine (PROzac) 20 mg capsule, , Disp: , Rfl:     FLUoxetine (PROzac) 20 MG tablet, Take 20 mg by mouth daily, Disp: , Rfl:     fluticasone (FLONASE) 50 mcg/act nasal spray, 1 spray into each nostril daily, Disp: 1 Bottle, Rfl: 2    gabapentin (NEURONTIN) 800 mg tablet, TAKE 1 TABLET BY MOUTH THREE TIMES A DAY, Disp: 90 tablet, Rfl: 2    gabapentin (NEURONTIN) 800 mg tablet, Take 1 tablet (800 mg total) by mouth 3 (three) times a day, Disp: 270 tablet, Rfl: 2    GENVOYA tablet, TAKE 1 TABLET BY MOUTH DAILY, Disp: 30 tablet, Rfl: 3    glucose blood (FREESTYLE LITE) test strip, Use as instructed, Disp: 100 each, Rfl: 5    glucose blood test strip, 1 each by Other route 4 (four) times a day, Disp: 100 each, Rfl: 0    ibuprofen (MOTRIN) 600 mg tablet, Take 1 tablet (600 mg total) by mouth every 6 (six) hours as needed for mild pain for up to 10 days, Disp: 30 tablet, Rfl: 0    lidocaine (LIDODERM) 5 %, Place 1 patch on the skin daily Remove & Discard patch within 12 hours or as directed by MD, Disp: 30 patch, Rfl: 0    lidocaine (LMX) 4 % cream, Apply topically as needed for mild pain, Disp: 30 g, Rfl: 0    Melatonin 5 MG TABS, Take 1 tablet by mouth at bedtime daily, Disp: 90 tablet, Rfl: 3   metFORMIN (GLUCOPHAGE) 500 mg tablet, Take 1 tablet (500 mg total) by mouth 2 (two) times a day, Disp: 180 tablet, Rfl: 2    naproxen (NAPROSYN) 500 mg tablet, Take 1 tablet (500 mg total) by mouth as needed for mild pain Please take with food and not more than 2 times per day , Disp: 30 tablet, Rfl: 0    nicotine (NICODERM CQ) 14 mg/24hr TD 24 hr patch, Place 1 patch on the skin every 24 hours, Disp: 28 patch, Rfl: 6    nicotine polacrilex (COMMIT) 4 MG lozenge, Apply 1 lozenge (4 mg total) to the mouth or throat as needed for smoking cessation, Disp: 100 each, Rfl: 5    omega-3-acid ethyl esters (LOVAZA) 1 g capsule, Take 2 capsules (2 g total) by mouth 2 (two) times a day, Disp: 180 capsule, Rfl: 1    polyethylene glycol (GLYCOLAX) powder, Drink per MD recommendations, Disp: , Rfl:     pravastatin (PRAVACHOL) 40 mg tablet, Take 1 tablet (40 mg total) by mouth daily, Disp: 90 tablet, Rfl: 2    predniSONE 20 mg tablet, Take 2 tablets (40 mg total) by mouth daily for 5 days, Disp: 10 tablet, Rfl: 0    sulfaSALAzine (AZULFIDINE-EN) 500 mg EC tablet, , Disp: , Rfl:     SUPER THIN LANCETS MISC, by Percutaneous route 4 (four) times a day, Disp: 100 each, Rfl: 6    traZODone (DESYREL) 50 mg tablet, , Disp: , Rfl: 0

## 2019-03-15 NOTE — PROGRESS NOTES
Ct  [id number]  Writer met with ct at Dr Marilia bennett, provided my information for ct to seek me if needs case mgt services as former  no longer here   dv

## 2019-03-18 ENCOUNTER — CONSULT (OUTPATIENT)
Dept: SURGERY | Facility: CLINIC | Age: 59
End: 2019-03-18
Payer: COMMERCIAL

## 2019-03-18 VITALS
TEMPERATURE: 98.9 F | WEIGHT: 168.3 LBS | DIASTOLIC BLOOD PRESSURE: 80 MMHG | HEIGHT: 65 IN | SYSTOLIC BLOOD PRESSURE: 128 MMHG | BODY MASS INDEX: 28.04 KG/M2

## 2019-03-18 DIAGNOSIS — L02.31 ABSCESS OF BUTTOCK, LEFT: ICD-10-CM

## 2019-03-18 DIAGNOSIS — L73.2 HIDRADENITIS SUPPURATIVA: Primary | ICD-10-CM

## 2019-03-18 PROCEDURE — 99203 OFFICE O/P NEW LOW 30 MIN: CPT | Performed by: SURGERY

## 2019-03-18 RX ORDER — CEPHALEXIN 750 MG/1
750 CAPSULE ORAL 4 TIMES DAILY
Qty: 20 CAPSULE | Refills: 0 | Status: SHIPPED | OUTPATIENT
Start: 2019-03-18 | End: 2019-03-23

## 2019-03-18 NOTE — PROGRESS NOTES
[id number] [facility] SPECIALIST    Assessment: Pt seems to experience some depressed feelings  Diagnoses and all orders for this visit:    HIV disease ([address] )  -     CBC and differential; Future  -     Chlamydia/GC amplified DNA by PCR; Future  -     Comprehensive metabolic panel; Future  -     Hepatitis C antibody; Future  -     HIV-1 RNA, quantitative, PCR; Future  -     Quantiferon TB Gold Plus; Future  -     RPR; Future  -     T-helper cells (CD4) count; Future  -     Urinalysis with reflex to microscopic; Future    Routine screening for STI (sexually transmitted infection)  -     Chlamydia/GC amplified DNA by PCR; Future    Contact with and (suspected) exposure to infections with a predominantly sexual mode of transmission  -     Chlamydia/GC amplified DNA by PCR; Future    Other problems related to lifestyle  -     Chlamydia/GC amplified DNA by PCR; Future    High cholesterol  -     Lipid Panel with Direct LDL reflex; Future    Need for influenza vaccination  -     influenza vaccine, 7101-0900, quadrivalent, recombinant, PF, 0 5 mL, for patients 18 yr+ (FLUBLOK)    Type 2 diabetes mellitus with other skin complication, without long-term current use of insulin (HCC)    Abscess of buttock, left    Cigarette nicotine dependence with nicotine-induced disorder          Plan/Behavorial Health Recommendations:  1  F/U with [facility] at future visits  2  Contact [facility] in the future if needed for additional support  3  Maintain therapy and psychiatry appointments and adherence to medication prescription  4  Increasing sense of well being, positivity, and use of positive coping skills  5  Maintaining NRT towards complete smoking cessation    Discussion: [facility] introduced self to the pt with [place] speaking staff from [employer] present to translate for the client  Today pt presents with depression  [facility] completed the PHQ-9 Depression screener and pt scored 13 with not SI or HI reported or observed   Pt reported that she currently see's a therapist and psychiatrist  She reported that she is waiting for her prescription to be filled as she out of her medication  [facility] inquired about the effectiveness of her medication as she reported that her medication helps with her depression and was that she was just prescribed medication to help her sleep  She reported that she often has trouble with remaining asleep  She also reported that she has always either overate or would have a poor appetite  Pt reports of often feeling hyperactive in which leads her to want to smoke  [facility] inquired further about this as the pt reported that she talked with the PCP at the clinic to prescribe her the patch in order to work towards quitting smoking  [facility] explained to the pt the importance of maintaining her appointments with her therapist and psychiatrist and adhering to her medication  [facility] offered [facility] services if needed for additional support  Pt will likely benefit from contacting [facility] in the future if needed for additional support  Pt would also benefit from maintaining therapy and psychiatry appointments and adhering to her medication prescription  Pt would also benefit from maintaining NRT towards complete smoking cessation  Subjective:     Patient ID: Justino Mae is a 63 y o  female  HPI: The patient is being seen at the [facility] today for a routine behavioral health follow up     Objective:    Orientation    Person: Yes   Place: Yes   Time: Yes     Appearance     Well Developed: Yes   Uncomfortable: No  Normal Body Odor: Yes   Smells of Feces: No  Disheveled: No  Grooming Unkempt: No  Poor Eye Contact: No  Hirsute: No  Looks Tired:  Yes   Appearance Reflects Stated Age: Yes     Mood and Affect    Appropriate: Yes   Euthymic: No  Irritable: No  Angry: No  Anxious: No  Depresses: Yes   Blunted: No  Labile: No  Restricted: No    Harm to Self or Others: None reported of observed    Substance Abuse: Reports preparing to quit smoking through us of the patch      FLORENCE Abdi   [facility] Specialist

## 2019-03-18 NOTE — PROGRESS NOTES
Office Visit - General Surgery  Nova Wright MRN: [id number]  Encounter: [id number]    Assessment and Plan    Problem List Items Addressed This Visit        Other    Abscess of buttock, left          Chief Complaint:  Nova Wright is a 63 y o  female who presents for Mass (Concult mass on buttocks)    Subjective      Past Medical History  Past Medical History:   Diagnosis Date    Arthritis     Cellulitis     Colon polyp     Diabetes mellitus ([facility] )     Fibromyalgia     Fibromyalgia     HIV (human immunodeficiency virus infection) ([facility] )     Hyperlipidemia     Pinguecula of both eyes     Presbyopia of both eyes        Past Surgical History  Past Surgical History:   Procedure Laterality Date    BREAST SURGERY      lumpectomy    COLONOSCOPY      last year, polyps, every 6 months    COLONOSCOPY N/A 11/1/2018    Procedure: COLONOSCOPY;  Surgeon: [facility] ;  Location: [facility];   Service: Gastroenterology    HYSTERECTOMY      INCISION AND DRAINAGE PERIRECTAL ABSCESS      TUBAL LIGATION         Family History  Family History   Problem Relation Age of Onset    Heart disease Mother     Diabetes Mother         type 1    Hypertension Mother     Cancer Sister        Social History  Social History     Socioeconomic History    Marital status: Single     Spouse name: None    Number of children: None    Years of education: None    Highest education level: None   Occupational History    None   Social Needs    Financial resource strain: None    Food insecurity:     Worry: None     Inability: None    Transportation needs:     Medical: None     Non-medical: None   Tobacco Use    Smoking status: Current Every Day Smoker     Types: Cigarettes    Smokeless tobacco: Never Used    Tobacco comment: 1 pk every 2 days, Pt started smoking at age 6   Substance and Sexual Activity    Alcohol use: Not Currently     Comment: occasionally     Drug use: No    Sexual activity: Not Currently Lifestyle    Physical activity:     Days per week: None     Minutes per session: None    Stress: None   Relationships    Social connections:     Talks on phone: None     Gets together: None     Attends Confucianism service: None     Active member of club or organization: None     Attends meetings of clubs or organizations: None     Relationship status: None    Intimate partner violence:     Fear of current or ex partner: None     Emotionally abused: None     Physically abused: None     Forced sexual activity: None   Other Topics Concern    None   Social History Narrative    None        Medications  Current Outpatient Medications on File Prior to Visit   Medication Sig Dispense Refill    albuterol (VENTOLIN HFA) 90 mcg/act inhaler Inhale 2 puffs every 6 (six) hours as needed for wheezing 18 g 2    aspirin (ECOTRIN LOW STRENGTH) 81 mg EC tablet Take 1 tablet (81 mg total) by mouth daily 90 tablet 2    Blood Glucose Monitoring Suppl (TRUE METRIX METER) GIL daily   Blood Glucose Monitoring Suppl GIL daily        gabapentin (NEURONTIN) 800 mg tablet Take 1 tablet (800 mg total) by mouth 3 (three) times a day 270 tablet 2    GENVOYA tablet TAKE 1 TABLET BY MOUTH DAILY 30 tablet 3    glucose blood (FREESTYLE LITE) test strip Use as instructed 100 each 5    glucose blood test strip 1 each by Other route 4 (four) times a day 100 each 0    metFORMIN (GLUCOPHAGE) 500 mg tablet Take 1 tablet (500 mg total) by mouth 2 (two) times a day 180 tablet 2    pravastatin (PRAVACHOL) 40 mg tablet Take 1 tablet (40 mg total) by mouth daily 90 tablet 2    SUPER THIN LANCETS MISC by Percutaneous route 4 (four) times a day 100 each 6    albuterol (2 5 mg/3 mL) 0 083 % nebulizer solution Take 1 vial (2 5 mg total) by nebulization every 6 (six) hours as needed for wheezing or shortness of breath (Patient not taking: Reported on 3/18/2019) 60 vial 0    ammonium lactate (LAC-HYDRIN) 12 % lotion       BANOPHEN 50 MG capsule TAKE 1 CAPSULE BY MOUTH DAILY AT BEDTIME AS NEEDED FOR SLEEP (Patient not taking: Reported on 3/18/2019) 30 capsule 1    bisacodyl (DULCOLAX) 5 mg EC tablet Take 30 mg by mouth      budesonide-formoterol (SYMBICORT) 160-4 5 mcg/act inhaler Inhale 2 puffs      busPIRone (BUSPAR) 5 mg tablet Take 5 mg by mouth 2 (two) times a day      clotrimazole (LOTRIMIN) 1 % cream Apply topically 2 (two) times a day      ergocalciferol (DRISDOL) 32251 units capsule Take 50,000 Units by mouth      FLUoxetine (PROzac) 20 mg capsule       FLUoxetine (PROzac) 20 MG tablet Take 20 mg by mouth daily      fluticasone (FLONASE) 50 mcg/act nasal spray 1 spray into each nostril daily 1 Bottle 2    gabapentin (NEURONTIN) 800 mg tablet TAKE 1 TABLET BY MOUTH THREE TIMES A DAY (Patient not taking: Reported on 3/18/2019) 90 tablet 2    ibuprofen (MOTRIN) 600 mg tablet Take 1 tablet (600 mg total) by mouth every 6 (six) hours as needed for mild pain for up to 10 days 30 tablet 0    lidocaine (LIDODERM) 5 % Place 1 patch on the skin daily Remove & Discard patch within 12 hours or as directed by MD (Patient not taking: Reported on 3/18/2019) 30 patch 0    lidocaine (LMX) 4 % cream Apply topically as needed for mild pain (Patient not taking: Reported on 3/18/2019) 30 g 0    Melatonin 5 MG TABS Take 1 tablet by mouth at bedtime daily (Patient not taking: Reported on 3/18/2019) 90 tablet 3    naproxen (NAPROSYN) 500 mg tablet Take 1 tablet (500 mg total) by mouth as needed for mild pain Please take with food and not more than 2 times per day   (Patient not taking: Reported on 3/18/2019) 30 tablet 0    nicotine (NICODERM CQ) 14 mg/24hr TD 24 hr patch Place 1 patch on the skin every 24 hours (Patient not taking: Reported on 3/18/2019) 28 patch 6    nicotine polacrilex (COMMIT) 4 MG lozenge Apply 1 lozenge (4 mg total) to the mouth or throat as needed for smoking cessation (Patient not taking: Reported on 3/18/2019) 100 each 5    omega-3-acid ethyl esters (LOVAZA) 1 g capsule Take 2 capsules (2 g total) by mouth 2 (two) times a day (Patient not taking: Reported on 3/18/2019) 180 capsule 1    polyethylene glycol (GLYCOLAX) powder Drink per MD recommendations      [] predniSONE 20 mg tablet Take 2 tablets (40 mg total) by mouth daily for 5 days 10 tablet 0    sulfaSALAzine (AZULFIDINE-EN) 500 mg EC tablet       traZODone (DESYREL) 50 mg tablet   0     No current facility-administered medications on file prior to visit          Allergies  No Known Allergies    Review of Systems    Objective  Vitals:    19 0940   BP: 128/80   Temp: 98 9 °F (37 2 °C)       Physical Exam

## 2019-03-18 NOTE — ASSESSMENT & PLAN NOTE
Induration on Right and Left side of labia, no abscess noted    Plan:  5x days keflex  Follow up with primary care physician

## 2019-03-18 NOTE — PROGRESS NOTES
Assessment/Plan:      Diagnoses and all orders for this visit:    Hidradenitis suppurativa  -     cephalexin (KEFLEX) 750 MG capsule; Take 1 capsule (750 mg total) by mouth 4 (four) times a day for 5 days    Abscess of buttock, left  -     Ambulatory referral to General Surgery  -     cephalexin (KEFLEX) 750 MG capsule; Take 1 capsule (750 mg total) by mouth 4 (four) times a day for 5 days          Subjective:     Patient ID: Valerie Hunter is a 63 y o  female  Patient is a 58F with HIV well controlled with anti-retrovirals  Patient has a history of Hidradenitis Suppurativa and abscesses in past  This time she presented to her PCP due to an abscess that formed on her left buttock and was referred to General Surgery  Prior to arrival here this abscess spontaneously drained  She also noted to have two sensitive areas of erythema on either side of her labia  Review of Systems   Constitutional: Negative  HENT: Negative  Eyes: Negative  Respiratory: Negative  Cardiovascular: Negative  Gastrointestinal: Negative  Endocrine: Negative  Genitourinary: Negative  Musculoskeletal: Negative  Skin: Positive for color change and wound  Allergic/Immunologic: Negative  Neurological: Negative  Hematological: Negative  Psychiatric/Behavioral: Negative  Objective:     Physical Exam   Constitutional: She is oriented to person, place, and time  She appears well-developed and well-nourished  HENT:   Head: Normocephalic and atraumatic  Eyes: EOM are normal    Neck: Normal range of motion  No tracheal deviation present  Cardiovascular: Normal rate and regular rhythm  Pulmonary/Chest: Effort normal  No respiratory distress  Abdominal: Soft  She exhibits no distension     Genitourinary: Vagina normal    Genitourinary Comments: Redness, induration on Right and Left of labia, tender to palpation in these regions    Spontaneously drained old abscess on left gluteal fold - no further erythema, some induration   Musculoskeletal: Normal range of motion  She exhibits no tenderness  Neurological: She is alert and oriented to person, place, and time  Skin: Skin is warm and dry  There is erythema  Erythema at labia sites of infection   Psychiatric: She has a normal mood and affect   Her behavior is normal

## 2019-03-20 ENCOUNTER — HOSPITAL ENCOUNTER (OUTPATIENT)
Dept: MAMMOGRAPHY | Facility: CLINIC | Age: 59
Discharge: HOME | End: 2019-03-20
Payer: COMMERCIAL

## 2019-03-20 VITALS — WEIGHT: 168 LBS | BODY MASS INDEX: 27.99 KG/M2 | HEIGHT: 65 IN

## 2019-03-20 DIAGNOSIS — Z12.39 ENCOUNTER FOR SCREENING FOR MALIGNANT NEOPLASM OF BREAST: ICD-10-CM

## 2019-03-20 PROCEDURE — 77063 BREAST TOMOSYNTHESIS BI: CPT

## 2019-03-20 PROCEDURE — 77067 SCR MAMMO BI INCL CAD: CPT

## 2019-03-21 ENCOUNTER — HOSPITAL ENCOUNTER (EMERGENCY)
Facility: HOSPITAL | Age: 59
Discharge: HOME | End: 2019-03-21
Attending: EMERGENCY MEDICINE | Admitting: EMERGENCY MEDICINE
Payer: COMMERCIAL

## 2019-03-21 VITALS
OXYGEN SATURATION: 96 % | WEIGHT: 162.92 LBS | DIASTOLIC BLOOD PRESSURE: 75 MMHG | SYSTOLIC BLOOD PRESSURE: 131 MMHG | BODY MASS INDEX: 27.11 KG/M2 | HEART RATE: 120 BPM | RESPIRATION RATE: 20 BRPM | TEMPERATURE: 97.8 F

## 2019-03-21 DIAGNOSIS — J45.901 ASTHMA EXACERBATION: Primary | ICD-10-CM

## 2019-03-21 DIAGNOSIS — J45.909 ASTHMA: ICD-10-CM

## 2019-03-21 PROCEDURE — 99283 EMERGENCY DEPT VISIT LOW MDM: CPT

## 2019-03-21 PROCEDURE — 94640 AIRWAY INHALATION TREATMENT: CPT

## 2019-03-21 RX ORDER — ALBUTEROL SULFATE 90 UG/1
2 AEROSOL, METERED RESPIRATORY (INHALATION) EVERY 4 HOURS PRN
Qty: 1 INHALER | Refills: 0 | Status: SHIPPED | OUTPATIENT
Start: 2019-03-21 | End: 2019-04-03 | Stop reason: ALTCHOICE

## 2019-03-21 RX ORDER — ALBUTEROL SULFATE 2.5 MG/3ML
5 SOLUTION RESPIRATORY (INHALATION) ONCE
Status: COMPLETED | OUTPATIENT
Start: 2019-03-21 | End: 2019-03-21

## 2019-03-21 RX ORDER — ALBUTEROL SULFATE 2.5 MG/3ML
2.5 SOLUTION RESPIRATORY (INHALATION) EVERY 6 HOURS PRN
Qty: 75 ML | Refills: 0 | Status: SHIPPED | OUTPATIENT
Start: 2019-03-21 | End: 2019-04-03 | Stop reason: ALTCHOICE

## 2019-03-21 RX ADMIN — IPRATROPIUM BROMIDE 0.5 MG: 0.5 SOLUTION RESPIRATORY (INHALATION) at 12:56

## 2019-03-21 RX ADMIN — ALBUTEROL SULFATE 5 MG: 2.5 SOLUTION RESPIRATORY (INHALATION) at 12:15

## 2019-03-21 RX ADMIN — ALBUTEROL SULFATE 5 MG: 2.5 SOLUTION RESPIRATORY (INHALATION) at 12:56

## 2019-03-21 RX ADMIN — IPRATROPIUM BROMIDE 0.5 MG: 0.5 SOLUTION RESPIRATORY (INHALATION) at 12:15

## 2019-03-21 NOTE — ED PROVIDER NOTES
History  Chief Complaint   Patient presents with    Asthma     hx of asthma, flare up for a week now   home meds not working  unknown trigger  audible wheezing in triage  History provided by:  Patient   used: Yes (Pt's family)    Asthma   Location:  Lungs  Duration:  1 week  Timing:  Intermittent  Progression:  Unchanged  Chronicity:  New  Ineffective treatments:  Inhaler and neb  Associated symptoms: cough (Productive) and wheezing    Associated symptoms: no abdominal pain, no chest pain, no fever, no nausea, no shortness of breath and no vomiting        Prior to Admission Medications   Prescriptions Last Dose Informant Patient Reported? Taking? BANOPHEN 50 MG capsule   No No   Sig: TAKE 1 CAPSULE BY MOUTH DAILY AT BEDTIME AS NEEDED FOR SLEEP   Patient not taking: Reported on 3/18/2019   Blood Glucose Monitoring Suppl (TRUE METRIX METER) GIL  Self Yes No   Sig: daily  Blood Glucose Monitoring Suppl GIL   Yes No   Sig: daily     FLUoxetine (PROzac) 20 MG tablet   Yes No   Sig: Take 20 mg by mouth daily   FLUoxetine (PROzac) 20 mg capsule   Yes No   GENVOYA tablet   No No   Sig: TAKE 1 TABLET BY MOUTH DAILY   Melatonin 5 MG TABS   No No   Sig: Take 1 tablet by mouth at bedtime daily   Patient not taking: Reported on 3/18/2019   SUPER THIN LANCETS MISC   No No   Sig: by Percutaneous route 4 (four) times a day   albuterol (2 5 mg/3 mL) 0 083 % nebulizer solution   No No   Sig: Take 1 vial (2 5 mg total) by nebulization every 6 (six) hours as needed for wheezing or shortness of breath   Patient not taking: Reported on 3/18/2019   albuterol (VENTOLIN HFA) 90 mcg/act inhaler   No No   Sig: Inhale 2 puffs every 6 (six) hours as needed for wheezing   ammonium lactate (LAC-HYDRIN) 12 % lotion   Yes No   aspirin (ECOTRIN LOW STRENGTH) 81 mg EC tablet   No No   Sig: Take 1 tablet (81 mg total) by mouth daily   bisacodyl (DULCOLAX) 5 mg EC tablet  Self Yes No   Sig: Take 30 mg by mouth budesonide-formoterol (SYMBICORT) 160-4 5 mcg/act inhaler  Self Yes No   Sig: Inhale 2 puffs   busPIRone (BUSPAR) 5 mg tablet   Yes No   Sig: Take 5 mg by mouth 2 (two) times a day   cephalexin (KEFLEX) 750 MG capsule   No No   Sig: Take 1 capsule (750 mg total) by mouth 4 (four) times a day for 5 days   clotrimazole (LOTRIMIN) 1 % cream  Self Yes No   Sig: Apply topically 2 (two) times a day   ergocalciferol (DRISDOL) 92148 units capsule   Yes No   Sig: Take 50,000 Units by mouth   fluticasone (FLONASE) 50 mcg/act nasal spray   No No   Si spray into each nostril daily   gabapentin (NEURONTIN) 800 mg tablet   No No   Sig: TAKE 1 TABLET BY MOUTH THREE TIMES A DAY   Patient not taking: Reported on 3/18/2019   gabapentin (NEURONTIN) 800 mg tablet   No No   Sig: Take 1 tablet (800 mg total) by mouth 3 (three) times a day   glucose blood (FREESTYLE LITE) test strip   No No   Sig: Use as instructed   glucose blood test strip   No No   Si each by Other route 4 (four) times a day   ibuprofen (MOTRIN) 600 mg tablet   No No   Sig: Take 1 tablet (600 mg total) by mouth every 6 (six) hours as needed for mild pain for up to 10 days   lidocaine (LIDODERM) 5 %   No No   Sig: Place 1 patch on the skin daily Remove & Discard patch within 12 hours or as directed by MD   Patient not taking: Reported on 3/18/2019   lidocaine (LMX) 4 % cream   No No   Sig: Apply topically as needed for mild pain   Patient not taking: Reported on 3/18/2019   metFORMIN (GLUCOPHAGE) 500 mg tablet   No No   Sig: Take 1 tablet (500 mg total) by mouth 2 (two) times a day   naproxen (NAPROSYN) 500 mg tablet   No No   Sig: Take 1 tablet (500 mg total) by mouth as needed for mild pain Please take with food and not more than 2 times per day     Patient not taking: Reported on 3/18/2019   nicotine (NICODERM CQ) 14 mg/24hr TD 24 hr patch   No No   Sig: Place 1 patch on the skin every 24 hours   Patient not taking: Reported on 3/18/2019   nicotine polacrilex (COMMIT) 4 MG lozenge   No No   Sig: Apply 1 lozenge (4 mg total) to the mouth or throat as needed for smoking cessation   Patient not taking: Reported on 3/18/2019   omega-3-acid ethyl esters (LOVAZA) 1 g capsule   No No   Sig: Take 2 capsules (2 g total) by mouth 2 (two) times a day   Patient not taking: Reported on 3/18/2019   polyethylene glycol (GLYCOLAX) powder  Self Yes No   Sig: Drink per MD recommendations   pravastatin (PRAVACHOL) 40 mg tablet   No No   Sig: Take 1 tablet (40 mg total) by mouth daily   sulfaSALAzine (AZULFIDINE-EN) 500 mg EC tablet  Self Yes No   traZODone (DESYREL) 50 mg tablet   Yes No      Facility-Administered Medications: None       Past Medical History:   Diagnosis Date    Arthritis     Cellulitis     Colon polyp     Diabetes mellitus [facility]     Fibromyalgia     Fibromyalgia     [facility]     Hyperlipidemia     Pinguecula of both eyes     Presbyopia of both eyes        Past Surgical History:   Procedure Laterality Date    BREAST BIOPSY Bilateral     more than 12 years ago     BREAST SURGERY      lumpectomy    COLONOSCOPY      last year, polyps, every 6 months    COLONOSCOPY N/A 11/1/2018    Procedure: COLONOSCOPY;  Surgeon: [facility] [employer] [facility], DO;  Location: [facility]; Service: Gastroenterology    HYSTERECTOMY      INCISION AND DRAINAGE PERIRECTAL ABSCESS      OOPHORECTOMY      TUBAL LIGATION         Family History   Problem Relation Age of Onset    Heart disease Mother     Diabetes Mother         type 1    Hypertension Mother     Cancer Sister      I have reviewed and agree with the history as documented  Social History     Tobacco Use    Smoking status: Current Every Day Smoker     Types: Cigarettes    Smokeless tobacco: Never Used    Tobacco comment: 1 pk every 2 days, Pt started smoking at age 6   Substance Use Topics    Alcohol use: Not Currently     Comment: occasionally     Drug use:  No Review of Systems   Constitutional: Negative for chills, diaphoresis and fever  Respiratory: Positive for cough (Productive) and wheezing  Negative for chest tightness and shortness of breath  Cardiovascular: Negative for chest pain, palpitations and leg swelling  Gastrointestinal: Negative for abdominal pain, nausea and vomiting  Musculoskeletal: Negative for back pain and neck pain  Skin: Negative for pallor  Neurological: Negative for dizziness, syncope, facial asymmetry, speech difficulty, weakness, light-headedness and numbness  All other systems reviewed and are negative  Physical Exam  Physical Exam   Constitutional: She is oriented to person, place, and time  She appears well-developed and well-nourished  Non-toxic appearance  She does not have a sickly appearance  She does not appear ill  HENT:   Head: Normocephalic and atraumatic  Right Ear: Tympanic membrane normal  No drainage  Tympanic membrane is not injected, not erythematous and not bulging  No middle ear effusion  Left Ear: Tympanic membrane normal  No drainage  Tympanic membrane is not injected, not erythematous and not bulging  No middle ear effusion  Nose: Nose normal    Mouth/Throat: Oropharynx is clear and moist  No oropharyngeal exudate  Eyes: Conjunctivae are normal  Right eye exhibits no discharge  Left eye exhibits no discharge  Right conjunctiva is not injected  Left conjunctiva is not injected  Neck: Normal range of motion  Neck supple  No neck rigidity  Cardiovascular: Regular rhythm, normal heart sounds and intact distal pulses  Tachycardia present  Exam reveals no gallop and no friction rub  No murmur heard  Pulmonary/Chest: Effort normal  No stridor  No respiratory distress  She has wheezes  She has no rales  Abdominal: Soft  Bowel sounds are normal  She exhibits no distension  There is no tenderness  There is no rebound and no guarding  Lymphadenopathy:     She has no cervical adenopathy  Neurological: She is alert and oriented to person, place, and time  Skin: Skin is warm and dry  No rash noted  No pallor  Nursing note and vitals reviewed  Vital Signs  ED Triage Vitals   Temperature Pulse Respirations Blood Pressure SpO2   03/21/19 1204 03/21/19 1204 03/21/19 1204 03/21/19 1204 03/21/19 1204   97 8 °F (36 6 °C) (!) 118 20 140/80 98 %      Temp Source Heart Rate Source Patient Position - Orthostatic VS BP Location FiO2 (%)   03/21/19 1204 03/21/19 1333 03/21/19 1333 03/21/19 1333 --   Oral Monitor Lying Right arm       Pain Score       03/21/19 1204       5           Vitals:    03/21/19 1204 03/21/19 1333   BP: 140/80 131/75   Pulse: (!) 118 (!) 120   Patient Position - Orthostatic VS:  Lying         Visual Acuity      ED Medications  Medications   albuterol inhalation solution 5 mg (5 mg Nebulization Given 3/21/19 1215)   ipratropium (ATROVENT) 0 02 % inhalation solution 0 5 mg (0 5 mg Nebulization Given 3/21/19 1215)   albuterol inhalation solution 5 mg (5 mg Nebulization Given 3/21/19 1256)   ipratropium (ATROVENT) 0 02 % inhalation solution 0 5 mg (0 5 mg Nebulization Given 3/21/19 1256)       Diagnostic Studies  Results Reviewed     None                 No orders to display              Procedures  Procedures       Phone Contacts  ED Phone Contact    ED Course  ED Course as of Mar 21 1339   Thu Mar 21, 2019   1249 Pt states she feels better after neb  When I asked pt if she feels like she needs another one, she says "it's up to you "  Will give another neb  1338 Pt reports feeling "much better "  Declining another neb tx    Asking for rx for her asthma meds                                  MDM    Disposition  Final diagnoses:   Asthma exacerbation   Asthma     Time reflects when diagnosis was documented in both MDM as applicable and the Disposition within this note     Time User Action Codes Description Comment    3/21/2019  1:38 PM Fermín Mcdonald Asthma exacerbation     3/21/2019  1:39 PM TRAVIS Mcdonald Home	[place] [[id number]] Asthma       ED Disposition     ED Disposition Condition Date/Time Comment    Discharge Stable Thu Mar 21, 2019  1:38 PM Jerrod Steward discharge to home/self care  Follow-up Information    None         Patient's Medications   Discharge Prescriptions    ALBUTEROL (2 5 MG/3 ML) 0 083 % NEBULIZER SOLUTION    Take 1 vial (2 5 mg total) by nebulization every 6 (six) hours as needed for wheezing or shortness of breath       Start Date: 3/21/2019 End Date: --       Order Dose: 2 5 mg       Quantity: 75 mL    Refills: 0    ALBUTEROL (PROVENTIL HFA,VENTOLIN HFA) 90 MCG/ACT INHALER    Inhale 2 puffs every 4 (four) hours as needed for wheezing       Start Date: 3/21/2019 End Date: --       Order Dose: 2 puffs       Quantity: 1 Inhaler    Refills: 0     No discharge procedures on file      ED Provider  Electronically Signed by           Yovany Bravo 24, DO  03/21/19 4567

## 2019-03-25 DIAGNOSIS — B20 HIV (HUMAN IMMUNODEFICIENCY VIRUS INFECTION) (HCC): ICD-10-CM

## 2019-03-25 RX ORDER — ELVITEGRAVIR, COBICISTAT, EMTRICITABINE, AND TENOFOVIR ALAFENAMIDE 150; 150; 200; 10 MG/1; MG/1; MG/1; MG/1
TABLET ORAL
Qty: 30 TABLET | Refills: 3 | OUTPATIENT
Start: 2019-03-25

## 2019-03-26 DIAGNOSIS — B20 HIV (HUMAN IMMUNODEFICIENCY VIRUS INFECTION) (HCC): ICD-10-CM

## 2019-03-26 DIAGNOSIS — B20 HIV DISEASE (HCC): Primary | ICD-10-CM

## 2019-03-29 ENCOUNTER — OFFICE VISIT (OUTPATIENT)
Dept: OBGYN CLINIC | Facility: MEDICAL CENTER | Age: 59
End: 2019-03-29
Payer: COMMERCIAL

## 2019-03-29 ENCOUNTER — HOSPITAL ENCOUNTER (OUTPATIENT)
Dept: ULTRASOUND IMAGING | Facility: MEDICAL CENTER | Age: 59
Discharge: HOME | End: 2019-03-29
Payer: COMMERCIAL

## 2019-03-29 VITALS
BODY MASS INDEX: 27.19 KG/M2 | SYSTOLIC BLOOD PRESSURE: 130 MMHG | HEART RATE: 87 BPM | DIASTOLIC BLOOD PRESSURE: 82 MMHG | HEIGHT: 65 IN | WEIGHT: 163.2 LBS

## 2019-03-29 DIAGNOSIS — R10.821 RIGHT UPPER QUADRANT ABDOMINAL TENDERNESS WITH REBOUND TENDERNESS: ICD-10-CM

## 2019-03-29 DIAGNOSIS — M75.112 INCOMPLETE TEAR OF LEFT ROTATOR CUFF: Primary | ICD-10-CM

## 2019-03-29 DIAGNOSIS — M25.562 LEFT KNEE PAIN, UNSPECIFIED CHRONICITY: ICD-10-CM

## 2019-03-29 PROCEDURE — 99214 OFFICE O/P EST MOD 30 MIN: CPT | Performed by: ORTHOPAEDIC SURGERY

## 2019-03-29 PROCEDURE — 76705 ECHO EXAM OF ABDOMEN: CPT

## 2019-03-29 NOTE — PROGRESS NOTES
Orthopaedic Surgery - Office Note  Valerie Hunter (89 y o  female)   : 1960   MRN: [id number]  Encounter Date: 3/29/2019    Chief Complaint   Patient presents with    Left Shoulder - Follow-up       Assessment / Plan  Left shoulder incomplete rotator cuff tear, resolved  Left knee pain    · Re-start physical therapy for anterior knee pain including knee and hip strengthening  · Continue activities as tolerated  Return in about 8 weeks (around 2019)  History of Present Illness  Agustín Casas is a 63 y o  female who presents to the office for follow up of left shoulder incomplete rotator cuff tear  Last visit she received a cortisone injection which has been beneficial for her  She reports improvement overall  She is happy with her progress  She also notes that her left knee has been giving out on her  She does not experience any pain  Review of Systems  Pertinent items are noted in HPI  All other systems were reviewed and are negative  Physical Exam  /82   Pulse 87   Ht 5' 5" (1 651 m)   Wt 74 kg (163 lb 3 2 oz)   BMI 27 16 kg/m²   Cons: Appears well  No apparent distress  Psych: Alert  Oriented x3  Mood and affect normal   Eyes: PERRLA, EOMI  Resp: Normal effort  No audible wheezing or stridor  CV: Palpable pulse  No discernable arrhythmia  No LE edema  Lymph:  No palpable cervical, axillary, or inguinal lymphadenopathy  Skin: Warm  No palpable masses  No visible lesions  Neuro: Normal muscle tone  Normal and symmetric DTR's  Left Shoulder Exam  Alignment / Posture:  Normal shoulder posture  Inspection:  No swelling  No erythema  No ecchymosis  Palpation:  Subacromial bursa tenderness  ROM:  Shoulder  degrees  Shoulder ER 60 degrees  Shoulder IR T8  Strength:  Shoulder FE 4/5  Shoulder ER 4+/5  Stability:  No objective shoulder instability  Tests: (-) Belly press  Neurovascular:  Sensation intact in Ax/R/M/U nerve distributions   Palpable radial pulse  Left Knee Exam  Alignment:  Normal knee alignment  Inspection:  No swelling  No erythema  No ecchymosis  Palpation:  Peripatellar tenderness  ROM:  Normal knee ROM  Strength:  5/5 quadriceps and hamstrings  Able to actively extend knee against gravity  Stability:  No objective knee instability  Stable Varus / Valgus stress, Lachman, and Posterior drawer  Tests:  No pertinent positive or negative tests  Patella:  Patella tracks centrally without crepitus  Neurovascular:  Sensation intact in DP/SP/Gonzales/Sa/T nerve distributions  Palpable DP & PT pulses  Gait:  Normal       Studies Reviewed  No studies to review    Procedures  No procedures today  Medical, Surgical, Family, and Social History  The patient's medical history, family history, and social history, were reviewed and updated as appropriate  Past Medical History:   Diagnosis Date    Arthritis     Cellulitis     Colon polyp     [facility]     Fibromyalgia     Fibromyalgia     HIV [facility]human immunodeficiency virus infection) ([facility] )     Hyperlipidemia     Pinguecula of both eyes     Presbyopia of both eyes        Past Surgical History:   Procedure Laterality Date    BREAST BIOPSY Bilateral     more than 12 years ago     BREAST SURGERY      lumpectomy    COLONOSCOPY      last year, polyps, every 6 months    COLONOSCOPY N/A 11/1/2018    Procedure: COLONOSCOPY;  Surgeon: [employer], DO;  Location: [facility];   Service: Gastroenterology    HYSTERECTOMY      INCISION AND DRAINAGE PERIRECTAL ABSCESS      OOPHORECTOMY      TUBAL LIGATION         Family History   Problem Relation Age of Onset    Heart disease Mother     Diabetes Mother         type 1    Hypertension Mother     Cancer Sister        Social History     Occupational History    Not on file   Tobacco Use    Smoking status: Current Every Day Smoker     Types: Cigarettes    Smokeless tobacco: Never Used    Tobacco comment: 1 pk every 2 days, Pt started smoking at age 6   Substance and Sexual Activity    Alcohol use: Not Currently     Comment: occasionally     Drug use: No    Sexual activity: Not Currently       No Known Allergies      Current Outpatient Medications:     albuterol (2 5 mg/3 mL) 0 083 % nebulizer solution, Take 1 vial (2 5 mg total) by nebulization every 6 (six) hours as needed for wheezing or shortness of breath, Disp: 60 vial, Rfl: 0    albuterol (2 5 mg/3 mL) 0 083 % nebulizer solution, Take 1 vial (2 5 mg total) by nebulization every 6 (six) hours as needed for wheezing or shortness of breath, Disp: 75 mL, Rfl: 0    albuterol (PROVENTIL HFA,VENTOLIN HFA) 90 mcg/act inhaler, Inhale 2 puffs every 4 (four) hours as needed for wheezing, Disp: 1 Inhaler, Rfl: 0    albuterol (VENTOLIN HFA) 90 mcg/act inhaler, Inhale 2 puffs every 6 (six) hours as needed for wheezing, Disp: 18 g, Rfl: 2    ammonium lactate (LAC-HYDRIN) 12 % lotion, , Disp: , Rfl:     aspirin (ECOTRIN LOW STRENGTH) 81 mg EC tablet, Take 1 tablet (81 mg total) by mouth daily, Disp: 90 tablet, Rfl: 2    BANOPHEN 50 MG capsule, TAKE 1 CAPSULE BY MOUTH DAILY AT BEDTIME AS NEEDED FOR SLEEP, Disp: 30 capsule, Rfl: 1    bisacodyl (DULCOLAX) 5 mg EC tablet, Take 30 mg by mouth, Disp: , Rfl:     Blood Glucose Monitoring Suppl (TRUE METRIX METER) GIL, daily  , Disp: , Rfl:     Blood Glucose Monitoring Suppl GIL, daily  , Disp: , Rfl:     budesonide-formoterol (SYMBICORT) 160-4 5 mcg/act inhaler, Inhale 2 puffs, Disp: , Rfl:     busPIRone (BUSPAR) 5 mg tablet, Take 5 mg by mouth 2 (two) times a day, Disp: , Rfl:     clotrimazole (LOTRIMIN) 1 % cream, Apply topically 2 (two) times a day, Disp: , Rfl:     elvitegravir-cobicistat-emtricitabine-tenofovir alafenamide (GENVOYA) tablet, Take 1 tablet by mouth daily, Disp: 30 tablet, Rfl: 5    FLUoxetine (PROzac) 20 mg capsule, , Disp: , Rfl:     FLUoxetine (PROzac) 20 MG tablet, Take 20 mg by mouth daily, Disp: , Rfl:    fluticasone (FLONASE) 50 mcg/act nasal spray, 1 spray into each nostril daily, Disp: 1 Bottle, Rfl: 2    gabapentin (NEURONTIN) 800 mg tablet, TAKE 1 TABLET BY MOUTH THREE TIMES A DAY, Disp: 90 tablet, Rfl: 2    gabapentin (NEURONTIN) 800 mg tablet, Take 1 tablet (800 mg total) by mouth 3 (three) times a day, Disp: 270 tablet, Rfl: 2    glucose blood (FREESTYLE LITE) test strip, Use as instructed, Disp: 100 each, Rfl: 5    glucose blood test strip, 1 each by Other route 4 (four) times a day, Disp: 100 each, Rfl: 0    lidocaine (LIDODERM) 5 %, Place 1 patch on the skin daily Remove & Discard patch within 12 hours or as directed by MD, Disp: 30 patch, Rfl: 0    lidocaine (LMX) 4 % cream, Apply topically as needed for mild pain, Disp: 30 g, Rfl: 0    Melatonin 5 MG TABS, Take 1 tablet by mouth at bedtime daily, Disp: 90 tablet, Rfl: 3    metFORMIN (GLUCOPHAGE) 500 mg tablet, Take 1 tablet (500 mg total) by mouth 2 (two) times a day, Disp: 180 tablet, Rfl: 2    naproxen (NAPROSYN) 500 mg tablet, Take 1 tablet (500 mg total) by mouth as needed for mild pain Please take with food and not more than 2 times per day , Disp: 30 tablet, Rfl: 0    nicotine (NICODERM CQ) 14 mg/24hr TD 24 hr patch, Place 1 patch on the skin every 24 hours, Disp: 28 patch, Rfl: 6    nicotine polacrilex (COMMIT) 4 MG lozenge, Apply 1 lozenge (4 mg total) to the mouth or throat as needed for smoking cessation, Disp: 100 each, Rfl: 5    omega-3-acid ethyl esters (LOVAZA) 1 g capsule, Take 2 capsules (2 g total) by mouth 2 (two) times a day, Disp: 180 capsule, Rfl: 1    polyethylene glycol (GLYCOLAX) powder, Drink per MD recommendations, Disp: , Rfl:     pravastatin (PRAVACHOL) 40 mg tablet, Take 1 tablet (40 mg total) by mouth daily, Disp: 90 tablet, Rfl: 2    sulfaSALAzine (AZULFIDINE-EN) 500 mg EC tablet, , Disp: , Rfl:     SUPER THIN LANCETS MISC, by Percutaneous route 4 (four) times a day, Disp: 100 each, Rfl: 6    traZODone (DESYREL) 50 mg tablet, , Disp: , Rfl: 0    ergocalciferol (DRISDOL) 56718 units capsule, Take 50,000 Units by mouth, Disp: , Rfl:     ibuprofen (MOTRIN) 600 mg tablet, Take 1 tablet (600 mg total) by mouth every 6 (six) hours as needed for mild pain for up to 10 days, Disp: 30 tablet, Rfl: 0      [facility] [employer] Records    I,:   [employer] am acting as a scribe while in the presence of the attending physician :        I,:   Steve Mayorga MD personally performed the services described in this documentation    as scribed in my presence :

## 2019-04-03 DIAGNOSIS — E78.2 ELEVATED TRIGLYCERIDES WITH HIGH CHOLESTEROL: Primary | ICD-10-CM

## 2019-04-03 DIAGNOSIS — Z91.89 AT RISK FOR CARDIOVASCULAR EVENT: ICD-10-CM

## 2019-04-03 DIAGNOSIS — E11.628 TYPE 2 DIABETES MELLITUS WITH OTHER SKIN COMPLICATION, WITHOUT LONG-TERM CURRENT USE OF INSULIN (HCC): ICD-10-CM

## 2019-04-03 RX ORDER — ASPIRIN 81 MG/1
81 TABLET ORAL DAILY
Qty: 90 TABLET | Refills: 1 | Status: SHIPPED | OUTPATIENT
Start: 2019-04-03 | End: 2019-08-27 | Stop reason: SDUPTHER

## 2019-04-05 RX ORDER — ASPIRIN 81 MG/1
81 TABLET ORAL DAILY
Qty: 90 TABLET | Refills: 2 | OUTPATIENT
Start: 2019-04-05

## 2019-04-05 RX ORDER — LANCETS 28 GAUGE
EACH MISCELLANEOUS
Qty: 100 EACH | Refills: 0 | OUTPATIENT
Start: 2019-04-05

## 2019-04-05 RX ORDER — ELVITEGRAVIR, COBICISTAT, EMTRICITABINE, AND TENOFOVIR ALAFENAMIDE 150; 150; 200; 10 MG/1; MG/1; MG/1; MG/1
TABLET ORAL
Qty: 30 TABLET | Refills: 3 | OUTPATIENT
Start: 2019-04-05

## 2019-04-11 DIAGNOSIS — J45.20 MILD INTERMITTENT ASTHMA WITHOUT COMPLICATION: ICD-10-CM

## 2019-04-11 DIAGNOSIS — J30.2 SEASONAL ALLERGIES: Primary | ICD-10-CM

## 2019-04-11 RX ORDER — LORATADINE 10 MG/1
10 TABLET ORAL DAILY
Qty: 90 TABLET | Refills: 1 | Status: SHIPPED | OUTPATIENT
Start: 2019-04-11

## 2019-04-11 RX ORDER — ALBUTEROL SULFATE 90 UG/1
2 AEROSOL, METERED RESPIRATORY (INHALATION) EVERY 6 HOURS PRN
Qty: 90 INHALER | Refills: 2 | Status: SHIPPED | OUTPATIENT
Start: 2019-04-11 | End: 2019-07-01 | Stop reason: SDUPTHER

## 2019-04-23 DIAGNOSIS — B20 HIV (HUMAN IMMUNODEFICIENCY VIRUS INFECTION) (HCC): ICD-10-CM

## 2019-04-23 DIAGNOSIS — M25.562 ACUTE PAIN OF LEFT KNEE: ICD-10-CM

## 2019-05-02 RX ORDER — NAPROXEN 500 MG/1
TABLET ORAL
Qty: 30 TABLET | Refills: 1 | OUTPATIENT
Start: 2019-05-02

## 2019-05-02 RX ORDER — ELVITEGRAVIR, COBICISTAT, EMTRICITABINE, AND TENOFOVIR ALAFENAMIDE 150; 150; 200; 10 MG/1; MG/1; MG/1; MG/1
TABLET ORAL
Qty: 30 TABLET | Refills: 3 | OUTPATIENT
Start: 2019-05-02

## 2019-05-17 ENCOUNTER — OFFICE VISIT (OUTPATIENT)
Dept: OBGYN CLINIC | Facility: MEDICAL CENTER | Age: 59
End: 2019-05-17
Payer: COMMERCIAL

## 2019-05-17 VITALS
DIASTOLIC BLOOD PRESSURE: 83 MMHG | HEART RATE: 81 BPM | HEIGHT: 65 IN | BODY MASS INDEX: 27.76 KG/M2 | SYSTOLIC BLOOD PRESSURE: 140 MMHG | WEIGHT: 166.6 LBS

## 2019-05-17 DIAGNOSIS — M75.112 INCOMPLETE TEAR OF LEFT ROTATOR CUFF, UNSPECIFIED WHETHER TRAUMATIC: Primary | ICD-10-CM

## 2019-05-17 PROCEDURE — 99213 OFFICE O/P EST LOW 20 MIN: CPT | Performed by: ORTHOPAEDIC SURGERY

## 2019-05-22 ENCOUNTER — PATIENT OUTREACH (OUTPATIENT)
Dept: SURGERY | Facility: CLINIC | Age: 59
End: 2019-05-22

## 2019-05-29 DIAGNOSIS — G62.9 NEUROPATHY: ICD-10-CM

## 2019-05-29 RX ORDER — GABAPENTIN 800 MG/1
TABLET ORAL
Qty: 90 TABLET | Refills: 2 | Status: SHIPPED | OUTPATIENT
Start: 2019-05-29 | End: 2020-01-02 | Stop reason: ALTCHOICE

## 2019-05-30 DIAGNOSIS — J06.9 VIRAL UPPER RESPIRATORY INFECTION: ICD-10-CM

## 2019-05-30 RX ORDER — FLUTICASONE PROPIONATE 50 MCG
1 SPRAY, SUSPENSION (ML) NASAL DAILY
Qty: 1 BOTTLE | Refills: 2 | Status: SHIPPED | OUTPATIENT
Start: 2019-05-30 | End: 2019-08-13 | Stop reason: SDUPTHER

## 2019-05-31 ENCOUNTER — APPOINTMENT (OUTPATIENT)
Dept: LAB | Facility: HOSPITAL | Age: 59
End: 2019-05-31
Payer: COMMERCIAL

## 2019-05-31 DIAGNOSIS — Z20.2 CONTACT WITH AND (SUSPECTED) EXPOSURE TO INFECTIONS WITH A PREDOMINANTLY SEXUAL MODE OF TRANSMISSION: ICD-10-CM

## 2019-05-31 DIAGNOSIS — E78.00 HIGH CHOLESTEROL: ICD-10-CM

## 2019-05-31 DIAGNOSIS — Z72.89 OTHER PROBLEMS RELATED TO LIFESTYLE: ICD-10-CM

## 2019-05-31 DIAGNOSIS — E11.9 TYPE 2 DIABETES MELLITUS WITHOUT COMPLICATION, WITHOUT LONG-TERM CURRENT USE OF INSULIN (HCC): ICD-10-CM

## 2019-05-31 DIAGNOSIS — Z11.3 ROUTINE SCREENING FOR STI (SEXUALLY TRANSMITTED INFECTION): ICD-10-CM

## 2019-05-31 DIAGNOSIS — B20 HIV DISEASE (HCC): ICD-10-CM

## 2019-05-31 DIAGNOSIS — R10.821 RIGHT UPPER QUADRANT ABDOMINAL TENDERNESS WITH REBOUND TENDERNESS: ICD-10-CM

## 2019-05-31 LAB
ALBUMIN SERPL BCP-MCNC: 3.7 G/DL (ref 3.5–5)
ALP SERPL-CCNC: 45 U/L (ref 46–116)
ALT SERPL W P-5'-P-CCNC: 24 U/L (ref 12–78)
AMYLASE SERPL-CCNC: 57 IU/L (ref 25–115)
ANION GAP SERPL CALCULATED.3IONS-SCNC: 9 MMOL/L (ref 4–13)
AST SERPL W P-5'-P-CCNC: 10 U/L (ref 5–45)
BACTERIA UR QL AUTO: ABNORMAL /HPF
BASOPHILS # BLD AUTO: 0.04 THOUSANDS/ΜL (ref 0–0.1)
BASOPHILS NFR BLD AUTO: 1 % (ref 0–1)
BILIRUB SERPL-MCNC: 0.35 MG/DL (ref 0.2–1)
BILIRUB UR QL STRIP: NEGATIVE
BUN SERPL-MCNC: 11 MG/DL (ref 5–25)
C TRACH DNA SPEC QL NAA+PROBE: NEGATIVE
CALCIUM SERPL-MCNC: 9.2 MG/DL (ref 8.3–10.1)
CHLORIDE SERPL-SCNC: 108 MMOL/L (ref 100–108)
CHOLEST SERPL-MCNC: 181 MG/DL (ref 50–200)
CLARITY UR: ABNORMAL
CO2 SERPL-SCNC: 25 MMOL/L (ref 21–32)
COLOR UR: YELLOW
CREAT SERPL-MCNC: 0.64 MG/DL (ref 0.6–1.3)
EOSINOPHIL # BLD AUTO: 0.07 THOUSAND/ΜL (ref 0–0.61)
EOSINOPHIL NFR BLD AUTO: 1 % (ref 0–6)
ERYTHROCYTE [DISTWIDTH] IN BLOOD BY AUTOMATED COUNT: 12.5 % (ref 11.6–15.1)
EST. AVERAGE GLUCOSE BLD GHB EST-MCNC: 177 MG/DL
GFR SERPL CREATININE-BSD FRML MDRD: 99 ML/MIN/1.73SQ M
GLUCOSE P FAST SERPL-MCNC: 183 MG/DL (ref 65–99)
GLUCOSE UR STRIP-MCNC: NEGATIVE MG/DL
HBA1C MFR BLD: 7.8 % (ref 4.2–6.3)
HCT VFR BLD AUTO: 45.3 % (ref 34.8–46.1)
HDLC SERPL-MCNC: 48 MG/DL (ref 40–60)
HGB BLD-MCNC: 14 G/DL (ref 11.5–15.4)
HGB UR QL STRIP.AUTO: ABNORMAL
IMM GRANULOCYTES # BLD AUTO: 0.02 THOUSAND/UL (ref 0–0.2)
IMM GRANULOCYTES NFR BLD AUTO: 0 % (ref 0–2)
KETONES UR STRIP-MCNC: NEGATIVE MG/DL
LDLC SERPL CALC-MCNC: 102 MG/DL (ref 0–100)
LEUKOCYTE ESTERASE UR QL STRIP: NEGATIVE
LIPASE SERPL-CCNC: 117 U/L (ref 73–393)
LYMPHOCYTES # BLD AUTO: 1.45 THOUSANDS/ΜL (ref 0.6–4.47)
LYMPHOCYTES NFR BLD AUTO: 29 % (ref 14–44)
MCH RBC QN AUTO: 31.7 PG (ref 26.8–34.3)
MCHC RBC AUTO-ENTMCNC: 30.9 G/DL (ref 31.4–37.4)
MCV RBC AUTO: 103 FL (ref 82–98)
MONOCYTES # BLD AUTO: 0.36 THOUSAND/ΜL (ref 0.17–1.22)
MONOCYTES NFR BLD AUTO: 7 % (ref 4–12)
N GONORRHOEA DNA SPEC QL NAA+PROBE: NEGATIVE
NEUTROPHILS # BLD AUTO: 3.12 THOUSANDS/ΜL (ref 1.85–7.62)
NEUTS SEG NFR BLD AUTO: 62 % (ref 43–75)
NITRITE UR QL STRIP: NEGATIVE
NON-SQ EPI CELLS URNS QL MICRO: ABNORMAL /HPF
NRBC BLD AUTO-RTO: 0 /100 WBCS
PH UR STRIP.AUTO: 5.5 [PH]
PLATELET # BLD AUTO: 292 THOUSANDS/UL (ref 149–390)
PMV BLD AUTO: 9.7 FL (ref 8.9–12.7)
POTASSIUM SERPL-SCNC: 3.8 MMOL/L (ref 3.5–5.3)
PROT SERPL-MCNC: 7.9 G/DL (ref 6.4–8.2)
PROT UR STRIP-MCNC: NEGATIVE MG/DL
RBC # BLD AUTO: 4.41 MILLION/UL (ref 3.81–5.12)
RBC #/AREA URNS AUTO: ABNORMAL /HPF
RPR SER QL: NORMAL
SODIUM SERPL-SCNC: 142 MMOL/L (ref 136–145)
SP GR UR STRIP.AUTO: >=1.03 (ref 1–1.03)
TRIGL SERPL-MCNC: 155 MG/DL
UROBILINOGEN UR QL STRIP.AUTO: 0.2 E.U./DL
WBC # BLD AUTO: 5.06 THOUSAND/UL (ref 4.31–10.16)
WBC #/AREA URNS AUTO: ABNORMAL /HPF

## 2019-05-31 PROCEDURE — 83036 HEMOGLOBIN GLYCOSYLATED A1C: CPT

## 2019-05-31 PROCEDURE — 87536 HIV-1 QUANT&REVRSE TRNSCRPJ: CPT

## 2019-05-31 PROCEDURE — 82150 ASSAY OF AMYLASE: CPT

## 2019-05-31 PROCEDURE — 85025 COMPLETE CBC W/AUTO DIFF WBC: CPT

## 2019-05-31 PROCEDURE — 80061 LIPID PANEL: CPT

## 2019-05-31 PROCEDURE — 86361 T CELL ABSOLUTE COUNT: CPT

## 2019-05-31 PROCEDURE — 81001 URINALYSIS AUTO W/SCOPE: CPT

## 2019-05-31 PROCEDURE — 87591 N.GONORRHOEAE DNA AMP PROB: CPT

## 2019-05-31 PROCEDURE — 80053 COMPREHEN METABOLIC PANEL: CPT

## 2019-05-31 PROCEDURE — 86480 TB TEST CELL IMMUN MEASURE: CPT

## 2019-05-31 PROCEDURE — 87491 CHLMYD TRACH DNA AMP PROBE: CPT

## 2019-05-31 PROCEDURE — 86803 HEPATITIS C AB TEST: CPT

## 2019-05-31 PROCEDURE — 83690 ASSAY OF LIPASE: CPT

## 2019-05-31 PROCEDURE — 86592 SYPHILIS TEST NON-TREP QUAL: CPT

## 2019-05-31 PROCEDURE — 36415 COLL VENOUS BLD VENIPUNCTURE: CPT

## 2019-06-01 LAB
BASOPHILS # BLD AUTO: 0 X10E3/UL (ref 0–0.2)
BASOPHILS NFR BLD AUTO: 0 %
CD3+CD4+ CELLS # BLD: 269 /UL (ref 359–1519)
CD3+CD4+ CELLS NFR BLD: 20.7 % (ref 30.8–58.5)
EOSINOPHIL # BLD AUTO: 0.1 X10E3/UL (ref 0–0.4)
EOSINOPHIL NFR BLD AUTO: 2 %
ERYTHROCYTE [DISTWIDTH] IN BLOOD BY AUTOMATED COUNT: 13.2 % (ref 12.3–15.4)
HCT VFR BLD AUTO: 44 % (ref 34–46.6)
HCV AB SER QL: NORMAL
HGB BLD-MCNC: 14.2 G/DL (ref 11.1–15.9)
IMM GRANULOCYTES # BLD: 0 X10E3/UL (ref 0–0.1)
IMM GRANULOCYTES NFR BLD: 0 %
LYMPHOCYTES # BLD AUTO: 1.3 X10E3/UL (ref 0.7–3.1)
LYMPHOCYTES NFR BLD AUTO: 28 %
MCH RBC QN AUTO: 31.6 PG (ref 26.6–33)
MCHC RBC AUTO-ENTMCNC: 32.3 G/DL (ref 31.5–35.7)
MCV RBC AUTO: 98 FL (ref 79–97)
MONOCYTES # BLD AUTO: 0.3 X10E3/UL (ref 0.1–0.9)
MONOCYTES NFR BLD AUTO: 6 %
NEUTROPHILS # BLD AUTO: 2.8 X10E3/UL (ref 1.4–7)
NEUTROPHILS NFR BLD AUTO: 64 %
PLATELET # BLD AUTO: 299 X10E3/UL (ref 150–450)
RBC # BLD AUTO: 4.5 X10E6/UL (ref 3.77–5.28)
WBC # BLD AUTO: 4.5 X10E3/UL (ref 3.4–10.8)

## 2019-06-03 LAB
GAMMA INTERFERON BACKGROUND BLD IA-ACNC: 0.02 IU/ML
M TB IFN-G BLD-IMP: NEGATIVE
M TB IFN-G CD4+ BCKGRND COR BLD-ACNC: -0.01 IU/ML
M TB IFN-G CD4+ BCKGRND COR BLD-ACNC: -0.01 IU/ML
MITOGEN IGNF BCKGRD COR BLD-ACNC: >10 IU/ML

## 2019-06-04 LAB
HIV1 RNA # SERPL NAA+PROBE: <20 COPIES/ML
HIV1 RNA SERPL NAA+PROBE-LOG#: NORMAL LOG10COPY/ML

## 2019-06-10 ENCOUNTER — PATIENT OUTREACH (OUTPATIENT)
Dept: SURGERY | Facility: CLINIC | Age: 59
End: 2019-06-10

## 2019-06-11 ENCOUNTER — PATIENT OUTREACH (OUTPATIENT)
Dept: SURGERY | Facility: CLINIC | Age: 59
End: 2019-06-11

## 2019-06-11 ENCOUNTER — OFFICE VISIT (OUTPATIENT)
Dept: SURGERY | Facility: CLINIC | Age: 59
End: 2019-06-11
Payer: COMMERCIAL

## 2019-06-11 VITALS
TEMPERATURE: 97.6 F | DIASTOLIC BLOOD PRESSURE: 78 MMHG | BODY MASS INDEX: 28.02 KG/M2 | WEIGHT: 168.2 LBS | HEIGHT: 65 IN | OXYGEN SATURATION: 99 % | SYSTOLIC BLOOD PRESSURE: 141 MMHG | RESPIRATION RATE: 18 BRPM | HEART RATE: 67 BPM

## 2019-06-11 DIAGNOSIS — R31.9 HEMATURIA, UNSPECIFIED TYPE: ICD-10-CM

## 2019-06-11 DIAGNOSIS — F17.219 CIGARETTE NICOTINE DEPENDENCE WITH NICOTINE-INDUCED DISORDER: ICD-10-CM

## 2019-06-11 DIAGNOSIS — Z23 NEED FOR HEPATITIS B VACCINATION: ICD-10-CM

## 2019-06-11 DIAGNOSIS — B20 HIV DISEASE (HCC): Primary | ICD-10-CM

## 2019-06-11 DIAGNOSIS — R03.0 ELEVATED BLOOD PRESSURE READING: ICD-10-CM

## 2019-06-11 DIAGNOSIS — Z87.891 HISTORY OF SMOKING 30 OR MORE PACK YEARS: ICD-10-CM

## 2019-06-11 DIAGNOSIS — Z91.14 NONCOMPLIANCE WITH MEDICATIONS: ICD-10-CM

## 2019-06-11 DIAGNOSIS — R10.821 RIGHT UPPER QUADRANT ABDOMINAL TENDERNESS WITH REBOUND TENDERNESS: ICD-10-CM

## 2019-06-11 DIAGNOSIS — E11.628 TYPE 2 DIABETES MELLITUS WITH OTHER SKIN COMPLICATION, WITHOUT LONG-TERM CURRENT USE OF INSULIN (HCC): ICD-10-CM

## 2019-06-11 PROCEDURE — G0010 ADMIN HEPATITIS B VACCINE: HCPCS

## 2019-06-11 PROCEDURE — 99214 OFFICE O/P EST MOD 30 MIN: CPT | Performed by: NURSE PRACTITIONER

## 2019-06-11 PROCEDURE — 90746 HEPB VACCINE 3 DOSE ADULT IM: CPT

## 2019-06-11 RX ORDER — PRAVASTATIN SODIUM 80 MG/1
80 TABLET ORAL DAILY
Qty: 90 TABLET | Refills: 1 | Status: CANCELLED | OUTPATIENT
Start: 2019-06-11

## 2019-06-13 ENCOUNTER — PATIENT OUTREACH (OUTPATIENT)
Dept: SURGERY | Facility: CLINIC | Age: 59
End: 2019-06-13

## 2019-06-14 ENCOUNTER — TELEPHONE (OUTPATIENT)
Dept: SURGERY | Facility: CLINIC | Age: 59
End: 2019-06-14

## 2019-06-20 ENCOUNTER — PATIENT OUTREACH (OUTPATIENT)
Dept: SURGERY | Facility: CLINIC | Age: 59
End: 2019-06-20

## 2019-06-21 ENCOUNTER — PATIENT OUTREACH (OUTPATIENT)
Dept: SURGERY | Facility: CLINIC | Age: 59
End: 2019-06-21

## 2019-06-25 ENCOUNTER — TELEPHONE (OUTPATIENT)
Dept: SURGERY | Facility: CLINIC | Age: 59
End: 2019-06-25

## 2019-06-25 DIAGNOSIS — E11.8 TYPE 2 DIABETES MELLITUS WITH COMPLICATION, WITH LONG-TERM CURRENT USE OF INSULIN (HCC): ICD-10-CM

## 2019-06-25 DIAGNOSIS — Z79.4 TYPE 2 DIABETES MELLITUS WITH COMPLICATION, WITH LONG-TERM CURRENT USE OF INSULIN (HCC): ICD-10-CM

## 2019-06-25 DIAGNOSIS — J45.20 MILD INTERMITTENT ASTHMA WITHOUT COMPLICATION: ICD-10-CM

## 2019-06-26 ENCOUNTER — TELEPHONE (OUTPATIENT)
Dept: SURGERY | Facility: CLINIC | Age: 59
End: 2019-06-26

## 2019-06-26 ENCOUNTER — TRANSCRIBE ORDERS (OUTPATIENT)
Dept: MULTI SPECIALTY CLINIC | Facility: CLINIC | Age: 59
End: 2019-06-26

## 2019-06-26 DIAGNOSIS — R31.9 HEMATURIA: Primary | ICD-10-CM

## 2019-06-29 ENCOUNTER — HOSPITAL ENCOUNTER (OUTPATIENT)
Dept: CT IMAGING | Facility: HOSPITAL | Age: 59
Discharge: HOME | End: 2019-06-29
Payer: COMMERCIAL

## 2019-06-29 DIAGNOSIS — R10.821 RIGHT UPPER QUADRANT ABDOMINAL TENDERNESS WITH REBOUND TENDERNESS: ICD-10-CM

## 2019-06-29 PROCEDURE — 74177 CT ABD & PELVIS W/CONTRAST: CPT

## 2019-06-29 RX ADMIN — IOHEXOL 100 ML: 350 INJECTION, SOLUTION INTRAVENOUS at 08:32

## 2019-07-01 DIAGNOSIS — E11.628 TYPE 2 DIABETES MELLITUS WITH OTHER SKIN COMPLICATION, WITHOUT LONG-TERM CURRENT USE OF INSULIN (HCC): Primary | ICD-10-CM

## 2019-07-01 DIAGNOSIS — J45.20 MILD INTERMITTENT ASTHMA WITHOUT COMPLICATION: ICD-10-CM

## 2019-07-01 RX ORDER — ALBUTEROL SULFATE 90 UG/1
2 AEROSOL, METERED RESPIRATORY (INHALATION) EVERY 6 HOURS PRN
Qty: 90 INHALER | Refills: 2 | Status: SHIPPED | OUTPATIENT
Start: 2019-07-01 | End: 2020-03-11 | Stop reason: SDUPTHER

## 2019-07-01 RX ORDER — ALBUTEROL SULFATE 90 UG/1
2 AEROSOL, METERED RESPIRATORY (INHALATION) EVERY 6 HOURS PRN
Qty: 90 INHALER | Refills: 2 | OUTPATIENT
Start: 2019-07-01

## 2019-07-02 ENCOUNTER — OFFICE VISIT (OUTPATIENT)
Dept: SURGERY | Facility: CLINIC | Age: 59
End: 2019-07-02
Payer: COMMERCIAL

## 2019-07-02 ENCOUNTER — CONSULT (OUTPATIENT)
Dept: MULTI SPECIALTY CLINIC | Facility: CLINIC | Age: 59
End: 2019-07-02

## 2019-07-02 VITALS
SYSTOLIC BLOOD PRESSURE: 122 MMHG | BODY MASS INDEX: 29.3 KG/M2 | OXYGEN SATURATION: 98 % | TEMPERATURE: 97.2 F | RESPIRATION RATE: 16 BRPM | HEIGHT: 63 IN | DIASTOLIC BLOOD PRESSURE: 68 MMHG | HEART RATE: 74 BPM | WEIGHT: 165.4 LBS

## 2019-07-02 VITALS
TEMPERATURE: 97.7 F | HEART RATE: 80 BPM | SYSTOLIC BLOOD PRESSURE: 128 MMHG | BODY MASS INDEX: 29.3 KG/M2 | HEIGHT: 63 IN | DIASTOLIC BLOOD PRESSURE: 80 MMHG | WEIGHT: 165.34 LBS

## 2019-07-02 DIAGNOSIS — R31.9 HEMATURIA: ICD-10-CM

## 2019-07-02 DIAGNOSIS — B20 HIV DISEASE (HCC): ICD-10-CM

## 2019-07-02 DIAGNOSIS — R31.29 MICROSCOPIC HEMATURIA: ICD-10-CM

## 2019-07-02 DIAGNOSIS — H10.31 ACUTE CONJUNCTIVITIS OF RIGHT EYE, UNSPECIFIED ACUTE CONJUNCTIVITIS TYPE: Primary | ICD-10-CM

## 2019-07-02 PROCEDURE — 99212 OFFICE O/P EST SF 10 MIN: CPT | Performed by: NURSE PRACTITIONER

## 2019-07-02 PROCEDURE — 99213 OFFICE O/P EST LOW 20 MIN: CPT | Performed by: PHYSICIAN ASSISTANT

## 2019-07-02 RX ORDER — OFLOXACIN 3 MG/ML
2 SOLUTION/ DROPS OPHTHALMIC 4 TIMES DAILY
Qty: 5 ML | Refills: 0 | Status: SHIPPED | OUTPATIENT
Start: 2019-07-02 | End: 2021-05-28 | Stop reason: ALTCHOICE

## 2019-07-02 NOTE — PROGRESS NOTES
Assessment/Plan:    Microscopic hematuria  Patient was found to have microscopic hematuria  RBCs 0-1  She had a CT scan with contrast done which showed no intra abdominal abnormalities  No stones or hydronephrosis seen on imaging  She has had her upper tracts imaged and will not require lower tract imaging secondary to the low number of RBCs  Follow up prn  Diagnoses and all orders for this visit:    Hematuria  -     Ambulatory referral to Urology    Microscopic hematuria          Subjective:      Patient ID: Leann Negron is a 63 y o  female  62 y/o female presents for follow up of hematuria  Patient was found to have blood on a UA and microscopic evaluation only showed 0-1 RBCs  She denies any gross hematuria or urinary issues  She denies dysuria, frequency, and flank pain  No prior history of nephrolithiasis  CT scan with contrast shows no intra abdominal abnormalities and abdominal ultrasound also negative  The following portions of the patient's history were reviewed and updated as appropriate: allergies, current medications, past family history, past medical history, past social history, past surgical history and problem list     Review of Systems   Respiratory: Negative for apnea, chest tightness and shortness of breath  Cardiovascular: Negative for chest pain and leg swelling  Gastrointestinal: Negative for abdominal distention and blood in stool  Genitourinary: Negative  Negative for difficulty urinating, dysuria, flank pain, frequency and hematuria  Objective:      /80 (BP Location: Right arm, Patient Position: Sitting, Cuff Size: Adult)   Pulse 80   Temp 97 7 °F (36 5 °C) (Oral)   Ht 5' 3" (1 6 m)   Wt 75 kg (165 lb 5 5 oz)   BMI 29 29 kg/m²          Physical Exam   Cardiovascular: Normal rate and intact distal pulses  Pulmonary/Chest: Effort normal    Abdominal: Soft  She exhibits no distension  There is no tenderness

## 2019-07-02 NOTE — PROGRESS NOTES
Assessment/Plan:    Acute conjunctivitis of right eye  New onset  Patient reports right eye is welling, waking up with eyes crusted shut, burning, and irritation to eye  Patient denies any pain or sick contact  Both eyes conjunctiva red and although the right is far more reddened than the left and right conjunctiva slightly swollen  Will prescribe moxifloxacin 2 drops to each eye daily x4 times per day for 5 days  Patient instructed to use good handwashing techniques to not touch her high to prevent spreading and to use warm compresses in a m  to help remove crusty eyes and use cool compresses to eyes for burning and may continue rising if needed  Patient was worried about getting a stye in her eye  No stye noted  during this exam  Patient was instructed that she did develop  I that all she would uses warm compresses again and dispose of compress after each use to prevent spreading of bacterial an eye infection  Patient was told is alert treatment for this dye because eventually stye will open up and drain on its own  Patient verbalized understanding    HIV disease [facility])  Patient reports taking medication every day  Stressed importance of 100% medication adherence  HIV Counseling:    Viral Load: < 20    CD4 Count: 269    ART: Nevin Steward     Denies side effects  Stressed the importance of adherence  Continue follow up in the ID clinic with Dr Chon Brown  Reviewed the most recent labs, including the Cd4 and viral load  Discussed the risks and benefits of treatment options, instructions for management, importance of treatment adherence, and reduction of risk factors  Educated on possible medication side effects  Counseled on routes of HIV transmission, including the risk of  infection  Emphasized that viral suppression is the best method to prevent HIV transmission  At this time the pt denies the need for HIV testing of anyone in their life      Total encounter time was greater than 35 minutes  Greater than 20 minutes were spent on counseling and patient education  Pt voices understanding and agreement with treatment plan  Diagnoses and all orders for this visit:    Acute conjunctivitis of right eye, unspecified acute conjunctivitis type  -     ofloxacin (OCUFLOX) 0 3 % ophthalmic solution; Administer 2 drops to both eyes 4 (four) times a day For 5 days  HIV disease ([facility] [address] )          Subjective:      Patient ID: Shy Bear is a 63 y o  female  HPI  Pt is being seen in the office today for new c/o "eye draiange that started yesterday when she woke up with right eye stuck together and again this am, and continues to drain throughout the day and gets crusty "  Pt used Visine both days without any relief of drainage but did help with the burning  Pt did rinse eye with cold water to remove the crusty  Pt denies contact with children or anyone with the same symptoms  Pt denies having conjunctiva prior  Pt denies itching, or allegery issues  Patient speaks only  was provided during the entire visit  The following portions of the patient's history were reviewed and updated as appropriate: allergies, current medications, past family history, past social history and problem list     Review of Systems   Eyes: Positive for discharge, redness and itching  Respiratory: Negative  Cardiovascular: Negative  Objective:      /68 (BP Location: Right arm, Patient Position: Sitting, Cuff Size: Standard)   Pulse 74   Temp (!) 97 2 °F (36 2 °C)   Resp 16   Ht 5' 3" (1 6 m)   Wt 75 kg (165 lb 6 4 oz)   SpO2 98%   BMI 29 30 kg/m²          Physical Exam   Constitutional: She appears well-developed and well-nourished  HENT:   Right Ear: External ear normal    Left Ear: External ear normal    Mouth/Throat: Oropharynx is clear and moist    Eyes: Pupils are equal, round, and reactive to light  Right eye exhibits discharge   Right conjunctiva is injected  Right conjunctiva has a hemorrhage  Left conjunctiva is injected  Left conjunctiva has a hemorrhage  Cardiovascular: Normal rate, regular rhythm, normal heart sounds and intact distal pulses  Pulmonary/Chest: Effort normal and breath sounds normal    Nursing note and vitals reviewed

## 2019-07-02 NOTE — ASSESSMENT & PLAN NOTE
New onset  Patient reports right eye is welling, waking up with eyes crusted shut, burning, and irritation to eye  Patient denies any pain or sick contact  Both eyes conjunctiva red and although the right is far more reddened than the left and right conjunctiva slightly swollen  Will prescribe moxifloxacin 2 drops to each eye daily x4 times per day for 5 days  Patient instructed to use good handwashing techniques to not touch her high to prevent spreading and to use warm compresses in a m  to help remove crusty eyes and use cool compresses to eyes for burning and may continue rising if needed  Patient was worried about getting a stye in her eye  No stye noted  during this exam  Patient was instructed that she did develop  I that all she would uses warm compresses again and dispose of compress after each use to prevent spreading of bacterial an eye infection  Patient was told is alert treatment for this dye because eventually stye will open up and drain on its own    Patient verbalized understanding

## 2019-07-02 NOTE — ASSESSMENT & PLAN NOTE
Patient reports taking medication every day  Stressed importance of 100% medication adherence  HIV Counseling:    Viral Load: < 20    CD4 Count: 269    ART: Jan Padilla     Denies side effects  Stressed the importance of adherence  Continue follow up in the ID clinic with Dr Albert Iglesias  Reviewed the most recent labs, including the Cd4 and viral load  Discussed the risks and benefits of treatment options, instructions for management, importance of treatment adherence, and reduction of risk factors  Educated on possible medication side effects  Counseled on routes of HIV transmission, including the risk of  infection  Emphasized that viral suppression is the best method to prevent HIV transmission  At this time the pt denies the need for HIV testing of anyone in their life  Total encounter time was greater than 35 minutes  Greater than 20 minutes were spent on counseling and patient education  Pt voices understanding and agreement with treatment plan

## 2019-07-03 DIAGNOSIS — E11.9 TYPE 2 DIABETES MELLITUS WITHOUT COMPLICATION, WITHOUT LONG-TERM CURRENT USE OF INSULIN (HCC): ICD-10-CM

## 2019-07-09 ENCOUNTER — OFFICE VISIT (OUTPATIENT)
Dept: OBGYN CLINIC | Facility: MEDICAL CENTER | Age: 59
End: 2019-07-09
Payer: COMMERCIAL

## 2019-07-09 VITALS
DIASTOLIC BLOOD PRESSURE: 79 MMHG | SYSTOLIC BLOOD PRESSURE: 129 MMHG | WEIGHT: 165.6 LBS | HEIGHT: 65 IN | HEART RATE: 78 BPM | BODY MASS INDEX: 27.59 KG/M2

## 2019-07-09 DIAGNOSIS — M75.112 INCOMPLETE TEAR OF LEFT ROTATOR CUFF, UNSPECIFIED WHETHER TRAUMATIC: Primary | ICD-10-CM

## 2019-07-09 DIAGNOSIS — M75.82 TENDINITIS OF LEFT ROTATOR CUFF: ICD-10-CM

## 2019-07-09 PROCEDURE — 20610 DRAIN/INJ JOINT/BURSA W/O US: CPT | Performed by: ORTHOPAEDIC SURGERY

## 2019-07-09 PROCEDURE — 99213 OFFICE O/P EST LOW 20 MIN: CPT | Performed by: ORTHOPAEDIC SURGERY

## 2019-07-09 RX ORDER — BUPIVACAINE HYDROCHLORIDE 2.5 MG/ML
4 INJECTION, SOLUTION INFILTRATION; PERINEURAL
Status: COMPLETED | OUTPATIENT
Start: 2019-07-09 | End: 2019-07-09

## 2019-07-09 RX ORDER — METHYLPREDNISOLONE ACETATE 40 MG/ML
1 INJECTION, SUSPENSION INTRA-ARTICULAR; INTRALESIONAL; INTRAMUSCULAR; SOFT TISSUE
Status: COMPLETED | OUTPATIENT
Start: 2019-07-09 | End: 2019-07-09

## 2019-07-09 RX ADMIN — METHYLPREDNISOLONE ACETATE 1 ML: 40 INJECTION, SUSPENSION INTRA-ARTICULAR; INTRALESIONAL; INTRAMUSCULAR; SOFT TISSUE at 13:25

## 2019-07-09 RX ADMIN — BUPIVACAINE HYDROCHLORIDE 4 ML: 2.5 INJECTION, SOLUTION INFILTRATION; PERINEURAL at 13:25

## 2019-07-09 NOTE — PROGRESS NOTES
Orthopaedic Surgery - Office Note  Quan Yanez (89 y o  female)   : 1960   MRN: [id number]  Encounter Date: 2019    Chief Complaint   Patient presents with    Left Shoulder - Follow-up       Assessment / Plan  Left shoulder rotator cuff tendinitis with partial tear, noted on previous MRI    · CSI of left subacromial bursa  was performed  · Activity as tolerated  · Continue HEP   · I stressed to the patient that further injections into this bursa would not be advised  Return if symptoms worsen or fail to improve  History of Present Illness  Quan Yanez is a 63 y o  female who presents for follow-up regarding her left shoulder  She states she is continuing to have pain  This pain radiates to the lateral aspect of the shoulder to the elbow  She states that she continues to exercise at home  There have been no other changes  Review of Systems  Pertinent items are noted in HPI  All other systems were reviewed and are negative  Physical Exam  /79   Pulse 78   Ht 5' 5" (1 651 m)   Wt 75 1 kg (165 lb 9 6 oz)   BMI 27 56 kg/m²   Cons: Appears well  No apparent distress  Psych: Alert  Oriented x3  Mood and affect normal   Eyes: PERRLA, EOMI  Resp: Normal effort  No audible wheezing or stridor  CV: Palpable pulse  No discernable arrhythmia  No LE edema  Lymph:  No palpable cervical, axillary, or inguinal lymphadenopathy  Skin: Warm  No palpable masses  No visible lesions  Neuro: Normal muscle tone  Normal and symmetric DTR's  Left Shoulder Exam  Alignment / Posture:  Normal shoulder posture  Inspection:  No swelling  No edema  No erythema  No ecchymosis  Palpation:  Subacromial bursa tenderness  ROM:  Shoulder   Shoulder ER 70  Shoulder IR L2  Strength:  Supraspinatus 4+/5  Infraspinatus 4+/5  Subscapularis 5/5  Stability:  No objective shoulder instability  Tests: (+) Ness  (-) Belly press    Neurovascular:  Sensation intact in Ax/R/M/U nerve distributions  2+ radial pulse  Brisk capillary refill in all fingertips  Fingers warm and perfused  Studies Reviewed  No studies to review    Large joint arthrocentesis: L subacromial bursa  Date/Time: 7/9/2019 1:25 PM  Consent given by: patient  Site marked: site marked  Timeout: Immediately prior to procedure a time out was called to verify the correct patient, procedure, equipment, support staff and site/side marked as required   Supporting Documentation  Indications: pain   Procedure Details  Location: shoulder - L subacromial bursa  Preparation: Patient was prepped and draped in the usual sterile fashion  Ultrasound guidance: no  Approach: lateral  Medications administered: 4 mL bupivacaine 0 25 %; 1 mL methylPREDNISolone acetate 40 mg/mL    Patient tolerance: patient tolerated the procedure well with no immediate complications  Dressing:  Sterile dressing applied             Medical, Surgical, Family, and Social History  The patient's medical history, family history, and social history, were reviewed and updated as appropriate  Past Medical History:   Diagnosis Date    Arthritis     Cellulitis     Colon polyp     Diabetes mellitus ([facility] [address] )     Fibromyalgia     Fibromyalgia     [facility] immunodeficiency virus infection[facility] ([facility] )     Hyperlipidemia     Pinguecula of both eyes     Presbyopia of both eyes        Past Surgical History:   Procedure Laterality Date    BREAST BIOPSY Bilateral     more than 12 years ago     BREAST SURGERY      lumpectomy    COLONOSCOPY      last year, polyps, every 6 months    COLONOSCOPY N/A 11/1/2018    Procedure: COLONOSCOPY;  Surgeon: Mychal Conde DO;  Location: [facility];   Service: Gastroenterology    HYSTERECTOMY      INCISION AND DRAINAGE PERIRECTAL ABSCESS      OOPHORECTOMY      TUBAL LIGATION         Family History   Problem Relation Age of Onset    Heart disease Mother     Diabetes Mother         type 1    Hypertension Mother     Cancer Sister Social History     Occupational History    Not on file   Tobacco Use    Smoking status: Current Every Day Smoker     Packs/day: 0 25     Types: Cigarettes    Smokeless tobacco: Never Used    Tobacco comment: 1 pk every 2 days, Pt started smoking at age 6   Substance and Sexual Activity    Alcohol use: Yes     Alcohol/week: 1 0 standard drinks     Types: 1 Cans of beer per week     Comment: occasionally     Drug use: No    Sexual activity: Not Currently       No Known Allergies      Current Outpatient Medications:     albuterol (PROVENTIL HFA) 90 mcg/act inhaler, Inhale 2 puffs every 6 (six) hours as needed for wheezing, Disp: 90 Inhaler, Rfl: 2    ammonium lactate (LAC-HYDRIN) 12 % lotion, , Disp: , Rfl:     aspirin (ECOTRIN LOW STRENGTH) 81 mg EC tablet, Take 1 tablet (81 mg total) by mouth daily, Disp: 90 tablet, Rfl: 1    BANOPHEN 50 MG capsule, TAKE 1 CAPSULE BY MOUTH DAILY AT BEDTIME AS NEEDED FOR SLEEP, Disp: 30 capsule, Rfl: 1    bisacodyl (DULCOLAX) 5 mg EC tablet, Take 30 mg by mouth, Disp: , Rfl:     Blood Glucose Monitoring Suppl (TRUE METRIX METER) GIL, daily  , Disp: , Rfl:     Blood Glucose Monitoring Suppl GIL, daily  , Disp: , Rfl:     budesonide-formoterol (SYMBICORT) 160-4 5 mcg/act inhaler, Inhale 2 puffs, Disp: , Rfl:     busPIRone (BUSPAR) 5 mg tablet, Take 5 mg by mouth 2 (two) times a day, Disp: , Rfl:     clotrimazole (LOTRIMIN) 1 % cream, Apply topically 2 (two) times a day, Disp: , Rfl:     elvitegravir-cobicistat-emtricitabine-tenofovir alafenamide (GENVOYA) tablet, Take 1 tablet by mouth daily, Disp: 30 tablet, Rfl: 5    FLUoxetine (PROzac) 20 mg capsule, , Disp: , Rfl:     fluticasone (FLONASE) 50 mcg/act nasal spray, 1 spray into each nostril daily, Disp: 1 Bottle, Rfl: 2    gabapentin (NEURONTIN) 800 mg tablet, TAKE 1 TABLET BY MOUTH THREE TIMES A DAY, Disp: 90 tablet, Rfl: 2    glucose blood (FREESTYLE LITE) test strip, Use as instructed, Disp: 100 each, Rfl: 5    glucose blood test strip, 1 each by Other route 4 (four) times a day, Disp: 100 each, Rfl: 0    lidocaine (LIDODERM) 5 %, Place 1 patch on the skin daily Remove & Discard patch within 12 hours or as directed by MD, Disp: 30 patch, Rfl: 0    lidocaine (LMX) 4 % cream, Apply topically as needed for mild pain, Disp: 30 g, Rfl: 0    loratadine (CLARITIN) 10 mg tablet, Take 1 tablet (10 mg total) by mouth daily, Disp: 90 tablet, Rfl: 1    Melatonin 5 MG TABS, Take 1 tablet by mouth at bedtime daily, Disp: 90 tablet, Rfl: 3    metFORMIN (GLUCOPHAGE) 500 mg tablet, Take 1 tablet (500 mg total) by mouth 2 (two) times a day with meals, Disp: 180 tablet, Rfl: 1    naproxen (NAPROSYN) 500 mg tablet, Take 1 tablet (500 mg total) by mouth as needed for mild pain Please take with food and not more than 2 times per day , Disp: 30 tablet, Rfl: 0    nicotine (NICODERM CQ) 14 mg/24hr TD 24 hr patch, Place 1 patch on the skin every 24 hours, Disp: 28 patch, Rfl: 6    nicotine polacrilex (NICORETTE) 4 mg gum, Chew 1 each (4 mg total) as needed for smoking cessation, Disp: 100 each, Rfl: 5    ofloxacin (OCUFLOX) 0 3 % ophthalmic solution, Administer 2 drops to both eyes 4 (four) times a day For 5 days  , Disp: 5 mL, Rfl: 0    omega-3-acid ethyl esters (LOVAZA) 1 g capsule, Take 2 capsules (2 g total) by mouth 2 (two) times a day, Disp: 180 capsule, Rfl: 1    polyethylene glycol (GLYCOLAX) powder, Drink per MD recommendations, Disp: , Rfl:     pravastatin (PRAVACHOL) 40 mg tablet, Take 1 tablet (40 mg total) by mouth daily, Disp: 90 tablet, Rfl: 2    sulfaSALAzine (AZULFIDINE-EN) 500 mg EC tablet, , Disp: , Rfl:     SUPER THIN LANCETS MISC, by Percutaneous route 4 (four) times a day, Disp: 100 each, Rfl: 6    traZODone (DESYREL) 50 mg tablet, , Disp: , Rfl: 0      DEMETRIA Ochoa Attestation    I,:   Nimisha Phelan MA am acting as a scribe while in the presence of the attending physician :        I,: Duc Romo MD personally performed the services described in this documentation    as scribed in my presence :

## 2019-07-11 ENCOUNTER — PATIENT OUTREACH (OUTPATIENT)
Dept: SURGERY | Facility: CLINIC | Age: 59
End: 2019-07-11

## 2019-07-26 ENCOUNTER — OFFICE VISIT (OUTPATIENT)
Dept: SURGERY | Facility: CLINIC | Age: 59
End: 2019-07-26
Payer: COMMERCIAL

## 2019-07-26 VITALS
HEART RATE: 84 BPM | DIASTOLIC BLOOD PRESSURE: 80 MMHG | TEMPERATURE: 99.7 F | BODY MASS INDEX: 27.02 KG/M2 | SYSTOLIC BLOOD PRESSURE: 118 MMHG | WEIGHT: 162.4 LBS | OXYGEN SATURATION: 99 %

## 2019-07-26 DIAGNOSIS — B20 HIV DISEASE (HCC): Primary | ICD-10-CM

## 2019-07-26 DIAGNOSIS — L02.31 ABSCESS OF BUTTOCK, LEFT: ICD-10-CM

## 2019-07-26 DIAGNOSIS — R31.29 MICROSCOPIC HEMATURIA: ICD-10-CM

## 2019-07-26 DIAGNOSIS — E11.628 TYPE 2 DIABETES MELLITUS WITH OTHER SKIN COMPLICATION, WITHOUT LONG-TERM CURRENT USE OF INSULIN (HCC): ICD-10-CM

## 2019-07-26 DIAGNOSIS — S80.211A ABRASION OF RIGHT KNEE, INITIAL ENCOUNTER: ICD-10-CM

## 2019-07-26 DIAGNOSIS — F17.219 CIGARETTE NICOTINE DEPENDENCE WITH NICOTINE-INDUCED DISORDER: ICD-10-CM

## 2019-07-26 PROCEDURE — 99215 OFFICE O/P EST HI 40 MIN: CPT | Performed by: INTERNAL MEDICINE

## 2019-07-26 NOTE — PROGRESS NOTES
Progress Note - Infectious Disease   Tyree Hair 63 y o  female MRN: [id number]  Unit/Bed#:  Encounter: [id number]      Impression/Plan:  1  HIV-doing well on Genvoya with a near undetectable viral load but with a CD4 count that  has drifted into the mid 200s  Continue antiretrovirals    Will recheck labs in 2 months and follow up in 3 months  Stressed adherence        2  Nicotine dependence-continue intervention by our tobacco cessation program    Patient is now using nicotine patches  Encourage complete tobacco cessation     3   Diabetes mellitus-poorly controlled  Patient is aggressively being treated with metformin, dietary interventions, and education  May need to add insulin as per the primary      4    Buttock abscess-status post IV antibiotics while in [place] and spontaneous drainage  She continues to have intermittent surfacing of the lesion that spontaneously drains but is no longer present  She will call back if any recurrence  5   Microhematuria-referred to Urology and the patient claims of already been seen although I do not see a note in epic  6   Right knee abrasion-no clinical evidence of a cellulitis  Told the patient to keep it clean with soap and water  No topical or systemic therapy indicated      Patient was provided medication, adherence and prevention education    Subjective:  Routine follow-up for HIV  Patient claims 100% adherence with  Genvoya   Patient denies any notable side effects  Overall the feeling well  The patient denies any fever chills or sweats, denies any nausea vomiting or diarrhea, denies any cough or shortness of breath  She apparently fell and abraded her right knee and she has been using hydrogen peroxide now alcohol  Two weeks ago her buttock abscess once again spontaneously drained and is now resolved once again      ROS: A complete 12 point ROS is negative other than that noted in the HPI    Followup portions patient history reviewed and updated as: Allergies, current medications, past medical history, past social history, past surgical history, and the problem list    Objective:  Vitals:  Vitals:    07/26/19 0746   BP: 118/80   BP Location: Right arm   Patient Position: Sitting   Pulse: 84   Temp: 99 7 °F (37 6 °C)   TempSrc: Oral   SpO2: 99%   Weight: 73 7 kg (162 lb 6 4 oz)       Physical Exam:   General Appearance:  Alert, interactive, appearing well,  nontoxic, no acute distress  Neck:   Supple without lymphadenopathy, no thyromegaly or masses   Throat: Oropharynx moist without lesions  Lungs:   Clear to auscultation bilaterally; no wheezes, rhonchi or rales; respirations unlabored   Heart:  RRR; no murmur, rub or gallop   Abdomen:   Soft, non-tender, non-distended, positive bowel sounds  Extremities: No clubbing, cyanosis or edema   Skin: No new rashes or lesions  No draining wounds noted         Labs, Imaging, & Other studies:   All pertinent labs and imaging studies were personally reviewed    Lab Results   Component Value Date    K 3 8 05/31/2019     05/31/2019    CO2 25 05/31/2019    BUN 11 05/31/2019    CREATININE 0 64 05/31/2019    GLUF 183 (H) 05/31/2019    CALCIUM 9 2 05/31/2019    AST 10 05/31/2019    ALT 24 05/31/2019    ALKPHOS 45 (L) 05/31/2019    EGFR 99 05/31/2019     Lab Results   Component Value Date    WBC 5 06 05/31/2019    WBC 4 5 05/31/2019    HGB 14 0 05/31/2019    HGB 14 2 05/31/2019    HCT 45 3 05/31/2019    HCT 44 0 05/31/2019     (H) 05/31/2019    MCV 98 (H) 05/31/2019     05/31/2019     05/31/2019     Lab Results   Component Value Date    HEPCAB Non-reactive 05/31/2019     Lab Results   Component Value Date    HAV Reactive (A) 11/10/2017    HEPCAB Non-reactive 05/31/2019     Lab Results   Component Value Date    RPR Non-Reactive 05/31/2019     CD4 ABS   Date/Time Value Ref Range Status   05/31/2019 09:07  (L) 359 - 1519 /uL Final     HIV-1 RNA by PCR, Qn   Date/Time Value Ref Range Status   05/31/2019 09:07 AM <20 copies/mL Final     Comment:     HIV-1 RNA not detected  The reportable range for this assay is 20 to 10,000,000  copies HIV-1 RNA/mL  Current Outpatient Medications:     albuterol (PROVENTIL HFA) 90 mcg/act inhaler, Inhale 2 puffs every 6 (six) hours as needed for wheezing, Disp: 90 Inhaler, Rfl: 2    ammonium lactate (LAC-HYDRIN) 12 % lotion, , Disp: , Rfl:     aspirin (ECOTRIN LOW STRENGTH) 81 mg EC tablet, Take 1 tablet (81 mg total) by mouth daily, Disp: 90 tablet, Rfl: 1    BANOPHEN 50 MG capsule, TAKE 1 CAPSULE BY MOUTH DAILY AT BEDTIME AS NEEDED FOR SLEEP, Disp: 30 capsule, Rfl: 1    bisacodyl (DULCOLAX) 5 mg EC tablet, Take 30 mg by mouth, Disp: , Rfl:     Blood Glucose Monitoring Suppl (TRUE METRIX METER) GIL, daily  , Disp: , Rfl:     Blood Glucose Monitoring Suppl GIL, daily  , Disp: , Rfl:     budesonide-formoterol (SYMBICORT) 160-4 5 mcg/act inhaler, Inhale 2 puffs, Disp: , Rfl:     busPIRone (BUSPAR) 5 mg tablet, Take 5 mg by mouth 2 (two) times a day, Disp: , Rfl:     clotrimazole (LOTRIMIN) 1 % cream, Apply topically 2 (two) times a day, Disp: , Rfl:     elvitegravir-cobicistat-emtricitabine-tenofovir alafenamide (GENVOYA) tablet, Take 1 tablet by mouth daily, Disp: 30 tablet, Rfl: 5    FLUoxetine (PROzac) 20 mg capsule, , Disp: , Rfl:     fluticasone (FLONASE) 50 mcg/act nasal spray, 1 spray into each nostril daily, Disp: 1 Bottle, Rfl: 2    gabapentin (NEURONTIN) 800 mg tablet, TAKE 1 TABLET BY MOUTH THREE TIMES A DAY, Disp: 90 tablet, Rfl: 2    glucose blood (FREESTYLE LITE) test strip, Use as instructed, Disp: 100 each, Rfl: 5    glucose blood test strip, 1 each by Other route 4 (four) times a day, Disp: 100 each, Rfl: 0    lidocaine (LIDODERM) 5 %, Place 1 patch on the skin daily Remove & Discard patch within 12 hours or as directed by MD, Disp: 30 patch, Rfl: 0    lidocaine (LMX) 4 % cream, Apply topically as needed for mild pain, Disp: 30 g, Rfl: 0    loratadine (CLARITIN) 10 mg tablet, Take 1 tablet (10 mg total) by mouth daily, Disp: 90 tablet, Rfl: 1    Melatonin 5 MG TABS, Take 1 tablet by mouth at bedtime daily, Disp: 90 tablet, Rfl: 3    metFORMIN (GLUCOPHAGE) 500 mg tablet, Take 1 tablet (500 mg total) by mouth 2 (two) times a day with meals, Disp: 180 tablet, Rfl: 1    naproxen (NAPROSYN) 500 mg tablet, Take 1 tablet (500 mg total) by mouth as needed for mild pain Please take with food and not more than 2 times per day , Disp: 30 tablet, Rfl: 0    nicotine (NICODERM CQ) 14 mg/24hr TD 24 hr patch, Place 1 patch on the skin every 24 hours, Disp: 28 patch, Rfl: 6    nicotine polacrilex (NICORETTE) 4 mg gum, Chew 1 each (4 mg total) as needed for smoking cessation, Disp: 100 each, Rfl: 5    ofloxacin (OCUFLOX) 0 3 % ophthalmic solution, Administer 2 drops to both eyes 4 (four) times a day For 5 days  , Disp: 5 mL, Rfl: 0    omega-3-acid ethyl esters (LOVAZA) 1 g capsule, Take 2 capsules (2 g total) by mouth 2 (two) times a day, Disp: 180 capsule, Rfl: 1    polyethylene glycol (GLYCOLAX) powder, Drink per MD recommendations, Disp: , Rfl:     pravastatin (PRAVACHOL) 40 mg tablet, Take 1 tablet (40 mg total) by mouth daily, Disp: 90 tablet, Rfl: 2    sulfaSALAzine (AZULFIDINE-EN) 500 mg EC tablet, , Disp: , Rfl:     SUPER THIN LANCETS MISC, by Percutaneous route 4 (four) times a day, Disp: 100 each, Rfl: 6    traZODone (DESYREL) 50 mg tablet, , Disp: , Rfl: 0

## 2019-07-30 ENCOUNTER — PATIENT OUTREACH (OUTPATIENT)
Dept: SURGERY | Facility: CLINIC | Age: 59
End: 2019-07-30

## 2019-08-05 ENCOUNTER — OFFICE VISIT (OUTPATIENT)
Dept: SURGERY | Facility: CLINIC | Age: 59
End: 2019-08-05
Payer: COMMERCIAL

## 2019-08-05 DIAGNOSIS — E78.00 HIGH CHOLESTEROL: Primary | ICD-10-CM

## 2019-08-05 DIAGNOSIS — Z13.820 SCREENING FOR OSTEOPOROSIS: Primary | ICD-10-CM

## 2019-08-05 DIAGNOSIS — E55.9 VITAMIN D DEFICIENCY: ICD-10-CM

## 2019-08-05 PROCEDURE — G0438 PPPS, INITIAL VISIT: HCPCS | Performed by: NURSE PRACTITIONER

## 2019-08-05 RX ORDER — B-COMPLEX WITH VITAMIN C
2 TABLET ORAL
Qty: 180 TABLET | Refills: 1 | Status: SHIPPED | OUTPATIENT
Start: 2019-08-05 | End: 2020-05-28

## 2019-08-05 RX ORDER — PRAVASTATIN SODIUM 80 MG/1
80 TABLET ORAL DAILY
Qty: 90 TABLET | Refills: 1 | Status: SHIPPED | OUTPATIENT
Start: 2019-08-05 | End: 2019-12-02 | Stop reason: SDUPTHER

## 2019-08-05 NOTE — PROGRESS NOTES
Assessment and Plan:     Problem List Items Addressed This Visit     None         History of Present Illness:     Patient presents for Medicare Annual Wellness visit    Patient Care Team:  Rebekah Miller as PCP - General (Nurse Practitioner)  Stefan Mullins MD as Consulting Physician (Infectious Diseases)  SHERICE Barraza as   Donnell Spear DO as Endoscopist     Problem List:     Patient Active Problem List   Diagnosis    Asthma    Colon polyps    Type 2 diabetes mellitus with skin complication, without long-term current use of insulin ([facility] )    Fibromyalgia    MDD (major depressive disorder), single episode, severe with psychotic features ([facility] )    Multiple environmental allergies    Nicotine dependence    Poor dentition    H/O abdominal hysterectomy    Hidradenitis suppurativa    Effusion of left knee    High cholesterol    HIV disease ([facility]    Mild intermittent asthma without complication    Neuropathy    Osteopenia    Periodontal disease    Acute pain of left shoulder    Primary insomnia    Polyp of colon    Elevated triglycerides with high cholesterol    Knee strain, left, sequela    Incomplete tear of left rotator cuff    Adhesive capsulitis of left shoulder    Noncompliance with medications    Numbness and tingling of both lower extremities    Falls, initial encounter    Viral upper respiratory infection    Abscess of buttock, left    Right upper quadrant abdominal tenderness with rebound tenderness    BMI 27 0-27 9,adult    Microscopic hematuria    Elevated blood pressure reading    History of smoking 30 or more pack years    Acute conjunctivitis of right eye      Past Medical and Surgical History:     Past Medical History:   Diagnosis Date    Arthritis     Cellulitis     Colon polyp     Diabetes mellitus ([facility]     Fibromyalgia     Fibromyalgia     HIV (human immunodeficiency virus infection) ([facility] )     Hyperlipidemia     Pinguecula of both eyes     Presbyopia of both eyes      Past Surgical History:   Procedure Laterality Date    BREAST BIOPSY Bilateral     more than 12 years ago     BREAST SURGERY      lumpectomy    COLONOSCOPY      last year, polyps, every 6 months    COLONOSCOPY N/A 11/1/2018    Procedure: COLONOSCOPY;  Surgeon: Alice Cline DO;  Location: [facility]; Service: Gastroenterology    HYSTERECTOMY      INCISION AND DRAINAGE PERIRECTAL ABSCESS      OOPHORECTOMY      TUBAL LIGATION        Family History:     Family History   Problem Relation Age of Onset    Heart disease Mother     Diabetes Mother         type 1    Hypertension Mother     Cancer Sister       Social History:     Social History     Tobacco Use   Smoking Status Current Every Day Smoker    Packs/day: 0 25    Types: Cigarettes   Smokeless Tobacco Never Used   Tobacco Comment    1 pk every 2 days, Pt started smoking at age 6     Social History     Substance and Sexual Activity   Alcohol Use Yes    Alcohol/week: 1 0 standard drinks    Types: 1 Cans of beer per week    Comment: occasionally      Social History     Substance and Sexual Activity   Drug Use No      Medications and Allergies:     Current Outpatient Medications   Medication Sig Dispense Refill    albuterol (PROVENTIL HFA) 90 mcg/act inhaler Inhale 2 puffs every 6 (six) hours as needed for wheezing 90 Inhaler 2    ammonium lactate (LAC-HYDRIN) 12 % lotion       aspirin (ECOTRIN LOW STRENGTH) 81 mg EC tablet Take 1 tablet (81 mg total) by mouth daily 90 tablet 1    BANOPHEN 50 MG capsule TAKE 1 CAPSULE BY MOUTH DAILY AT BEDTIME AS NEEDED FOR SLEEP 30 capsule 1    bisacodyl (DULCOLAX) 5 mg EC tablet Take 30 mg by mouth      Blood Glucose Monitoring Suppl (TRUE METRIX METER) GIL daily   Blood Glucose Monitoring Suppl GIL daily        budesonide-formoterol (SYMBICORT) 160-4 5 mcg/act inhaler Inhale 2 puffs      busPIRone (BUSPAR) 5 mg tablet Take 5 mg by mouth 2 (two) times a day      clotrimazole (LOTRIMIN) 1 % cream Apply topically 2 (two) times a day      elvitegravir-cobicistat-emtricitabine-tenofovir alafenamide (GENVOYA) tablet Take 1 tablet by mouth daily 30 tablet 5    FLUoxetine (PROzac) 20 mg capsule       fluticasone (FLONASE) 50 mcg/act nasal spray 1 spray into each nostril daily 1 Bottle 2    gabapentin (NEURONTIN) 800 mg tablet TAKE 1 TABLET BY MOUTH THREE TIMES A DAY 90 tablet 2    glucose blood (FREESTYLE LITE) test strip Use as instructed 100 each 5    glucose blood test strip 1 each by Other route 4 (four) times a day 100 each 0    lidocaine (LIDODERM) 5 % Place 1 patch on the skin daily Remove & Discard patch within 12 hours or as directed by MD 30 patch 0    lidocaine (LMX) 4 % cream Apply topically as needed for mild pain 30 g 0    loratadine (CLARITIN) 10 mg tablet Take 1 tablet (10 mg total) by mouth daily 90 tablet 1    Melatonin 5 MG TABS Take 1 tablet by mouth at bedtime daily 90 tablet 3    metFORMIN (GLUCOPHAGE) 500 mg tablet Take 1 tablet (500 mg total) by mouth 2 (two) times a day with meals 180 tablet 1    naproxen (NAPROSYN) 500 mg tablet Take 1 tablet (500 mg total) by mouth as needed for mild pain Please take with food and not more than 2 times per day  30 tablet 0    nicotine (NICODERM CQ) 14 mg/24hr TD 24 hr patch Place 1 patch on the skin every 24 hours 28 patch 6    nicotine polacrilex (NICORETTE) 4 mg gum Chew 1 each (4 mg total) as needed for smoking cessation 100 each 5    ofloxacin (OCUFLOX) 0 3 % ophthalmic solution Administer 2 drops to both eyes 4 (four) times a day For 5 days   5 mL 0    omega-3-acid ethyl esters (LOVAZA) 1 g capsule Take 2 capsules (2 g total) by mouth 2 (two) times a day 180 capsule 1    polyethylene glycol (GLYCOLAX) powder Drink per MD recommendations      pravastatin (PRAVACHOL) 40 mg tablet Take 1 tablet (40 mg total) by mouth daily 90 tablet 2    sulfaSALAzine (AZULFIDINE-EN) 500 mg EC tablet       SUPER THIN LANCETS MISC by Percutaneous route 4 (four) times a day 100 each 6    traZODone (DESYREL) 50 mg tablet   0     No current facility-administered medications for this visit  No Known Allergies   Immunizations:     Immunization History   Administered Date(s) Administered    Hep B, adult 06/11/2019    Hepatitis B 04/19/2017    Influenza, recombinant, quadrivalent,injectable, preservative free 03/15/2019    Pneumococcal Conjugate 13-Valent 04/19/2017    Pneumococcal Polysaccharide PPV23 08/14/2017      Medicare Screening Tests and Risk Assessments:     Ronn Suárez is here for her Welcome to Medicare visit  Health Risk Assessment:  Patient rates overall health as good  Patient feels that their physical health rating is Slightly better  Eyesight was rated as Slightly worse  Hearing was rated as Same  Patient feels that their emotional and mental health rating is Much better  Pain experienced by patient in the last 7 days has been Some  Patient's pain rating has been 9/10  Patient states that she has experienced no weight loss or gain in last 6 months  Emotional/Mental Health:  Patient has not been feeling nervous/anxious  PHQ-9 Depression Screening:    Frequency of the following problems over the past two weeks:      1  Little interest or pleasure in doing things: 1 - several days      2  Feeling down, depressed, or hopeless: 1 - several days  PHQ-2 Score: 2          Broken Bones/Falls: Fall Risk Assessment:    In the past year, patient has experienced: History of falling in past year    Number of falls: 2 or more    Injured during fall: Yes     Patient feels unsteady when standing or walking     Patient is worried about falling     Bladder/Bowel:  Patient has not leaked urine accidently in the last six months  Patient reports no loss of bowel control  Immunizations:  Patient has had a flu vaccination within the last year  Patient has received a pneumonia shot    Patient has not received a shingles shot   Patient has received tetanus/diphtheria shot  Home Safety:  Patient has trouble with stairs inside or outside of their home  Patient currently reports that there are safety hazards present in home , working smoke alarms, working carbon monoxide detectors  Preventative Screenings:   Breast cancer screening performed, 2/1/2019  colon cancer screen completed, 10/10/2018  cholesterol screen completed, 5/31/2019  glaucoma eye exam completed, 12/1/2018      Nutrition:  Current diet: Regular with servings of the following:    Medications:  Patient is not currently taking any over-the-counter supplements  Patient is able to manage medications  Lifestyle Choices:  Patient reports current tobacco use  Patient reports alcohol use  Patient drives a vehicle  Patient wears seat belt  Current level of exercise of physical activity described by patient as: Regular  (Additional Comments: Pt tries to walk daily)    Activities of Daily Living:  Can get out of bed by his or her self, able to dress self, able to make own meals, able to do own shopping, able to bathe self, can do own laundry/housekeeping, unable to manage own money and other related tasksAdditional Comments: Pt states mom helps her with her bill/expenses    Previous Hospitalizations:  Hospitalization or ED visit in past 12 months  Number of hospitalizations within the last year: 1-2  Additional Comments: Pt went to ER for her right shoulder    Advanced Directives:  Patient has decided on a power of   Patient has spoken to designated power of   Patient has completed advanced directive          Preventative Screening/Counseling:      Cardiovascular:      General: Risks and Benefits Discussed and Screening Current      Counseling: Tobacco Cessation, Improve Exercise Tolerance, Healthy Diet, Healthy Weight, Improve Cholesterol and Improve Blood Pressure      Comments: Pt is currently using nicotine patch and removes patch when she needs to smoke  Pt was instructed it is either one or the other not both  Pt is doing this about  CRNP explained the patch has medication that is systemically absorbed over time and just because she removes the patch to take a couple of puffs  Pt was instructed to use the gum  Pt is on medication ASA, statin, and metformin  Pt has been smoking since 6years old  Diabetes:      General: Risks and Benefits Discussed      Counseling: Healthy Diet, Healthy Weight and Improve Physical Activity      Comments: A1C: 7 8  Pt was told once her A1C is > 8 0 she will need to start on insulin and the new goal is to get her A1C < 7 0  Pt's mother is also diabetic  Pt was offered Health coaching classes by [place] diabetic counselor and declined offer at this time  Colorectal Cancer:      General: Risks and Benefits Discussed and Screening Current      Counseling: high fiber diet      Comments: Colonoscopy 2/2017 and next one is in 8 years  Breast Cancer:      General: Risks and Benefits Discussed and Screening Current      Comments: Recent mammogram was done  Cervical Cancer:      General: Risks and Benefits Discussed and Screening Current      Comments: Pt has total abdominal hysterotomy and was told she does not need any more PAP's  CRNP did question if she needed a vaginal exam and pt reports "she was told no "         Osteoporosis:      General: Risks and Benefits Discussed      Counseling: Regular Weightbearing Exercise and Calcium and Vitamin D Intake      Due for studies: Bone Density Ultrasound      Comments: Will order DXA scan and vitamin d and ca supplement  AAA:      General: Risks and Benefits Discussed      Counseling: tobacco cessation counseling given      Comments: Pt is not aware of any family risks or history of AAA  Glaucoma:      General: Risks and Benefits Discussed and Screening Current      Comments: Pt does see someone every January           HIV: General: Risks and Benefits Discussed and Screening Current      Counseling: Condom Use and HIV Prevention      Comments: Pt denies being sexually active  Hepatitis C:      General: Risks and Benefits Discussed and Screening Current        Advanced Directives:   Patient has no living will for healthcare, Information on ACP and/or AD not provided  5 wishes given  End of life assessment reviewed with patient  Additional Comments: Pt was given 5 wishes     Immunizations:  Patient reviewed and up to date      Other Preventative Counseling (Non-Medicare):  Alcohol Use, Fall Prevention, Increase physical activity, Nutrition Counseling, Car/seat belt/driving safety reviewed, Skin self-exam, Sunscreen use, Dietary education for weight gain and Weight reduction discussed

## 2019-08-13 DIAGNOSIS — J06.9 VIRAL UPPER RESPIRATORY INFECTION: ICD-10-CM

## 2019-08-13 DIAGNOSIS — F17.219 CIGARETTE NICOTINE DEPENDENCE WITH NICOTINE-INDUCED DISORDER: ICD-10-CM

## 2019-08-13 RX ORDER — NICOTINE 21 MG/24HR
1 PATCH, TRANSDERMAL 24 HOURS TRANSDERMAL EVERY 24 HOURS
Qty: 28 PATCH | Refills: 6 | Status: SHIPPED | OUTPATIENT
Start: 2019-08-13 | End: 2020-01-14 | Stop reason: ALTCHOICE

## 2019-08-13 RX ORDER — FLUTICASONE PROPIONATE 50 MCG
1 SPRAY, SUSPENSION (ML) NASAL DAILY
Qty: 1 BOTTLE | Refills: 2 | Status: SHIPPED | OUTPATIENT
Start: 2019-08-13 | End: 2019-11-05 | Stop reason: SDUPTHER

## 2019-08-22 ENCOUNTER — PATIENT OUTREACH (OUTPATIENT)
Dept: SURGERY | Facility: CLINIC | Age: 59
End: 2019-08-22

## 2019-08-27 DIAGNOSIS — E78.2 ELEVATED TRIGLYCERIDES WITH HIGH CHOLESTEROL: ICD-10-CM

## 2019-08-27 DIAGNOSIS — E11.628 TYPE 2 DIABETES MELLITUS WITH OTHER SKIN COMPLICATION, WITHOUT LONG-TERM CURRENT USE OF INSULIN (HCC): ICD-10-CM

## 2019-08-27 DIAGNOSIS — B20 HIV DISEASE (HCC): ICD-10-CM

## 2019-08-30 ENCOUNTER — APPOINTMENT (OUTPATIENT)
Dept: LAB | Facility: HOSPITAL | Age: 59
End: 2019-08-30
Attending: INTERNAL MEDICINE
Payer: COMMERCIAL

## 2019-08-30 DIAGNOSIS — B20 HIV DISEASE (HCC): ICD-10-CM

## 2019-08-30 LAB
ALBUMIN SERPL BCP-MCNC: 3.3 G/DL (ref 3.5–5)
ALP SERPL-CCNC: 54 U/L (ref 46–116)
ALT SERPL W P-5'-P-CCNC: 23 U/L (ref 12–78)
ANION GAP SERPL CALCULATED.3IONS-SCNC: 9 MMOL/L (ref 4–13)
AST SERPL W P-5'-P-CCNC: 14 U/L (ref 5–45)
BASOPHILS # BLD AUTO: 0.02 THOUSANDS/ΜL (ref 0–0.1)
BASOPHILS NFR BLD AUTO: 0 % (ref 0–1)
BILIRUB SERPL-MCNC: 0.3 MG/DL (ref 0.2–1)
BUN SERPL-MCNC: 11 MG/DL (ref 5–25)
CALCIUM SERPL-MCNC: 8.8 MG/DL (ref 8.3–10.1)
CHLORIDE SERPL-SCNC: 106 MMOL/L (ref 100–108)
CO2 SERPL-SCNC: 27 MMOL/L (ref 21–32)
CREAT SERPL-MCNC: 0.6 MG/DL (ref 0.6–1.3)
EOSINOPHIL # BLD AUTO: 0.08 THOUSAND/ΜL (ref 0–0.61)
EOSINOPHIL NFR BLD AUTO: 1 % (ref 0–6)
ERYTHROCYTE [DISTWIDTH] IN BLOOD BY AUTOMATED COUNT: 13.8 % (ref 11.6–15.1)
GFR SERPL CREATININE-BSD FRML MDRD: 102 ML/MIN/1.73SQ M
GLUCOSE P FAST SERPL-MCNC: 176 MG/DL (ref 65–99)
HCT VFR BLD AUTO: 43.1 % (ref 34.8–46.1)
HGB BLD-MCNC: 13.6 G/DL (ref 11.5–15.4)
IMM GRANULOCYTES # BLD AUTO: 0.03 THOUSAND/UL (ref 0–0.2)
IMM GRANULOCYTES NFR BLD AUTO: 1 % (ref 0–2)
LYMPHOCYTES # BLD AUTO: 1.61 THOUSANDS/ΜL (ref 0.6–4.47)
LYMPHOCYTES NFR BLD AUTO: 27 % (ref 14–44)
MCH RBC QN AUTO: 31.7 PG (ref 26.8–34.3)
MCHC RBC AUTO-ENTMCNC: 31.6 G/DL (ref 31.4–37.4)
MCV RBC AUTO: 101 FL (ref 82–98)
MONOCYTES # BLD AUTO: 0.53 THOUSAND/ΜL (ref 0.17–1.22)
MONOCYTES NFR BLD AUTO: 9 % (ref 4–12)
NEUTROPHILS # BLD AUTO: 3.7 THOUSANDS/ΜL (ref 1.85–7.62)
NEUTS SEG NFR BLD AUTO: 62 % (ref 43–75)
NRBC BLD AUTO-RTO: 0 /100 WBCS
PLATELET # BLD AUTO: 258 THOUSANDS/UL (ref 149–390)
PMV BLD AUTO: 9.8 FL (ref 8.9–12.7)
POTASSIUM SERPL-SCNC: 4.2 MMOL/L (ref 3.5–5.3)
PROT SERPL-MCNC: 7.6 G/DL (ref 6.4–8.2)
RBC # BLD AUTO: 4.29 MILLION/UL (ref 3.81–5.12)
SODIUM SERPL-SCNC: 142 MMOL/L (ref 136–145)
WBC # BLD AUTO: 5.97 THOUSAND/UL (ref 4.31–10.16)

## 2019-08-30 PROCEDURE — 87536 HIV-1 QUANT&REVRSE TRNSCRPJ: CPT

## 2019-08-30 PROCEDURE — 36415 COLL VENOUS BLD VENIPUNCTURE: CPT

## 2019-08-30 PROCEDURE — 80053 COMPREHEN METABOLIC PANEL: CPT

## 2019-08-30 PROCEDURE — 85025 COMPLETE CBC W/AUTO DIFF WBC: CPT

## 2019-08-30 PROCEDURE — 86361 T CELL ABSOLUTE COUNT: CPT

## 2019-08-30 RX ORDER — ASPIRIN 81 MG
TABLET, DELAYED RELEASE (ENTERIC COATED) ORAL
Qty: 90 TABLET | Refills: 4 | Status: SHIPPED | OUTPATIENT
Start: 2019-08-30 | End: 2020-03-31 | Stop reason: SDUPTHER

## 2019-08-30 RX ORDER — ELVITEGRAVIR, COBICISTAT, EMTRICITABINE, AND TENOFOVIR ALAFENAMIDE 150; 150; 200; 10 MG/1; MG/1; MG/1; MG/1
TABLET ORAL
Qty: 30 TABLET | Refills: 4 | Status: SHIPPED | OUTPATIENT
Start: 2019-08-30 | End: 2019-12-31 | Stop reason: SDUPTHER

## 2019-08-31 LAB
BASOPHILS # BLD AUTO: 0 X10E3/UL (ref 0–0.2)
BASOPHILS NFR BLD AUTO: 0 %
CD3+CD4+ CELLS # BLD: 354 /UL (ref 359–1519)
CD3+CD4+ CELLS NFR BLD: 22.1 % (ref 30.8–58.5)
EOSINOPHIL # BLD AUTO: 0.1 X10E3/UL (ref 0–0.4)
EOSINOPHIL NFR BLD AUTO: 1 %
ERYTHROCYTE [DISTWIDTH] IN BLOOD BY AUTOMATED COUNT: 14.6 % (ref 12.3–15.4)
HCT VFR BLD AUTO: 38.7 % (ref 34–46.6)
HGB BLD-MCNC: 13.2 G/DL (ref 11.1–15.9)
IMM GRANULOCYTES # BLD: 0 X10E3/UL (ref 0–0.1)
IMM GRANULOCYTES NFR BLD: 0 %
LYMPHOCYTES # BLD AUTO: 1.6 X10E3/UL (ref 0.7–3.1)
LYMPHOCYTES NFR BLD AUTO: 26 %
MCH RBC QN AUTO: 31.9 PG (ref 26.6–33)
MCHC RBC AUTO-ENTMCNC: 34.1 G/DL (ref 31.5–35.7)
MCV RBC AUTO: 94 FL (ref 79–97)
MONOCYTES # BLD AUTO: 0.6 X10E3/UL (ref 0.1–0.9)
MONOCYTES NFR BLD AUTO: 9 %
NEUTROPHILS # BLD AUTO: 4 X10E3/UL (ref 1.4–7)
NEUTROPHILS NFR BLD AUTO: 64 %
PLATELET # BLD AUTO: 297 X10E3/UL (ref 150–450)
RBC # BLD AUTO: 4.14 X10E6/UL (ref 3.77–5.28)
WBC # BLD AUTO: 6.3 X10E3/UL (ref 3.4–10.8)

## 2019-09-03 LAB
HIV1 RNA # SERPL NAA+PROBE: <20 COPIES/ML
HIV1 RNA SERPL NAA+PROBE-LOG#: NORMAL LOG10COPY/ML

## 2019-09-06 ENCOUNTER — TELEPHONE (OUTPATIENT)
Dept: SURGERY | Facility: CLINIC | Age: 59
End: 2019-09-06

## 2019-09-06 NOTE — TELEPHONE ENCOUNTER
Teri Frey from [facility] called stating the Dexa scan was approved the authorization #[id number] approved for 9/18-11/17      The approval will be mailed out to us

## 2019-09-10 DIAGNOSIS — G62.9 NEUROPATHY: ICD-10-CM

## 2019-09-11 ENCOUNTER — TELEPHONE (OUTPATIENT)
Dept: SURGERY | Facility: CLINIC | Age: 59
End: 2019-09-11

## 2019-09-11 RX ORDER — GABAPENTIN 800 MG/1
TABLET ORAL
Qty: 270 TABLET | Refills: 7 | Status: SHIPPED | OUTPATIENT
Start: 2019-09-11 | End: 2020-09-09

## 2019-09-12 ENCOUNTER — PATIENT OUTREACH (OUTPATIENT)
Dept: SURGERY | Facility: CLINIC | Age: 59
End: 2019-09-12

## 2019-09-12 NOTE — ASSESSMENT & PLAN NOTE
Doing well on ART  Pt reports taking her mediation every day with food and denies any missed doses  Stressed the importance of 100% medication adherence  HIV Counseling:    Viral Load: < 20    CD4 Count: 354    ART: Carla Tejeda    Denies side effects  Stressed the importance of adherence  Continue follow up in the ID clinic with Dr Lani Álvarez  Reviewed the most recent labs, including the Cd4 and viral load  Discussed the risks and benefits of treatment options, instructions for management, importance of treatment adherence, and reduction of risk factors  Educated on possible medication side effects  Counseled on routes of HIV transmission, including the risk of  infection  Emphasized that viral suppression is the best method to prevent HIV transmission  At this time the pt denies the need for HIV testing of anyone in their life  Total encounter time was greater than 35 minutes  Greater than 20 minutes were spent on counseling and patient education  Pt voices understanding and agreement with treatment plan

## 2019-09-12 NOTE — ASSESSMENT & PLAN NOTE
NO change  UA: moderate blood  Pt did follow up with urology and per AUA guidelines for microscopic hematuria and pt does not meet current guidelines for workup

## 2019-09-12 NOTE — ASSESSMENT & PLAN NOTE
Lab Results   Component Value Date    HGBA1C 7 8 (H) 05/31/2019     Ongoing  Pt reports  If A1C remains the same or higher, pt will require insulin to help lower A1C to goal of 7 or less  Recommended diabetes educational classes  Pt reports "increase in eating food due to stress and it is not the green leafy vegetables or healthy eating that she should be doing  Pt will be spending the holidays in [place] and will not be eating healthy either  CRNP stressed the importance of eating healthy now  [facility] RN explained how as we age we burn less calories and need to consume less food and how the extra sugar and carbohydrates are used and stores when eaten in high consumption  Pt has agreed to [facility] diabetic counseling in January of 2020
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
## 2019-09-12 NOTE — ASSESSMENT & PLAN NOTE
Doing better  Lipid panel:  Cholesterol: 181 down from 234  Triglycerides: 155 down from 251  HDL: 48 down from 54  LDL: 102 down from 130  LDL not at goal   Pravastatin was increased to 80 mg tablet daily  Will recheck lipid profile  Pt was strongly encouraged to eating more fruits and vegetables,eat smaller portions, and eat less food high in cholesterol  Pt does follow with [place]  Recommend Diabetes educational classes

## 2019-09-13 ENCOUNTER — PATIENT OUTREACH (OUTPATIENT)
Dept: SURGERY | Facility: CLINIC | Age: 59
End: 2019-09-13

## 2019-09-13 ENCOUNTER — OFFICE VISIT (OUTPATIENT)
Dept: SURGERY | Facility: CLINIC | Age: 59
End: 2019-09-13
Payer: COMMERCIAL

## 2019-09-13 ENCOUNTER — TELEPHONE (OUTPATIENT)
Dept: SURGERY | Facility: CLINIC | Age: 59
End: 2019-09-13

## 2019-09-13 VITALS
DIASTOLIC BLOOD PRESSURE: 58 MMHG | OXYGEN SATURATION: 98 % | HEART RATE: 85 BPM | SYSTOLIC BLOOD PRESSURE: 129 MMHG | RESPIRATION RATE: 18 BRPM | HEIGHT: 65 IN | WEIGHT: 164.1 LBS | BODY MASS INDEX: 27.34 KG/M2 | TEMPERATURE: 97.3 F

## 2019-09-13 DIAGNOSIS — R51.9 GENERALIZED HEADACHES: ICD-10-CM

## 2019-09-13 DIAGNOSIS — Z23 NEED FOR HEPATITIS B VACCINATION: ICD-10-CM

## 2019-09-13 DIAGNOSIS — B20 HIV DISEASE (HCC): Primary | ICD-10-CM

## 2019-09-13 DIAGNOSIS — R03.0 ELEVATED BLOOD PRESSURE READING: ICD-10-CM

## 2019-09-13 DIAGNOSIS — M75.02 ADHESIVE CAPSULITIS OF LEFT SHOULDER: ICD-10-CM

## 2019-09-13 DIAGNOSIS — R31.29 MICROSCOPIC HEMATURIA: ICD-10-CM

## 2019-09-13 DIAGNOSIS — F17.219 CIGARETTE NICOTINE DEPENDENCE WITH NICOTINE-INDUCED DISORDER: ICD-10-CM

## 2019-09-13 DIAGNOSIS — E78.00 HIGH CHOLESTEROL: ICD-10-CM

## 2019-09-13 DIAGNOSIS — M25.562 ACUTE PAIN OF LEFT KNEE: ICD-10-CM

## 2019-09-13 DIAGNOSIS — E11.628 TYPE 2 DIABETES MELLITUS WITH OTHER SKIN COMPLICATION, WITHOUT LONG-TERM CURRENT USE OF INSULIN (HCC): ICD-10-CM

## 2019-09-13 PROBLEM — H10.31 ACUTE CONJUNCTIVITIS OF RIGHT EYE: Status: RESOLVED | Noted: 2019-07-02 | Resolved: 2019-09-13

## 2019-09-13 PROBLEM — M25.512 ACUTE PAIN OF LEFT SHOULDER: Status: RESOLVED | Noted: 2018-06-13 | Resolved: 2019-09-13

## 2019-09-13 PROCEDURE — 99214 OFFICE O/P EST MOD 30 MIN: CPT | Performed by: NURSE PRACTITIONER

## 2019-09-13 PROCEDURE — 90746 HEPB VACCINE 3 DOSE ADULT IM: CPT

## 2019-09-13 PROCEDURE — G0010 ADMIN HEPATITIS B VACCINE: HCPCS

## 2019-09-13 RX ORDER — NAPROXEN 500 MG/1
500 TABLET ORAL 2 TIMES DAILY WITH MEALS
Qty: 20 TABLET | Refills: 0 | Status: SHIPPED | OUTPATIENT
Start: 2019-09-13 | End: 2020-01-14 | Stop reason: ALTCHOICE

## 2019-09-13 NOTE — ASSESSMENT & PLAN NOTE
Ongoing  Pt did complete PT and was discharged from Dr Aneta Arango service  Pt does complete her exercises she was given in PT  Pt reports the pain still from cervical to lumbar and is worse because she sleeps on right pain is worse or left side  Pt is not able to sleep on back due to "feeling like I will suffocate "  Pt does use OTC heating pads and they help to relieved the pain  Pt is requesting naprosyn  CRNP explained the medication is not meant to be used long term  CRNP will reordered naprosyn 500 mg tablet by with food as needed for severe pain  Pt was encouraged to drink plenty of water to hydrate kidneys  Pt reports tylenol with not relief  This injury is a year old
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
## 2019-09-13 NOTE — ASSESSMENT & PLAN NOTE
Doing better  Pt reports HA come and go but overall they are better and occurring less frequently  Will continue to monitor

## 2019-09-17 ENCOUNTER — PATIENT OUTREACH (OUTPATIENT)
Dept: SURGERY | Facility: CLINIC | Age: 59
End: 2019-09-17

## 2019-09-17 NOTE — PROGRESS NOTES
Ct brought bill to cm  Cm called billing, both services were with [facility]  Billing department billed one to her insurance, the other was given to the director, to see if it can be covered, since ct is up to date with everything

## 2019-09-20 ENCOUNTER — TELEPHONE (OUTPATIENT)
Dept: SURGERY | Facility: CLINIC | Age: 59
End: 2019-09-20

## 2019-09-20 NOTE — TELEPHONE ENCOUNTER
Spoke with patient regarding DXA bone scan  10/14 @ 2:30PM at [id number] [facility] [address] [facility] [place]  Mailed to patient  Patient was questioning her pravastatin  Received 40mg tablet with instructions to take one(1) tablet daily  Per nurse, take two(2) tablets daily due to last PCP visit provider increased to 80mg daily  Recommended patient call pharmacy for clarification also

## 2019-10-14 ENCOUNTER — HOSPITAL ENCOUNTER (OUTPATIENT)
Dept: BONE DENSITY | Facility: CLINIC | Age: 59
Discharge: HOME | End: 2019-10-14
Payer: COMMERCIAL

## 2019-10-14 DIAGNOSIS — Z13.820 SPECIAL SCREENING FOR OSTEOPOROSIS: Primary | ICD-10-CM

## 2019-10-14 DIAGNOSIS — Z13.820 SPECIAL SCREENING FOR OSTEOPOROSIS: ICD-10-CM

## 2019-10-14 PROCEDURE — 77080 DXA BONE DENSITY AXIAL: CPT

## 2019-10-25 ENCOUNTER — OFFICE VISIT (OUTPATIENT)
Dept: SURGERY | Facility: CLINIC | Age: 59
End: 2019-10-25
Payer: COMMERCIAL

## 2019-10-25 VITALS
BODY MASS INDEX: 27.66 KG/M2 | HEIGHT: 65 IN | OXYGEN SATURATION: 97 % | WEIGHT: 166 LBS | TEMPERATURE: 97.9 F | DIASTOLIC BLOOD PRESSURE: 73 MMHG | HEART RATE: 82 BPM | SYSTOLIC BLOOD PRESSURE: 124 MMHG

## 2019-10-25 DIAGNOSIS — F17.219 CIGARETTE NICOTINE DEPENDENCE WITH NICOTINE-INDUCED DISORDER: ICD-10-CM

## 2019-10-25 DIAGNOSIS — B20 HIV DISEASE (HCC): Primary | ICD-10-CM

## 2019-10-25 DIAGNOSIS — L02.31 ABSCESS OF BUTTOCK: ICD-10-CM

## 2019-10-25 DIAGNOSIS — E11.628 TYPE 2 DIABETES MELLITUS WITH OTHER SKIN COMPLICATION, WITHOUT LONG-TERM CURRENT USE OF INSULIN (HCC): ICD-10-CM

## 2019-10-25 DIAGNOSIS — R31.29 MICROSCOPIC HEMATURIA: ICD-10-CM

## 2019-10-25 PROCEDURE — 99215 OFFICE O/P EST HI 40 MIN: CPT | Performed by: INTERNAL MEDICINE

## 2019-10-25 NOTE — PROGRESS NOTES
Progress Note - Infectious Disease   Dorothy Nearing [facility] 63 y o  female MRN: [id number]  Unit/Bed#:  Encounter: [id number]      Impression/Plan:  1  HIV-doing well on Genvoya with a near undetectable viral load but with a CD4 count that  has drifted into the 300s  Continue antiretrovirals    Will recheck labs in 2 months and follow up in 3 months  Stressed adherence        2  Nicotine dependence-continue intervention by our tobacco cessation program    Patient is now using nicotine patches  Encourage complete tobacco cessation  Patient is now down to 3 cigarettes per day  Will continue to stressed this intervention and try to get complete tobacco cessation     3   Diabetes mellitus-suboptimal control   Patient is aggressively being treated with metformin, dietary interventions, and education  May need to add insulin as per the primary      4    Buttock abscess-no recurrence after having recurrent problems in the past   Will continue monitor      5  Microhematuria-seen by Urology who did not recommend any additional workup for now  Will continue to monitor the UA annually    Patient was provided medication, adherence and prevention education    Subjective:  Routine follow-up for HIV  Patient claims 100% adherence with Genvoya  Patient denies any notable side effects  Overall the feeling well  The patient denies any fever chills or sweats, denies any nausea vomiting or diarrhea, denies any cough or shortness of breath  ROS: A complete 12 point ROS is negative other than that noted in the HPI    Followup portions patient history reviewed and updated as:   Allergies, current medications, past medical history, past social history, past surgical history, and the problem list    Objective:  Vitals:  Vitals:    10/25/19 0736   BP: 124/73   BP Location: Right arm   Patient Position: Sitting   Cuff Size: Standard   Pulse: 82   Temp: 97 9 °F (36 6 °C)   SpO2: 97%   Weight: 75 3 kg (166 lb)   Height: 5' 5" (4 651 m)       Physical Exam:   General Appearance:  Alert, interactive, appearing well,  nontoxic, no acute distress  Neck:   Supple without lymphadenopathy, no thyromegaly or masses   Throat: Oropharynx moist without lesions  Lungs:   Clear to auscultation bilaterally; no wheezes, rhonchi or rales; respirations unlabored   Heart:  RRR; no murmur, rub or gallop   Abdomen:   Soft, non-tender, non-distended, positive bowel sounds  Extremities: No clubbing, cyanosis or edema   Skin: No new rashes or lesions  No draining wounds noted  Labs, Imaging, & Other studies:   All pertinent labs and imaging studies were personally reviewed    Lab Results   Component Value Date    K 4 2 08/30/2019     08/30/2019    CO2 27 08/30/2019    BUN 11 08/30/2019    CREATININE 0 60 08/30/2019    GLUF 176 (H) 08/30/2019    CALCIUM 8 8 08/30/2019    AST 14 08/30/2019    ALT 23 08/30/2019    ALKPHOS 54 08/30/2019    EGFR 102 08/30/2019     Lab Results   Component Value Date    WBC 5 97 08/30/2019    WBC 6 3 08/30/2019    HGB 13 6 08/30/2019    HGB 13 2 08/30/2019    HCT 43 1 08/30/2019    HCT 38 7 08/30/2019     (H) 08/30/2019    MCV 94 08/30/2019     08/30/2019     08/30/2019     Lab Results   Component Value Date    HEPCAB Non-reactive 05/31/2019     Lab Results   Component Value Date    HAV Reactive (A) 11/10/2017    HEPCAB Non-reactive 05/31/2019     Lab Results   Component Value Date    RPR Non-Reactive 05/31/2019     CD4 ABS   Date/Time Value Ref Range Status   08/30/2019 07:03  (L) 359 - 1519 /uL Final     HIV-1 RNA by PCR, Qn   Date/Time Value Ref Range Status   08/30/2019 07:03 AM <20 copies/mL Final     Comment:     HIV-1 RNA not detected  The reportable range for this assay is 20 to 10,000,000  copies HIV-1 RNA/mL             Current Outpatient Medications:     albuterol (PROVENTIL HFA) 90 mcg/act inhaler, Inhale 2 puffs every 6 (six) hours as needed for wheezing, Disp: 90 Inhaler, Rfl: 2    ammonium lactate (LAC-HYDRIN) 12 % lotion, , Disp: , Rfl:     ASPIR-LOW 81 MG EC tablet, TAKE ONE TABLET BY MOUTH ONCE DAILY, Disp: 90 tablet, Rfl: 4    BANOPHEN 50 MG capsule, TAKE 1 CAPSULE BY MOUTH DAILY AT BEDTIME AS NEEDED FOR SLEEP, Disp: 30 capsule, Rfl: 1    bisacodyl (DULCOLAX) 5 mg EC tablet, Take 30 mg by mouth, Disp: , Rfl:     Blood Glucose Monitoring Suppl (TRUE METRIX METER) GIL, daily  , Disp: , Rfl:     Blood Glucose Monitoring Suppl GIL, daily  , Disp: , Rfl:     budesonide-formoterol (SYMBICORT) 160-4 5 mcg/act inhaler, Inhale 2 puffs, Disp: , Rfl:     busPIRone (BUSPAR) 5 mg tablet, Take 5 mg by mouth 2 (two) times a day, Disp: , Rfl:     calcium carbonate-vitamin D (OSCAL-D) 500 mg-200 units per tablet, Take 2 tablets by mouth daily with breakfast, Disp: 180 tablet, Rfl: 1    clotrimazole (LOTRIMIN) 1 % cream, Apply topically 2 (two) times a day, Disp: , Rfl:     FLUoxetine (PROzac) 20 mg capsule, , Disp: , Rfl:     fluticasone (FLONASE) 50 mcg/act nasal spray, 1 spray into each nostril daily, Disp: 1 Bottle, Rfl: 2    gabapentin (NEURONTIN) 800 mg tablet, TAKE 1 TABLET BY MOUTH THREE TIMES A DAY, Disp: 90 tablet, Rfl: 2    gabapentin (NEURONTIN) 800 mg tablet, TAKE ONE TABLET BY MOUTH THREE TIMES A DAY, Disp: 270 tablet, Rfl: 7    GENVOYA tablet, TAKE ONE TABLET BY MOUTH ONCE DAILY, Disp: 30 tablet, Rfl: 4    glucose blood (FREESTYLE LITE) test strip, Use as instructed, Disp: 100 each, Rfl: 5    glucose blood test strip, 1 each by Other route 4 (four) times a day, Disp: 100 each, Rfl: 0    lidocaine (LIDODERM) 5 %, Place 1 patch on the skin daily Remove & Discard patch within 12 hours or as directed by MD, Disp: 30 patch, Rfl: 0    lidocaine (LMX) 4 % cream, Apply topically as needed for mild pain, Disp: 30 g, Rfl: 0    loratadine (CLARITIN) 10 mg tablet, Take 1 tablet (10 mg total) by mouth daily, Disp: 90 tablet, Rfl: 1    Melatonin 5 MG TABS, Take 1 tablet by mouth at bedtime daily, Disp: 90 tablet, Rfl: 3    metFORMIN (GLUCOPHAGE) 500 mg tablet, Take 1 tablet (500 mg total) by mouth 2 (two) times a day with meals, Disp: 180 tablet, Rfl: 1    naproxen (EC NAPROSYN) 500 MG EC tablet, Take 1 tablet (500 mg total) by mouth 2 (two) times a day with meals, Disp: 20 tablet, Rfl: 0    naproxen (NAPROSYN) 500 mg tablet, Take 1 tablet (500 mg total) by mouth as needed for mild pain Please take with food and not more than 2 times per day , Disp: 30 tablet, Rfl: 0    nicotine (NICODERM CQ) 14 mg/24hr TD 24 hr patch, Place 1 patch on the skin every 24 hours, Disp: 28 patch, Rfl: 6    nicotine polacrilex (NICORETTE) 4 mg gum, Chew 1 each (4 mg total) as needed for smoking cessation, Disp: 100 each, Rfl: 5    ofloxacin (OCUFLOX) 0 3 % ophthalmic solution, Administer 2 drops to both eyes 4 (four) times a day For 5 days  , Disp: 5 mL, Rfl: 0    omega-3-acid ethyl esters (LOVAZA) 1 g capsule, Take 2 capsules (2 g total) by mouth 2 (two) times a day, Disp: 180 capsule, Rfl: 1    polyethylene glycol (GLYCOLAX) powder, Drink per MD recommendations, Disp: , Rfl:     pravastatin (PRAVACHOL) 80 mg tablet, Take 1 tablet (80 mg total) by mouth daily, Disp: 90 tablet, Rfl: 1    sulfaSALAzine (AZULFIDINE-EN) 500 mg EC tablet, , Disp: , Rfl:     SUPER THIN LANCETS MISC, by Percutaneous route 4 (four) times a day, Disp: 100 each, Rfl: 6    traZODone (DESYREL) 50 mg tablet, , Disp: , Rfl: 0

## 2019-11-05 DIAGNOSIS — J06.9 VIRAL UPPER RESPIRATORY INFECTION: ICD-10-CM

## 2019-11-05 RX ORDER — FLUTICASONE PROPIONATE 50 MCG
SPRAY, SUSPENSION (ML) NASAL
Qty: 1 BOTTLE | Refills: 2 | Status: SHIPPED | OUTPATIENT
Start: 2019-11-05 | End: 2020-01-02 | Stop reason: ALTCHOICE

## 2019-11-27 ENCOUNTER — PATIENT OUTREACH (OUTPATIENT)
Dept: SURGERY | Facility: CLINIC | Age: 59
End: 2019-11-27

## 2019-12-02 DIAGNOSIS — E11.628 TYPE 2 DIABETES MELLITUS WITH OTHER SKIN COMPLICATION, WITHOUT LONG-TERM CURRENT USE OF INSULIN (HCC): ICD-10-CM

## 2019-12-02 DIAGNOSIS — E78.00 HIGH CHOLESTEROL: ICD-10-CM

## 2019-12-02 RX ORDER — PRAVASTATIN SODIUM 80 MG/1
80 TABLET ORAL DAILY
Qty: 90 TABLET | Refills: 1 | Status: SHIPPED | OUTPATIENT
Start: 2019-12-02 | End: 2020-05-28

## 2019-12-03 ENCOUNTER — PATIENT OUTREACH (OUTPATIENT)
Dept: SURGERY | Facility: CLINIC | Age: 59
End: 2019-12-03

## 2019-12-03 NOTE — PROGRESS NOTES
Ct called cm because she is not able to come in this month for her recert appoint  Ct states that she will be on vacation  Ct scheduled recert appointment for 1/24 at 830  Hospital chart

## 2019-12-27 DIAGNOSIS — E11.9 TYPE 2 DIABETES MELLITUS WITHOUT COMPLICATION, WITHOUT LONG-TERM CURRENT USE OF INSULIN (HCC): ICD-10-CM

## 2019-12-31 DIAGNOSIS — B20 HIV DISEASE (HCC): ICD-10-CM

## 2020-01-02 ENCOUNTER — TELEPHONE (OUTPATIENT)
Dept: SURGERY | Facility: CLINIC | Age: 60
End: 2020-01-02

## 2020-01-08 ENCOUNTER — TELEPHONE (OUTPATIENT)
Dept: SURGERY | Facility: CLINIC | Age: 60
End: 2020-01-08

## 2020-01-08 NOTE — TELEPHONE ENCOUNTER
Patient called in to ask if she could take a descovy pill until her from [facility] on Friday morning  She states she took a brand new bottle of genvoya and is now short of a couple of pills  She has been taking a pill every other day to make it last until her return  She has a friend who's son is taking descovy and will give her a pill to take until she returns  She wanted to know if PCP would approve of this   recommended to patient to call the pharmacy that she fills her medication to see if they can send to a local pharmacy for her to fill  Patient verbalized understanding and stated she will call the pharmacy in the meantime

## 2020-01-08 NOTE — TELEPHONE ENCOUNTER
Spoke with patient  Per PCP, do not take medication, Descovy, in place of Genvoya  Patient stated she contacted her pharmacy ([facility]) and was told to call the pharmacy that she wanted the medication sent to [facility] ) When she spoke to [facility], she was told that was not possible to do  Recommended to patient to call insurance and explain her situation  The insurance may give an override until she is home to receive a complete fill of medication  Patient stated she will take her last pill on Thursday and when she is home on Friday (01/10/2020) she will get a refill  Reiterated to patient to contact insurance and try to get a fill in the meantime  Patient stated that would be a lot of work and to get nothing in the end was not worth it  She will take her last pill on Thursday and get another refill when she is back in the area

## 2020-01-10 NOTE — PROGRESS NOTES
Ct called requested assistance with canceling  time for Dorothy Leandro Noriega called cancelled  [address]  [address]   At [facility]/[place]     [phone] Normal vision: sees adequately in most situations; can see medication labels, newsprint

## 2020-01-13 PROBLEM — T65.221A: Status: ACTIVE | Noted: 2017-11-17
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
## 2020-01-14 ENCOUNTER — OFFICE VISIT (OUTPATIENT)
Dept: SURGERY | Facility: CLINIC | Age: 60
End: 2020-01-14
Payer: COMMERCIAL

## 2020-01-14 ENCOUNTER — APPOINTMENT (OUTPATIENT)
Dept: LAB | Facility: CLINIC | Age: 60
End: 2020-01-14
Payer: COMMERCIAL

## 2020-01-14 ENCOUNTER — TELEPHONE (OUTPATIENT)
Dept: SURGERY | Facility: CLINIC | Age: 60
End: 2020-01-14

## 2020-01-14 VITALS
RESPIRATION RATE: 18 BRPM | WEIGHT: 166.8 LBS | HEART RATE: 100 BPM | BODY MASS INDEX: 27.79 KG/M2 | TEMPERATURE: 97.3 F | SYSTOLIC BLOOD PRESSURE: 113 MMHG | HEIGHT: 65 IN | OXYGEN SATURATION: 99 % | DIASTOLIC BLOOD PRESSURE: 72 MMHG

## 2020-01-14 DIAGNOSIS — Z23 NEED FOR INFLUENZA VACCINATION: ICD-10-CM

## 2020-01-14 DIAGNOSIS — Z91.14 NONCOMPLIANCE WITH MEDICATIONS: ICD-10-CM

## 2020-01-14 DIAGNOSIS — E11.628 TYPE 2 DIABETES MELLITUS WITH OTHER SKIN COMPLICATION, WITHOUT LONG-TERM CURRENT USE OF INSULIN (HCC): ICD-10-CM

## 2020-01-14 DIAGNOSIS — M25.474 BILATERAL SWELLING OF FEET AND ANKLES: ICD-10-CM

## 2020-01-14 DIAGNOSIS — M25.475 BILATERAL SWELLING OF FEET AND ANKLES: ICD-10-CM

## 2020-01-14 DIAGNOSIS — T65.221D TOXIC EFFECT OF TOBACCO CIGARETTE, UNINTENTIONAL, SUBSEQUENT ENCOUNTER: ICD-10-CM

## 2020-01-14 DIAGNOSIS — M25.472 BILATERAL SWELLING OF FEET AND ANKLES: ICD-10-CM

## 2020-01-14 DIAGNOSIS — B20 HIV DISEASE (HCC): Primary | ICD-10-CM

## 2020-01-14 DIAGNOSIS — M25.471 BILATERAL SWELLING OF FEET AND ANKLES: ICD-10-CM

## 2020-01-14 DIAGNOSIS — E78.00 HIGH CHOLESTEROL: ICD-10-CM

## 2020-01-14 DIAGNOSIS — M85.80 OSTEOPENIA DETERMINED BY X-RAY: ICD-10-CM

## 2020-01-14 DIAGNOSIS — R05.9 COUGH: ICD-10-CM

## 2020-01-14 DIAGNOSIS — B20 HIV DISEASE (HCC): ICD-10-CM

## 2020-01-14 DIAGNOSIS — R03.0 ELEVATED BLOOD PRESSURE READING: ICD-10-CM

## 2020-01-14 DIAGNOSIS — J45.20 MILD INTERMITTENT ASTHMA WITHOUT COMPLICATION: ICD-10-CM

## 2020-01-14 LAB
ALBUMIN SERPL BCP-MCNC: 3.9 G/DL (ref 3.5–5)
ALP SERPL-CCNC: 56 U/L (ref 46–116)
ALT SERPL W P-5'-P-CCNC: 23 U/L (ref 12–78)
ANION GAP SERPL CALCULATED.3IONS-SCNC: 6 MMOL/L (ref 4–13)
AST SERPL W P-5'-P-CCNC: 10 U/L (ref 5–45)
BASOPHILS # BLD AUTO: 0.05 THOUSANDS/ΜL (ref 0–0.1)
BASOPHILS NFR BLD AUTO: 1 % (ref 0–1)
BILIRUB SERPL-MCNC: 0.46 MG/DL (ref 0.2–1)
BUN SERPL-MCNC: 10 MG/DL (ref 5–25)
CALCIUM SERPL-MCNC: 9.3 MG/DL (ref 8.3–10.1)
CHLORIDE SERPL-SCNC: 108 MMOL/L (ref 100–108)
CHOLEST SERPL-MCNC: 186 MG/DL (ref 50–200)
CO2 SERPL-SCNC: 28 MMOL/L (ref 21–32)
CREAT SERPL-MCNC: 0.7 MG/DL (ref 0.6–1.3)
EOSINOPHIL # BLD AUTO: 0.08 THOUSAND/ΜL (ref 0–0.61)
EOSINOPHIL NFR BLD AUTO: 1 % (ref 0–6)
ERYTHROCYTE [DISTWIDTH] IN BLOOD BY AUTOMATED COUNT: 12.9 % (ref 11.6–15.1)
EST. AVERAGE GLUCOSE BLD GHB EST-MCNC: 171 MG/DL
GFR SERPL CREATININE-BSD FRML MDRD: 96 ML/MIN/1.73SQ M
GLUCOSE P FAST SERPL-MCNC: 188 MG/DL (ref 65–99)
HBA1C MFR BLD: 7.6 % (ref 4.2–6.3)
HCT VFR BLD AUTO: 44.5 % (ref 34.8–46.1)
HDLC SERPL-MCNC: 47 MG/DL
HGB BLD-MCNC: 14.4 G/DL (ref 11.5–15.4)
IMM GRANULOCYTES # BLD AUTO: 0.02 THOUSAND/UL (ref 0–0.2)
IMM GRANULOCYTES NFR BLD AUTO: 0 % (ref 0–2)
LDLC SERPL CALC-MCNC: 90 MG/DL (ref 0–100)
LYMPHOCYTES # BLD AUTO: 1.6 THOUSANDS/ΜL (ref 0.6–4.47)
LYMPHOCYTES NFR BLD AUTO: 28 % (ref 14–44)
MCH RBC QN AUTO: 32.4 PG (ref 26.8–34.3)
MCHC RBC AUTO-ENTMCNC: 32.4 G/DL (ref 31.4–37.4)
MCV RBC AUTO: 100 FL (ref 82–98)
MONOCYTES # BLD AUTO: 0.38 THOUSAND/ΜL (ref 0.17–1.22)
MONOCYTES NFR BLD AUTO: 7 % (ref 4–12)
NEUTROPHILS # BLD AUTO: 3.65 THOUSANDS/ΜL (ref 1.85–7.62)
NEUTS SEG NFR BLD AUTO: 63 % (ref 43–75)
NRBC BLD AUTO-RTO: 0 /100 WBCS
PLATELET # BLD AUTO: 351 THOUSANDS/UL (ref 149–390)
PMV BLD AUTO: 10.1 FL (ref 8.9–12.7)
POTASSIUM SERPL-SCNC: 3.9 MMOL/L (ref 3.5–5.3)
PROT SERPL-MCNC: 8.3 G/DL (ref 6.4–8.2)
RBC # BLD AUTO: 4.44 MILLION/UL (ref 3.81–5.12)
SODIUM SERPL-SCNC: 142 MMOL/L (ref 136–145)
TRIGL SERPL-MCNC: 247 MG/DL
WBC # BLD AUTO: 5.78 THOUSAND/UL (ref 4.31–10.16)

## 2020-01-14 PROCEDURE — 83036 HEMOGLOBIN GLYCOSYLATED A1C: CPT

## 2020-01-14 PROCEDURE — 87536 HIV-1 QUANT&REVRSE TRNSCRPJ: CPT

## 2020-01-14 PROCEDURE — 3051F HG A1C>EQUAL 7.0%<8.0%: CPT | Performed by: INTERNAL MEDICINE

## 2020-01-14 PROCEDURE — 36415 COLL VENOUS BLD VENIPUNCTURE: CPT

## 2020-01-14 PROCEDURE — 80053 COMPREHEN METABOLIC PANEL: CPT

## 2020-01-14 PROCEDURE — 99214 OFFICE O/P EST MOD 30 MIN: CPT | Performed by: NURSE PRACTITIONER

## 2020-01-14 PROCEDURE — 85025 COMPLETE CBC W/AUTO DIFF WBC: CPT

## 2020-01-14 PROCEDURE — 86361 T CELL ABSOLUTE COUNT: CPT

## 2020-01-14 PROCEDURE — 80061 LIPID PANEL: CPT

## 2020-01-14 RX ORDER — CROMOLYN SODIUM 5.2 MG
1 AEROSOL, SPRAY WITH PUMP (ML) NASAL 3 TIMES DAILY
Qty: 1 ACT | Refills: 2 | Status: SHIPPED | OUTPATIENT
Start: 2020-01-14 | End: 2020-04-07 | Stop reason: SDUPTHER
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
## 2020-01-15 LAB
BASOPHILS # BLD AUTO: 0 X10E3/UL (ref 0–0.2)
BASOPHILS NFR BLD AUTO: 0 %
CD3+CD4+ CELLS # BLD: 350 /UL (ref 359–1519)
CD3+CD4+ CELLS NFR BLD: 20.6 % (ref 30.8–58.5)
EOSINOPHIL # BLD AUTO: 0.1 X10E3/UL (ref 0–0.4)
EOSINOPHIL NFR BLD AUTO: 2 %
ERYTHROCYTE [DISTWIDTH] IN BLOOD BY AUTOMATED COUNT: 13.4 % (ref 11.7–15.4)
HCT VFR BLD AUTO: 43.5 % (ref 34–46.6)
HGB BLD-MCNC: 14.4 G/DL (ref 11.1–15.9)
IMM GRANULOCYTES # BLD: 0 X10E3/UL (ref 0–0.1)
IMM GRANULOCYTES NFR BLD: 0 %
LYMPHOCYTES # BLD AUTO: 1.7 X10E3/UL (ref 0.7–3.1)
LYMPHOCYTES NFR BLD AUTO: 29 %
MCH RBC QN AUTO: 32.6 PG (ref 26.6–33)
MCHC RBC AUTO-ENTMCNC: 33.1 G/DL (ref 31.5–35.7)
MCV RBC AUTO: 98 FL (ref 79–97)
MONOCYTES # BLD AUTO: 0.4 X10E3/UL (ref 0.1–0.9)
MONOCYTES NFR BLD AUTO: 7 %
NEUTROPHILS # BLD AUTO: 3.5 X10E3/UL (ref 1.4–7)
NEUTROPHILS NFR BLD AUTO: 62 %
PLATELET # BLD AUTO: 397 X10E3/UL (ref 150–450)
RBC # BLD AUTO: 4.42 X10E6/UL (ref 3.77–5.28)
WBC # BLD AUTO: 5.7 X10E3/UL (ref 3.4–10.8)

## 2020-01-15 PROCEDURE — G0008 ADMIN INFLUENZA VIRUS VAC: HCPCS

## 2020-01-15 PROCEDURE — 90682 RIV4 VACC RECOMBINANT DNA IM: CPT

## 2020-01-16 DIAGNOSIS — E55.9 VITAMIN D DEFICIENCY: ICD-10-CM

## 2020-01-16 DIAGNOSIS — M85.88 OSTEOPENIA OF LUMBAR SPINE: Primary | ICD-10-CM

## 2020-01-16 LAB
HIV1 RNA # SERPL NAA+PROBE: <20 COPIES/ML
HIV1 RNA SERPL NAA+PROBE-LOG#: NORMAL LOG10COPY/ML

## 2020-01-16 RX ORDER — MELATONIN
2000 DAILY
Qty: 180 TABLET | Refills: 1 | Status: SHIPPED | OUTPATIENT
Start: 2020-01-16 | End: 2020-05-28

## 2020-01-24 ENCOUNTER — PATIENT OUTREACH (OUTPATIENT)
Dept: SURGERY | Facility: CLINIC | Age: 60
End: 2020-01-24

## 2020-01-24 ENCOUNTER — OFFICE VISIT (OUTPATIENT)
Dept: SURGERY | Facility: CLINIC | Age: 60
End: 2020-01-24
Payer: COMMERCIAL

## 2020-01-24 VITALS
HEIGHT: 65 IN | HEART RATE: 89 BPM | SYSTOLIC BLOOD PRESSURE: 118 MMHG | TEMPERATURE: 98.4 F | OXYGEN SATURATION: 99 % | WEIGHT: 168 LBS | BODY MASS INDEX: 27.99 KG/M2 | DIASTOLIC BLOOD PRESSURE: 60 MMHG

## 2020-01-24 DIAGNOSIS — T65.221D TOXIC EFFECT OF TOBACCO CIGARETTE, UNINTENTIONAL, SUBSEQUENT ENCOUNTER: ICD-10-CM

## 2020-01-24 DIAGNOSIS — R31.29 MICROSCOPIC HEMATURIA: ICD-10-CM

## 2020-01-24 DIAGNOSIS — L02.31 ABSCESS OF BUTTOCK, LEFT: ICD-10-CM

## 2020-01-24 DIAGNOSIS — B20 HIV DISEASE (HCC): Primary | ICD-10-CM

## 2020-01-24 DIAGNOSIS — E11.628 TYPE 2 DIABETES MELLITUS WITH OTHER SKIN COMPLICATION, WITHOUT LONG-TERM CURRENT USE OF INSULIN (HCC): ICD-10-CM

## 2020-01-24 PROCEDURE — 99215 OFFICE O/P EST HI 40 MIN: CPT | Performed by: INTERNAL MEDICINE

## 2020-01-24 NOTE — PROGRESS NOTES
Ct came in today to complete recert  Ct states that she has not been adherent to her medication while she was on vacation  Ct states that she was taking them every other day to make the pills last  Cm is not sexually active  Cm went over medication assessment and risk reduction  Ct sees Dr Caty Weir and Evelin Fiore for PCP and ID care  Ct has [employer] insurance  Ct states housing is stable  Ct has no history of SA but is currently active with    Ct to bring the [place] [employer] [facility] award letter as proof of income

## 2020-01-24 NOTE — PROGRESS NOTES
Nutrition follow up (from 6/11/19)    RD met Meredith during morning ID clinic to introduce self and address any of her nutritional questions/concerns  [place]  Lizzy Gibson is very interested in attending cooking classes, however in the winter she is unable to get to class d/t transportation limitations  RD offered mailing her the recipes in Syriac from class, and she accepted  Provided her with two recipes from previous cooking classes (winter orange salad, and vegetable stir youssef)  Meredith admitted that she does not like many vegetables, RD suggested using various flavorings to accommodate (I e  Lenka, garlic, herbs)  Pt is aware of elevated A1C  Reviewed importance of keeping portions of starches/CHO (I e  Rice and pasta) in moderation, and eating small portions of food regularly throughout the day  Meredith drinks Coke Zero, water, and dilutes any juice that she has  Meredith described some traditional cultural meals that she eats, which are seemingly high in fat and CHO  She appears aware of what she needs to do, but lacks some motivation  Per prior nutrition notes, she has attended diabetes classes  Wt today: 168#, stable from last nutrition encounter   A1C 7 8   (1/14/20)    Continue to offer support, follow A1C, wt trend, lipid panel, diet intake  Mail recipes to Lizzy Mccloud, MS, RDN, LDN, [facility]

## 2020-01-24 NOTE — PROGRESS NOTES
Progress Note - Infectious Disease   Elian Hair 63 y o  female MRN: [id number]  Unit/Bed#:  Encounter: [id number]      Impression/Plan:    1  HIV-doing well on Genvoya with a near undetectable viral load but with a CD4 count that  has drifted into the 350  Continue antiretrovirals    Will recheck labs in 2 months and follow up in 3 months  Stressed adherence        2  Nicotine dependence-continue intervention by our tobacco cessation program    Patient is now using nicotine patches  Encourage complete tobacco cessation  Patient is now down to 3 cigarettes per day  Will continue to stressed this intervention and try to get complete tobacco cessation     3   Diabetes mellitus-suboptimal control, Hba1c 7 6%,  Patient is aggressively being treated with metformin, dietary interventions, and education    May need to add insulin as per the primary      4    Buttock abscess-no recurrence after having recurrent problems in the past   Will continue monitor      5   Microhematuria-seen by Urology who did not recommend any additional workup for now  Will continue to monitor the UA annually     Patient was provided medication, adherence and prevention education      Patient was provided medication, adherence and prevention education    Subjective:  Routine follow-up for HIV  Patient claims 100% adherence with     Patient denies any notable side effects  Overall the feeling well  The patient denies any fever chills or sweats, denies any nausea vomiting or diarrhea, denies any cough or shortness of breath  ROS: A complete 12 point ROS is negative other than that noted in the HPI    Followup portions patient history reviewed and updated as:   Allergies, current medications, past medical history, past social history, past surgical history, and the problem list    Objective:  Vitals:  Vitals:    01/24/20 0743   BP: 118/60   BP Location: Right arm   Patient Position: Sitting   Cuff Size: Standard   Pulse: 89   Temp: 98 4 °F (36 9 °C)   SpO2: 99%   Weight: 76 2 kg (168 lb)   Height: 5' 5" (1 651 m)       Physical Exam:   General Appearance:  Alert, interactive, appearing well,  nontoxic, no acute distress  Neck:   Supple without lymphadenopathy, no thyromegaly or masses   Throat: Oropharynx moist without lesions  Lungs:   Clear to auscultation bilaterally; no wheezes, rhonchi or rales; respirations unlabored   Heart:  RRR; no murmur, rub or gallop   Abdomen:   Soft, non-tender, non-distended, positive bowel sounds  Extremities: No clubbing, cyanosis or edema   Skin: No new rashes or lesions  No draining wounds noted  Labs, Imaging, & Other studies:   All pertinent labs and imaging studies were personally reviewed    Lab Results   Component Value Date    K 3 9 01/14/2020     01/14/2020    CO2 28 01/14/2020    BUN 10 01/14/2020    CREATININE 0 70 01/14/2020    GLUF 188 (H) 01/14/2020    CALCIUM 9 3 01/14/2020    AST 10 01/14/2020    ALT 23 01/14/2020    ALKPHOS 56 01/14/2020    EGFR 96 01/14/2020     Lab Results   Component Value Date    WBC 5 78 01/14/2020    WBC 5 7 01/14/2020    HGB 14 4 01/14/2020    HGB 14 4 01/14/2020    HCT 44 5 01/14/2020    HCT 43 5 01/14/2020     (H) 01/14/2020    MCV 98 (H) 01/14/2020     01/14/2020     01/14/2020     Lab Results   Component Value Date    HEPCAB Non-reactive 05/31/2019     Lab Results   Component Value Date    HAV Reactive (A) 11/10/2017    HEPCAB Non-reactive 05/31/2019     Lab Results   Component Value Date    RPR Non-Reactive 05/31/2019     CD4 ABS   Date/Time Value Ref Range Status   01/14/2020 10:58  (L) 359 - 1519 /uL Final     HIV-1 RNA by PCR, Qn   Date/Time Value Ref Range Status   01/14/2020 10:58 AM <20 copies/mL Final     Comment:     HIV-1 RNA detected  The reportable range for this assay is 20 to 10,000,000  copies HIV-1 RNA/mL             Current Outpatient Medications:     albuterol (PROVENTIL HFA) 90 mcg/act inhaler, Inhale 2 puffs every 6 (six) hours as needed for wheezing, Disp: 90 Inhaler, Rfl: 2    ammonium lactate (LAC-HYDRIN) 12 % lotion, , Disp: , Rfl:     ASPIR-LOW 81 MG EC tablet, TAKE ONE TABLET BY MOUTH ONCE DAILY, Disp: 90 tablet, Rfl: 4    BANOPHEN 50 MG capsule, TAKE 1 CAPSULE BY MOUTH DAILY AT BEDTIME AS NEEDED FOR SLEEP (Patient not taking: Reported on 1/14/2020), Disp: 30 capsule, Rfl: 1    bisacodyl (DULCOLAX) 5 mg EC tablet, Take 30 mg by mouth, Disp: , Rfl:     Blood Glucose Monitoring Suppl (TRUE METRIX METER) GIL, daily  , Disp: , Rfl:     Blood Glucose Monitoring Suppl GIL, daily  , Disp: , Rfl:     budesonide-formoterol (SYMBICORT) 160-4 5 mcg/act inhaler, Inhale 2 puffs, Disp: , Rfl:     busPIRone (BUSPAR) 5 mg tablet, Take 5 mg by mouth 2 (two) times a day, Disp: , Rfl:     calcium carbonate-vitamin D (OSCAL-D) 500 mg-200 units per tablet, Take 2 tablets by mouth daily with breakfast, Disp: 180 tablet, Rfl: 1    cholecalciferol (VITAMIN D3) 1,000 units tablet, Take 2 tablets (2,000 Units total) by mouth daily, Disp: 180 tablet, Rfl: 1    clotrimazole (LOTRIMIN) 1 % cream, Apply topically 2 (two) times a day, Disp: , Rfl:     cromolyn (NASALCHROM) 5 2 MG/ACT nasal spray, 1 spray into each nostril 3 (three) times a day, Disp: 1 Act, Rfl: 2    elvitegravir-cobicistat-emtricitabine-tenofovir alafenamide (GENVOYA) tablet, Take 1 tablet by mouth daily, Disp: 30 tablet, Rfl: 4    FLUoxetine (PROzac) 20 mg capsule, , Disp: , Rfl:     gabapentin (NEURONTIN) 800 mg tablet, TAKE ONE TABLET BY MOUTH THREE TIMES A DAY, Disp: 270 tablet, Rfl: 7    glucose blood (FREESTYLE LITE) test strip, Use as instructed, Disp: 100 each, Rfl: 5    glucose blood test strip, 1 each by Other route 4 (four) times a day, Disp: 100 each, Rfl: 0    lidocaine (LIDODERM) 5 %, Place 1 patch on the skin daily Remove & Discard patch within 12 hours or as directed by MD (Patient not taking: Reported on 1/14/2020), Disp: 30 patch, Rfl: 0    lidocaine (LMX) 4 % cream, Apply topically as needed for mild pain (Patient not taking: Reported on 1/14/2020), Disp: 30 g, Rfl: 0    loratadine (CLARITIN) 10 mg tablet, Take 1 tablet (10 mg total) by mouth daily (Patient not taking: Reported on 1/14/2020), Disp: 90 tablet, Rfl: 1    Melatonin 5 MG TABS, Take 1 tablet by mouth at bedtime daily (Patient not taking: Reported on 1/14/2020), Disp: 90 tablet, Rfl: 3    metFORMIN (GLUCOPHAGE) 500 mg tablet, Take 1 tablet (500 mg total) by mouth 2 (two) times a day with meals, Disp: 180 tablet, Rfl: 1    ofloxacin (OCUFLOX) 0 3 % ophthalmic solution, Administer 2 drops to both eyes 4 (four) times a day For 5 days   (Patient not taking: Reported on 1/14/2020), Disp: 5 mL, Rfl: 0    omega-3-acid ethyl esters (LOVAZA) 1 g capsule, Take 2 capsules (2 g total) by mouth 2 (two) times a day, Disp: 180 capsule, Rfl: 1    polyethylene glycol (GLYCOLAX) powder, Drink per MD recommendations, Disp: , Rfl:     pravastatin (PRAVACHOL) 80 mg tablet, Take 1 tablet (80 mg total) by mouth daily, Disp: 90 tablet, Rfl: 1    sulfaSALAzine (AZULFIDINE-EN) 500 mg EC tablet, , Disp: , Rfl:     SUPER THIN LANCETS MISC, by Percutaneous route 4 (four) times a day, Disp: 100 each, Rfl: 6    traZODone (DESYREL) 50 mg tablet, , Disp: , Rfl: 0

## 2020-01-27 ENCOUNTER — PATIENT OUTREACH (OUTPATIENT)
Dept: SURGERY | Facility: CLINIC | Age: 60
End: 2020-01-27

## 2020-01-31 ENCOUNTER — TELEPHONE (OUTPATIENT)
Dept: SURGERY | Facility: CLINIC | Age: 60
End: 2020-01-31

## 2020-01-31 NOTE — TELEPHONE ENCOUNTER
Mailed [employer] from cooking class, per her request, since she is unable to attend classes  [place]ipes [place]        Caralyn Riedel, MS, RDN, LDN, [facility]

## 2020-02-28 ENCOUNTER — TELEPHONE (OUTPATIENT)
Dept: SURGERY | Facility: CLINIC | Age: 60
End: 2020-02-28

## 2020-02-28 NOTE — TELEPHONE ENCOUNTER
Mailed [employer] from February cooking classes, per her request, since she is unable to attend classes  Recipes in [place]        Chikis Mendez MS, RD, LDN, [facility]

## 2020-03-09 ENCOUNTER — PATIENT OUTREACH (OUTPATIENT)
Dept: SURGERY | Facility: CLINIC | Age: 60
End: 2020-03-09

## 2020-03-10 DIAGNOSIS — E78.2 ELEVATED TRIGLYCERIDES WITH HIGH CHOLESTEROL: ICD-10-CM

## 2020-03-10 RX ORDER — OMEGA-3-ACID ETHYL ESTERS 1 G/1
CAPSULE, LIQUID FILLED ORAL
Qty: 180 CAPSULE | Refills: 1 | Status: SHIPPED | OUTPATIENT
Start: 2020-03-10 | End: 2020-04-21 | Stop reason: SDUPTHER

## 2020-03-11 ENCOUNTER — APPOINTMENT (OUTPATIENT)
Dept: LAB | Facility: HOSPITAL | Age: 60
End: 2020-03-11
Attending: INTERNAL MEDICINE
Payer: COMMERCIAL

## 2020-03-11 DIAGNOSIS — B20 HIV DISEASE (HCC): ICD-10-CM

## 2020-03-11 DIAGNOSIS — J45.20 MILD INTERMITTENT ASTHMA WITHOUT COMPLICATION: ICD-10-CM

## 2020-03-11 LAB
ALBUMIN SERPL BCP-MCNC: 3.7 G/DL (ref 3.5–5)
ALP SERPL-CCNC: 44 U/L (ref 46–116)
ALT SERPL W P-5'-P-CCNC: 22 U/L (ref 12–78)
ANION GAP SERPL CALCULATED.3IONS-SCNC: 10 MMOL/L (ref 4–13)
AST SERPL W P-5'-P-CCNC: 11 U/L (ref 5–45)
BASOPHILS # BLD AUTO: 0.04 THOUSANDS/ΜL (ref 0–0.1)
BASOPHILS NFR BLD AUTO: 1 % (ref 0–1)
BILIRUB SERPL-MCNC: 0.31 MG/DL (ref 0.2–1)
BUN SERPL-MCNC: 14 MG/DL (ref 5–25)
CALCIUM SERPL-MCNC: 9.1 MG/DL (ref 8.3–10.1)
CHLORIDE SERPL-SCNC: 102 MMOL/L (ref 100–108)
CO2 SERPL-SCNC: 26 MMOL/L (ref 21–32)
CREAT SERPL-MCNC: 0.7 MG/DL (ref 0.6–1.3)
EOSINOPHIL # BLD AUTO: 0.08 THOUSAND/ΜL (ref 0–0.61)
EOSINOPHIL NFR BLD AUTO: 1 % (ref 0–6)
ERYTHROCYTE [DISTWIDTH] IN BLOOD BY AUTOMATED COUNT: 13.2 % (ref 11.6–15.1)
GFR SERPL CREATININE-BSD FRML MDRD: 96 ML/MIN/1.73SQ M
GLUCOSE P FAST SERPL-MCNC: 168 MG/DL (ref 65–99)
HCT VFR BLD AUTO: 43.9 % (ref 34.8–46.1)
HGB BLD-MCNC: 13.8 G/DL (ref 11.5–15.4)
IMM GRANULOCYTES # BLD AUTO: 0.01 THOUSAND/UL (ref 0–0.2)
IMM GRANULOCYTES NFR BLD AUTO: 0 % (ref 0–2)
LYMPHOCYTES # BLD AUTO: 1.53 THOUSANDS/ΜL (ref 0.6–4.47)
LYMPHOCYTES NFR BLD AUTO: 24 % (ref 14–44)
MCH RBC QN AUTO: 32.3 PG (ref 26.8–34.3)
MCHC RBC AUTO-ENTMCNC: 31.4 G/DL (ref 31.4–37.4)
MCV RBC AUTO: 103 FL (ref 82–98)
MONOCYTES # BLD AUTO: 0.5 THOUSAND/ΜL (ref 0.17–1.22)
MONOCYTES NFR BLD AUTO: 8 % (ref 4–12)
NEUTROPHILS # BLD AUTO: 4.11 THOUSANDS/ΜL (ref 1.85–7.62)
NEUTS SEG NFR BLD AUTO: 66 % (ref 43–75)
NRBC BLD AUTO-RTO: 0 /100 WBCS
PLATELET # BLD AUTO: 285 THOUSANDS/UL (ref 149–390)
PMV BLD AUTO: 9.6 FL (ref 8.9–12.7)
POTASSIUM SERPL-SCNC: 4.2 MMOL/L (ref 3.5–5.3)
PROT SERPL-MCNC: 8 G/DL (ref 6.4–8.2)
RBC # BLD AUTO: 4.27 MILLION/UL (ref 3.81–5.12)
SODIUM SERPL-SCNC: 138 MMOL/L (ref 136–145)
WBC # BLD AUTO: 6.27 THOUSAND/UL (ref 4.31–10.16)

## 2020-03-11 PROCEDURE — 36415 COLL VENOUS BLD VENIPUNCTURE: CPT

## 2020-03-11 PROCEDURE — 85025 COMPLETE CBC W/AUTO DIFF WBC: CPT

## 2020-03-11 PROCEDURE — 86361 T CELL ABSOLUTE COUNT: CPT

## 2020-03-11 PROCEDURE — 80053 COMPREHEN METABOLIC PANEL: CPT

## 2020-03-11 PROCEDURE — 87536 HIV-1 QUANT&REVRSE TRNSCRPJ: CPT

## 2020-03-11 RX ORDER — ALBUTEROL SULFATE 90 UG/1
2 AEROSOL, METERED RESPIRATORY (INHALATION) EVERY 6 HOURS PRN
Qty: 90 INHALER | Refills: 2 | Status: SHIPPED | OUTPATIENT
Start: 2020-03-11 | End: 2020-05-12 | Stop reason: SDUPTHER

## 2020-03-12 LAB
BASOPHILS # BLD AUTO: 0 X10E3/UL (ref 0–0.2)
BASOPHILS NFR BLD AUTO: 1 %
CD3+CD4+ CELLS # BLD: 345 /UL (ref 359–1519)
CD3+CD4+ CELLS NFR BLD: 20.3 % (ref 30.8–58.5)
EOSINOPHIL # BLD AUTO: 0.1 X10E3/UL (ref 0–0.4)
EOSINOPHIL NFR BLD AUTO: 1 %
ERYTHROCYTE [DISTWIDTH] IN BLOOD BY AUTOMATED COUNT: 13.7 % (ref 11.7–15.4)
HCT VFR BLD AUTO: 42.8 % (ref 34–46.6)
HGB BLD-MCNC: 14.2 G/DL (ref 11.1–15.9)
IMM GRANULOCYTES # BLD: 0 X10E3/UL (ref 0–0.1)
IMM GRANULOCYTES NFR BLD: 0 %
LYMPHOCYTES # BLD AUTO: 1.7 X10E3/UL (ref 0.7–3.1)
LYMPHOCYTES NFR BLD AUTO: 27 %
MCH RBC QN AUTO: 31.9 PG (ref 26.6–33)
MCHC RBC AUTO-ENTMCNC: 33.2 G/DL (ref 31.5–35.7)
MCV RBC AUTO: 96 FL (ref 79–97)
MONOCYTES # BLD AUTO: 0.5 X10E3/UL (ref 0.1–0.9)
MONOCYTES NFR BLD AUTO: 7 %
NEUTROPHILS # BLD AUTO: 4.1 X10E3/UL (ref 1.4–7)
NEUTROPHILS NFR BLD AUTO: 64 %
PLATELET # BLD AUTO: 319 X10E3/UL (ref 150–450)
RBC # BLD AUTO: 4.45 X10E6/UL (ref 3.77–5.28)
WBC # BLD AUTO: 6.4 X10E3/UL (ref 3.4–10.8)

## 2020-03-16 LAB
HIV1 RNA # SERPL NAA+PROBE: <20 COPIES/ML
HIV1 RNA SERPL NAA+PROBE-LOG#: NORMAL LOG10COPY/ML

## 2020-03-17 NOTE — PROGRESS NOTES
Assessment/Plan:    AIDS ([facility] )    Cd4:    CD4 T Cell Abs   Date/Time Value Ref Range Status   2018 08:40  (L) 359 - 1519 /uL Final      Viral load: <20   ART: Domenica Garrido  Denies missing any doses  Denies side effects  Stressed the importance of adherence  Continue follow up with ID clinic  Reviewed most recent labs, including Cd4 and viral load  Discussed the risks and benefits of treatment options, instructions for management, importance of treatment adherence, and reduction of risk factor  Educated on possible  medication side effects  Counseled on routes of HIV transmission, including the risk of  infection  Emphasized that viral suppression is the best method to prevent HIV transmission  At this time pt denies the need for HIV testing of anyone in their life  Total encounter time was 45 minutes  Greater then 20 minutes were spent on counseling and patient education  Pt voices understanding and agreement with treatment plan  Type 2 diabetes mellitus without complication, without long-term current use of insulin ([facility] )  Lab Results   Component Value Date    HGBA1C 6 3 11/10/2017       Continue Metformin  Repeat HgbA1C with next labs  BGS test strips reordered today  Acute pain of left shoulder  Injury occurred after fall one month ago  Currently undergoing orthopedic treatment for left knee injury that was caused by same fall  Exam + for tenderness with palpitation and edema  No redness or bruising present  Self limiting ROM secondary to pain   strength symmetrical in upper extremities  Xray ordered and will refer to orthopedics  Advised acetaminophen TID with ibuprofen for breakthrough pain  Educated to use ice or heat for comfort  Refer to orthopedics for further evaluation  Primary insomnia  Advised to use benadryl to treat insomnia  Scheduled for  treatment intake per pt  report   Lavinia Rdz states she was on medication previously in [facility] [place] to treat her 17-Mar-2020 insomnia but can not recall the name of the medication  Educated to discuss insomnia with psychiatrist at next appointment  Diagnoses and all orders for this visit:    Type 2 diabetes mellitus without complication, with long-term current use of insulin (HCC)  -     glucose blood test strip; 1 each by Other route 4 (four) times a day  -     HEMOGLOBIN A1C W/ EAG ESTIMATION; Future    Left shoulder pain, unspecified chronicity  -     acetaminophen (TYLENOL) 500 mg tablet; Take 2 tablets (1,000 mg total) by mouth every 8 (eight) hours  -     ibuprofen (MOTRIN) 800 mg tablet; Take 1 tablet (800 mg total) by mouth every 8 (eight) hours  -     XR shoulder 2+ vw left; Future  -     Ambulatory referral to Orthopedic Surgery; Future    Insomnia, unspecified type  -     diphenhydrAMINE (BENADRYL) 50 mg capsule; Take 1 capsule (50 mg total) by mouth daily at bedtime as needed for sleep    Acute pain of left shoulder    Primary insomnia          Subjective:      Patient ID: Radha Trinh is a 65 y o  female  Alejandra Hunt is here today for acute visit for left shoulder pain after fall one month ago  At that time she also injured her knee and had an MRI which revealed no acute fx, but did show moderate bone contusion,peripheral medial patellar facet and strain and partial tear of the patellar attachment of the medial patellofemoral ligament  Evaluated by orthoopedics for knee injury and currently wearing a supportive brace on L knee  Meredith states her L shoulder is now bothering her  C/O pain with movement and at rest, swelling, and decreased ROM  States pain started two weeks ago  Reports pain is slightly better with ibuprofen, but still rates it on a scale of 5 on 1-10 scale  Meredith also requests refill of her BGS strips and c/o insomnia           The following portions of the patient's history were reviewed and updated as appropriate: allergies, current medications, past family history, past medical history, past social history, past surgical history and problem list     Review of Systems   Constitutional: Negative for activity change, appetite change, chills, diaphoresis, fatigue, fever and unexpected weight change  HENT: Negative for congestion, dental problem, ear pain, hearing loss, mouth sores, rhinorrhea and sore throat  Eyes: Negative for pain, redness and visual disturbance  Respiratory: Negative for shortness of breath and wheezing  Cardiovascular: Negative for chest pain and leg swelling  Gastrointestinal: Negative for abdominal pain, constipation, diarrhea, nausea and vomiting  Endocrine: Negative for polydipsia, polyphagia and polyuria  Genitourinary: Negative for difficulty urinating and dysuria  Musculoskeletal: Positive for joint swelling and myalgias  Negative for back pain  Pain in L shoulder   Skin: Negative for rash  Neurological: Negative for syncope and headaches  Psychiatric/Behavioral: Negative for behavioral problems and suicidal ideas  Insomnia         Objective:      /68 (BP Location: Left arm, Patient Position: Sitting, Cuff Size: Standard)   Pulse 94   Temp 98 °F (36 7 °C)   Ht 5' 5" (1 651 m)   Wt 78 7 kg (173 lb 6 4 oz)   SpO2 99%   BMI 28 86 kg/m²          Physical Exam   Constitutional: She is oriented to person, place, and time  She appears well-developed and well-nourished  No distress  HENT:   Head: Normocephalic and atraumatic  Right Ear: External ear normal    Left Ear: External ear normal    Nose: Nose normal    Mouth/Throat: Oropharynx is clear and moist  No oropharyngeal exudate  Eyes: Conjunctivae are normal  Pupils are equal, round, and reactive to light  Right eye exhibits no discharge  Left eye exhibits no discharge  Neck: Normal range of motion  No thyromegaly present  Cardiovascular: Normal rate, regular rhythm, normal heart sounds and intact distal pulses  No murmur heard    Pulmonary/Chest: Effort normal and breath sounds normal  She has no wheezes  Abdominal: Soft  Bowel sounds are normal  She exhibits no mass  There is no tenderness  Musculoskeletal: She exhibits edema and tenderness  She exhibits no deformity  Left shoulder: She exhibits decreased range of motion, tenderness, swelling and pain  She exhibits no crepitus, no deformity and normal strength  Lymphadenopathy:     She has no cervical adenopathy  Neurological: She is alert and oriented to person, place, and time  Skin: Skin is warm and dry  No rash noted  Psychiatric: She has a normal mood and affect   Her behavior is normal

## 2020-03-25 ENCOUNTER — TELEPHONE (OUTPATIENT)
Dept: SURGERY | Facility: CLINIC | Age: 60
End: 2020-03-25

## 2020-03-25 NOTE — TELEPHONE ENCOUNTER
PN spoke with patient to see how patient is doing and educated Meredith about the importance of hand washing, and keeping away from anyone sick due to [id number]  Patient states she is being very careful and she only goes out when needs to buy something  Patient stated she does not have hand  and PN educated patient to wash hands with soap and water every time she gets home and not to touch face, mouth, nose with hands when she is out

## 2020-03-31 DIAGNOSIS — E11.628 TYPE 2 DIABETES MELLITUS WITH OTHER SKIN COMPLICATION, WITHOUT LONG-TERM CURRENT USE OF INSULIN (HCC): ICD-10-CM

## 2020-03-31 DIAGNOSIS — E78.2 ELEVATED TRIGLYCERIDES WITH HIGH CHOLESTEROL: ICD-10-CM

## 2020-03-31 RX ORDER — ASPIRIN 81 MG/1
81 TABLET ORAL DAILY
Qty: 90 TABLET | Refills: 4 | Status: SHIPPED | OUTPATIENT
Start: 2020-03-31 | End: 2021-03-30

## 2020-04-07 DIAGNOSIS — R05.9 COUGH: ICD-10-CM

## 2020-04-07 RX ORDER — CROMOLYN SODIUM 5.2 MG
1 AEROSOL, SPRAY WITH PUMP (ML) NASAL 3 TIMES DAILY
Qty: 1 ACT | Refills: 2 | Status: SHIPPED | OUTPATIENT
Start: 2020-04-07 | End: 2020-05-28 | Stop reason: SDUPTHER

## 2020-04-09 ENCOUNTER — TELEMEDICINE (OUTPATIENT)
Dept: SURGERY | Facility: CLINIC | Age: 60
End: 2020-04-09

## 2020-04-09 DIAGNOSIS — B20 HIV INFECTION, UNSPECIFIED SYMPTOM STATUS (HCC): Primary | ICD-10-CM

## 2020-04-09 PROBLEM — E78.00 HIGH CHOLESTEROL: Status: RESOLVED | Noted: 2017-11-17 | Resolved: 2020-04-09

## 2020-04-09 PROCEDURE — NC001 PR NO CHARGE: Performed by: NURSE PRACTITIONER

## 2020-04-09 RX ORDER — EZETIMIBE 10 MG/1
10 TABLET ORAL DAILY
Qty: 90 TABLET | Refills: 1 | Status: CANCELLED | OUTPATIENT
Start: 2020-04-09

## 2020-04-09 NOTE — ASSESSMENT & PLAN NOTE
Not at [address]  Lab Results   Component Value Date    HGBA1C 7 6 (H) 01/14/2020    Patient reports blood sugars running:  · Increase metformin from 500 mg b i d  to a 1000 mg b i d   · Next A1c in 3 months is greater than 7 0 will add glipizide    · Patient was reminded to complete diabetic eye exam  · Defer DM foot exam due coivd-19

## 2020-04-09 NOTE — ASSESSMENT & PLAN NOTE
Continues  Pt continues to smoke   Stressed the importance 100% smoking cessation  Nicotine Dependency - Primary    · Counseled for greater than 15 minutes on the importance of smoking cessation  Education was given regarding the cardiovascular effects of how nicotine affects the heart, lungs, kidneys,and peripheral vascular system    Referred to [facility] for enrollment in smoking cessation program

## 2020-04-09 NOTE — PROGRESS NOTES
Virtual Brief Visit    Problem List Items Addressed This Visit        Endocrine    Type 2 diabetes mellitus with skin complication, without long-term current use of insulin ([facility] )     Not at [address]  Lab Results   Component Value Date    HGBA1C 7 6 (H) 2020    Patient reports blood sugars running:  · Increase metformin from 500 mg b i d  to a 1000 mg b i d   · Next A1c in 3 months is greater than 7 0 will add glipizide  · Patient was reminded to complete diabetic eye exam  · Defer DM foot exam due coivd-19            Other    Toxic effect of tobacco cigarette, unintentional     Continues  Pt continues to smoke   Stressed the importance 100% smoking cessation  Nicotine Dependency - Primary    · Counseled for greater than 15 minutes on the importance of smoking cessation  Education was given regarding the cardiovascular effects of how nicotine affects the heart, lungs, kidneys,and peripheral vascular system  Referred to [facility] for enrollment in smoking cessation program            HIV disease ([facility] ) - Primary     Doing good  Pt reports 100% medication compliance  Stressed the importance of 100% medication adherence  HIV Counseling:    Viral Load: < 20    CD4 Count: 345    ART: Tez Corea    Denies side effects  Stressed the importance of adherence  Continue follow up in the ID clinic with Dr Siddharth Partida  Reviewed the most recent labs, including the Cd4 and viral load  Discussed the risks and benefits of treatment options, instructions for management, importance of treatment adherence, and reduction of risk factors  Educated on possible medication side effects  Counseled on routes of HIV transmission, including the risk of  infection  Emphasized that viral suppression is the best method to prevent HIV transmission  At this time the pt denies the need for HIV testing of anyone in their life  Total encounter time was greater than 35 minutes    Greater than 20 minutes were spent on counseling and patient education  Pt voices understanding and agreement with treatment plan  Elevated triglycerides with high cholesterol     Triglycerides are elevated up from 155  Patient on max dose of pravastatin with okay control cholesterol  LDL at 90 and should be less than 70  · Continue Lovaza 4 g per day  · Have patient work with [facility] dietitian to work on reduce reduction of triglycerides  · Pt would not be a good candidate for zetia due to elevated trig and need to low LDL and ASCVD risk of 10 2%  Pt continues to smoke,  poorly control of DM, and CV risks; pt would be a good candidate for PCSK9     · Discussed starting pt on PCKS-9 inhibitor Repatha 140 mg SC [id number]  · Reviewed side effects with pt   · [facility] dietician to work with pt for nutritional recommendations and dietary guidelines  Other Visit Diagnoses     Screening for malignant neoplasm of breast                    Reason for visit is for 3 month follow up with PCP for management of chronic health conditions  Pt is Thai speaking only and interpretor was present during the entire telephone visit  Encounter provider ESTEFANY Macias    Provider located at [address] [address]    Recent Visits  No visits were found meeting these conditions  Showing recent visits within past 7 days and meeting all other requirements     Today's Visits  Date Type Provider Dept   04/09/20 Telemedicine Bryn Macias [facility] today's visits and meeting all other requirements     Future Appointments  No visits were found meeting these conditions  Showing future appointments within next 150 days and meeting all other requirements        After connecting through {AMB VIRTUAL VISIT HIHYDD:71824}, the patient was identified by name and date of birth   Henri Hilario was informed that this is a telemedicine visit and that the visit is being conducted through {AMB CORONAVIRUS VISIT XYKEJQ:46176}  {Telemedicine confidentiality :53349} {Telemedicine participants:75216}  She acknowledged consent and understanding of privacy and security of the video platform  The patient has agreed to participate and understands they can discontinue the visit at any time  Patient is aware this is a billable service  Estrellita Yo is a 63 y o  female patent is being seen for 3 month follow up with PCP for management of chronic health conditions  Pt reports       Past Medical History:   Diagnosis Date    Acute pain of left shoulder 6/13/2018    Arthritis     Cellulitis     Colon polyp     Diabetes mellitus (HCC)     Fibromyalgia     Fibromyalgia     HIV (human immunodeficiency virus infection) ([facility] )     Hyperlipidemia     Pinguecula of both eyes     Presbyopia of both eyes        Past Surgical History:   Procedure Laterality Date    BREAST BIOPSY Bilateral     more than 12 years ago     BREAST SURGERY      lumpectomy    COLONOSCOPY      last year, polyps, every 6 months    COLONOSCOPY N/A 11/1/2018    Procedure: COLONOSCOPY;  Surgeon: Milka Mcarthur DO;  Location: [facility]; Service: Gastroenterology    HYSTERECTOMY      INCISION AND DRAINAGE PERIRECTAL ABSCESS      OOPHORECTOMY      TUBAL LIGATION         Current Outpatient Medications   Medication Sig Dispense Refill    albuterol (Proventil HFA) 90 mcg/act inhaler Inhale 2 puffs every 6 (six) hours as needed for wheezing 90 Inhaler 2    ammonium lactate (LAC-HYDRIN) 12 % lotion       aspirin (Aspir-Low) 81 mg EC tablet Take 1 tablet (81 mg total) by mouth daily 90 tablet 4    BANOPHEN 50 MG capsule TAKE 1 CAPSULE BY MOUTH DAILY AT BEDTIME AS NEEDED FOR SLEEP (Patient not taking: Reported on 1/14/2020) 30 capsule 1    bisacodyl (DULCOLAX) 5 mg EC tablet Take 30 mg by mouth      Blood Glucose Monitoring Suppl (TRUE METRIX METER) GIL daily        Blood Glucose Monitoring Suppl GIL daily   budesonide-formoterol (SYMBICORT) 160-4 5 mcg/act inhaler Inhale 2 puffs      busPIRone (BUSPAR) 5 mg tablet Take 5 mg by mouth 2 (two) times a day      calcium carbonate-vitamin D (OSCAL-D) 500 mg-200 units per tablet Take 2 tablets by mouth daily with breakfast 180 tablet 1    cholecalciferol (VITAMIN D3) 1,000 units tablet Take 2 tablets (2,000 Units total) by mouth daily 180 tablet 1    clotrimazole (LOTRIMIN) 1 % cream Apply topically 2 (two) times a day      cromolyn (NASALCHROM) 5 2 MG/ACT nasal spray 1 spray into each nostril 3 (three) times a day 1 Act 2    elvitegravir-cobicistat-emtricitabine-tenofovir alafenamide (GENVOYA) tablet Take 1 tablet by mouth daily 30 tablet 4    FLUoxetine (PROzac) 20 mg capsule       gabapentin (NEURONTIN) 800 mg tablet TAKE ONE TABLET BY MOUTH THREE TIMES A  tablet 7    glucose blood (FREESTYLE LITE) test strip Use as instructed 100 each 5    glucose blood test strip 1 each by Other route 4 (four) times a day 100 each 0    lidocaine (LIDODERM) 5 % Place 1 patch on the skin daily Remove & Discard patch within 12 hours or as directed by MD (Patient not taking: Reported on 1/14/2020) 30 patch 0    lidocaine (LMX) 4 % cream Apply topically as needed for mild pain (Patient not taking: Reported on 1/14/2020) 30 g 0    loratadine (CLARITIN) 10 mg tablet Take 1 tablet (10 mg total) by mouth daily (Patient not taking: Reported on 1/14/2020) 90 tablet 1    Melatonin 5 MG TABS Take 1 tablet by mouth at bedtime daily (Patient not taking: Reported on 1/14/2020) 90 tablet 3    ofloxacin (OCUFLOX) 0 3 % ophthalmic solution Administer 2 drops to both eyes 4 (four) times a day For 5 days   (Patient not taking: Reported on 1/14/2020) 5 mL 0    omega-3-acid ethyl esters (LOVAZA) 1 g capsule TAKE TWO CAPSULES BY MOUTH TWICE A  capsule 1    polyethylene glycol (GLYCOLAX) powder Drink per MD recommendations      pravastatin (PRAVACHOL) 80 mg tablet Take 1 tablet (80 mg total) by mouth daily 90 tablet 1    sulfaSALAzine (AZULFIDINE-EN) 500 mg EC tablet       SUPER THIN LANCETS MISC by Percutaneous route 4 (four) times a day 100 each 6    traZODone (DESYREL) 50 mg tablet   0     No current facility-administered medications for this visit  No Known Allergies    Review of Systems    As a result of this visit, I have referred the patient for further respiratory evaluation  {YES/NO:36342}    {covid time spent:87697}      It was my intent to perform this visit via video technology but the patient was not able to do a video connection so the visit was completed via audio telephone only

## 2020-04-09 NOTE — ASSESSMENT & PLAN NOTE
Doing good  Pt reports 100% medication compliance  Stressed the importance of 100% medication adherence  HIV Counseling:    Viral Load: < 20    CD4 Count: 345    ART: Edilma Rodriguez    Denies side effects  Stressed the importance of adherence  Continue follow up in the ID clinic with Dr Idalmis Darby  Reviewed the most recent labs, including the Cd4 and viral load  Discussed the risks and benefits of treatment options, instructions for management, importance of treatment adherence, and reduction of risk factors  Educated on possible medication side effects  Counseled on routes of HIV transmission, including the risk of  infection  Emphasized that viral suppression is the best method to prevent HIV transmission  At this time the pt denies the need for HIV testing of anyone in their life  Total encounter time was greater than 35 minutes  Greater than 20 minutes were spent on counseling and patient education  Pt voices understanding and agreement with treatment plan

## 2020-04-09 NOTE — ASSESSMENT & PLAN NOTE
Triglycerides are elevated up from 155  Patient on max dose of pravastatin with okay control cholesterol  LDL at 90 and should be less than 70  · Continue Lovaza 4 g per day  · Have patient work with [facility] dietitian to work on reduce reduction of triglycerides  · Pt would not be a good candidate for zetia due to elevated trig and need to low LDL and ASCVD risk of 10 2%  Pt continues to smoke,  poorly control of DM, and CV risks; pt would be a good candidate for PCSK9     · Discussed starting pt on PCKS-9 inhibitor Repatha 140 mg SC [id number]  · Reviewed side effects with pt   · [facility] dietician to work with pt for nutritional recommendations and dietary guidelines

## 2020-04-14 ENCOUNTER — TELEPHONE (OUTPATIENT)
Dept: SURGERY | Facility: CLINIC | Age: 60
End: 2020-04-14

## 2020-04-15 ENCOUNTER — PATIENT OUTREACH (OUTPATIENT)
Dept: SURGERY | Facility: CLINIC | Age: 60
End: 2020-04-15

## 2020-04-15 ENCOUNTER — TELEPHONE (OUTPATIENT)
Dept: SURGERY | Facility: CLINIC | Age: 60
End: 2020-04-15

## 2020-04-21 ENCOUNTER — TELEMEDICINE (OUTPATIENT)
Dept: SURGERY | Facility: CLINIC | Age: 60
End: 2020-04-21
Payer: COMMERCIAL

## 2020-04-21 ENCOUNTER — APPOINTMENT (OUTPATIENT)
Dept: LAB | Facility: HOSPITAL | Age: 60
End: 2020-04-21
Payer: COMMERCIAL

## 2020-04-21 ENCOUNTER — OFFICE VISIT (OUTPATIENT)
Dept: SURGERY | Facility: CLINIC | Age: 60
End: 2020-04-21
Payer: COMMERCIAL

## 2020-04-21 ENCOUNTER — PATIENT OUTREACH (OUTPATIENT)
Dept: SURGERY | Facility: CLINIC | Age: 60
End: 2020-04-21

## 2020-04-21 VITALS
SYSTOLIC BLOOD PRESSURE: 120 MMHG | HEART RATE: 86 BPM | TEMPERATURE: 97.9 F | OXYGEN SATURATION: 96 % | DIASTOLIC BLOOD PRESSURE: 70 MMHG | WEIGHT: 164 LBS | BODY MASS INDEX: 27.29 KG/M2

## 2020-04-21 VITALS
BODY MASS INDEX: 27.29 KG/M2 | SYSTOLIC BLOOD PRESSURE: 120 MMHG | HEART RATE: 86 BPM | WEIGHT: 164 LBS | OXYGEN SATURATION: 96 % | DIASTOLIC BLOOD PRESSURE: 70 MMHG | TEMPERATURE: 97.9 F

## 2020-04-21 DIAGNOSIS — L02.31 ABSCESS OF BUTTOCK, LEFT: ICD-10-CM

## 2020-04-21 DIAGNOSIS — K61.2 ABSCESS OF ANAL AND RECTAL REGIONS: ICD-10-CM

## 2020-04-21 DIAGNOSIS — E78.5 DYSLIPIDEMIA: ICD-10-CM

## 2020-04-21 DIAGNOSIS — B20 HIV DISEASE (HCC): ICD-10-CM

## 2020-04-21 DIAGNOSIS — E11.628 TYPE 2 DIABETES MELLITUS WITH OTHER SKIN COMPLICATION, WITHOUT LONG-TERM CURRENT USE OF INSULIN (HCC): Primary | ICD-10-CM

## 2020-04-21 DIAGNOSIS — E78.2 ELEVATED TRIGLYCERIDES WITH HIGH CHOLESTEROL: ICD-10-CM

## 2020-04-21 DIAGNOSIS — T65.221D TOXIC EFFECT OF TOBACCO CIGARETTE, UNINTENTIONAL, SUBSEQUENT ENCOUNTER: ICD-10-CM

## 2020-04-21 DIAGNOSIS — B20 HIV DISEASE (HCC): Primary | ICD-10-CM

## 2020-04-21 DIAGNOSIS — E11.628 TYPE 2 DIABETES MELLITUS WITH OTHER SKIN COMPLICATION, WITHOUT LONG-TERM CURRENT USE OF INSULIN (HCC): ICD-10-CM

## 2020-04-21 PROCEDURE — 3051F HG A1C>EQUAL 7.0%<8.0%: CPT | Performed by: NURSE PRACTITIONER

## 2020-04-21 PROCEDURE — G2012 BRIEF CHECK IN BY MD/QHP: HCPCS | Performed by: NURSE PRACTITIONER

## 2020-04-21 PROCEDURE — 99214 OFFICE O/P EST MOD 30 MIN: CPT | Performed by: NURSE PRACTITIONER

## 2020-04-21 PROCEDURE — 87070 CULTURE OTHR SPECIMN AEROBIC: CPT

## 2020-04-21 PROCEDURE — 87205 SMEAR GRAM STAIN: CPT

## 2020-04-21 PROCEDURE — 4004F PT TOBACCO SCREEN RCVD TLK: CPT | Performed by: NURSE PRACTITIONER

## 2020-04-21 RX ORDER — OMEGA-3-ACID ETHYL ESTERS 1 G/1
2 CAPSULE, LIQUID FILLED ORAL 2 TIMES DAILY
Qty: 180 CAPSULE | Refills: 1 | Status: SHIPPED | OUTPATIENT
Start: 2020-04-21 | End: 2020-08-11 | Stop reason: SDUPTHER

## 2020-04-21 NOTE — ASSESSMENT & PLAN NOTE
Not at goal   Triglycerides are elevated up from 155 to 247  Patient on max dose of pravastatin with okay control cholesterol  Pt reports taking 80 mg daily  Pt cannot take Atorvastatin with Genvoya at high doses due to interaction  LDL at 90 and should be less than 70  · Continue Lovaza 4 g per day  Pt reports only taking Lovaza 2 grams per day  · Have patient work with [facility] dietitian to work on reduce reduction of triglycerides  · Pt would not be a good candidate for zetia due to elevated trig and need to low LDL and ASCVD risk of 10 2%  Pt continues to smoke,  poorly control of DM, and CV risks; pt would be a good candidate for PCSK9     · Discussed starting pt on PCKS-9 inhibitor Repatha 140 mg SC [id number]  Pt was agreeable for PCKS-9  · Reviewed side effects with pt   · [facility] dietician to work with pt for nutritional recommendations and dietary guidelines  · Recheck lipid panel in 3 months since pt has not been taking Lovaza 4 gr daily

## 2020-04-21 NOTE — ASSESSMENT & PLAN NOTE
Continues  Pt continues to smoke more due to being quarantined  Strongly encouraged pt to find hobbies ands to get outside and walk to avoid smoking more  · Stressed the importance 100% smoking cessation  Nicotine Dependency - Primary    Counseled for greater than 15 minutes on the importance of smoking cessation  Education was given regarding the cardiovascular effects of how nicotine affects the heart, lungs, kidneys,and peripheral vascular system    Referred to [facility] for enrollment in smoking cessation program

## 2020-04-21 NOTE — ASSESSMENT & PLAN NOTE
Draining again  Pt reports over the past 2 months her left buttock abscess has been filling and emptying every week  Pt reports  Large blister like wound that drains puss or thin yellow drainage has been coming out of wound and once it opens it stays open and then will scab over  There is pain when wound erupts  Pt is requesting abx  Wound has not been cultured since 2017  Will bring pt into office today to culture wound, obtain pictures and see about need for abx  · Will schedule ct scan to rule of fistula or abscess  · Educated pt on why some HIV pt are susceptible to skin infections

## 2020-04-21 NOTE — PROGRESS NOTES
Virtual Brief Visit    Problem List Items Addressed This Visit        Endocrine    Type 2 diabetes mellitus with skin complication, without long-term current use of insulin ([facility] [address] )     Not at goal   Lab Results   Component Value Date    HGBA1C 7 6 (H) 2020    Patient reports blood sugars runnin FBS but only checks once per day  Pt asked to check TID before meals  · Increase metformin from 500 mg b i d  to a 1000 mg b i d with meals  Pt was told to take 2 pills with each meal until new prescription arrives  · Next A1C in 3 months is greater than 7 0 will add glipizide  · Patient was reminded to complete diabetic eye exam  Defer DM foot exam due coivd-19  Relevant Medications    metFORMIN (GLUCOPHAGE) 1000 MG tablet    Other Relevant Orders    HEMOGLOBIN A1C W/ EAG ESTIMATION       Other    Toxic effect of tobacco cigarette, unintentional     Continues  Pt continues to smoke more due to being quarantined  Strongly encouraged pt to find hobbies ands to get outside and walk to avoid smoking more  · Stressed the importance 100% smoking cessation  Nicotine Dependency - Primary    Counseled for greater than 15 minutes on the importance of smoking cessation  Education was given regarding the cardiovascular effects of how nicotine affects the heart, lungs, kidneys,and peripheral vascular system  Referred to [facility][1] for enrollment in smoking cessation program            HIV disease ([facility] [address] ) - Primary     Doing good  Pt reports 100% medication compliance  Stressed the importance of 100% medication adherence  HIV Counseling:    Viral Load: < 20    CD4 Count: 345    ART: Gaby Alcantar    Denies side effects  Stressed the importance of adherence  Continue follow up in the ID clinic with Dr Emeli Servin  Reviewed the most recent labs, including the Cd4 and viral load    Discussed the risks and benefits of treatment options, instructions for management, importance of treatment adherence, and reduction of risk factors  Educated on possible medication side effects  Counseled on routes of HIV transmission, including the risk of  infection  Emphasized that viral suppression is the best method to prevent HIV transmission  At this time the pt denies the need for HIV testing of anyone in their life  Total encounter time was greater than 35 minutes  Greater than 20 minutes were spent on counseling and patient education  Pt voices understanding and agreement with treatment plan  Dyslipidemia     Not at goal   Triglycerides are elevated up from 155 to 247  Patient on max dose of pravastatin with okay control cholesterol  Pt reports taking 80 mg daily  Pt cannot take Atorvastatin with Genvoya at high doses due to interaction  LDL at 90 and should be less than 70  · Continue Lovaza 4 g per day  Pt reports only taking Lovaza 2 grams per day  · Have patient work with [facility] [employer] [facility] dietitian to work on reduce reduction of triglycerides  · Pt would not be a good candidate for zetia due to elevated trig and need to low LDL and ASCVD risk of 10 2%  Pt continues to smoke,  poorly control of DM, and CV risks; pt would be a good candidate for PCSK9     · Discussed starting pt on PCKS-9 inhibitor Repatha 140 mg SC [id number]  Pt was agreeable for PCKS-9  · Reviewed side effects with pt   · [facility] dietician to work with pt for nutritional recommendations and dietary guidelines  · Recheck lipid panel in 3 months since pt has not been taking Lovaza 4 gr daily  Relevant Medications    omega-3-acid ethyl esters (LOVAZA) 1 g capsule    Other Relevant Orders    Lipid panel    Abscess of buttock, left     Draining again  Pt reports over the past 2 months her left buttock abscess has been filling and emptying every week  Pt reports  Large blister like wound that drains puss or thin yellow drainage has been coming out of wound and once it opens it stays open and then will scab over    There is pain when wound erupts  Pt is requesting abx  Wound has not been cultured since 2017  Will bring pt into office today to culture wound, obtain pictures and see about need for abx  · Will schedule ct scan to rule of fistula or abscess  · Educated pt on why some HIV pt are susceptible to skin infections  Relevant Orders    CT pelvis w contrast    Wound culture and Gram stain      Other Visit Diagnoses     Elevated triglycerides with high cholesterol        Relevant Medications    omega-3-acid ethyl esters (LOVAZA) 1 g capsule    Abscess of anal and rectal regions        Relevant Orders    CT pelvis w contrast    Wound culture and Gram stain                Reason for visit is for 3 month follow up visit with PCP for management of chronic health care conditions  Encounter provider ESTEFANY Palomo    Provider located at [address] [address]    Recent Visits  Date Type Provider Dept   04/15/20 Telephone [facility]   04/14/20 Telephone [facility]   Showing recent visits within past 7 days and meeting all other requirements     Today's Visits  Date Type Provider Dept   04/21/20 [facility]   Showing today's visits and meeting all other requirements     Future Appointments  No visits were found meeting these conditions  Showing future appointments within next 150 days and meeting all other requirements        After connecting through telephone, the patient was identified by name and date of birth  Luz Maria Shipman was informed that this is a telemedicine visit and that the visit is being conducted through telephone  My office door was closed  No one else was in the room  She acknowledged consent and understanding of privacy and security of the video platform   The patient has agreed to participate and understands they can discontinue the visit at any time     Patient is aware this is a billable service  Shruti Machado is a 63 y o  female is being seen  for 3 month follow up visit with PCP for management of chronic health care conditions  Pt reports doing good and all of her family is doing good except for her brother who was just dx with cancer  Pt has been staying home and denies any cough, sob, and fever  Pt complains of left buttock abscess wound that keep draining  Wound last erupted yesterday and is started to close up  PT has been apply Triple abx ointment  Pt denies fever or chills  Around becomes reddened once it erupts and painful to touch  Pt is [place] speaking only and interpretor was present during the entire phone visit  Pt was made aware during translation  Past Medical History:   Diagnosis Date    Acute pain of left shoulder 6/13/2018    Arthritis     Cellulitis     Colon polyp     Diabetes mellitus (HCC)     Fibromyalgia     Fibromyalgia     HIV (human immunodeficiency virus infection) ([facility] [address] )     Hyperlipidemia     Pinguecula of both eyes     Presbyopia of both eyes        Past Surgical History:   Procedure Laterality Date    BREAST BIOPSY Bilateral     more than 12 years ago     BREAST SURGERY      lumpectomy    COLONOSCOPY      last year, polyps, every 6 months    COLONOSCOPY N/A 11/1/2018    Procedure: COLONOSCOPY;  Surgeon: Josh Mark DO;  Location: [facility];   Service: Gastroenterology    HYSTERECTOMY      INCISION AND DRAINAGE PERIRECTAL ABSCESS      OOPHORECTOMY      TUBAL LIGATION         Current Outpatient Medications   Medication Sig Dispense Refill    albuterol (Proventil HFA) 90 mcg/act inhaler Inhale 2 puffs every 6 (six) hours as needed for wheezing 90 Inhaler 2    ammonium lactate (LAC-HYDRIN) 12 % lotion       aspirin (Aspir-Low) 81 mg EC tablet Take 1 tablet (81 mg total) by mouth daily 90 tablet 4    BANOPHEN 50 MG capsule TAKE 1 CAPSULE BY MOUTH DAILY AT BEDTIME AS NEEDED FOR SLEEP (Patient not taking: Reported on 1/14/2020) 30 capsule 1    bisacodyl (DULCOLAX) 5 mg EC tablet Take 30 mg by mouth      Blood Glucose Monitoring Suppl (TRUE METRIX METER) GIL daily   Blood Glucose Monitoring Suppl GIL daily        budesonide-formoterol (SYMBICORT) 160-4 5 mcg/act inhaler Inhale 2 puffs      busPIRone (BUSPAR) 5 mg tablet Take 5 mg by mouth 2 (two) times a day      calcium carbonate-vitamin D (OSCAL-D) 500 mg-200 units per tablet Take 2 tablets by mouth daily with breakfast 180 tablet 1    cholecalciferol (VITAMIN D3) 1,000 units tablet Take 2 tablets (2,000 Units total) by mouth daily 180 tablet 1    clotrimazole (LOTRIMIN) 1 % cream Apply topically 2 (two) times a day      cromolyn (NASALCHROM) 5 2 MG/ACT nasal spray 1 spray into each nostril 3 (three) times a day 1 Act 2    elvitegravir-cobicistat-emtricitabine-tenofovir alafenamide (GENVOYA) tablet Take 1 tablet by mouth daily 30 tablet 4    FLUoxetine (PROzac) 20 mg capsule       gabapentin (NEURONTIN) 800 mg tablet TAKE ONE TABLET BY MOUTH THREE TIMES A  tablet 7    glucose blood (FREESTYLE LITE) test strip Use as instructed 100 each 5    glucose blood test strip 1 each by Other route 4 (four) times a day 100 each 0    lidocaine (LIDODERM) 5 % Place 1 patch on the skin daily Remove & Discard patch within 12 hours or as directed by MD (Patient not taking: Reported on 1/14/2020) 30 patch 0    lidocaine (LMX) 4 % cream Apply topically as needed for mild pain (Patient not taking: Reported on 1/14/2020) 30 g 0    loratadine (CLARITIN) 10 mg tablet Take 1 tablet (10 mg total) by mouth daily (Patient not taking: Reported on 1/14/2020) 90 tablet 1    Melatonin 5 MG TABS Take 1 tablet by mouth at bedtime daily (Patient not taking: Reported on 1/14/2020) 90 tablet 3    metFORMIN (GLUCOPHAGE) 1000 MG tablet Take 1 tablet (1,000 mg total) by mouth 2 (two) times a day with meals 180 tablet 1    ofloxacin (OCUFLOX) 0 3 % ophthalmic solution Administer 2 drops to both eyes 4 (four) times a day For 5 days  (Patient not taking: Reported on 1/14/2020) 5 mL 0    omega-3-acid ethyl esters (LOVAZA) 1 g capsule Take 2 capsules (2 g total) by mouth 2 (two) times a day 180 capsule 1    polyethylene glycol (GLYCOLAX) powder Drink per MD recommendations      pravastatin (PRAVACHOL) 80 mg tablet Take 1 tablet (80 mg total) by mouth daily 90 tablet 1    sulfaSALAzine (AZULFIDINE-EN) 500 mg EC tablet       SUPER THIN LANCETS MISC by Percutaneous route 4 (four) times a day 100 each 6    traZODone (DESYREL) 50 mg tablet   0     No current facility-administered medications for this visit  No Known Allergies    Review of Systems   Constitutional: Negative  HENT: Negative  Respiratory: Negative  Cardiovascular: Negative  Skin: Positive for wound  Left buttocks  AAOX3  No sings of respiratory distress during telephone conversation  Pt appears in good spirits  Impression/Plan:  Medication changes to metformin and lovaza  Repeat labs in 3 months  Come into office for quick culture of left buttock wound to evaluate for abx therapy  Order ct scan of pelvis with contrast to rule out fistula  As a result of this visit, I have referred the patient for further respiratory evaluation  No    I spent 45 minutes directly with the patient during this visit and >50% was counseling/coordination of care  It was my intent to perform this visit via video technology but the patient was not able to do a video connection so the visit was completed via audio telephone only

## 2020-04-21 NOTE — ASSESSMENT & PLAN NOTE
Doing good  Pt reports 100% medication compliance  Stressed the importance of 100% medication adherence  HIV Counseling:    Viral Load: < 20    CD4 Count: 345    ART: Isabelle Gonzalez    Denies side effects  Stressed the importance of adherence  Continue follow up in the ID clinic with Dr Genesis Galicia  Reviewed the most recent labs, including the Cd4 and viral load  Discussed the risks and benefits of treatment options, instructions for management, importance of treatment adherence, and reduction of risk factors  Educated on possible medication side effects  Counseled on routes of HIV transmission, including the risk of  infection  Emphasized that viral suppression is the best method to prevent HIV transmission  At this time the pt denies the need for HIV testing of anyone in their life  Total encounter time was greater than 35 minutes  Greater than 20 minutes were spent on counseling and patient education  Pt voices understanding and agreement with treatment plan

## 2020-04-21 NOTE — ASSESSMENT & PLAN NOTE
Not at goal   Lab Results   Component Value Date    HGBA1C 7 6 (H) 2020    Patient reports blood sugars runnin FBS but only checks once per day  Pt asked to check TID before meals  · Increase metformin from 500 mg b i d  to a 1000 mg b i d with meals  Pt was told to take 2 pills with each meal until new prescription arrives  · Next A1C in 3 months is greater than 7 0 will add glipizide  · Patient was reminded to complete diabetic eye exam  Defer DM foot exam due coivd-19

## 2020-04-23 LAB
BACTERIA WND AEROBE CULT: NORMAL
GRAM STN SPEC: NORMAL

## 2020-04-24 ENCOUNTER — TELEMEDICINE (OUTPATIENT)
Dept: SURGERY | Facility: CLINIC | Age: 60
End: 2020-04-24
Payer: COMMERCIAL

## 2020-04-24 ENCOUNTER — TELEPHONE (OUTPATIENT)
Dept: SURGERY | Facility: CLINIC | Age: 60
End: 2020-04-24

## 2020-04-24 DIAGNOSIS — B20 HIV DISEASE (HCC): Primary | ICD-10-CM

## 2020-04-24 DIAGNOSIS — T65.221D TOXIC EFFECT OF TOBACCO CIGARETTE, UNINTENTIONAL, SUBSEQUENT ENCOUNTER: ICD-10-CM

## 2020-04-24 DIAGNOSIS — Z87.891 HISTORY OF SMOKING 30 OR MORE PACK YEARS: ICD-10-CM

## 2020-04-24 DIAGNOSIS — R31.29 MICROSCOPIC HEMATURIA: ICD-10-CM

## 2020-04-24 DIAGNOSIS — E11.628 TYPE 2 DIABETES MELLITUS WITH OTHER SKIN COMPLICATION, WITHOUT LONG-TERM CURRENT USE OF INSULIN (HCC): ICD-10-CM

## 2020-04-24 PROCEDURE — 4004F PT TOBACCO SCREEN RCVD TLK: CPT | Performed by: INTERNAL MEDICINE

## 2020-04-24 PROCEDURE — 99214 OFFICE O/P EST MOD 30 MIN: CPT | Performed by: INTERNAL MEDICINE

## 2020-04-24 PROCEDURE — 3051F HG A1C>EQUAL 7.0%<8.0%: CPT | Performed by: INTERNAL MEDICINE

## 2020-05-12 DIAGNOSIS — J45.20 MILD INTERMITTENT ASTHMA WITHOUT COMPLICATION: ICD-10-CM

## 2020-05-12 RX ORDER — ALBUTEROL SULFATE 90 UG/1
2 AEROSOL, METERED RESPIRATORY (INHALATION) EVERY 6 HOURS PRN
Qty: 1 INHALER | Refills: 5 | Status: SHIPPED | OUTPATIENT
Start: 2020-05-12 | End: 2020-09-21 | Stop reason: SDUPTHER

## 2020-05-28 DIAGNOSIS — E55.9 VITAMIN D DEFICIENCY: ICD-10-CM

## 2020-05-28 DIAGNOSIS — E78.00 HIGH CHOLESTEROL: ICD-10-CM

## 2020-05-28 DIAGNOSIS — M85.88 OSTEOPENIA OF LUMBAR SPINE: ICD-10-CM

## 2020-05-28 DIAGNOSIS — R05.9 COUGH: ICD-10-CM

## 2020-05-28 RX ORDER — PRAVASTATIN SODIUM 80 MG/1
TABLET ORAL
Qty: 90 TABLET | Refills: 2 | Status: SHIPPED | OUTPATIENT
Start: 2020-05-28 | End: 2021-01-19 | Stop reason: SDUPTHER

## 2020-05-28 RX ORDER — CROMOLYN SODIUM 5.2 MG
1 AEROSOL, SPRAY WITH PUMP (ML) NASAL 3 TIMES DAILY
Qty: 1 ACT | Refills: 6 | Status: SHIPPED | OUTPATIENT
Start: 2020-05-28 | End: 2020-12-29 | Stop reason: SDUPTHER

## 2020-05-28 RX ORDER — MELATONIN
Qty: 180 TABLET | Refills: 4 | Status: SHIPPED | OUTPATIENT
Start: 2020-05-28 | End: 2021-05-28 | Stop reason: SDUPTHER

## 2020-05-28 RX ORDER — B-COMPLEX WITH VITAMIN C
TABLET ORAL
Qty: 180 TABLET | Refills: 4 | Status: SHIPPED | OUTPATIENT
Start: 2020-05-28 | End: 2021-05-28 | Stop reason: SDUPTHER

## 2020-05-29 ENCOUNTER — TELEPHONE (OUTPATIENT)
Dept: SURGERY | Facility: CLINIC | Age: 60
End: 2020-05-29

## 2020-06-08 ENCOUNTER — PATIENT OUTREACH (OUTPATIENT)
Dept: SURGERY | Facility: CLINIC | Age: 60
End: 2020-06-08

## 2020-06-08 ENCOUNTER — TELEPHONE (OUTPATIENT)
Dept: SURGERY | Facility: CLINIC | Age: 60
End: 2020-06-08

## 2020-06-15 ENCOUNTER — PATIENT OUTREACH (OUTPATIENT)
Dept: SURGERY | Facility: CLINIC | Age: 60
End: 2020-06-15

## 2020-06-18 ENCOUNTER — TELEPHONE (OUTPATIENT)
Dept: SURGERY | Facility: CLINIC | Age: 60
End: 2020-06-18

## 2020-06-20 ENCOUNTER — APPOINTMENT (OUTPATIENT)
Dept: LAB | Facility: HOSPITAL | Age: 60
End: 2020-06-20
Attending: INTERNAL MEDICINE
Payer: COMMERCIAL

## 2020-06-20 DIAGNOSIS — B20 HIV DISEASE (HCC): ICD-10-CM

## 2020-06-20 LAB
ALBUMIN SERPL BCP-MCNC: 3.9 G/DL (ref 3.5–5)
ALP SERPL-CCNC: 40 U/L (ref 46–116)
ALT SERPL W P-5'-P-CCNC: 33 U/L (ref 12–78)
ANION GAP SERPL CALCULATED.3IONS-SCNC: 10 MMOL/L (ref 4–13)
AST SERPL W P-5'-P-CCNC: 18 U/L (ref 5–45)
BACTERIA UR QL AUTO: ABNORMAL /HPF
BASOPHILS # BLD AUTO: 0.04 THOUSANDS/ΜL (ref 0–0.1)
BASOPHILS NFR BLD AUTO: 1 % (ref 0–1)
BILIRUB SERPL-MCNC: 0.43 MG/DL (ref 0.2–1)
BILIRUB UR QL STRIP: NEGATIVE
BUN SERPL-MCNC: 14 MG/DL (ref 5–25)
CALCIUM SERPL-MCNC: 9.5 MG/DL (ref 8.3–10.1)
CHLORIDE SERPL-SCNC: 102 MMOL/L (ref 100–108)
CLARITY UR: ABNORMAL
CO2 SERPL-SCNC: 29 MMOL/L (ref 21–32)
COLOR UR: YELLOW
CREAT SERPL-MCNC: 0.82 MG/DL (ref 0.6–1.3)
EOSINOPHIL # BLD AUTO: 0.07 THOUSAND/ΜL (ref 0–0.61)
EOSINOPHIL NFR BLD AUTO: 1 % (ref 0–6)
ERYTHROCYTE [DISTWIDTH] IN BLOOD BY AUTOMATED COUNT: 12.9 % (ref 11.6–15.1)
GFR SERPL CREATININE-BSD FRML MDRD: 79 ML/MIN/1.73SQ M
GLUCOSE P FAST SERPL-MCNC: 180 MG/DL (ref 65–99)
GLUCOSE UR STRIP-MCNC: NEGATIVE MG/DL
HCT VFR BLD AUTO: 44.9 % (ref 34.8–46.1)
HCV AB SER QL: NORMAL
HGB BLD-MCNC: 14.1 G/DL (ref 11.5–15.4)
HGB UR QL STRIP.AUTO: ABNORMAL
IMM GRANULOCYTES # BLD AUTO: 0.02 THOUSAND/UL (ref 0–0.2)
IMM GRANULOCYTES NFR BLD AUTO: 0 % (ref 0–2)
KETONES UR STRIP-MCNC: NEGATIVE MG/DL
LEUKOCYTE ESTERASE UR QL STRIP: NEGATIVE
LYMPHOCYTES # BLD AUTO: 1.72 THOUSANDS/ΜL (ref 0.6–4.47)
LYMPHOCYTES NFR BLD AUTO: 33 % (ref 14–44)
MCH RBC QN AUTO: 32.3 PG (ref 26.8–34.3)
MCHC RBC AUTO-ENTMCNC: 31.4 G/DL (ref 31.4–37.4)
MCV RBC AUTO: 103 FL (ref 82–98)
MONOCYTES # BLD AUTO: 0.39 THOUSAND/ΜL (ref 0.17–1.22)
MONOCYTES NFR BLD AUTO: 7 % (ref 4–12)
NEUTROPHILS # BLD AUTO: 3.06 THOUSANDS/ΜL (ref 1.85–7.62)
NEUTS SEG NFR BLD AUTO: 58 % (ref 43–75)
NITRITE UR QL STRIP: NEGATIVE
NON-SQ EPI CELLS URNS QL MICRO: ABNORMAL /HPF
NRBC BLD AUTO-RTO: 0 /100 WBCS
PH UR STRIP.AUTO: 5 [PH]
PLATELET # BLD AUTO: 313 THOUSANDS/UL (ref 149–390)
PMV BLD AUTO: 10.1 FL (ref 8.9–12.7)
POTASSIUM SERPL-SCNC: 4.2 MMOL/L (ref 3.5–5.3)
PROT SERPL-MCNC: 8.2 G/DL (ref 6.4–8.2)
PROT UR STRIP-MCNC: NEGATIVE MG/DL
RBC # BLD AUTO: 4.37 MILLION/UL (ref 3.81–5.12)
RBC #/AREA URNS AUTO: ABNORMAL /HPF
SODIUM SERPL-SCNC: 141 MMOL/L (ref 136–145)
SP GR UR STRIP.AUTO: >=1.03 (ref 1–1.03)
UROBILINOGEN UR QL STRIP.AUTO: 0.2 E.U./DL
WBC # BLD AUTO: 5.3 THOUSAND/UL (ref 4.31–10.16)
WBC #/AREA URNS AUTO: ABNORMAL /HPF

## 2020-06-20 PROCEDURE — 85025 COMPLETE CBC W/AUTO DIFF WBC: CPT

## 2020-06-20 PROCEDURE — 81001 URINALYSIS AUTO W/SCOPE: CPT

## 2020-06-20 PROCEDURE — 87536 HIV-1 QUANT&REVRSE TRNSCRPJ: CPT

## 2020-06-20 PROCEDURE — 36415 COLL VENOUS BLD VENIPUNCTURE: CPT

## 2020-06-20 PROCEDURE — 80053 COMPREHEN METABOLIC PANEL: CPT

## 2020-06-20 PROCEDURE — 86592 SYPHILIS TEST NON-TREP QUAL: CPT

## 2020-06-20 PROCEDURE — 86361 T CELL ABSOLUTE COUNT: CPT

## 2020-06-20 PROCEDURE — 86480 TB TEST CELL IMMUN MEASURE: CPT

## 2020-06-20 PROCEDURE — 86803 HEPATITIS C AB TEST: CPT

## 2020-06-21 LAB
BASOPHILS # BLD AUTO: 0 X10E3/UL (ref 0–0.2)
BASOPHILS NFR BLD AUTO: 1 %
CD3+CD4+ CELLS # BLD: 364 /UL (ref 359–1519)
CD3+CD4+ CELLS NFR BLD: 21.4 % (ref 30.8–58.5)
EOSINOPHIL # BLD AUTO: 0.1 X10E3/UL (ref 0–0.4)
EOSINOPHIL NFR BLD AUTO: 1 %
ERYTHROCYTE [DISTWIDTH] IN BLOOD BY AUTOMATED COUNT: 12.5 % (ref 11.7–15.4)
HCT VFR BLD AUTO: 45.6 % (ref 34–46.6)
HGB BLD-MCNC: 14.5 G/DL (ref 11.1–15.9)
IMM GRANULOCYTES # BLD: 0 X10E3/UL (ref 0–0.1)
IMM GRANULOCYTES NFR BLD: 0 %
LYMPHOCYTES # BLD AUTO: 1.7 X10E3/UL (ref 0.7–3.1)
LYMPHOCYTES NFR BLD AUTO: 33 %
MCH RBC QN AUTO: 31.7 PG (ref 26.6–33)
MCHC RBC AUTO-ENTMCNC: 31.8 G/DL (ref 31.5–35.7)
MCV RBC AUTO: 100 FL (ref 79–97)
MONOCYTES # BLD AUTO: 0.4 X10E3/UL (ref 0.1–0.9)
MONOCYTES NFR BLD AUTO: 8 %
NEUTROPHILS # BLD AUTO: 2.9 X10E3/UL (ref 1.4–7)
NEUTROPHILS NFR BLD AUTO: 57 %
PLATELET # BLD AUTO: 333 X10E3/UL (ref 150–450)
RBC # BLD AUTO: 4.58 X10E6/UL (ref 3.77–5.28)
WBC # BLD AUTO: 5.1 X10E3/UL (ref 3.4–10.8)

## 2020-06-22 ENCOUNTER — TELEPHONE (OUTPATIENT)
Dept: SURGERY | Facility: CLINIC | Age: 60
End: 2020-06-22

## 2020-06-22 LAB
GAMMA INTERFERON BACKGROUND BLD IA-ACNC: 0.01 IU/ML
M TB IFN-G BLD-IMP: NEGATIVE
M TB IFN-G CD4+ BCKGRND COR BLD-ACNC: 0.01 IU/ML
M TB IFN-G CD4+ BCKGRND COR BLD-ACNC: 0.01 IU/ML
MITOGEN IGNF BCKGRD COR BLD-ACNC: 2.15 IU/ML
RPR SER QL: NORMAL

## 2020-06-23 ENCOUNTER — HOSPITAL ENCOUNTER (OUTPATIENT)
Dept: CT IMAGING | Facility: HOSPITAL | Age: 60
Discharge: HOME | End: 2020-06-23
Payer: COMMERCIAL

## 2020-06-23 DIAGNOSIS — K61.2 ABSCESS OF ANAL AND RECTAL REGIONS: ICD-10-CM

## 2020-06-23 DIAGNOSIS — L02.31 ABSCESS OF BUTTOCK, LEFT: ICD-10-CM

## 2020-06-23 LAB
HIV1 RNA # SERPL NAA+PROBE: <20 COPIES/ML
HIV1 RNA SERPL NAA+PROBE-LOG#: NORMAL LOG10COPY/ML

## 2020-06-23 PROCEDURE — 72193 CT PELVIS W/DYE: CPT

## 2020-06-23 RX ADMIN — IOHEXOL 100 ML: 350 INJECTION, SOLUTION INTRAVENOUS at 17:44

## 2020-06-25 ENCOUNTER — TELEPHONE (OUTPATIENT)
Dept: SURGERY | Facility: CLINIC | Age: 60
End: 2020-06-25

## 2020-06-26 ENCOUNTER — PATIENT OUTREACH (OUTPATIENT)
Dept: SURGERY | Facility: CLINIC | Age: 60
End: 2020-06-26

## 2020-06-30 DIAGNOSIS — E11.9 TYPE 2 DIABETES MELLITUS WITHOUT COMPLICATION, WITHOUT LONG-TERM CURRENT USE OF INSULIN (HCC): ICD-10-CM

## 2020-07-13 ENCOUNTER — PATIENT OUTREACH (OUTPATIENT)
Dept: SURGERY | Facility: CLINIC | Age: 60
End: 2020-07-13

## 2020-07-13 NOTE — PROGRESS NOTES
Clinic spoke with cm regarding ct  Clinic request cm to email and call ct  Cm emailed and called ct, cm unable to leave vm

## 2020-07-20 ENCOUNTER — PATIENT OUTREACH (OUTPATIENT)
Dept: SURGERY | Facility: CLINIC | Age: 60
End: 2020-07-20

## 2020-07-20 DIAGNOSIS — Z20.822 COVID-19 RULED OUT: ICD-10-CM

## 2020-07-20 DIAGNOSIS — U07.1 2019 NOVEL CORONAVIRUS DISEASE (COVID-19): Primary | ICD-10-CM

## 2020-07-21 ENCOUNTER — PATIENT OUTREACH (OUTPATIENT)
Dept: SURGERY | Facility: CLINIC | Age: 60
End: 2020-07-21

## 2020-07-21 NOTE — PROGRESS NOTES
Clinic spoke with cm regarding how to get in contact with ct  Per clinic when they dial out, phone states that its busy  Cm stated that sometimes she emails back  Clinic requested cm to email ct where she can get tested for covoid in order to travel to [facility] provided information to ct via email

## 2020-07-24 ENCOUNTER — PATIENT OUTREACH (OUTPATIENT)
Dept: SURGERY | Facility: CLINIC | Age: 60
End: 2020-07-24

## 2020-07-24 ENCOUNTER — OFFICE VISIT (OUTPATIENT)
Dept: SURGERY | Facility: CLINIC | Age: 60
End: 2020-07-24
Payer: COMMERCIAL

## 2020-07-24 VITALS
OXYGEN SATURATION: 97 % | TEMPERATURE: 97.9 F | HEART RATE: 81 BPM | DIASTOLIC BLOOD PRESSURE: 68 MMHG | WEIGHT: 164.4 LBS | SYSTOLIC BLOOD PRESSURE: 107 MMHG | HEIGHT: 65 IN | BODY MASS INDEX: 27.39 KG/M2

## 2020-07-24 DIAGNOSIS — Z23 NEED FOR TDAP VACCINATION: ICD-10-CM

## 2020-07-24 DIAGNOSIS — T65.221D TOXIC EFFECT OF TOBACCO CIGARETTE, UNINTENTIONAL, SUBSEQUENT ENCOUNTER: ICD-10-CM

## 2020-07-24 DIAGNOSIS — B20 HIV DISEASE (HCC): Primary | ICD-10-CM

## 2020-07-24 DIAGNOSIS — R31.29 MICROSCOPIC HEMATURIA: ICD-10-CM

## 2020-07-24 PROCEDURE — 99214 OFFICE O/P EST MOD 30 MIN: CPT | Performed by: INTERNAL MEDICINE

## 2020-07-24 PROCEDURE — 3008F BODY MASS INDEX DOCD: CPT | Performed by: INTERNAL MEDICINE

## 2020-07-24 PROCEDURE — 90471 IMMUNIZATION ADMIN: CPT

## 2020-07-24 PROCEDURE — 4004F PT TOBACCO SCREEN RCVD TLK: CPT | Performed by: INTERNAL MEDICINE

## 2020-07-24 PROCEDURE — 3051F HG A1C>EQUAL 7.0%<8.0%: CPT | Performed by: INTERNAL MEDICINE

## 2020-07-24 PROCEDURE — 90715 TDAP VACCINE 7 YRS/> IM: CPT
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
## 2020-07-24 NOTE — PROGRESS NOTES
Progress Note - Infectious Disease   Megan Hair 63 y o  female MRN: [id number]  Unit/Bed#:  Encounter: [id number]      Impression/Plan:    1  HIV-doing well on Genvoya with undetectable viral load,  with a CD4 count in the 300s  Continue antiretrovirals   Will recheck labs in 2 months and follow up in 3 months  Stressed adherence        2  Nicotine dependence-continue intervention by our tobacco cessation program   Patient admits of smoking more and attributes this to current quarantine status  Advised patient about smoking cessation       3   Diabetes mellitus-suboptimal control, Hba1c stable at 7 6%,  Patient is  being treated with metformin, dietary interventions, and education   Discussed with primary    4   Microhematuria-seen by Urology 07/2019 who did not recommend any additional workup for now  Ankita Carrasco continue to monitor the UA annually       Patient was provided medication, adherence and prevention education    Subjective:  Routine follow-up for HIV  Patient claims 100% adherence with     Patient denies any notable side effects  Overall the feeling well  The patient denies any fever chills or sweats, denies any nausea vomiting or diarrhea, denies any cough or shortness of breath  Patient is leaving to ID tomorrow and will return 08/15/2020; She admits of continuing to smoke, and reports that she is not ready to quit yet    ROS: A complete 12 point ROS is negative other than that noted in the HPI    Followup portions patient history reviewed and updated as:   Allergies, current medications, past medical history, past social history, past surgical history, and the problem list    Objective:  Vitals:  Vitals:    07/24/20 0739   BP: 107/68   BP Location: Right arm   Patient Position: Sitting   Cuff Size: Standard   Pulse: 81   Temp: 97 9 °F (36 6 °C)   SpO2: 97%   Weight: 74 6 kg (164 lb 6 4 oz)   Height: 5' 5" (1 651 m)       Physical Exam:   General Appearance:  Alert, interactive, appearing well,  nontoxic, no acute distress  Neck:   Supple without lymphadenopathy, no thyromegaly or masses   Throat: Oropharynx moist without lesions  Lungs:   Clear to auscultation bilaterally; no wheezes, rhonchi or rales; respirations unlabored   Heart:  RRR; no murmur, rub or gallop   Abdomen:   Soft, non-tender, non-distended, positive bowel sounds  Extremities: No clubbing, cyanosis or edema   Skin: No new rashes or lesions  No draining wounds noted  Labs, Imaging, & Other studies:   All pertinent labs and imaging studies were personally reviewed    Lab Results   Component Value Date    K 4 2 06/20/2020     06/20/2020    CO2 29 06/20/2020    BUN 14 06/20/2020    CREATININE 0 82 06/20/2020    GLUF 180 (H) 06/20/2020    CALCIUM 9 5 06/20/2020    AST 18 06/20/2020    ALT 33 06/20/2020    ALKPHOS 40 (L) 06/20/2020    EGFR 79 06/20/2020     Lab Results   Component Value Date    WBC 5 30 06/20/2020    WBC 5 1 06/20/2020    HGB 14 1 06/20/2020    HGB 14 5 06/20/2020    HCT 44 9 06/20/2020    HCT 45 6 06/20/2020     (H) 06/20/2020     (H) 06/20/2020     06/20/2020     06/20/2020     Lab Results   Component Value Date    HEPCAB Non-reactive 06/20/2020     Lab Results   Component Value Date    HAV Reactive (A) 11/10/2017    HEPCAB Non-reactive 06/20/2020     Lab Results   Component Value Date    RPR Non-Reactive 06/20/2020     CD4 ABS   Date/Time Value Ref Range Status   06/20/2020 08:25  359 - 1519 /uL Final     HIV-1 RNA by PCR, Qn   Date/Time Value Ref Range Status   06/20/2020 08:25 AM <20 copies/mL Final     Comment:     HIV-1 RNA not detected  The reportable range for this assay is 20 to 10,000,000  copies HIV-1 RNA/mL             Current Outpatient Medications:     albuterol (Proventil HFA) 90 mcg/act inhaler, Inhale 2 puffs every 6 (six) hours as needed for wheezing, Disp: 1 Inhaler, Rfl: 5    ammonium lactate (LAC-HYDRIN) 12 % lotion, , Disp: , Rfl:     aspirin (Aspir-Low) 81 mg EC tablet, Take 1 tablet (81 mg total) by mouth daily, Disp: 90 tablet, Rfl: 4    BANOPHEN 50 MG capsule, TAKE 1 CAPSULE BY MOUTH DAILY AT BEDTIME AS NEEDED FOR SLEEP (Patient not taking: Reported on 1/14/2020), Disp: 30 capsule, Rfl: 1    bisacodyl (DULCOLAX) 5 mg EC tablet, Take 30 mg by mouth, Disp: , Rfl:     Blood Glucose Monitoring Suppl (TRUE METRIX METER) GIL, daily  , Disp: , Rfl:     Blood Glucose Monitoring Suppl GIL, daily  , Disp: , Rfl:     budesonide-formoterol (SYMBICORT) 160-4 5 mcg/act inhaler, Inhale 2 puffs, Disp: , Rfl:     busPIRone (BUSPAR) 5 mg tablet, Take 5 mg by mouth 2 (two) times a day, Disp: , Rfl:     calcium carbonate-vitamin D (OSCAL-D) 500 mg-200 units per tablet, TAKE TWO TABLETS BY MOUTH ONCE DAILY WITH BREAKFAST, Disp: 180 tablet, Rfl: 4    cholecalciferol (VITAMIN D3) 1,000 units tablet, TAKE TWO TABLETS BY MOUTH ONCE DAILY, Disp: 180 tablet, Rfl: 4    clotrimazole (LOTRIMIN) 1 % cream, Apply topically 2 (two) times a day, Disp: , Rfl:     cromolyn (NASALCHROM) 5 2 MG/ACT nasal spray, 1 spray into each nostril 3 (three) times a day, Disp: 1 Act, Rfl: 6    elvitegravir-cobicistat-emtricitabine-tenofovir alafenamide (Genvoya) tablet, Take 1 tablet by mouth daily, Disp: 30 tablet, Rfl: 4    FLUoxetine (PROzac) 20 mg capsule, , Disp: , Rfl:     gabapentin (NEURONTIN) 800 mg tablet, TAKE ONE TABLET BY MOUTH THREE TIMES A DAY, Disp: 270 tablet, Rfl: 7    glucose blood (FREESTYLE LITE) test strip, Use as instructed, Disp: 100 each, Rfl: 5    lidocaine (LIDODERM) 5 %, Place 1 patch on the skin daily Remove & Discard patch within 12 hours or as directed by MD (Patient not taking: Reported on 1/14/2020), Disp: 30 patch, Rfl: 0    lidocaine (LMX) 4 % cream, Apply topically as needed for mild pain (Patient not taking: Reported on 1/14/2020), Disp: 30 g, Rfl: 0    loratadine (CLARITIN) 10 mg tablet, Take 1 tablet (10 mg total) by mouth daily (Patient not taking: Reported on 1/14/2020), Disp: 90 tablet, Rfl: 1    Melatonin 5 MG TABS, Take 1 tablet by mouth at bedtime daily (Patient not taking: Reported on 1/14/2020), Disp: 90 tablet, Rfl: 3    metFORMIN (GLUCOPHAGE) 1000 MG tablet, Take 1 tablet (1,000 mg total) by mouth 2 (two) times a day with meals, Disp: 180 tablet, Rfl: 1    ofloxacin (OCUFLOX) 0 3 % ophthalmic solution, Administer 2 drops to both eyes 4 (four) times a day For 5 days   (Patient not taking: Reported on 1/14/2020), Disp: 5 mL, Rfl: 0    omega-3-acid ethyl esters (LOVAZA) 1 g capsule, Take 2 capsules (2 g total) by mouth 2 (two) times a day, Disp: 180 capsule, Rfl: 1    polyethylene glycol (GLYCOLAX) powder, Drink per MD recommendations, Disp: , Rfl:     pravastatin (PRAVACHOL) 80 mg tablet, TAKE ONE TABLET BY MOUTH ONCE DAILY, Disp: 90 tablet, Rfl: 2    sulfaSALAzine (AZULFIDINE-EN) 500 mg EC tablet, , Disp: , Rfl:     SUPER THIN LANCETS MISC, by Percutaneous route 4 (four) times a day, Disp: 100 each, Rfl: 6    traZODone (DESYREL) 50 mg tablet, , Disp: , Rfl: 0

## 2020-07-24 NOTE — PROGRESS NOTES
Assessment    Annual nutrition assessment     Clinical Data/Client History    HIV: yes  AIDS: no    : Kirt Martin    Socio- Economic Status: SNAP and Cooks    Living Situation: Apartment and in [address] Prep/Access: Refrigerator, Stove and Microwave    Psycho Social Factors: Not discussed    Functional Status: Ambulatory, Able to [employer] and Prepared Own Meals    Activity Level: Walks frequently, claimed to be active with home responsibilities     Ambulation Difficulty with Not discussed    Oral Problems: Dentures  Fit well; Pt denied difficulty chewing/swallowing     Last Dental Exam: Over 1 year ago    Procedures Performed: Not discussed, has teeth pulled and wears dentures; pt reported that they fit well     Typical Food/Beverage Intake:    · Breakfast Oatmeal or "maicena", regular milk  · Lunch Sometimes fruit like amado  · Dinner Sopa, fried food (tostones or Corrie Reggie), pork chop  · Snacks nothing usually, or galleta  · Fluids water, sugar-free cola, diluted juice    Appetite: Good    Supplements: No n/a    Food Intolerance: Pt denied n/v/d; denied constipation; denied food allergies    Weight Change Percent: Stable from April 2020    Usual Body Weight: 164# (April 2020); 168# (January 2020); 168 # (June 2019)    Current Body Weight: 164# (74 6 kg); IBW: 125# (56 8 kg); 131% IBW; BMI 27 4;  Ht 5' 5" (1 651 m)    Nutrition Diagnosis    Problem: Not ready for Diet/Lifestyle change and Altered nutrition related laboratory values    Related to: Estimated intake inconsistent with measured nutritional needs    As Evidenced By: Patient Interview, Dietary Recall, Demonstartes inability to apply food and nutrition related information  and A1C 7 6    Intervention Diet Prescription    Nutritional needs based on: Current BW, IBW, -250-500 kcal for weight loss    · 4784-2016 kcal  · 45-60 g protein  · 1922-9604 fluid  · 2 g Na    Current intake: exceeds estimated nutrtional     Snack/Supplement Recommendations: No supplement recommendations at this time     Nutrition Recommendations: Increase water intake in place of soda/juice; increase intake of vegetables; smaller portions; regular physical activity     Goals: No SMART goals established    Nutrition Education Intervention: No Interest     Person Educated: patient    Topics Discussed: Increase water intake in place of soda/juice; increase intake of vegetables; smaller portions; regular physical activity; general nutrition assessment items; [employer]    Teaching Method: Verbal    Readiness to Change: Pre-Contemplation:   (Not yet acknowledging that there is a problem behavior that needs to change)    Visit Summary    RD met with Padilla Sulema in conjunction with AM ID clinic appointment to complete annual nutrition assessment  Encounter was in [address] with RD  Meredith's dietary recall significantly lacks nutrient dense choices  RD stressed importance of vegetables, however pt reported not liking any vegetables  Meredith disclosed that she is a "picky" eater  RD explained [employer] and SNAP benefits offered, provided Yonatan Tire, and encouraged Meredith to utilize incentives to acquire free vegetables and try new foods  Provided Padilla Leone with [employer]  Padilla Sulema was receptive, stating she would try  RD provided pt with several recipes from past cooking classes, pt does enjoy cooking  RD encouraged smaller portions  RD reinforced drinking more water in place of sugar-sweetened beverages; Meredith does dilute her juice and drink soda with no sugar; stressed water  RD also encouraged more routine physical activity  Pt checks blood glucose, yesterday morning 140  Pt's chart notes indicate that she has had some diabetes education, RD encouraged f/u with CDE ALEENA Love and provided her with business card  RD also offered own business card  Meredith denied having questions at this time          A1C 7 6  164#    Continue to monitor lipid panel, A1C, weight trend, diet/exercise recall  Offer nutrition education intervention as pt is receptive to it        Niharika Bateman, MS, RD, LDN

## 2020-07-29 ENCOUNTER — PATIENT OUTREACH (OUTPATIENT)
Dept: SURGERY | Facility: CLINIC | Age: 60
End: 2020-07-29

## 2020-08-11 DIAGNOSIS — E78.2 ELEVATED TRIGLYCERIDES WITH HIGH CHOLESTEROL: ICD-10-CM

## 2020-08-11 RX ORDER — OMEGA-3-ACID ETHYL ESTERS 1 G/1
2 CAPSULE, LIQUID FILLED ORAL 2 TIMES DAILY
Qty: 180 CAPSULE | Refills: 1 | Status: SHIPPED | OUTPATIENT
Start: 2020-08-11 | End: 2020-08-11

## 2020-08-11 RX ORDER — OMEGA-3-ACID ETHYL ESTERS 1 G/1
CAPSULE, LIQUID FILLED ORAL
Qty: 180 CAPSULE | Refills: 0 | Status: SHIPPED | OUTPATIENT
Start: 2020-08-11 | End: 2020-09-01 | Stop reason: ALTCHOICE

## 2020-08-18 ENCOUNTER — PATIENT OUTREACH (OUTPATIENT)
Dept: SURGERY | Facility: CLINIC | Age: 60
End: 2020-08-18

## 2020-08-25 ENCOUNTER — PATIENT OUTREACH (OUTPATIENT)
Dept: SURGERY | Facility: CLINIC | Age: 60
End: 2020-08-25

## 2020-09-01 ENCOUNTER — OFFICE VISIT (OUTPATIENT)
Dept: SURGERY | Facility: CLINIC | Age: 60
End: 2020-09-01
Payer: COMMERCIAL

## 2020-09-01 ENCOUNTER — TELEPHONE (OUTPATIENT)
Dept: SURGERY | Facility: CLINIC | Age: 60
End: 2020-09-01

## 2020-09-01 VITALS
DIASTOLIC BLOOD PRESSURE: 62 MMHG | SYSTOLIC BLOOD PRESSURE: 134 MMHG | OXYGEN SATURATION: 98 % | WEIGHT: 163.5 LBS | TEMPERATURE: 97.3 F | BODY MASS INDEX: 27.24 KG/M2 | HEIGHT: 65 IN | RESPIRATION RATE: 18 BRPM | HEART RATE: 96 BPM

## 2020-09-01 DIAGNOSIS — G62.9 NEUROPATHY: ICD-10-CM

## 2020-09-01 DIAGNOSIS — M79.7 FIBROMYALGIA: ICD-10-CM

## 2020-09-01 DIAGNOSIS — E78.5 DYSLIPIDEMIA: ICD-10-CM

## 2020-09-01 DIAGNOSIS — B20 HIV DISEASE (HCC): Primary | ICD-10-CM

## 2020-09-01 DIAGNOSIS — M65.341 TRIGGER RING FINGER OF RIGHT HAND: ICD-10-CM

## 2020-09-01 DIAGNOSIS — F32.3 MDD (MAJOR DEPRESSIVE DISORDER), SINGLE EPISODE, SEVERE WITH PSYCHOTIC FEATURES (HCC): ICD-10-CM

## 2020-09-01 DIAGNOSIS — E11.628 TYPE 2 DIABETES MELLITUS WITH OTHER SKIN COMPLICATION, WITHOUT LONG-TERM CURRENT USE OF INSULIN (HCC): ICD-10-CM

## 2020-09-01 DIAGNOSIS — T65.221D TOXIC EFFECT OF TOBACCO CIGARETTE, UNINTENTIONAL, SUBSEQUENT ENCOUNTER: ICD-10-CM

## 2020-09-01 DIAGNOSIS — H93.13 RINGING IN EAR, BILATERAL: ICD-10-CM

## 2020-09-01 DIAGNOSIS — U07.1 COVID-19: ICD-10-CM

## 2020-09-01 DIAGNOSIS — E55.9 VITAMIN D DEFICIENCY: ICD-10-CM

## 2020-09-01 DIAGNOSIS — Z12.31 ENCOUNTER FOR SCREENING MAMMOGRAM FOR MALIGNANT NEOPLASM OF BREAST: ICD-10-CM

## 2020-09-01 PROCEDURE — 99214 OFFICE O/P EST MOD 30 MIN: CPT | Performed by: NURSE PRACTITIONER

## 2020-09-01 NOTE — ASSESSMENT & PLAN NOTE
Ongoing  Pt has not completed lipid panel for since 1/2020  Pravastatin at goal 80 mg and LDL is not at goal   Discussed adding zetia if LDL not good  Pt does not wish to continue to Lovaza because she is taking to many pills  · D/C Lovaza  · Work with [employer]     · Complete lab work

## 2020-09-01 NOTE — ASSESSMENT & PLAN NOTE
Continue to smoke  Pt is smoking about 3 max per day  Pt is not interested in medication for smoking cessation  Risk reduction: work on smoking 1 or 2 cigarettes  Nicotine Dependency - Primary    Counseled for greater than 15 minutes on the importance of smoking cessation  Education was given regarding the cardiovascular effects of how nicotine affects the heart, lungs, kidneys,and peripheral vascular system    Referred to [facility] for enrollment in smoking cessation program

## 2020-09-01 NOTE — ASSESSMENT & PLAN NOTE
Doing good  Pt reports 100% medication compliance  Stressed the importance of 100% medication adherence  HIV Counseling:    Viral Load: < 20    CD4 Count: 364    ART: Jose Rafeal Padilla    Denies side effects  Stressed the importance of adherence  Continue follow up in the ID clinic with Dr Arielle Rosales  Reviewed the most recent labs, including the Cd4 and viral load  Discussed the risks and benefits of treatment options, instructions for management, importance of treatment adherence, and reduction of risk factors  Educated on possible medication side effects  Counseled on routes of HIV transmission, including the risk of  infection  Emphasized that viral suppression is the best method to prevent HIV transmission  At this time the pt denies the need for HIV testing of anyone in their life  Total encounter time was greater than 35 minutes  Greater than 20 minutes were spent on counseling and patient education  Pt voices understanding and agreement with treatment plan

## 2020-09-01 NOTE — ASSESSMENT & PLAN NOTE
Ongoing  Lab Results   Component Value Date    HGBA1C 7 6 (H) 01/14/2020   Pt has not completed A1C since 1/2020  BS running FBS: 130's   Pt is not interested in any life style management changes or educational classes  Pt reports taking metformin 1000 mg BID with meals  Pt is not interested in controlling her DM, making life style changes, or attending education DM classes or work with Health       · Stressed the importance of completed labs and DM eye exam asap  · Continue to follow with [place]

## 2020-09-01 NOTE — ASSESSMENT & PLAN NOTE
Doing ok  Finger on right ring finger does lock up from time to time    Pt educated on reason behind trigger finger and encouraged to use a stress ball and  finger extensor stretch daily  · Continue to monitor  · Education hand out was provided  · Hematology / Oncology Outpatient Follow Up Note    Yumiko Fernandes 65 y o  female WBJ:[birth year] [facility]:[id number]         Date:  5/18/2018    Assessment / Plan:  A 65year old postmenopausal woman with stage IIIA right breast cancer with invasive lobular histology, grade 2, ER/SC positive HER-2 negative disease  She underwent mastectomy resulting in CHAPARRO  She had 5 positive lymph nodes  She was treated with adjuvant chemotherapy with [facility] followed by paclitaxel  She was subsequently treated with 5 years of adjuvant hormonal therapy with tamoxifen, since she was premenopausal  In August 2016, her postmenopausal condition was confirmed  Since then, she has been on anastrozole with no significant side effects  She has no evidence recurrent disease, based on her symptomatology and physical examinations  I recommended her to continue with anastrozole 1 mg once a day  We discussed the duration of aromatase inhibitor  Due to her risk of disease, I may consider longer than 5 years of treatment  She understood  I will see her again in a year for routine follow-up  Subjective:     HPI:          Interval History:  A 36-year-old postmenopausal female with stage IIIA right breast cancer, grade 2, invasive lobular histology, ER/SC positive HER-2 negative disease  She underwent mastectomy with lymph node dissection followed by adjuvant chemotherapy with a c  followed by paclitaxel  She had 5 positive axillary lymph nodes at the time of surgery  Prior to the chemotherapy, she had regular menstrual cycle  She developed chemotherapy-induced menopause  She underwent adjuvant chemotherapy with AC , followed by paclitaxel  She was treated with 5 years of adjuvant hormonal therapy with tamoxifen  Her postmenopausal condition was confirmed in August 2016  Therefore, she is currently on anastrozole  She presents today for routine follow-up  She continued to do well   She has very minimal hot flashes as well as minimal musculoskeletal symptoms  She has dry cough without production  Recent chest x-ray was negative for pulmonary disease  Her weight is stable  Her performance status is normal       Objective:     Primary Diagnosis:    Right breast cancer stage IIIA (pT2, pN2, M0) grade 2, ER/SD positive, HER-2 negative disease with invasive lobular carcinoma  5 positive axillary lymph nodes  Diagnosed in December 2010  Cancer Staging:  Cancer Staging  No matching staging information was found for the patient  Previous Hematologic/ Oncologic Treatment:     1  Adjuvant chemotherapy with dose-dense AC x4, followed by weekly paclitaxel x12   2  Adjuvant hormonal therapy with tamoxifen from July 2011 through August 2016  Current Hematologic/ Oncologic Treatment:      Adjuvant hormonal therapy with anastrozole since August 2016  Disease Status:     CHAPARRO status post mastectomy with axillary lymph node dissection  Test Results:    Pathology:        Radiology:    CT scan of chest in March 2017 showed no evidence of metastatic disease  DEXA scan in November 2016 showed a T score of -2 0, consistent with osteopenia  Chest x-ray in April 2018 was negative for pulmonary disease  Laboratory:    CA 27, 29 was 45 in April 2018  Physical Exam:      General Appearance:    Alert, oriented        Eyes:    PERRL   Ears:    Normal external ear canals, both ears   Nose:   Nares normal, septum midline   Throat:   Mucosa moist  Pharynx without injection  Neck:   Supple       Lungs:     Clear to auscultation bilaterally   Chest Wall:    No tenderness or deformity    Heart:    Regular rate and rhythm       Abdomen:     Soft, non-tender, bowel sounds +, no organomegaly           Extremities:   Extremities no cyanosis or edema       Skin:   no rash or icterus      Lymph nodes:   Cervical, supraclavicular, and axillary nodes normal   Neurologic:   CNII-XII intact, normal strength, sensation and reflexes     Throughout          Breast exam: status post right mastectomy with TRAM flap reconstruction on the right  Status post left mastectomy without reconstruction  No palpable abnormality in her right reconstructed breast or left chest wall  ROS: Review of Systems   Constitutional:        Minimal hot flashes   Respiratory:        Dry cough   Musculoskeletal:        Minimal musculoskeletal symptoms  All other systems reviewed and are negative  Imaging: No results found  Labs:   Lab Results   Component Value Date    WBC 5 22 04/06/2018    HGB 12 9 04/06/2018    HCT 38 7 04/06/2018    MCV 85 04/06/2018     04/06/2018     Lab Results   Component Value Date     04/06/2018    K 3 8 04/06/2018     04/06/2018    CO2 28 04/06/2018    ANIONGAP 9 04/06/2018    BUN 24 04/06/2018    CREATININE 0 86 04/06/2018    GLUCOSE 90 01/31/2015    GLUF 93 04/06/2018    CALCIUM 8 9 04/06/2018    AST 26 04/06/2018    ALT 50 04/06/2018    ALKPHOS 103 04/06/2018    PROT 7 2 04/06/2018    BILITOT 0 60 04/06/2018    EGFR 73 04/06/2018       Current Medications: Reviewed  Allergies: Reviewed  PMH/FH/SH:  Reviewed      Vital Sign:    Body surface area is 1 64 meters squared      Wt Readings from Last 3 Encounters:   05/18/18 61 9 kg (136 lb 6 4 oz)   03/16/18 60 5 kg (133 lb 7 oz)   11/04/17 61 7 kg (136 lb)        Temp Readings from Last 3 Encounters:   05/18/18 98 1 °F (36 7 °C) (Tympanic)   05/19/17 98 6 °F (37 °C)   11/11/16 98 °F (36 7 °C)        BP Readings from Last 3 Encounters:   05/18/18 120/78   11/04/17 138/80   05/19/17 112/76         Pulse Readings from Last 3 Encounters:   05/18/18 97   05/19/17 100   11/11/16 98     @LASTSAO2(3)@

## 2020-09-01 NOTE — ASSESSMENT & PLAN NOTE
Doing ok  Pt has not seen rheumatoid team since 7/2018  Pt reports it was the rheumatoid who increase her gabapentin  Pt just has pain in right calf but has tingling in toes and finger

## 2020-09-01 NOTE — TELEPHONE ENCOUNTER
RD called, first attempt "non working number", second attempt busy tone and disconnect  Attempt to check in on nutritional status and readiness for change following appointment with PCP earlier today        America Hu, MS, RD, LDN

## 2020-09-01 NOTE — ASSESSMENT & PLAN NOTE
Worsening  Pt reporters this condition has been going on forever but now low pitching noise is getting louder  Pt reports it sounds like crickets at night time     · Referral to ENT to determine if any hearing loss, damage to hair cells on inner ear, or middle ear infection

## 2020-09-01 NOTE — ASSESSMENT & PLAN NOTE
Doing ok  Pt does have tingling in toes, right calf and fingers  Pt has been taking gabapentin 800 mg TID    · DM foot exam today

## 2020-09-01 NOTE — ASSESSMENT & PLAN NOTE
Doing alright  Pt stopped all BH medication cold turkey in June 2020  Pt denies any worsening depression, SI or SA's  Pt has been busy traveling and staying busy  Offered BH services at [facility] and referral to [address] [place] [address] but has denied     · Pt declined offer for referral and office therapist

## 2020-09-01 NOTE — PROGRESS NOTES
Assessment/Plan:    HIV disease ([facility] [address] )  Doing good  Pt reports 100% medication compliance  Stressed the importance of 100% medication adherence  HIV Counseling:    Viral Load: < 20    CD4 Count: 364    ART: Clay [place]    Denies side effects  Stressed the importance of adherence  Continue follow up in the ID clinic with Dr Edward Carmen  Reviewed the most recent labs, including the Cd4 and viral load  Discussed the risks and benefits of treatment options, instructions for management, importance of treatment adherence, and reduction of risk factors  Educated on possible medication side effects  Counseled on routes of HIV transmission, including the risk of  infection  Emphasized that viral suppression is the best method to prevent HIV transmission  At this time the pt denies the need for HIV testing of anyone in their life  Total encounter time was greater than 35 minutes  Greater than 20 minutes were spent on counseling and patient education  Pt voices understanding and agreement with treatment plan  Type 2 diabetes mellitus with skin complication, without long-term current use of insulin (HCC)  Ongoing  Lab Results   Component Value Date    HGBA1C 7 6 (H) 2020   Pt has not completed A1C since 2020  BS running FBS: 130's   Pt is not interested in any life style management changes or educational classes  Pt reports taking metformin 1000 mg BID with meals  Pt is not interested in controlling her DM, making life style changes, or attending education DM classes or work with Health   · Stressed the importance of completed labs and DM eye exam asap  · Continue to follow with [facility] dietician    Toxic effect of tobacco cigarette, unintentional  Continue to smoke  Pt is smoking about 3 max per day  Pt is not interested in medication for smoking cessation  Risk reduction: work on smoking 1 or 2 cigarettes    Nicotine Dependency - Primary    Counseled for greater than 15 minutes on the importance of smoking cessation  Education was given regarding the cardiovascular effects of how nicotine affects the heart, lungs, kidneys,and peripheral vascular system  Referred to [facility] for enrollment in smoking cessation program       Dyslipidemia  Ongoing  Pt has not completed lipid panel for since 1/2020  Pravastatin at goal 80 mg and LDL is not at goal   Discussed adding zetia if LDL not good  Pt does not wish to continue to Lovaza because she is taking to many pills  · D/C [facility]  · Work with [employer]  · Complete lab work    Neuropathy  Doing ok  Pt does have tingling in toes, right calf and fingers  Pt has been taking gabapentin 800 mg TID  · DM foot exam today    Trigger ring finger of right hand  Doing ok  Finger on right ring finger does lock up from time to time  Pt educated on reason behind trigger finger and encouraged to use a stress ball and  finger extensor stretch daily  · Continue to monitor  · Education hand out was provided  ·     Fibromyalgia  Doing ok  Pt has not seen rheumatoid team since 7/2018  Pt reports it was the rheumatoid who increase her gabapentin  Pt just has pain in right calf but has tingling in toes and finger  MDD (major depressive disorder), single episode, severe with psychotic features ([facility] [address] )  Doing alright  Pt stopped all  medication cold turkey in June 2020  Pt denies any worsening depression, SI or SA's  Pt has been busy traveling and staying busy  Offered  services at [facility] and referral to [facility] but has denied  · Pt declined offer for referral and office therapist      Vitamin D deficiency  Pt does not wish to take any vitamins because she has to take too many pills  Ringing in ear, bilateral  Worsening  Pt reporters this condition has been going on forever but now low pitching noise is getting louder  Pt reports it sounds like crickets at night time     · Referral to ENT to determine if any hearing loss, damage to hair cells on inner ear, or middle ear infection        Diagnoses and all orders for this visit:    HIV disease ([facility] [address] )    Encounter for screening mammogram for malignant neoplasm of breast  -     Mammo diagnostic bilateral w 3d & cad; Future    Type 2 diabetes mellitus with other skin complication, without long-term current use of insulin ([facility])  -     Microalbumin / creatinine urine ratio  -     HEMOGLOBIN A1C W/ EAG ESTIMATION; Future    Toxic effect of tobacco cigarette, unintentional, subsequent encounter    Dyslipidemia    COVID-19  -     SARS-CoV2 Antibody, Total (IgG, IgA, IgM) [facility]; Future    Ringing in ear, bilateral  -     Ambulatory Referral to Otolaryngology;  Future    Neuropathy    Trigger ring finger of right hand    Fibromyalgia    MDD (major depressive disorder), single episode, severe with psychotic features ([facility] [address] )    Vitamin D deficiency        Patient Active Problem List   Diagnosis    Asthma    Colon polyps    Type 2 diabetes mellitus with skin complication, without long-term current use of insulin ([facility] [address] )    Fibromyalgia    MDD (major depressive disorder), single episode, severe with psychotic features ([facility] [address] )    Multiple environmental allergies    Toxic effect of tobacco cigarette, unintentional    Poor dentition    H/O abdominal hysterectomy    Hidradenitis suppurativa    Effusion of left knee    HIV disease ([address] )    Mild intermittent asthma without complication    Neuropathy    Osteopenia    Periodontal disease    Primary insomnia    Polyp of colon    Dyslipidemia    Knee strain, left, sequela    Incomplete tear of left rotator cuff    Adhesive capsulitis of left shoulder    Noncompliance with medications    Numbness and tingling of both lower extremities    Falls, initial encounter    Viral upper respiratory infection    Abscess of buttock, left    Right upper quadrant abdominal tenderness with rebound tenderness    BMI 27 0-27 9,adult    Microscopic hematuria    Elevated blood pressure reading    History of smoking 30 or more pack years    Generalized headaches    Osteopenia determined by x-ray    Bilateral swelling of feet and ankles    Cough    Vitamin D deficiency    Trigger ring finger of right hand    Ringing in ear, bilateral     Lab Results   Component Value Date    K 4 2 06/20/2020     06/20/2020    CO2 29 06/20/2020    BUN 14 06/20/2020    CREATININE 0 82 06/20/2020    GLUF 180 (H) 06/20/2020    CALCIUM 9 5 06/20/2020    AST 18 06/20/2020    ALT 33 06/20/2020    ALKPHOS 40 (L) 06/20/2020    EGFR 79 06/20/2020     Lab Results   Component Value Date    WBC 5 30 06/20/2020    WBC 5 1 06/20/2020    HGB 14 1 06/20/2020    HGB 14 5 06/20/2020    HCT 44 9 06/20/2020    HCT 45 6 06/20/2020     (H) 06/20/2020     (H) 06/20/2020     06/20/2020     06/20/2020       Subjective:      Patient ID: Melyssa Santoro is a 63 y o  female  HPI  Pt is being seen for 3 month follow up for management of ongoing chronic health problems  Pt reports doing ok  Pt was recently in [place] to visit her mother and did self quarantine when she returned for 2 weeks  Pt has stopped taking her [address] meds in June because her meds were not working and she does not have a good relationship with the doctor  Pt is Monegasque speaking only and interpretor was present during the entire visit  The following portions of the patient's history were reviewed and updated as appropriate: allergies, current medications, past medical history, past social history and problem list     Review of Systems   Constitutional: Negative  HENT:        Ringing in ears  Respiratory: Negative  Cardiovascular: Negative  Gastrointestinal: Negative  Genitourinary: Negative  Neurological: Negative  Psychiatric/Behavioral: Negative            Objective:      /62 (BP Location: Right arm, Patient Position: Sitting, Cuff Size: Standard)   Pulse 96   Temp Emaana Muñoz ) 97 3 °F (36 3 °C)   Resp 18   Ht 5' 5" (1 651 m)   Wt 74 2 kg (163 lb 8 oz)   SpO2 98%   BMI 27 21 kg/m²          Physical Exam  Constitutional:       Appearance: Normal appearance  Cardiovascular:      Rate and Rhythm: Normal rate and regular rhythm  Chest Wall: PMI is not displaced  Pulses: no weak pulses          Dorsalis pedis pulses are 2+ on the right side and 2+ on the left side  Posterior tibial pulses are 2+ on the right side and 2+ on the left side  Pulmonary:      Effort: Pulmonary effort is normal       Breath sounds: Normal breath sounds  Abdominal:      General: Bowel sounds are normal  There is distension  Palpations: Abdomen is soft  Musculoskeletal: Normal range of motion  Skin:     General: Skin is warm and dry  Capillary Refill: Capillary refill takes less than 2 seconds  Neurological:      Mental Status: She is alert and oriented to person, place, and time  Psychiatric:         Mood and Affect: Mood normal          Behavior: Behavior normal          Thought Content: Thought content normal          Judgment: Judgment normal          BMI Counseling: Body mass index is 27 21 kg/m²  The BMI is above normal  Nutrition recommendations include reducing portion sizes, decreasing overall calorie intake, 3-5 servings of fruits/vegetables daily, reducing fast food intake, consuming healthier snacks, decreasing soda and/or juice intake, moderation in carbohydrate intake and increasing intake of lean protein  Exercise recommendations include moderate aerobic physical activity for 150 minutes/week, exercising 3-5 times per week and strength training exercises  Diabetic Foot Exam    Right Foot/Ankle   Right Foot Inspection  Skin Exam: pre-ulcer                            Sensory   Vibration: intact  Proprioception: intact   Monofilament testing: intact  Vascular  Capillary refills: < 3 seconds  The right DP pulse is 2+  The right PT pulse is 2+       Left Foot/Ankle  Left Foot Inspection  Skin Exam: pre-ulcer                                         Sensory   Vibration: intact  Proprioception: intact  Monofilament: intact  Vascular  Capillary refills: < 3 seconds  The left DP pulse is 2+  The left PT pulse is 2+  Assign Risk Category:  No deformity present; No loss of protective sensation;  No weak pulses       Risk: 0

## 2020-09-08 DIAGNOSIS — B20 HIV DISEASE (HCC): ICD-10-CM

## 2020-09-08 DIAGNOSIS — G62.9 NEUROPATHY: ICD-10-CM

## 2020-09-09 RX ORDER — GABAPENTIN 800 MG/1
TABLET ORAL
Qty: 270 TABLET | Refills: 6 | Status: SHIPPED | OUTPATIENT
Start: 2020-09-09 | End: 2021-05-28 | Stop reason: ALTCHOICE

## 2020-09-09 RX ORDER — ELVITEGRAVIR, COBICISTAT, EMTRICITABINE, AND TENOFOVIR ALAFENAMIDE 150; 150; 200; 10 MG/1; MG/1; MG/1; MG/1
TABLET ORAL
Qty: 30 TABLET | Refills: 3 | Status: SHIPPED | OUTPATIENT
Start: 2020-09-09 | End: 2020-12-22

## 2020-09-10 ENCOUNTER — HOSPITAL ENCOUNTER (OUTPATIENT)
Dept: MAMMOGRAPHY | Facility: CLINIC | Age: 60
Discharge: HOME | End: 2020-09-10
Payer: COMMERCIAL

## 2020-09-10 VITALS — BODY MASS INDEX: 27.16 KG/M2 | WEIGHT: 163 LBS | HEIGHT: 65 IN

## 2020-09-10 DIAGNOSIS — Z12.31 ENCOUNTER FOR SCREENING MAMMOGRAM FOR MALIGNANT NEOPLASM OF BREAST: ICD-10-CM

## 2020-09-10 PROCEDURE — 77067 SCR MAMMO BI INCL CAD: CPT

## 2020-09-10 PROCEDURE — 77063 BREAST TOMOSYNTHESIS BI: CPT

## 2020-09-21 DIAGNOSIS — E78.2 ELEVATED TRIGLYCERIDES WITH HIGH CHOLESTEROL: ICD-10-CM

## 2020-09-21 DIAGNOSIS — J45.20 MILD INTERMITTENT ASTHMA WITHOUT COMPLICATION: ICD-10-CM

## 2020-09-21 RX ORDER — ALBUTEROL SULFATE 90 UG/1
2 AEROSOL, METERED RESPIRATORY (INHALATION) EVERY 6 HOURS PRN
Qty: 1 INHALER | Refills: 5 | Status: SHIPPED | OUTPATIENT
Start: 2020-09-21 | End: 2021-01-26 | Stop reason: SDUPTHER

## 2020-09-21 RX ORDER — OMEGA-3-ACID ETHYL ESTERS 1 G/1
CAPSULE, LIQUID FILLED ORAL
Qty: 180 CAPSULE | Refills: 0 | OUTPATIENT
Start: 2020-09-21

## 2020-09-21 RX ORDER — ALBUTEROL SULFATE 90 UG/1
AEROSOL, METERED RESPIRATORY (INHALATION)
Refills: 4 | OUTPATIENT
Start: 2020-09-21

## 2020-09-24 ENCOUNTER — PATIENT OUTREACH (OUTPATIENT)
Dept: SURGERY | Facility: CLINIC | Age: 60
End: 2020-09-24

## 2020-09-25 DIAGNOSIS — E78.2 ELEVATED TRIGLYCERIDES WITH HIGH CHOLESTEROL: ICD-10-CM

## 2020-09-28 RX ORDER — OMEGA-3-ACID ETHYL ESTERS 1 G/1
CAPSULE, LIQUID FILLED ORAL
Qty: 180 CAPSULE | Refills: 0 | Status: SHIPPED | OUTPATIENT
Start: 2020-09-28 | End: 2020-11-10

## 2020-09-29 DIAGNOSIS — E11.628 TYPE 2 DIABETES MELLITUS WITH OTHER SKIN COMPLICATION, WITHOUT LONG-TERM CURRENT USE OF INSULIN (HCC): ICD-10-CM

## 2020-09-30 ENCOUNTER — PATIENT OUTREACH (OUTPATIENT)
Dept: SURGERY | Facility: CLINIC | Age: 60
End: 2020-09-30

## 2020-09-30 DIAGNOSIS — E11.628 TYPE 2 DIABETES MELLITUS WITH OTHER SKIN COMPLICATION, WITHOUT LONG-TERM CURRENT USE OF INSULIN (HCC): ICD-10-CM

## 2020-10-12 DIAGNOSIS — E11.9 TYPE 2 DIABETES MELLITUS WITHOUT COMPLICATION, WITH LONG-TERM CURRENT USE OF INSULIN (HCC): ICD-10-CM

## 2020-10-12 DIAGNOSIS — Z79.4 TYPE 2 DIABETES MELLITUS WITHOUT COMPLICATION, WITH LONG-TERM CURRENT USE OF INSULIN (HCC): ICD-10-CM

## 2020-10-12 RX ORDER — LANCETS
EACH MISCELLANEOUS 4 TIMES DAILY
Qty: 100 EACH | Refills: 6 | Status: SHIPPED | OUTPATIENT
Start: 2020-10-12 | End: 2021-03-02

## 2020-10-14 ENCOUNTER — PATIENT OUTREACH (OUTPATIENT)
Dept: SURGERY | Facility: CLINIC | Age: 60
End: 2020-10-14

## 2020-10-15 ENCOUNTER — OFFICE VISIT (OUTPATIENT)
Dept: OTOLARYNGOLOGY | Facility: CLINIC | Age: 60
End: 2020-10-15
Payer: COMMERCIAL

## 2020-10-15 ENCOUNTER — OFFICE VISIT (OUTPATIENT)
Dept: AUDIOLOGY | Facility: CLINIC | Age: 60
End: 2020-10-15
Payer: COMMERCIAL

## 2020-10-15 VITALS
SYSTOLIC BLOOD PRESSURE: 129 MMHG | TEMPERATURE: 97.8 F | BODY MASS INDEX: 27.31 KG/M2 | HEART RATE: 83 BPM | DIASTOLIC BLOOD PRESSURE: 80 MMHG | HEIGHT: 65 IN | RESPIRATION RATE: 16 BRPM | WEIGHT: 163.9 LBS

## 2020-10-15 DIAGNOSIS — H90.3 SENSORINEURAL HEARING LOSS OF BOTH EARS: Primary | ICD-10-CM

## 2020-10-15 DIAGNOSIS — H93.13 TINNITUS AURIUM, BILATERAL: Primary | ICD-10-CM

## 2020-10-15 DIAGNOSIS — J34.2 DEVIATED NASAL SEPTUM: ICD-10-CM

## 2020-10-15 PROCEDURE — 99203 OFFICE O/P NEW LOW 30 MIN: CPT | Performed by: OTOLARYNGOLOGY

## 2020-10-15 PROCEDURE — 92567 TYMPANOMETRY: CPT | Performed by: AUDIOLOGIST

## 2020-10-15 PROCEDURE — 92557 COMPREHENSIVE HEARING TEST: CPT | Performed by: AUDIOLOGIST

## 2020-10-16 ENCOUNTER — PATIENT OUTREACH (OUTPATIENT)
Dept: SURGERY | Facility: CLINIC | Age: 60
End: 2020-10-16

## 2020-10-17 ENCOUNTER — LAB (OUTPATIENT)
Dept: LAB | Facility: HOSPITAL | Age: 60
End: 2020-10-17
Payer: COMMERCIAL

## 2020-10-17 DIAGNOSIS — U07.1 COVID-19: ICD-10-CM

## 2020-10-17 DIAGNOSIS — E78.5 DYSLIPIDEMIA: ICD-10-CM

## 2020-10-17 DIAGNOSIS — E11.628 TYPE 2 DIABETES MELLITUS WITH OTHER SKIN COMPLICATION, WITHOUT LONG-TERM CURRENT USE OF INSULIN (HCC): ICD-10-CM

## 2020-10-17 LAB
CHOLEST SERPL-MCNC: 159 MG/DL (ref 50–200)
CREAT UR-MCNC: 174 MG/DL
EST. AVERAGE GLUCOSE BLD GHB EST-MCNC: 137 MG/DL
HBA1C MFR BLD: 6.4 %
HDLC SERPL-MCNC: 50 MG/DL
LDLC SERPL CALC-MCNC: 87 MG/DL (ref 0–100)
MICROALBUMIN UR-MCNC: 24.6 MG/L (ref 0–20)
MICROALBUMIN/CREAT 24H UR: 14 MG/G CREATININE (ref 0–30)
NONHDLC SERPL-MCNC: 109 MG/DL
TRIGL SERPL-MCNC: 111 MG/DL

## 2020-10-17 PROCEDURE — 80061 LIPID PANEL: CPT

## 2020-10-17 PROCEDURE — 36415 COLL VENOUS BLD VENIPUNCTURE: CPT

## 2020-10-17 PROCEDURE — 82570 ASSAY OF URINE CREATININE: CPT | Performed by: NURSE PRACTITIONER

## 2020-10-17 PROCEDURE — 86769 SARS-COV-2 COVID-19 ANTIBODY: CPT

## 2020-10-17 PROCEDURE — 83036 HEMOGLOBIN GLYCOSYLATED A1C: CPT

## 2020-10-17 PROCEDURE — 82043 UR ALBUMIN QUANTITATIVE: CPT | Performed by: NURSE PRACTITIONER

## 2020-10-18 LAB — SARS-COV-2 IGG+IGM SERPL QL IA: NORMAL

## 2020-10-19 DIAGNOSIS — B20 HIV DISEASE (HCC): Primary | ICD-10-CM

## 2020-10-21 ENCOUNTER — LAB (OUTPATIENT)
Dept: LAB | Facility: HOSPITAL | Age: 60
End: 2020-10-21
Payer: COMMERCIAL

## 2020-10-21 ENCOUNTER — PATIENT OUTREACH (OUTPATIENT)
Dept: SURGERY | Facility: CLINIC | Age: 60
End: 2020-10-21

## 2020-10-21 DIAGNOSIS — B20 HIV DISEASE (HCC): ICD-10-CM

## 2020-10-21 LAB
ALBUMIN SERPL BCP-MCNC: 3.7 G/DL (ref 3.5–5)
ALP SERPL-CCNC: 42 U/L (ref 46–116)
ALT SERPL W P-5'-P-CCNC: 27 U/L (ref 12–78)
ANION GAP SERPL CALCULATED.3IONS-SCNC: 12 MMOL/L (ref 4–13)
AST SERPL W P-5'-P-CCNC: 13 U/L (ref 5–45)
BASOPHILS # BLD AUTO: 0.04 THOUSANDS/ΜL (ref 0–0.1)
BASOPHILS NFR BLD AUTO: 1 % (ref 0–1)
BILIRUB SERPL-MCNC: 0.37 MG/DL (ref 0.2–1)
BUN SERPL-MCNC: 14 MG/DL (ref 5–25)
CALCIUM SERPL-MCNC: 9.2 MG/DL (ref 8.3–10.1)
CHLORIDE SERPL-SCNC: 104 MMOL/L (ref 100–108)
CO2 SERPL-SCNC: 24 MMOL/L (ref 21–32)
CREAT SERPL-MCNC: 0.67 MG/DL (ref 0.6–1.3)
EOSINOPHIL # BLD AUTO: 0.08 THOUSAND/ΜL (ref 0–0.61)
EOSINOPHIL NFR BLD AUTO: 2 % (ref 0–6)
ERYTHROCYTE [DISTWIDTH] IN BLOOD BY AUTOMATED COUNT: 13.2 % (ref 11.6–15.1)
GFR SERPL CREATININE-BSD FRML MDRD: 97 ML/MIN/1.73SQ M
GLUCOSE P FAST SERPL-MCNC: 181 MG/DL (ref 65–99)
HCT VFR BLD AUTO: 45.4 % (ref 34.8–46.1)
HGB BLD-MCNC: 14.3 G/DL (ref 11.5–15.4)
IMM GRANULOCYTES # BLD AUTO: 0.02 THOUSAND/UL (ref 0–0.2)
IMM GRANULOCYTES NFR BLD AUTO: 0 % (ref 0–2)
LYMPHOCYTES # BLD AUTO: 1.91 THOUSANDS/ΜL (ref 0.6–4.47)
LYMPHOCYTES NFR BLD AUTO: 35 % (ref 14–44)
MCH RBC QN AUTO: 32 PG (ref 26.8–34.3)
MCHC RBC AUTO-ENTMCNC: 31.5 G/DL (ref 31.4–37.4)
MCV RBC AUTO: 102 FL (ref 82–98)
MONOCYTES # BLD AUTO: 0.42 THOUSAND/ΜL (ref 0.17–1.22)
MONOCYTES NFR BLD AUTO: 8 % (ref 4–12)
NEUTROPHILS # BLD AUTO: 3 THOUSANDS/ΜL (ref 1.85–7.62)
NEUTS SEG NFR BLD AUTO: 54 % (ref 43–75)
NRBC BLD AUTO-RTO: 0 /100 WBCS
PLATELET # BLD AUTO: 300 THOUSANDS/UL (ref 149–390)
PMV BLD AUTO: 9.6 FL (ref 8.9–12.7)
POTASSIUM SERPL-SCNC: 3.9 MMOL/L (ref 3.5–5.3)
PROT SERPL-MCNC: 8 G/DL (ref 6.4–8.2)
RBC # BLD AUTO: 4.47 MILLION/UL (ref 3.81–5.12)
SODIUM SERPL-SCNC: 140 MMOL/L (ref 136–145)
WBC # BLD AUTO: 5.47 THOUSAND/UL (ref 4.31–10.16)

## 2020-10-21 PROCEDURE — 80053 COMPREHEN METABOLIC PANEL: CPT

## 2020-10-21 PROCEDURE — 87536 HIV-1 QUANT&REVRSE TRNSCRPJ: CPT

## 2020-10-21 PROCEDURE — 85025 COMPLETE CBC W/AUTO DIFF WBC: CPT

## 2020-10-21 PROCEDURE — 86361 T CELL ABSOLUTE COUNT: CPT

## 2020-10-21 PROCEDURE — 87591 N.GONORRHOEAE DNA AMP PROB: CPT

## 2020-10-21 PROCEDURE — 87491 CHLMYD TRACH DNA AMP PROBE: CPT

## 2020-10-21 PROCEDURE — 36415 COLL VENOUS BLD VENIPUNCTURE: CPT

## 2020-10-22 LAB
BASOPHILS # BLD AUTO: 0 X10E3/UL (ref 0–0.2)
BASOPHILS NFR BLD AUTO: 1 %
CD3+CD4+ CELLS # BLD: 373 /UL (ref 359–1519)
CD3+CD4+ CELLS NFR BLD: 20.7 % (ref 30.8–58.5)
EOSINOPHIL # BLD AUTO: 0.1 X10E3/UL (ref 0–0.4)
EOSINOPHIL NFR BLD AUTO: 1 %
ERYTHROCYTE [DISTWIDTH] IN BLOOD BY AUTOMATED COUNT: 12.4 % (ref 11.7–15.4)
HCT VFR BLD AUTO: 44.6 % (ref 34–46.6)
HGB BLD-MCNC: 14.7 G/DL (ref 11.1–15.9)
IMM GRANULOCYTES # BLD: 0 X10E3/UL (ref 0–0.1)
IMM GRANULOCYTES NFR BLD: 0 %
LYMPHOCYTES # BLD AUTO: 1.8 X10E3/UL (ref 0.7–3.1)
LYMPHOCYTES NFR BLD AUTO: 34 %
MCH RBC QN AUTO: 31.7 PG (ref 26.6–33)
MCHC RBC AUTO-ENTMCNC: 33 G/DL (ref 31.5–35.7)
MCV RBC AUTO: 96 FL (ref 79–97)
MONOCYTES # BLD AUTO: 0.4 X10E3/UL (ref 0.1–0.9)
MONOCYTES NFR BLD AUTO: 8 %
NEUTROPHILS # BLD AUTO: 2.9 X10E3/UL (ref 1.4–7)
NEUTROPHILS NFR BLD AUTO: 56 %
PLATELET # BLD AUTO: 330 X10E3/UL (ref 150–450)
RBC # BLD AUTO: 4.64 X10E6/UL (ref 3.77–5.28)
WBC # BLD AUTO: 5.2 X10E3/UL (ref 3.4–10.8)

## 2020-10-23 LAB
C TRACH DNA SPEC QL NAA+PROBE: NEGATIVE
N GONORRHOEA DNA SPEC QL NAA+PROBE: NEGATIVE

## 2020-10-24 LAB
HIV1 RNA # SERPL NAA+PROBE: <20 COPIES/ML
HIV1 RNA SERPL NAA+PROBE-LOG#: NORMAL LOG10COPY/ML

## 2020-10-28 ENCOUNTER — PATIENT OUTREACH (OUTPATIENT)
Dept: SURGERY | Facility: CLINIC | Age: 60
End: 2020-10-28

## 2020-11-03 ENCOUNTER — PATIENT OUTREACH (OUTPATIENT)
Dept: SURGERY | Facility: CLINIC | Age: 60
End: 2020-11-03

## 2020-11-04 ENCOUNTER — PATIENT OUTREACH (OUTPATIENT)
Dept: SURGERY | Facility: CLINIC | Age: 60
End: 2020-11-04

## 2020-11-09 ENCOUNTER — OFFICE VISIT (OUTPATIENT)
Dept: SURGERY | Facility: CLINIC | Age: 60
End: 2020-11-09
Payer: COMMERCIAL

## 2020-11-09 VITALS
HEART RATE: 82 BPM | RESPIRATION RATE: 18 BRPM | OXYGEN SATURATION: 98 % | WEIGHT: 165.3 LBS | HEIGHT: 65 IN | TEMPERATURE: 97.9 F | BODY MASS INDEX: 27.54 KG/M2 | SYSTOLIC BLOOD PRESSURE: 114 MMHG | DIASTOLIC BLOOD PRESSURE: 62 MMHG

## 2020-11-09 DIAGNOSIS — Z23 NEED FOR INFLUENZA VACCINATION: ICD-10-CM

## 2020-11-09 DIAGNOSIS — B20 HIV DISEASE (HCC): Primary | ICD-10-CM

## 2020-11-09 DIAGNOSIS — Z00.00 ENCOUNTER FOR SUBSEQUENT ANNUAL WELLNESS VISIT (AWV) IN MEDICARE PATIENT: ICD-10-CM

## 2020-11-09 DIAGNOSIS — T65.221D TOXIC EFFECT OF TOBACCO CIGARETTE, UNINTENTIONAL, SUBSEQUENT ENCOUNTER: ICD-10-CM

## 2020-11-09 DIAGNOSIS — E11.628 TYPE 2 DIABETES MELLITUS WITH OTHER SKIN COMPLICATION, WITHOUT LONG-TERM CURRENT USE OF INSULIN (HCC): ICD-10-CM

## 2020-11-09 PROCEDURE — G0008 ADMIN INFLUENZA VIRUS VAC: HCPCS

## 2020-11-09 PROCEDURE — G0439 PPPS, SUBSEQ VISIT: HCPCS | Performed by: NURSE PRACTITIONER

## 2020-11-09 PROCEDURE — 90682 RIV4 VACC RECOMBINANT DNA IM: CPT

## 2020-11-09 PROCEDURE — 99213 OFFICE O/P EST LOW 20 MIN: CPT | Performed by: NURSE PRACTITIONER

## 2020-11-10 DIAGNOSIS — E78.2 ELEVATED TRIGLYCERIDES WITH HIGH CHOLESTEROL: ICD-10-CM

## 2020-11-10 RX ORDER — OMEGA-3-ACID ETHYL ESTERS 1 G/1
CAPSULE, LIQUID FILLED ORAL
Qty: 180 CAPSULE | Refills: 0 | Status: SHIPPED | OUTPATIENT
Start: 2020-11-10 | End: 2020-12-23 | Stop reason: SDUPTHER

## 2020-11-23 DIAGNOSIS — E11.628 TYPE 2 DIABETES MELLITUS WITH OTHER SKIN COMPLICATION, WITHOUT LONG-TERM CURRENT USE OF INSULIN (HCC): Primary | ICD-10-CM

## 2020-11-23 RX ORDER — UBIQUINOL 100 MG
1 CAPSULE ORAL AS NEEDED
OUTPATIENT
Start: 2020-11-23

## 2020-11-23 RX ORDER — UBIQUINOL 100 MG
1 CAPSULE ORAL DAILY
Qty: 100 EACH | Refills: 5 | Status: SHIPPED | OUTPATIENT
Start: 2020-11-23 | End: 2021-05-04 | Stop reason: SDUPTHER

## 2020-11-23 RX ORDER — UBIQUINOL 100 MG
1 CAPSULE ORAL AS NEEDED
COMMUNITY
End: 2020-11-23 | Stop reason: SDUPTHER

## 2020-12-04 ENCOUNTER — PATIENT OUTREACH (OUTPATIENT)
Dept: SURGERY | Facility: CLINIC | Age: 60
End: 2020-12-04

## 2020-12-16 ENCOUNTER — PATIENT OUTREACH (OUTPATIENT)
Dept: SURGERY | Facility: CLINIC | Age: 60
End: 2020-12-16

## 2020-12-22 DIAGNOSIS — B20 HIV DISEASE (HCC): ICD-10-CM

## 2020-12-22 DIAGNOSIS — E78.2 ELEVATED TRIGLYCERIDES WITH HIGH CHOLESTEROL: ICD-10-CM

## 2020-12-22 RX ORDER — ELVITEGRAVIR, COBICISTAT, EMTRICITABINE, AND TENOFOVIR ALAFENAMIDE 150; 150; 200; 10 MG/1; MG/1; MG/1; MG/1
TABLET ORAL
Qty: 30 TABLET | Refills: 2 | Status: SHIPPED | OUTPATIENT
Start: 2020-12-22 | End: 2021-03-09

## 2020-12-23 DIAGNOSIS — E78.2 ELEVATED TRIGLYCERIDES WITH HIGH CHOLESTEROL: ICD-10-CM

## 2020-12-23 RX ORDER — OMEGA-3-ACID ETHYL ESTERS 1 G/1
2 CAPSULE, LIQUID FILLED ORAL 2 TIMES DAILY
Qty: 180 CAPSULE | Refills: 1 | Status: SHIPPED | OUTPATIENT
Start: 2020-12-23 | End: 2021-05-28 | Stop reason: SDUPTHER

## 2020-12-23 RX ORDER — OMEGA-3-ACID ETHYL ESTERS 1 G/1
CAPSULE, LIQUID FILLED ORAL
Qty: 180 CAPSULE | Refills: 0 | OUTPATIENT
Start: 2020-12-23

## 2020-12-28 ENCOUNTER — PATIENT OUTREACH (OUTPATIENT)
Dept: SURGERY | Facility: CLINIC | Age: 60
End: 2020-12-28

## 2020-12-28 DIAGNOSIS — B20 HIV DISEASE (HCC): Primary | ICD-10-CM

## 2020-12-29 DIAGNOSIS — R05.9 COUGH: ICD-10-CM

## 2020-12-29 DIAGNOSIS — E11.9 TYPE 2 DIABETES MELLITUS WITHOUT COMPLICATION, WITHOUT LONG-TERM CURRENT USE OF INSULIN (HCC): ICD-10-CM

## 2020-12-29 RX ORDER — CROMOLYN SODIUM 5.2 MG
1 AEROSOL, SPRAY WITH PUMP (ML) NASAL 3 TIMES DAILY
Qty: 1 ACT | Refills: 6 | Status: SHIPPED | OUTPATIENT
Start: 2020-12-29

## 2020-12-29 RX ORDER — BLOOD-GLUCOSE METER
KIT MISCELLANEOUS
Qty: 100 EACH | Refills: 5 | Status: SHIPPED | OUTPATIENT
Start: 2020-12-29 | End: 2021-05-28 | Stop reason: SDUPTHER

## 2021-01-04 ENCOUNTER — PATIENT OUTREACH (OUTPATIENT)
Dept: SURGERY | Facility: CLINIC | Age: 61
End: 2021-01-04

## 2021-01-04 RX ORDER — CROMOLYN SODIUM 5.2 MG
1 AEROSOL, SPRAY WITH PUMP (ML) NASAL 3 TIMES DAILY
Refills: 1 | OUTPATIENT
Start: 2021-01-04

## 2021-01-04 RX ORDER — BLOOD-GLUCOSE METER
KIT MISCELLANEOUS
Refills: 4 | OUTPATIENT
Start: 2021-01-04

## 2021-01-05 ENCOUNTER — PATIENT OUTREACH (OUTPATIENT)
Dept: SURGERY | Facility: CLINIC | Age: 61
End: 2021-01-05

## 2021-01-08 ENCOUNTER — PATIENT OUTREACH (OUTPATIENT)
Dept: SURGERY | Facility: CLINIC | Age: 61
End: 2021-01-08

## 2021-01-08 ENCOUNTER — TELEPHONE (OUTPATIENT)
Dept: SURGERY | Facility: CLINIC | Age: 61
End: 2021-01-08

## 2021-01-08 NOTE — PROGRESS NOTES
Cm emailed ct to set up recert appt  Cm followed up with clinic staff if ct ever contacted them back

## 2021-01-11 ENCOUNTER — LAB (OUTPATIENT)
Dept: LAB | Facility: HOSPITAL | Age: 61
End: 2021-01-11
Payer: COMMERCIAL

## 2021-01-11 DIAGNOSIS — E11.628 TYPE 2 DIABETES MELLITUS WITH OTHER SKIN COMPLICATION, WITHOUT LONG-TERM CURRENT USE OF INSULIN (HCC): ICD-10-CM

## 2021-01-11 DIAGNOSIS — B20 HIV DISEASE (HCC): ICD-10-CM

## 2021-01-11 LAB
ALBUMIN SERPL BCP-MCNC: 3.5 G/DL (ref 3.5–5)
ALP SERPL-CCNC: 37 U/L (ref 46–116)
ALT SERPL W P-5'-P-CCNC: 27 U/L (ref 12–78)
ANION GAP SERPL CALCULATED.3IONS-SCNC: 10 MMOL/L (ref 4–13)
AST SERPL W P-5'-P-CCNC: 13 U/L (ref 5–45)
BASOPHILS # BLD AUTO: 0.05 THOUSANDS/ΜL (ref 0–0.1)
BASOPHILS NFR BLD AUTO: 1 % (ref 0–1)
BILIRUB SERPL-MCNC: 0.35 MG/DL (ref 0.2–1)
BUN SERPL-MCNC: 18 MG/DL (ref 5–25)
CALCIUM SERPL-MCNC: 9.1 MG/DL (ref 8.3–10.1)
CHLORIDE SERPL-SCNC: 105 MMOL/L (ref 100–108)
CO2 SERPL-SCNC: 27 MMOL/L (ref 21–32)
CREAT SERPL-MCNC: 0.63 MG/DL (ref 0.6–1.3)
EOSINOPHIL # BLD AUTO: 0.08 THOUSAND/ΜL (ref 0–0.61)
EOSINOPHIL NFR BLD AUTO: 2 % (ref 0–6)
ERYTHROCYTE [DISTWIDTH] IN BLOOD BY AUTOMATED COUNT: 13.1 % (ref 11.6–15.1)
GFR SERPL CREATININE-BSD FRML MDRD: 98 ML/MIN/1.73SQ M
GLUCOSE P FAST SERPL-MCNC: 190 MG/DL (ref 65–99)
HCT VFR BLD AUTO: 42.6 % (ref 34.8–46.1)
HGB BLD-MCNC: 13.3 G/DL (ref 11.5–15.4)
IMM GRANULOCYTES # BLD AUTO: 0.01 THOUSAND/UL (ref 0–0.2)
IMM GRANULOCYTES NFR BLD AUTO: 0 % (ref 0–2)
LYMPHOCYTES # BLD AUTO: 1.58 THOUSANDS/ΜL (ref 0.6–4.47)
LYMPHOCYTES NFR BLD AUTO: 30 % (ref 14–44)
MCH RBC QN AUTO: 32.5 PG (ref 26.8–34.3)
MCHC RBC AUTO-ENTMCNC: 31.2 G/DL (ref 31.4–37.4)
MCV RBC AUTO: 104 FL (ref 82–98)
MONOCYTES # BLD AUTO: 0.44 THOUSAND/ΜL (ref 0.17–1.22)
MONOCYTES NFR BLD AUTO: 8 % (ref 4–12)
NEUTROPHILS # BLD AUTO: 3.06 THOUSANDS/ΜL (ref 1.85–7.62)
NEUTS SEG NFR BLD AUTO: 59 % (ref 43–75)
NRBC BLD AUTO-RTO: 0 /100 WBCS
PLATELET # BLD AUTO: 287 THOUSANDS/UL (ref 149–390)
PMV BLD AUTO: 9.8 FL (ref 8.9–12.7)
POTASSIUM SERPL-SCNC: 4.1 MMOL/L (ref 3.5–5.3)
PROT SERPL-MCNC: 7.9 G/DL (ref 6.4–8.2)
RBC # BLD AUTO: 4.09 MILLION/UL (ref 3.81–5.12)
SODIUM SERPL-SCNC: 142 MMOL/L (ref 136–145)
WBC # BLD AUTO: 5.22 THOUSAND/UL (ref 4.31–10.16)

## 2021-01-11 PROCEDURE — 86361 T CELL ABSOLUTE COUNT: CPT

## 2021-01-11 PROCEDURE — 87536 HIV-1 QUANT&REVRSE TRNSCRPJ: CPT

## 2021-01-11 PROCEDURE — 36415 COLL VENOUS BLD VENIPUNCTURE: CPT

## 2021-01-11 PROCEDURE — 85025 COMPLETE CBC W/AUTO DIFF WBC: CPT

## 2021-01-11 PROCEDURE — 80053 COMPREHEN METABOLIC PANEL: CPT

## 2021-01-12 LAB
BASOPHILS # BLD AUTO: 0.1 X10E3/UL (ref 0–0.2)
BASOPHILS NFR BLD AUTO: 1 %
CD3+CD4+ CELLS # BLD: 339 /UL (ref 359–1519)
CD3+CD4+ CELLS NFR BLD: 21.2 % (ref 30.8–58.5)
EOSINOPHIL # BLD AUTO: 0.1 X10E3/UL (ref 0–0.4)
EOSINOPHIL NFR BLD AUTO: 2 %
ERYTHROCYTE [DISTWIDTH] IN BLOOD BY AUTOMATED COUNT: 12.3 % (ref 11.7–15.4)
HCT VFR BLD AUTO: 39.1 % (ref 34–46.6)
HGB BLD-MCNC: 13.2 G/DL (ref 11.1–15.9)
IMM GRANULOCYTES # BLD: 0 X10E3/UL (ref 0–0.1)
IMM GRANULOCYTES NFR BLD: 0 %
LYMPHOCYTES # BLD AUTO: 1.6 X10E3/UL (ref 0.7–3.1)
LYMPHOCYTES NFR BLD AUTO: 30 %
MCH RBC QN AUTO: 32.8 PG (ref 26.6–33)
MCHC RBC AUTO-ENTMCNC: 33.8 G/DL (ref 31.5–35.7)
MCV RBC AUTO: 97 FL (ref 79–97)
MONOCYTES # BLD AUTO: 0.4 X10E3/UL (ref 0.1–0.9)
MONOCYTES NFR BLD AUTO: 8 %
NEUTROPHILS # BLD AUTO: 3.1 X10E3/UL (ref 1.4–7)
NEUTROPHILS NFR BLD AUTO: 59 %
PLATELET # BLD AUTO: 311 X10E3/UL (ref 150–450)
RBC # BLD AUTO: 4.02 X10E6/UL (ref 3.77–5.28)
WBC # BLD AUTO: 5.3 X10E3/UL (ref 3.4–10.8)

## 2021-01-13 ENCOUNTER — TELEPHONE (OUTPATIENT)
Dept: SURGERY | Facility: CLINIC | Age: 61
End: 2021-01-13

## 2021-01-13 LAB
HIV1 RNA # SERPL NAA+PROBE: <20 COPIES/ML
HIV1 RNA SERPL NAA+PROBE-LOG#: NORMAL LOG10COPY/ML

## 2021-01-13 NOTE — TELEPHONE ENCOUNTER
Received completed comprehensive health assessment from [facility] on 11/30/2020

## 2021-01-14 ENCOUNTER — PATIENT OUTREACH (OUTPATIENT)
Dept: SURGERY | Facility: CLINIC | Age: 61
End: 2021-01-14

## 2021-01-15 ENCOUNTER — OFFICE VISIT (OUTPATIENT)
Dept: SURGERY | Facility: CLINIC | Age: 61
End: 2021-01-15
Payer: COMMERCIAL

## 2021-01-15 VITALS
DIASTOLIC BLOOD PRESSURE: 64 MMHG | TEMPERATURE: 97.8 F | OXYGEN SATURATION: 99 % | HEART RATE: 84 BPM | SYSTOLIC BLOOD PRESSURE: 128 MMHG | WEIGHT: 168.4 LBS | BODY MASS INDEX: 28.06 KG/M2 | HEIGHT: 65 IN

## 2021-01-15 DIAGNOSIS — B20 HIV DISEASE (HCC): Primary | ICD-10-CM

## 2021-01-15 DIAGNOSIS — E11.628 TYPE 2 DIABETES MELLITUS WITH OTHER SKIN COMPLICATION, WITHOUT LONG-TERM CURRENT USE OF INSULIN (HCC): ICD-10-CM

## 2021-01-15 DIAGNOSIS — M65.341 TRIGGER RING FINGER OF RIGHT HAND: Primary | ICD-10-CM

## 2021-01-15 DIAGNOSIS — M65.341 TRIGGER RING FINGER OF RIGHT HAND: ICD-10-CM

## 2021-01-15 DIAGNOSIS — Z87.891 HISTORY OF SMOKING 30 OR MORE PACK YEARS: ICD-10-CM

## 2021-01-15 PROCEDURE — 99214 OFFICE O/P EST MOD 30 MIN: CPT | Performed by: INTERNAL MEDICINE

## 2021-01-15 NOTE — PROGRESS NOTES
RD met with Meredith in conjunction with ID clinic appt, to check in on nutritional status and offer support as receptive       -nutrition  Meredith admitted to not making any nutrition/lifestyle changes  She described cultural foods, pastries, and beverages prepared and consumed over the holidays  RD reminded her of importance of moderation  She admitted that she eats what she wants, and is not interested in making any dietary changes at this point  -T2DM  Meredith does check her BG, usually 130-140 fasting, which is improved from ~200 that she reported historically; she denied hypoglycemic events  RD reinforced taking all medications as prescribed  RD encouraged regular physical activity; pt reports being active with ADLs  RD explained winter mobile market voucher for [employer], she was receptive and took flyer  Pt had nothing further she wished to discuss with RD at present  RD encouraged her to call should she need support  168#   BMI 28  HgbA1C 6 4     Monitor weight trend, HgbA1C, management of T2DM, diet/exercise recall        Silviano Quintanilla, MS, RD, LDN

## 2021-01-15 NOTE — PROGRESS NOTES
Progress Note - Infectious Disease   Petrona Hair 62 y o  female MRN: [id number]  Unit/Bed#:  Encounter: [id number]      Impression/Plan:    1  HIV-doing well on Genvoya with undetectable viral load,  with a CD4 count in the 300s  Continue antiretrovirals   Will recheck labs in 2 months and follow up in 3 months  Stressed adherence        2  Nicotine dependence-continue intervention by our tobacco cessation program   Patient admits of smoking, but now down to 1 pack per week  and attributes this to current quarantine status and stress   Advised patient about smoking cessation       3   Diabetes mellitus-better controlled now, with HbA1c down to at 6 4%,  Patient is  being treated with metformin, dietary interventions, and education   Discussed with primary     4   Microhematuria-seen by Urology 07/2019 who did not recommend any additional workup for now  Ti Conte continue to monitor the UA annually    5  Trigger finger: will discuss with PCP for possible referral to Ortho    Patient was provided medication, adherence and prevention education    Subjective:  Routine follow-up for HIV  Patient claims 100% adherence with     Patient denies any notable side effects  Overall the feeling well  The patient denies any fever chills or sweats, denies any nausea vomiting or diarrhea, denies any cough or shortness of breath  ROS: A complete 12 point ROS is negative other than that noted in the HPI    Followup portions patient history reviewed and updated as:   Allergies, current medications, past medical history, past social history, past surgical history, and the problem list    Objective:  Vitals:  Vitals:    01/15/21 0814   BP: 128/64   BP Location: Right arm   Patient Position: Sitting   Cuff Size: Standard   Pulse: 84   Temp: 97 8 °F (36 6 °C)   SpO2: 99%   Weight: 76 4 kg (168 lb 6 4 oz)   Height: 5' 5" (1 651 m)       Physical Exam:   General Appearance:  Alert, interactive, appearing well,  nontoxic, no acute distress  Neck:   Supple without lymphadenopathy, no thyromegaly or masses   Throat: Oropharynx moist without lesions  Lungs:   Clear to auscultation bilaterally; no wheezes, rhonchi or rales; respirations unlabored   Heart:  RRR; no murmur, rub or gallop   Abdomen:   Soft, non-tender, non-distended, positive bowel sounds  Extremities: No clubbing, cyanosis or edema   Skin: No new rashes or lesions  No draining wounds noted  Labs, Imaging, & Other studies:   All pertinent labs and imaging studies were personally reviewed    Lab Results   Component Value Date    K 4 1 01/11/2021     01/11/2021    CO2 27 01/11/2021    BUN 18 01/11/2021    CREATININE 0 63 01/11/2021    GLUF 190 (H) 01/11/2021    CALCIUM 9 1 01/11/2021    AST 13 01/11/2021    ALT 27 01/11/2021    ALKPHOS 37 (L) 01/11/2021    EGFR 98 01/11/2021     Lab Results   Component Value Date    WBC 5 22 01/11/2021    WBC 5 3 01/11/2021    HGB 13 3 01/11/2021    HGB 13 2 01/11/2021    HCT 42 6 01/11/2021    HCT 39 1 01/11/2021     (H) 01/11/2021    MCV 97 01/11/2021     01/11/2021     01/11/2021     Lab Results   Component Value Date    HEPCAB Non-reactive 06/20/2020     Lab Results   Component Value Date    HAV Reactive (A) 11/10/2017    HEPCAB Non-reactive 06/20/2020     Lab Results   Component Value Date    RPR Non-Reactive 06/20/2020     CD4 ABS   Date/Time Value Ref Range Status   01/11/2021 08:33  (L) 359 - 1519 /uL Final     HIV-1 RNA by PCR, Qn   Date/Time Value Ref Range Status   01/11/2021 08:33 AM <20 copies/mL Final     Comment:     HIV-1 RNA not detected  The reportable range for this assay is 20 to 10,000,000  copies HIV-1 RNA/mL             Current Outpatient Medications:     albuterol (Proventil HFA) 90 mcg/act inhaler, Inhale 2 puffs every 6 (six) hours as needed for wheezing, Disp: 1 Inhaler, Rfl: 5    Alcohol Swabs (Alcohol Prep) 70 % PADS, Use 1 pad daily, Disp: 100 each, Rfl: 5    ammonium lactate (LAC-HYDRIN) 12 % lotion, , Disp: , Rfl:     aspirin (Aspir-Low) 81 mg EC tablet, Take 1 tablet (81 mg total) by mouth daily, Disp: 90 tablet, Rfl: 4    BANOPHEN 50 MG capsule, TAKE 1 CAPSULE BY MOUTH DAILY AT BEDTIME AS NEEDED FOR SLEEP (Patient not taking: Reported on 1/14/2020), Disp: 30 capsule, Rfl: 1    bisacodyl (DULCOLAX) 5 mg EC tablet, Take 30 mg by mouth, Disp: , Rfl:     Blood Glucose Monitoring Suppl (TRUE METRIX METER) GIL, daily  , Disp: , Rfl:     Blood Glucose Monitoring Suppl GIL, daily  , Disp: , Rfl:     budesonide-formoterol (SYMBICORT) 160-4 5 mcg/act inhaler, Inhale 2 puffs, Disp: , Rfl:     busPIRone (BUSPAR) 5 mg tablet, Take 5 mg by mouth 2 (two) times a day, Disp: , Rfl:     calcium carbonate-vitamin D (OSCAL-D) 500 mg-200 units per tablet, TAKE TWO TABLETS BY MOUTH ONCE DAILY WITH BREAKFAST, Disp: 180 tablet, Rfl: 4    cholecalciferol (VITAMIN D3) 1,000 units tablet, TAKE TWO TABLETS BY MOUTH ONCE DAILY, Disp: 180 tablet, Rfl: 4    clotrimazole (LOTRIMIN) 1 % cream, Apply topically 2 (two) times a day, Disp: , Rfl:     cromolyn (NASALCHROM) 5 2 MG/ACT nasal spray, 1 spray into each nostril 3 (three) times a day, Disp: 1 Act, Rfl: 6    FLUoxetine (PROzac) 20 mg capsule, , Disp: , Rfl:     gabapentin (NEURONTIN) 800 mg tablet, TAKE ONE TABLET BY MOUTH THREE TIMES A DAY, Disp: 270 tablet, Rfl: 6    Genvoya tablet, TAKE ONE TABLET BY MOUTH ONCE DAILY, Disp: 30 tablet, Rfl: 2    glucose blood (FREESTYLE LITE) test strip, Use as instructed, Disp: 100 each, Rfl: 5    lidocaine (LIDODERM) 5 %, Place 1 patch on the skin daily Remove & Discard patch within 12 hours or as directed by MD (Patient not taking: Reported on 11/9/2020), Disp: 30 patch, Rfl: 0    lidocaine (LMX) 4 % cream, Apply topically as needed for mild pain, Disp: 30 g, Rfl: 0    loratadine (CLARITIN) 10 mg tablet, Take 1 tablet (10 mg total) by mouth daily (Patient not taking: Reported on 1/14/2020), Disp: 90 tablet, Rfl: 1    metFORMIN (GLUCOPHAGE) 1000 MG tablet, TAKE ONE TABLET BY MOUTH TWICE A DAY WITH MEALS, Disp: 180 tablet, Rfl: 1    ofloxacin (OCUFLOX) 0 3 % ophthalmic solution, Administer 2 drops to both eyes 4 (four) times a day For 5 days   (Patient not taking: Reported on 1/14/2020), Disp: 5 mL, Rfl: 0    omega-3-acid ethyl esters (LOVAZA) 1 g capsule, Take 2 capsules (2 g total) by mouth 2 (two) times a day, Disp: 180 capsule, Rfl: 1    polyethylene glycol (GLYCOLAX) powder, Drink per MD recommendations, Disp: , Rfl:     pravastatin (PRAVACHOL) 80 mg tablet, TAKE ONE TABLET BY MOUTH ONCE DAILY, Disp: 90 tablet, Rfl: 2    sulfaSALAzine (AZULFIDINE-EN) 500 mg EC tablet, , Disp: , Rfl:     Super Thin Lancets MISC, by Percutaneous route 4 (four) times a day, Disp: 100 each, Rfl: 6    traZODone (DESYREL) 50 mg tablet, , Disp: , Rfl: 0

## 2021-01-18 ENCOUNTER — TELEPHONE (OUTPATIENT)
Dept: SURGERY | Facility: CLINIC | Age: 61
End: 2021-01-18

## 2021-01-18 ENCOUNTER — TELEPHONE (OUTPATIENT)
Dept: OBGYN CLINIC | Facility: MEDICAL CENTER | Age: 61
End: 2021-01-18

## 2021-01-18 NOTE — TELEPHONE ENCOUNTER
Patient scheduled to see Dr Chase Hunter by Merritt Mott at [facility]

## 2021-01-18 NOTE — PROGRESS NOTES
Assessment/Plan: S approached pt to explore behavioral, emotional and cognitive aspects in need to be addressed by completing the PHQ-9 screening  During today's contact, pt disclosed being stable at the time, with no behavioral issues in need to address  It was explored pt's willingness to complete the PHQ-9 screening, which she scored 0 with no depressive traits, nor SI or HI disclosed or presented at the time  After screening's completion, it was explored pt's stage of change regarding smoking cessation considerations which she mentioned not been willing to consider at the moment  Pt was encouraged to continue to address behavioral, cognitive, and emotional aspects within [address] [place] [address] which she mentioned been willing to consider in the future as needed  After screening's completion,     [facility]     Today patient present with   Chief Complaint   Patient presents with    HIV Positive    Follow-up     Patient would likely benefit from: Smoking cessation options' discussion  Consider/focus/continue: Exploring behavioral, cognitive, and emotional displays  Stage of change: Pre-contemplation  Plan/ Behavioral Recommendations: Smoking cessation f/u  Diagnoses and all orders for this visit:    HIV disease ([facility] [address] )  -     CBC and differential; Future  -     HIV-1 RNA, quantitative, PCR; Future  -     Comprehensive metabolic panel; Future  -     T-helper cells (CD4) count; Future    Type 2 diabetes mellitus with other skin complication, without long-term current use of insulin (HCC)    Trigger ring finger of right hand    History of smoking 30 or more pack years          Subjective:     Patient ID: Tory Dickerson is a 62 y o  female  HPI    History of Present Illness:      Patient is being seen for annual behavioral health assessment  Patient denies current behavioral health concerns      [unfilled]       Review of Systems      Objective:     Physical Exam      [facility]    Orientation     Person: yes    Place: yes    Time: yes    Appearance    Well Developed: yes healthy    Uncomfortable: no    Normal Body Odor: yes    Smells of Feces: no    Smells of Urine: no    Disheveled: no    Well Nourished: yes weight WNL of ideal    Grooming Unkempt: no    Poor Eye Contact: no    Hirsute: no    Looks Tired: no    Acutely Exhausted: noappearance reflects stated age    Mood and Affect:     Appropriate: yes    Euthymicno    Irritable: no    Angry: no    Anxious: no    Depressed:no    Blunted:no    Labile: no    Restricted: no    Harm to Self or Others: No SI or HI were disclosed nor presented throughout session  Substance Abuse: None reported by pt

## 2021-01-18 NOTE — TELEPHONE ENCOUNTER
Mailed ortho appointment reminder:      02/02 @ [address]  [place], [address] [facility]

## 2021-01-19 ENCOUNTER — PATIENT OUTREACH (OUTPATIENT)
Dept: SURGERY | Facility: CLINIC | Age: 61
End: 2021-01-19

## 2021-01-19 DIAGNOSIS — E78.00 HIGH CHOLESTEROL: ICD-10-CM

## 2021-01-19 RX ORDER — PRAVASTATIN SODIUM 80 MG/1
80 TABLET ORAL DAILY
Qty: 90 TABLET | Refills: 2 | Status: SHIPPED | OUTPATIENT
Start: 2021-01-19 | End: 2021-05-28 | Stop reason: SDUPTHER

## 2021-01-19 RX ORDER — PRAVASTATIN SODIUM 80 MG/1
TABLET ORAL
Qty: 90 TABLET | Refills: 1 | OUTPATIENT
Start: 2021-01-19

## 2021-01-22 ENCOUNTER — PATIENT OUTREACH (OUTPATIENT)
Dept: SURGERY | Facility: CLINIC | Age: 61
End: 2021-01-22

## 2021-01-22 NOTE — PROGRESS NOTES
Cm met with ct to complete recert  No signatures due to covid  Ct states that she is not sexually active  Ct states that she is adherent to all of her medical appointments and medications  Cm went over risk reduction and medication assessment with ct  Ct sees Marilee for ID and PCP care  Ct has [facility] for insurance  Ct lives alone and receives ssi as income  Ct states she goes to [facility] and that her last appointment was in 2019  Ct stated that she only goes to the dentist when she has an emergency  Cm encouraged ct to go every 6 months for check ups and cleanings  Ct is active with  not active with sa  Ct denies domestic abuse and legal issues

## 2021-01-26 DIAGNOSIS — J45.20 MILD INTERMITTENT ASTHMA WITHOUT COMPLICATION: ICD-10-CM

## 2021-01-26 RX ORDER — ALBUTEROL SULFATE 90 UG/1
2 AEROSOL, METERED RESPIRATORY (INHALATION) EVERY 6 HOURS PRN
Qty: 1 INHALER | Refills: 5 | Status: SHIPPED | OUTPATIENT
Start: 2021-01-26 | End: 2021-05-28 | Stop reason: SDUPTHER

## 2021-01-26 RX ORDER — ALBUTEROL SULFATE 90 UG/1
AEROSOL, METERED RESPIRATORY (INHALATION)
Refills: 4 | OUTPATIENT
Start: 2021-01-26

## 2021-01-28 ENCOUNTER — OFFICE VISIT (OUTPATIENT)
Dept: OBGYN CLINIC | Facility: MEDICAL CENTER | Age: 61
End: 2021-01-28
Payer: COMMERCIAL

## 2021-01-28 VITALS — RESPIRATION RATE: 18 BRPM | BODY MASS INDEX: 28.16 KG/M2 | WEIGHT: 169 LBS | HEIGHT: 65 IN

## 2021-01-28 DIAGNOSIS — M65.341 TRIGGER FINGER, RIGHT RING FINGER: Primary | ICD-10-CM

## 2021-01-28 PROCEDURE — 99214 OFFICE O/P EST MOD 30 MIN: CPT | Performed by: ORTHOPAEDIC SURGERY

## 2021-01-28 PROCEDURE — 20550 NJX 1 TENDON SHEATH/LIGAMENT: CPT | Performed by: ORTHOPAEDIC SURGERY

## 2021-01-28 RX ORDER — BETAMETHASONE SODIUM PHOSPHATE AND BETAMETHASONE ACETATE 3; 3 MG/ML; MG/ML
3 INJECTION, SUSPENSION INTRA-ARTICULAR; INTRALESIONAL; INTRAMUSCULAR; SOFT TISSUE
Status: COMPLETED | OUTPATIENT
Start: 2021-01-28 | End: 2021-01-28

## 2021-01-28 RX ORDER — LIDOCAINE HYDROCHLORIDE 10 MG/ML
0.5 INJECTION, SOLUTION INFILTRATION; PERINEURAL
Status: COMPLETED | OUTPATIENT
Start: 2021-01-28 | End: 2021-01-28

## 2021-01-28 RX ADMIN — BETAMETHASONE SODIUM PHOSPHATE AND BETAMETHASONE ACETATE 3 MG: 3; 3 INJECTION, SUSPENSION INTRA-ARTICULAR; INTRALESIONAL; INTRAMUSCULAR; SOFT TISSUE at 09:18

## 2021-01-28 RX ADMIN — LIDOCAINE HYDROCHLORIDE 0.5 ML: 10 INJECTION, SOLUTION INFILTRATION; PERINEURAL at 09:18

## 2021-01-28 NOTE — PROGRESS NOTES
Chief Complaint     Right ring finger locking     History of Present Illness     Kingston Jeong is a 62 y o  female who presents to the office today for an evaluation her right ring finger  I am seeing her in consultation at the request of Yao Ovalles  Patient states she has had pain, clicking, and locking her right ring finger for about 6 months now with no known incident of injury  She states when the finger locks it is very painful to her  She does note the locking is more frequent in the morning especially when she was brushing her teeth  She has not had any treatment in regards to this condition  She denies any previous injuries to the finger  She denies any numbness and tingling  She does have type 2 diabetes her last A1c being 6 4  She does check her glucose often  Patient also reports she is HIV positive  Past Medical History:   Diagnosis Date    Acute pain of left shoulder 6/13/2018    Arthritis     Cellulitis     Colon polyp     Diabetes mellitus (HCC)     Fibromyalgia     Fibromyalgia     HIV (human immunodeficiency virus infection) ([address] )     Hyperlipidemia     Pinguecula of both eyes     Presbyopia of both eyes        Past Surgical History:   Procedure Laterality Date    BREAST BIOPSY Bilateral     more than 12 years ago in [address] [place] [address]      lumpectomy    COLONOSCOPY      last year, polyps, every 6 months    COLONOSCOPY N/A 11/1/2018    Procedure: COLONOSCOPY;  Surgeon: King Cuellar DO;  Location: [facility];   Service: Gastroenterology    HYSTERECTOMY      INCISION AND DRAINAGE PERIRECTAL ABSCESS      OOPHORECTOMY      TUBAL LIGATION         No Known Allergies    Current Outpatient Medications on File Prior to Visit   Medication Sig Dispense Refill    albuterol (Proventil HFA) 90 mcg/act inhaler Inhale 2 puffs every 6 (six) hours as needed for wheezing 1 Inhaler 5    Alcohol Swabs (Alcohol Prep) 70 % PADS Use 1 pad daily 100 each 5    ammonium lactate (LAC-HYDRIN) 12 % lotion       aspirin (Aspir-Low) 81 mg EC tablet Take 1 tablet (81 mg total) by mouth daily 90 tablet 4    bisacodyl (DULCOLAX) 5 mg EC tablet Take 30 mg by mouth      Blood Glucose Monitoring Suppl (TRUE METRIX METER) GIL daily   Blood Glucose Monitoring Suppl GIL daily        budesonide-formoterol (SYMBICORT) 160-4 5 mcg/act inhaler Inhale 2 puffs      busPIRone (BUSPAR) 5 mg tablet Take 5 mg by mouth 2 (two) times a day      calcium carbonate-vitamin D (OSCAL-D) 500 mg-200 units per tablet TAKE TWO TABLETS BY MOUTH ONCE DAILY WITH BREAKFAST 180 tablet 4    cholecalciferol (VITAMIN D3) 1,000 units tablet TAKE TWO TABLETS BY MOUTH ONCE DAILY 180 tablet 4    clotrimazole (LOTRIMIN) 1 % cream Apply topically 2 (two) times a day      cromolyn (NASALCHROM) 5 2 MG/ACT nasal spray 1 spray into each nostril 3 (three) times a day 1 Act 6    FLUoxetine (PROzac) 20 mg capsule       gabapentin (NEURONTIN) 800 mg tablet TAKE ONE TABLET BY MOUTH THREE TIMES A  tablet 6    Genvoya tablet TAKE ONE TABLET BY MOUTH ONCE DAILY 30 tablet 2    glucose blood (FREESTYLE LITE) test strip Use as instructed 100 each 5    lidocaine (LMX) 4 % cream Apply topically as needed for mild pain 30 g 0    metFORMIN (GLUCOPHAGE) 1000 MG tablet TAKE ONE TABLET BY MOUTH TWICE A DAY WITH MEALS 180 tablet 1    omega-3-acid ethyl esters (LOVAZA) 1 g capsule Take 2 capsules (2 g total) by mouth 2 (two) times a day 180 capsule 1    polyethylene glycol (GLYCOLAX) powder Drink per MD recommendations      pravastatin (PRAVACHOL) 80 mg tablet Take 1 tablet (80 mg total) by mouth daily 90 tablet 2    sulfaSALAzine (AZULFIDINE-EN) 500 mg EC tablet       Super Thin Lancets MISC by Percutaneous route 4 (four) times a day 100 each 6    traZODone (DESYREL) 50 mg tablet   0    BANOPHEN 50 MG capsule TAKE 1 CAPSULE BY MOUTH DAILY AT BEDTIME AS NEEDED FOR SLEEP (Patient not taking: Reported on 1/14/2020) 30 capsule 1    lidocaine (LIDODERM) 5 % Place 1 patch on the skin daily Remove & Discard patch within 12 hours or as directed by MD (Patient not taking: Reported on 11/9/2020) 30 patch 0    loratadine (CLARITIN) 10 mg tablet Take 1 tablet (10 mg total) by mouth daily (Patient not taking: Reported on 1/14/2020) 90 tablet 1    ofloxacin (OCUFLOX) 0 3 % ophthalmic solution Administer 2 drops to both eyes 4 (four) times a day For 5 days  (Patient not taking: Reported on 1/14/2020) 5 mL 0     No current facility-administered medications on file prior to visit  Social History     Tobacco Use    Smoking status: Current Every Day Smoker     Packs/day: 1 00     Types: Cigarettes    Smokeless tobacco: Never Used    Tobacco comment: 1 pack every 5 days   Substance Use Topics    Alcohol use: Yes     Alcohol/week: 1 0 standard drinks     Types: 1 Cans of beer per week     Comment: occasionally     Drug use: No       Family History   Problem Relation Age of Onset    Heart disease Mother     Diabetes Mother         type 1    Hypertension Mother     Cancer Sister         ovarian/uterine    Endometrial cancer Sister     Ovarian cancer Sister     No Known Problems Father     No Known Problems Daughter     No Known Problems Maternal Grandmother     No Known Problems Maternal Grandfather     No Known Problems Paternal Grandmother     No Known Problems Paternal Grandfather     No Known Problems Son     No Known Problems Son     No Known Problems Daughter     Throat cancer Brother     No Known Problems Brother     No Known Problems Brother        Review of Systems     As stated in the HPI  All other systems were reviewed and are negative  Physical Exam     Resp 18   Ht 5' 5" (1 651 m)   Wt 76 7 kg (169 lb)   BMI 28 12 kg/m²     GENERAL: This is a well-developed, well-nourished, age-appropriate patient in no acute distress  The patient is alert and oriented x3   Pleasant and cooperative  Eyes: Anicteric sclerae  Extraocular movements appear intact  HENT: Nares are patent with no drainage  Lungs: There is equal chest rise on inspection  Breathing is non-labored with no audible wheezing  Cardiovascular: No cyanosis  No upper extremity lymphadema  Skin: Skin is warm to touch  No obvious skin lesions or rashes other than described below  Neurologic: No ataxia  Psychiatric: Mood and affect are appropriate  Right ring finger   Skin intact   No erythema or ecchymosis noted   No swelling noted   thickening noted at A1 pulley, tender to palpation  active triggering noted,  No flexion contracture noted  Sensation intact to the radial and ulnar aspect of the digit   Brisk capillary refill noted    nail plate intact   DPC  Over 0    Data Review     Results Reviewed     None           Imaging:  None today     Assessment and Plan      Diagnoses and all orders for this visit:    Trigger finger, right ring finger  -     Hand/upper extremity injection: R ring A1          67-year-old female with right ring trigger finger  Patient and I discussed non operative treatment of corticosteroid injection  Because she is diabetic a did  her to check her sugars 3 times daily for the next 3 days as corticosteroid injections can raise her glucose levels  Risks of the injection including pain, infection, tendon rupture, progression of arthritis, fat atrophy, depigmentation, and worsening of symptoms were all discussed with the patient  There was good understanding by the patient and they wish to proceed  Injection was tolerated well  I will see her back in 4 weeks time if she has not has improvement with her symptoms  I discussed the natural course and treatment options of trigger finger with the patient    We discussed that the etiology is thickening of the tendon sheath surrounding the flexor tendons in the distal palm and that common symptoms are pain, catching, clicking, popping, and locking of the digit  We also discussed that there are surgical and nonsurgical means to treat this  Activity modification can be somewhat helpful, though corticosteroid injections are often the first-line treatment for this condition  The published efficacy of the 1st injection for trigger finger is about 45% at achieving full remission and that we may pursue up to 3 injections per the patient's desire if symptomatology returns  At any point, the patient may choose to have surgical intervention which would involve release of the A1 pulley  We discussed the technical aspects, risks, and benefits of the procedure, perioperative care, and expected recovery from this surgery  Follow Up: 4 weeks/prn     To Do Next Visit:     PROCEDURES PERFORMED:  Hand/upper extremity injection: R ring A1  Universal Protocol:  Consent: Verbal consent obtained    Consent given by: patient  Timeout called at: 1/28/2021 9:17 AM   Supporting Documentation  Indications: pain   Procedure Details  Condition:trigger finger Location: ring finger - R ring A1   Preparation: Patient was prepped and draped in the usual sterile fashion  Needle size: 25 G  Ultrasound guidance: no  Medications administered: 0 5 mL lidocaine 1 %; 3 mg betamethasone acetate-betamethasone sodium phosphate 6 (3-3) mg/mL    Patient tolerance: patient tolerated the procedure well with no immediate complications  Dressing:  Sterile dressing applied               Scribe Attestation    I,:  Kristen Ireland MA am acting as a scribe while in the presence of the attending physician :       I,:  Jhon Perez MD personally performed the services described in this documentation    as scribed in my presence :

## 2021-01-28 NOTE — LETTER
January 28, 2021     Bryan Vazquez [address] [address]    Patient: Ishmael Friend   YOB: 1960   Date of Visit: 1/28/2021       Dear Dr Deniz Cobb: Thank you for referring Torres Dickens to me for evaluation  Below are my notes for this consultation  If you have questions, please do not hesitate to call me  I look forward to following your patient along with you  Sincerely,        Janina Shanks MD        CC: No Recipients  Janina Shanks MD  1/28/2021  3:01 PM  Sign when Signing Visit  Chief Complaint     Right ring finger locking     History of Present Illness     Ishmael Friend is a 62 y o  female who presents to the office today for an evaluation her right ring finger  I am seeing her in consultation at the request of Taylor Kehr, [address]  Patient states she has had pain, clicking, and locking her right ring finger for about 6 months now with no known incident of injury  She states when the finger locks it is very painful to her  She does note the locking is more frequent in the morning especially when she was brushing her teeth  She has not had any treatment in regards to this condition  She denies any previous injuries to the finger  She denies any numbness and tingling  She does have type 2 diabetes her last A1c being 6 4  She does check her glucose often  Patient also reports she is HIV positive         Past Medical History:   Diagnosis Date    Acute pain of left shoulder 6/13/2018    Arthritis     Cellulitis     Colon polyp     Diabetes mellitus (HCC)     Fibromyalgia     Fibromyalgia     HIV (human immunodeficiency virus infection) ([address] )     Hyperlipidemia     Pinguecula of both eyes     Presbyopia of both eyes        Past Surgical History:   Procedure Laterality Date    BREAST BIOPSY Bilateral     more than 12 years ago in [facility]      lumpectomy    COLONOSCOPY      last year, polyps, every 6 months    COLONOSCOPY N/A 11/1/2018    Procedure: COLONOSCOPY;  Surgeon: Macie Geiger, DO;  Location: [facility]; Service: Gastroenterology    HYSTERECTOMY      INCISION AND DRAINAGE PERIRECTAL ABSCESS      OOPHORECTOMY      TUBAL LIGATION         No Known Allergies    Current Outpatient Medications on File Prior to Visit   Medication Sig Dispense Refill    albuterol (Proventil HFA) 90 mcg/act inhaler Inhale 2 puffs every 6 (six) hours as needed for wheezing 1 Inhaler 5    Alcohol Swabs (Alcohol Prep) 70 % PADS Use 1 pad daily 100 each 5    ammonium lactate (LAC-HYDRIN) 12 % lotion       aspirin (Aspir-Low) 81 mg EC tablet Take 1 tablet (81 mg total) by mouth daily 90 tablet 4    bisacodyl (DULCOLAX) 5 mg EC tablet Take 30 mg by mouth      Blood Glucose Monitoring Suppl (TRUE METRIX METER) GIL daily   Blood Glucose Monitoring Suppl GIL daily        budesonide-formoterol (SYMBICORT) 160-4 5 mcg/act inhaler Inhale 2 puffs      busPIRone (BUSPAR) 5 mg tablet Take 5 mg by mouth 2 (two) times a day      calcium carbonate-vitamin D (OSCAL-D) 500 mg-200 units per tablet TAKE TWO TABLETS BY MOUTH ONCE DAILY WITH BREAKFAST 180 tablet 4    cholecalciferol (VITAMIN D3) 1,000 units tablet TAKE TWO TABLETS BY MOUTH ONCE DAILY 180 tablet 4    clotrimazole (LOTRIMIN) 1 % cream Apply topically 2 (two) times a day      cromolyn (NASALCHROM) 5 2 MG/ACT nasal spray 1 spray into each nostril 3 (three) times a day 1 Act 6    FLUoxetine (PROzac) 20 mg capsule       gabapentin (NEURONTIN) 800 mg tablet TAKE ONE TABLET BY MOUTH THREE TIMES A  tablet 6    Genvoya tablet TAKE ONE TABLET BY MOUTH ONCE DAILY 30 tablet 2    glucose blood (FREESTYLE LITE) test strip Use as instructed 100 each 5    lidocaine (LMX) 4 % cream Apply topically as needed for mild pain 30 g 0    metFORMIN (GLUCOPHAGE) 1000 MG tablet TAKE ONE TABLET BY MOUTH TWICE A DAY WITH MEALS 180 tablet 1    omega-3-acid ethyl esters (LOVAZA) 1 g capsule Take 2 capsules (2 g total) by mouth 2 (two) times a day 180 capsule 1    polyethylene glycol (GLYCOLAX) powder Drink per MD recommendations      pravastatin (PRAVACHOL) 80 mg tablet Take 1 tablet (80 mg total) by mouth daily 90 tablet 2    sulfaSALAzine (AZULFIDINE-EN) 500 mg EC tablet       Super Thin Lancets MISC by Percutaneous route 4 (four) times a day 100 each 6    traZODone (DESYREL) 50 mg tablet   0    BANOPHEN 50 MG capsule TAKE 1 CAPSULE BY MOUTH DAILY AT BEDTIME AS NEEDED FOR SLEEP (Patient not taking: Reported on 1/14/2020) 30 capsule 1    lidocaine (LIDODERM) 5 % Place 1 patch on the skin daily Remove & Discard patch within 12 hours or as directed by MD (Patient not taking: Reported on 11/9/2020) 30 patch 0    loratadine (CLARITIN) 10 mg tablet Take 1 tablet (10 mg total) by mouth daily (Patient not taking: Reported on 1/14/2020) 90 tablet 1    ofloxacin (OCUFLOX) 0 3 % ophthalmic solution Administer 2 drops to both eyes 4 (four) times a day For 5 days  (Patient not taking: Reported on 1/14/2020) 5 mL 0     No current facility-administered medications on file prior to visit  Social History     Tobacco Use    Smoking status: Current Every Day Smoker     Packs/day: 1 00     Types: Cigarettes    Smokeless tobacco: Never Used    Tobacco comment: 1 pack every 5 days   Substance Use Topics    Alcohol use:  Yes     Alcohol/week: 1 0 standard drinks     Types: 1 Cans of beer per week     Comment: occasionally     Drug use: No       Family History   Problem Relation Age of Onset    Heart disease Mother     Diabetes Mother         type 1    Hypertension Mother     Cancer Sister         ovarian/uterine    Endometrial cancer Sister     Ovarian cancer Sister     No Known Problems Father     No Known Problems Daughter     No Known Problems Maternal Grandmother     No Known Problems Maternal Grandfather     No Known Problems Paternal Grandmother     No Known Problems Paternal Grandfather     No Known Problems Son     No Known Problems Son     No Known Problems Daughter     Throat cancer Brother     No Known Problems Brother     No Known Problems Brother        Review of Systems     As stated in the HPI  All other systems were reviewed and are negative  Physical Exam     Resp 18   Ht 5' 5" (1 651 m)   Wt 76 7 kg (169 lb)   BMI 28 12 kg/m²     GENERAL: This is a well-developed, well-nourished, age-appropriate patient in no acute distress  The patient is alert and oriented x3  Pleasant and cooperative  Eyes: Anicteric sclerae  Extraocular movements appear intact  HENT: Nares are patent with no drainage  Lungs: There is equal chest rise on inspection  Breathing is non-labored with no audible wheezing  Cardiovascular: No cyanosis  No upper extremity lymphadema  Skin: Skin is warm to touch  No obvious skin lesions or rashes other than described below  Neurologic: No ataxia  Psychiatric: Mood and affect are appropriate  Right ring finger   Skin intact   No erythema or ecchymosis noted   No swelling noted   thickening noted at A1 pulley, tender to palpation  active triggering noted,  No flexion contracture noted  Sensation intact to the radial and ulnar aspect of the digit   Brisk capillary refill noted    nail plate intact   DPC  Over 0    Data Review     Results Reviewed     None           Imaging:  None today     Assessment and Plan      Diagnoses and all orders for this visit:    Trigger finger, right ring finger  -     Hand/upper extremity injection: R ring A1          25-year-old female with right ring trigger finger  Patient and I discussed non operative treatment of corticosteroid injection  Because she is diabetic a did  her to check her sugars 3 times daily for the next 3 days as corticosteroid injections can raise her glucose levels   Risks of the injection including pain, infection, tendon rupture, progression of arthritis, fat atrophy, depigmentation, and worsening of symptoms were all discussed with the patient  There was good understanding by the patient and they wish to proceed  Injection was tolerated well  I will see her back in 4 weeks time if she has not has improvement with her symptoms  I discussed the natural course and treatment options of trigger finger with the patient  We discussed that the etiology is thickening of the tendon sheath surrounding the flexor tendons in the distal palm and that common symptoms are pain, catching, clicking, popping, and locking of the digit  We also discussed that there are surgical and nonsurgical means to treat this  Activity modification can be somewhat helpful, though corticosteroid injections are often the first-line treatment for this condition  The published efficacy of the 1st injection for trigger finger is about 45% at achieving full remission and that we may pursue up to 3 injections per the patient's desire if symptomatology returns  At any point, the patient may choose to have surgical intervention which would involve release of the A1 pulley  We discussed the technical aspects, risks, and benefits of the procedure, perioperative care, and expected recovery from this surgery  Follow Up: 4 weeks/prn     To Do Next Visit:     PROCEDURES PERFORMED:  Hand/upper extremity injection: R ring A1  Universal Protocol:  Consent: Verbal consent obtained    Consent given by: patient  Timeout called at: 1/28/2021 9:17 AM   Supporting Documentation  Indications: pain   Procedure Details  Condition:trigger finger Location: ring finger - R ring A1   Preparation: Patient was prepped and draped in the usual sterile fashion  Needle size: 25 G  Ultrasound guidance: no  Medications administered: 0 5 mL lidocaine 1 %; 3 mg betamethasone acetate-betamethasone sodium phosphate 6 (3-3) mg/mL    Patient tolerance: patient tolerated the procedure well with no immediate complications  Dressing: Sterile dressing applied               Scribe Attestation    I,:  Yissel Romero MA am acting as a scribe while in the presence of the attending physician :       I,:  Alicia Perez MD personally performed the services described in this documentation    as scribed in my presence :

## 2021-02-09 ENCOUNTER — PATIENT OUTREACH (OUTPATIENT)
Dept: SURGERY | Facility: CLINIC | Age: 61
End: 2021-02-09

## 2021-02-23 ENCOUNTER — PATIENT OUTREACH (OUTPATIENT)
Dept: SURGERY | Facility: CLINIC | Age: 61
End: 2021-02-23

## 2021-02-25 ENCOUNTER — PATIENT OUTREACH (OUTPATIENT)
Dept: SURGERY | Facility: CLINIC | Age: 61
End: 2021-02-25

## 2021-03-01 ENCOUNTER — OFFICE VISIT (OUTPATIENT)
Dept: SURGERY | Facility: CLINIC | Age: 61
End: 2021-03-01
Payer: COMMERCIAL

## 2021-03-01 VITALS
OXYGEN SATURATION: 99 % | HEIGHT: 65 IN | WEIGHT: 166.6 LBS | HEART RATE: 84 BPM | SYSTOLIC BLOOD PRESSURE: 144 MMHG | TEMPERATURE: 96.6 F | RESPIRATION RATE: 18 BRPM | DIASTOLIC BLOOD PRESSURE: 62 MMHG | BODY MASS INDEX: 27.76 KG/M2

## 2021-03-01 DIAGNOSIS — N64.4 PAINFUL LUMPY RIGHT BREAST: ICD-10-CM

## 2021-03-01 DIAGNOSIS — B37.2 YEAST INFECTION OF THE SKIN: ICD-10-CM

## 2021-03-01 DIAGNOSIS — Z11.3 ROUTINE SCREENING FOR STI (SEXUALLY TRANSMITTED INFECTION): ICD-10-CM

## 2021-03-01 DIAGNOSIS — N63.10 PAINFUL LUMPY RIGHT BREAST: ICD-10-CM

## 2021-03-01 DIAGNOSIS — N63.21 LUMP IN UPPER OUTER QUADRANT OF LEFT BREAST: ICD-10-CM

## 2021-03-01 DIAGNOSIS — B20 HIV DISEASE (HCC): Primary | ICD-10-CM

## 2021-03-01 DIAGNOSIS — T65.221D TOXIC EFFECT OF TOBACCO CIGARETTE, UNINTENTIONAL, SUBSEQUENT ENCOUNTER: ICD-10-CM

## 2021-03-01 DIAGNOSIS — Z13.6 SCREENING FOR CARDIOVASCULAR CONDITION: ICD-10-CM

## 2021-03-01 DIAGNOSIS — Z11.3 ENCOUNTER FOR SCREENING FOR INFECTIONS WITH A PREDOMINANTLY SEXUAL MODE OF TRANSMISSION: ICD-10-CM

## 2021-03-01 DIAGNOSIS — E11.628 TYPE 2 DIABETES MELLITUS WITH OTHER SKIN COMPLICATION, WITHOUT LONG-TERM CURRENT USE OF INSULIN (HCC): ICD-10-CM

## 2021-03-01 PROCEDURE — 99214 OFFICE O/P EST MOD 30 MIN: CPT | Performed by: NURSE PRACTITIONER

## 2021-03-01 RX ORDER — NYSTATIN 100000 [USP'U]/G
POWDER TOPICAL 2 TIMES DAILY
Qty: 15 G | Refills: 3 | Status: SHIPPED | OUTPATIENT
Start: 2021-03-01

## 2021-03-01 NOTE — ASSESSMENT & PLAN NOTE
Ongoing since the big snow storm  Pt had bilateral redness, pain, and itching in folds of breast tissue  Pt was using diclofenac sodium cream that her mother had and it helped with the pain  Pt was also using hydrogen peroxide for breast folds  Pt does not wear a bra  Area appears to be healing  Noted erythema in bilateral breast folds, with darken pigment of skin  Educated pt on how yeast infections happen and what to be done to manage yeast infection    · Stop using diclofenac cream  · Clean twice daily with mild soap and water; dry thoroughly, and apply small amount of nystatin powder until healed  · May need to use gauze or pads to under areas of breast to help absorb sweat or moisture  · Wear cotton bra to help with wicking moisture  · Keep area dry

## 2021-03-01 NOTE — ASSESSMENT & PLAN NOTE
New diagnosis  Patient reports small round pea-sized lump on the lateral aspect of breast tissue approximately 9 o' clock that is painful on palpation  Manual breast exam performed  No lymph node involvement or tenderness, nipple drainage, or other lumps  Lump is very discrete, fixed, and does not move with noted dimpling as patient was lying flat on exam table  Screening mammo 9/2020:IMPRESSION:  No mammographic evidence of malignancy  DDX: fibroadenomas, cyst, lipoma, or overgrowth of duct  Pt denies family hx of breast cancer      · Ordered US of bilateral breast tissue

## 2021-03-01 NOTE — ASSESSMENT & PLAN NOTE
Feeling fine  Patient did not complete last A1c with other labs 2021  Patient reports blood sugars are runnin    Lab Results   Component Value Date    HGBA1C 6 4 (H) 10/17/2020   · Continue metformin  · Complete DM eye exam  Stressed importance of exercising for 30 minutes 5 days a week per recommended guidelines  Stressed importance of eating a diet low in fat, carbohydrates, cholesterol, sugar intake, monitor portion control and eating more fruits and vegetables    · Continue to follow with [facility] dietitian  · Complete A1C that was ordered

## 2021-03-01 NOTE — PROGRESS NOTES
Assessment/Plan:    HIV disease ([address] )  Doing good  Pt reports 100% medication compliance  Stressed the importance of 100% medication adherence  HIV Counseling:    Viral Load: < 20    CD4 Count: 339    ART:  Diane Huffman    Denies side effects  Stressed the importance of adherence  Continue follow up in the ID clinic with Dr Vadim Prieto  Reviewed the most recent labs, including the Cd4 and viral load  Discussed the risks and benefits of treatment options, instructions for management, importance of treatment adherence, and reduction of risk factors  Educated on possible medication side effects  Counseled on routes of HIV transmission, including the risk of  infection  Emphasized that viral suppression is the best method to prevent HIV transmission  At this time the pt denies the need for HIV testing of anyone in their life  Total encounter time was greater than 35 minutes  Greater than 20 minutes were spent on counseling and patient education  Pt voices understanding and agreement with treatment plan  Toxic effect of tobacco cigarette, unintentional  Continues to smoke  Patient is smoking about 2 packs over a week  Pt is caring for her 79 yo mother by herself and with the pandemic is feeling more anxious  Pt does cry it out and then feels better  Pt does want any intervention at this time  · Continue stressed importance of 100% smoking sensation  Nicotine Dependency - Primary    Counseled for greater than 15 minutes on the importance of smoking cessation  Education was given regarding the cardiovascular effects of how nicotine affects the heart, lungs, kidneys,and peripheral vascular system  Referred to [facility] for enrollment in smoking cessation program       Type 2 diabetes mellitus with skin complication, without long-term current use of insulin (HCC)  Feeling fine  Patient did not complete last A1c with other labs 2021    Patient reports blood sugars are runnin    Lab Results   Component Value Date    HGBA1C 6 4 (H) 10/17/2020   · Continue metformin  · Complete DM eye exam  Stressed importance of exercising for 30 minutes 5 days a week per recommended guidelines  Stressed importance of eating a diet low in fat, carbohydrates, cholesterol, sugar intake, monitor portion control and eating more fruits and vegetables  · Continue to follow with [facility] dietitian  · Complete A1C that was ordered    Trigger ring finger of right hand  Doing better  Pt went to orthopedic and was injected in right ring finger  Pt reports improvement in movement of finger and not locking  · F/u with ortho if needed     Yeast infection of the skin  Ongoing since the big snow storm  Pt had bilateral redness, pain, and itching in folds of breast tissue  Pt was using diclofenac sodium cream that her mother had and it helped with the pain  Pt was also using hydrogen peroxide for breast folds  Pt does not wear a bra  Area appears to be healing  Noted erythema in bilateral breast folds, with darken pigment of skin  Educated pt on how yeast infections happen and what to be done to manage yeast infection  · Stop using diclofenac cream  · Clean twice daily with mild soap and water; dry thoroughly, and apply small amount of nystatin powder until healed  · May need to use gauze or pads to under areas of breast to help absorb sweat or moisture  · Wear cotton bra to help with wicking moisture  · Keep area dry       Painful lumpy right breast  New dx  At 4' o clock area lump painful to palpation  Pt felt this a few days ago  Pt does perform self breat exams frequently while in shower  Manual breast exam was performed  Pt had previous excisional bx for breast lumps in right upper breast   No lymph node involvement or tenderness, nipple drainage, dimpling, or other lumps  Screening mammo 9/2020:IMPRESSION:  No mammographic evidence of malignancy     DDX: fibroadenomas, cyst, lipoma, cancer, or overgrowth of duct  Pt denies family hx of breast cancer  · Ordered US of bilateral breast tissue     Lump in upper outer quadrant of left breast  New diagnosis  Patient reports small round pea-sized lump on the lateral aspect of breast tissue approximately 9 o' clock that is painful on palpation  Manual breast exam performed  No lymph node involvement or tenderness, nipple drainage, or other lumps  Lump is very discrete, fixed, and does not move with noted dimpling as patient was lying flat on exam table  Screening mammo 9/2020:IMPRESSION:  No mammographic evidence of malignancy  DDX: fibroadenomas, cyst, lipoma, or overgrowth of duct  Pt denies family hx of breast cancer  · Ordered US of bilateral breast tissue        Diagnoses and all orders for this visit:    HIV disease ([address] )    Toxic effect of tobacco cigarette, unintentional, subsequent encounter    Type 2 diabetes mellitus with other skin complication, without long-term current use of insulin ([facility])    Painful lumpy right breast    Lump in upper outer quadrant of left breast    Yeast infection of the skin  -     nystatin (MYCOSTATIN) powder; Apply topically 4 (four) times a day    Other orders  -     Cancel: US BREAST BILATERAL LIMITED (DIAGNOSTIC);  Future        Patient Active Problem List   Diagnosis    Asthma    Colon polyps    Type 2 diabetes mellitus with skin complication, without long-term current use of insulin ([address] )    Fibromyalgia    MDD (major depressive disorder), single episode, severe with psychotic features ([address] )    Multiple environmental allergies    Toxic effect of tobacco cigarette, unintentional    Poor dentition    H/O abdominal hysterectomy    Hidradenitis suppurativa    Effusion of left knee    HIV disease ([address] )    Mild intermittent asthma without complication    Neuropathy    Osteopenia    Periodontal disease    Primary insomnia    Polyp of colon    Dyslipidemia    Knee strain, left, sequela    Incomplete tear of left rotator cuff    Adhesive capsulitis of left shoulder    Noncompliance with medications    Numbness and tingling of both lower extremities    Falls, initial encounter    Viral upper respiratory infection    Abscess of buttock, left    Right upper quadrant abdominal tenderness with rebound tenderness    BMI 27 0-27 9,adult    Microscopic hematuria    Elevated blood pressure reading    History of smoking 30 or more pack years    Generalized headaches    Osteopenia determined by x-ray    Bilateral swelling of feet and ankles    Cough    Vitamin D deficiency    Trigger ring finger of right hand    Ringing in ear, bilateral    Painful lumpy right breast    Lump in upper outer quadrant of left breast    Yeast infection of the skin     Lab Results   Component Value Date    K 4 1 01/11/2021     01/11/2021    CO2 27 01/11/2021    BUN 18 01/11/2021    CREATININE 0 63 01/11/2021    GLUF 190 (H) 01/11/2021    CALCIUM 9 1 01/11/2021    AST 13 01/11/2021    ALT 27 01/11/2021    ALKPHOS 37 (L) 01/11/2021    EGFR 98 01/11/2021     Lab Results   Component Value Date    WBC 5 22 01/11/2021    WBC 5 3 01/11/2021    HGB 13 3 01/11/2021    HGB 13 2 01/11/2021    HCT 42 6 01/11/2021    HCT 39 1 01/11/2021     (H) 01/11/2021    MCV 97 01/11/2021     01/11/2021     01/11/2021       Subjective:      Patient ID: Shakir Monsivais is a 62 y o  female  HPI  Patient is being seen in the office today for follow-up visit and management of chronic healthcare conditions  PMH:  HIV, DM, asthma, MDD, dyslipidemia, smoker, insomnia, fibromyalgia, vitamin-D deficiency, and osteopenia  Pt reports she is doing ok  Pt has been taking care of her aging mother and is feeling very stressed out  Pt is smoking again  Pt is Sami speaking only and interpretor was present during the entire visit     The following portions of the patient's history were reviewed and updated as appropriate: allergies, current medications, past medical history, past social history, past surgical history and problem list     Review of Systems   Constitutional: Negative  HENT: Negative  Respiratory: Negative  Bilateral breast lumps  Cardiovascular: Negative  Musculoskeletal: Negative  Neurological: Negative  Psychiatric/Behavioral: Negative  Objective:      /62 (BP Location: Right arm, Patient Position: Sitting, Cuff Size: Standard)   Pulse 84   Temp (!) 96 6 °F (35 9 °C)   Resp 18   Ht 5' 5" (1 651 m)   Wt 75 6 kg (166 lb 9 6 oz)   SpO2 99%   BMI 27 72 kg/m²          Physical Exam  Vitals signs and nursing note reviewed  Exam conducted with a chaperone present  Constitutional:       Appearance: Normal appearance  Cardiovascular:      Rate and Rhythm: Normal rate and regular rhythm  Pulmonary:      Effort: Pulmonary effort is normal       Breath sounds: Normal breath sounds  Abdominal:      General: Bowel sounds are normal       Palpations: Abdomen is soft  Musculoskeletal: Normal range of motion  Skin:     General: Skin is warm and dry  Capillary Refill: Capillary refill takes less than 2 seconds  Neurological:      Mental Status: She is alert and oriented to person, place, and time  Psychiatric:         Mood and Affect: Mood normal          Behavior: Behavior normal          Thought Content: Thought content normal          Judgment: Judgment normal          Breasts: breasts appear normal, no suspicious masses, no skin or nipple changes or axillary nodes, abnormal mass palpable on left lateral breast wall pea size, discreet, firm lump at 9 o' clock that is tender on palpation and right side at with soft lump tender to palpation at 4 o' clock

## 2021-03-01 NOTE — ASSESSMENT & PLAN NOTE
Doing better  Pt went to orthopedic and was injected in right ring finger  Pt reports improvement in movement of finger and not locking    · F/u with ortho if needed

## 2021-03-01 NOTE — ASSESSMENT & PLAN NOTE
Feels fine  BP: 144/62  Pt feels her bp is usually wnl  Educated pt on how stress, smoking, and anxiety can cause increase in BP    · Monitor bp if remains elevated would consider HCTZ

## 2021-03-01 NOTE — ASSESSMENT & PLAN NOTE
Continues to smoke  Patient is smoking about 2 packs over a week  Pt is caring for her 79 yo mother by herself and with the pandemic is feeling more anxious  Pt does cry it out and then feels better  Pt does want any intervention at this time  · Continue stressed importance of 100% smoking sensation  Nicotine Dependency - Primary    Counseled for greater than 15 minutes on the importance of smoking cessation  Education was given regarding the cardiovascular effects of how nicotine affects the heart, lungs, kidneys,and peripheral vascular system    Referred to [address] for enrollment in smoking cessation program

## 2021-03-01 NOTE — ASSESSMENT & PLAN NOTE
New dx  At 4' o clock area lump painful to palpation  Pt felt this a few days ago  Pt does perform self breat exams frequently while in shower  Manual breast exam was performed  Pt had previous excisional bx for breast lumps in right upper breast   No lymph node involvement or tenderness, nipple drainage, dimpling, or other lumps  Screening mammo 9/2020:IMPRESSION:  No mammographic evidence of malignancy     DDX: fibroadenomas, cyst, lipoma, cancer, or overgrowth of duct  Pt denies family hx of breast cancer  · Ordered US of bilateral breast tissue

## 2021-03-01 NOTE — ASSESSMENT & PLAN NOTE
Doing good  Pt reports 100% medication compliance  Stressed the importance of 100% medication adherence  HIV Counseling:    Viral Load: < 20    CD4 Count: 339    ART:  Diane Huffman    Denies side effects  Stressed the importance of adherence  Continue follow up in the ID clinic with Dr Vadim Prieto  Reviewed the most recent labs, including the Cd4 and viral load  Discussed the risks and benefits of treatment options, instructions for management, importance of treatment adherence, and reduction of risk factors  Educated on possible medication side effects  Counseled on routes of HIV transmission, including the risk of  infection  Emphasized that viral suppression is the best method to prevent HIV transmission  At this time the pt denies the need for HIV testing of anyone in their life  Total encounter time was greater than 35 minutes  Greater than 20 minutes were spent on counseling and patient education  Pt voices understanding and agreement with treatment plan

## 2021-03-02 DIAGNOSIS — Z79.4 TYPE 2 DIABETES MELLITUS WITHOUT COMPLICATION, WITH LONG-TERM CURRENT USE OF INSULIN (HCC): ICD-10-CM

## 2021-03-02 DIAGNOSIS — E11.9 TYPE 2 DIABETES MELLITUS WITHOUT COMPLICATION, WITH LONG-TERM CURRENT USE OF INSULIN (HCC): ICD-10-CM

## 2021-03-02 RX ORDER — BLOOD-GLUCOSE METER
EACH MISCELLANEOUS
Qty: 100 EACH | Refills: 5 | Status: SHIPPED | OUTPATIENT
Start: 2021-03-02 | End: 2021-05-28 | Stop reason: SDUPTHER

## 2021-03-08 ENCOUNTER — PATIENT OUTREACH (OUTPATIENT)
Dept: SURGERY | Facility: CLINIC | Age: 61
End: 2021-03-08

## 2021-03-09 DIAGNOSIS — B20 HIV DISEASE (HCC): ICD-10-CM

## 2021-03-09 RX ORDER — ELVITEGRAVIR, COBICISTAT, EMTRICITABINE, AND TENOFOVIR ALAFENAMIDE 150; 150; 200; 10 MG/1; MG/1; MG/1; MG/1
TABLET ORAL
Qty: 30 TABLET | Refills: 1 | Status: SHIPPED | OUTPATIENT
Start: 2021-03-09 | End: 2021-04-30 | Stop reason: SDUPTHER

## 2021-03-15 ENCOUNTER — TRANSCRIBE ORDERS (OUTPATIENT)
Dept: ADMINISTRATIVE | Facility: HOSPITAL | Age: 61
End: 2021-03-15

## 2021-03-15 DIAGNOSIS — N64.4 BREAST PAIN: Primary | ICD-10-CM

## 2021-03-15 DIAGNOSIS — N63.0 BREAST LUMP: ICD-10-CM

## 2021-03-18 ENCOUNTER — TELEPHONE (OUTPATIENT)
Dept: SURGERY | Facility: CLINIC | Age: 61
End: 2021-03-18

## 2021-03-18 NOTE — TELEPHONE ENCOUNTER
[facility] for [facility] requesting written consultation and patient demographics to schedule diabetic eye exam       Faxed provider letter to [phone]

## 2021-03-23 ENCOUNTER — HOSPITAL ENCOUNTER (OUTPATIENT)
Dept: MAMMOGRAPHY | Facility: CLINIC | Age: 61
Discharge: HOME | End: 2021-03-23
Payer: COMMERCIAL

## 2021-03-23 ENCOUNTER — HOSPITAL ENCOUNTER (OUTPATIENT)
Dept: ULTRASOUND IMAGING | Facility: CLINIC | Age: 61
Discharge: HOME | End: 2021-03-23
Payer: COMMERCIAL

## 2021-03-23 VITALS — HEIGHT: 65 IN | WEIGHT: 165 LBS | BODY MASS INDEX: 27.49 KG/M2

## 2021-03-23 DIAGNOSIS — N63.0 BREAST LUMP: ICD-10-CM

## 2021-03-23 DIAGNOSIS — N64.4 BREAST PAIN: ICD-10-CM

## 2021-03-23 PROCEDURE — 77066 DX MAMMO INCL CAD BI: CPT

## 2021-03-23 PROCEDURE — G0279 TOMOSYNTHESIS, MAMMO: HCPCS

## 2021-03-23 PROCEDURE — 76642 ULTRASOUND BREAST LIMITED: CPT

## 2021-03-29 ENCOUNTER — APPOINTMENT (OUTPATIENT)
Dept: LAB | Facility: HOSPITAL | Age: 61
End: 2021-03-29
Payer: COMMERCIAL

## 2021-03-29 DIAGNOSIS — Z11.3 ROUTINE SCREENING FOR STI (SEXUALLY TRANSMITTED INFECTION): ICD-10-CM

## 2021-03-29 DIAGNOSIS — Z11.3 ENCOUNTER FOR SCREENING FOR INFECTIONS WITH A PREDOMINANTLY SEXUAL MODE OF TRANSMISSION: ICD-10-CM

## 2021-03-29 DIAGNOSIS — B20 HIV DISEASE (HCC): ICD-10-CM

## 2021-03-29 DIAGNOSIS — Z13.6 SCREENING FOR CARDIOVASCULAR CONDITION: ICD-10-CM

## 2021-03-29 LAB
CHOLEST SERPL-MCNC: 158 MG/DL (ref 50–200)
EST. AVERAGE GLUCOSE BLD GHB EST-MCNC: 163 MG/DL
HBA1C MFR BLD: 7.3 %
HCV AB SER QL: NORMAL
HDLC SERPL-MCNC: 49 MG/DL
LDLC SERPL CALC-MCNC: 77 MG/DL (ref 0–100)
NONHDLC SERPL-MCNC: 109 MG/DL
TRIGL SERPL-MCNC: 160 MG/DL

## 2021-03-29 PROCEDURE — 86803 HEPATITIS C AB TEST: CPT

## 2021-03-29 PROCEDURE — 86480 TB TEST CELL IMMUN MEASURE: CPT

## 2021-03-29 PROCEDURE — 86592 SYPHILIS TEST NON-TREP QUAL: CPT

## 2021-03-29 PROCEDURE — 83036 HEMOGLOBIN GLYCOSYLATED A1C: CPT

## 2021-03-29 PROCEDURE — 36415 COLL VENOUS BLD VENIPUNCTURE: CPT

## 2021-03-29 PROCEDURE — 80061 LIPID PANEL: CPT

## 2021-03-30 DIAGNOSIS — E11.628 TYPE 2 DIABETES MELLITUS WITH OTHER SKIN COMPLICATION, WITHOUT LONG-TERM CURRENT USE OF INSULIN (HCC): ICD-10-CM

## 2021-03-30 DIAGNOSIS — E78.2 ELEVATED TRIGLYCERIDES WITH HIGH CHOLESTEROL: ICD-10-CM

## 2021-03-30 LAB
GAMMA INTERFERON BACKGROUND BLD IA-ACNC: 0.04 IU/ML
M TB IFN-G BLD-IMP: NEGATIVE
M TB IFN-G CD4+ BCKGRND COR BLD-ACNC: 0 IU/ML
M TB IFN-G CD4+ BCKGRND COR BLD-ACNC: 0 IU/ML
MITOGEN IGNF BCKGRD COR BLD-ACNC: 6.9 IU/ML
RPR SER QL: NORMAL

## 2021-03-30 RX ORDER — ASPIRIN 81 MG/1
TABLET, COATED ORAL
Qty: 90 TABLET | Refills: 10 | Status: SHIPPED | OUTPATIENT
Start: 2021-03-30

## 2021-03-31 ENCOUNTER — PATIENT OUTREACH (OUTPATIENT)
Dept: SURGERY | Facility: CLINIC | Age: 61
End: 2021-03-31

## 2021-04-05 ENCOUNTER — TELEPHONE (OUTPATIENT)
Dept: SURGERY | Facility: CLINIC | Age: 61
End: 2021-04-05

## 2021-04-05 ENCOUNTER — PATIENT OUTREACH (OUTPATIENT)
Dept: SURGERY | Facility: CLINIC | Age: 61
End: 2021-04-05

## 2021-04-05 NOTE — TELEPHONE ENCOUNTER
Spoke with patient's CM  scheduled eye appointment with [facility] on 05/28 @ 8:10AM  Per request, refaxed patient demographics along with copies of insurance cards

## 2021-04-06 NOTE — PROGRESS NOTES
Cm spoke with ct to set up diabetic eye exam  Cm spoke with clinic staff and notified appointment is for 5/28 at 810

## 2021-04-08 ENCOUNTER — PATIENT OUTREACH (OUTPATIENT)
Dept: SURGERY | Facility: CLINIC | Age: 61
End: 2021-04-08

## 2021-04-14 ENCOUNTER — PATIENT OUTREACH (OUTPATIENT)
Dept: SURGERY | Facility: CLINIC | Age: 61
End: 2021-04-14

## 2021-04-15 ENCOUNTER — PATIENT OUTREACH (OUTPATIENT)
Dept: SURGERY | Facility: CLINIC | Age: 61
End: 2021-04-15

## 2021-04-20 ENCOUNTER — APPOINTMENT (OUTPATIENT)
Dept: LAB | Facility: HOSPITAL | Age: 61
End: 2021-04-20
Attending: INTERNAL MEDICINE
Payer: COMMERCIAL

## 2021-04-20 DIAGNOSIS — B20 HIV DISEASE (HCC): ICD-10-CM

## 2021-04-20 LAB
ALBUMIN SERPL BCP-MCNC: 3.4 G/DL (ref 3.5–5)
ALP SERPL-CCNC: 42 U/L (ref 46–116)
ALT SERPL W P-5'-P-CCNC: 24 U/L (ref 12–78)
ANION GAP SERPL CALCULATED.3IONS-SCNC: 13 MMOL/L (ref 4–13)
AST SERPL W P-5'-P-CCNC: 8 U/L (ref 5–45)
BACTERIA UR QL AUTO: ABNORMAL /HPF
BASOPHILS # BLD AUTO: 0.05 THOUSANDS/ΜL (ref 0–0.1)
BASOPHILS NFR BLD AUTO: 1 % (ref 0–1)
BILIRUB SERPL-MCNC: 0.42 MG/DL (ref 0.2–1)
BILIRUB UR QL STRIP: NEGATIVE
BUN SERPL-MCNC: 17 MG/DL (ref 5–25)
C TRACH DNA SPEC QL NAA+PROBE: NEGATIVE
CALCIUM ALBUM COR SERPL-MCNC: 9.2 MG/DL (ref 8.3–10.1)
CALCIUM SERPL-MCNC: 8.7 MG/DL (ref 8.3–10.1)
CHLORIDE SERPL-SCNC: 104 MMOL/L (ref 100–108)
CLARITY UR: CLEAR
CO2 SERPL-SCNC: 25 MMOL/L (ref 21–32)
COLOR UR: YELLOW
CREAT SERPL-MCNC: 0.72 MG/DL (ref 0.6–1.3)
EOSINOPHIL # BLD AUTO: 0.08 THOUSAND/ΜL (ref 0–0.61)
EOSINOPHIL NFR BLD AUTO: 1 % (ref 0–6)
ERYTHROCYTE [DISTWIDTH] IN BLOOD BY AUTOMATED COUNT: 12.8 % (ref 11.6–15.1)
GFR SERPL CREATININE-BSD FRML MDRD: 92 ML/MIN/1.73SQ M
GLUCOSE P FAST SERPL-MCNC: 213 MG/DL (ref 65–99)
GLUCOSE UR STRIP-MCNC: NEGATIVE MG/DL
HCT VFR BLD AUTO: 42.6 % (ref 34.8–46.1)
HGB BLD-MCNC: 13.6 G/DL (ref 11.5–15.4)
HGB UR QL STRIP.AUTO: ABNORMAL
IMM GRANULOCYTES # BLD AUTO: 0.02 THOUSAND/UL (ref 0–0.2)
IMM GRANULOCYTES NFR BLD AUTO: 0 % (ref 0–2)
KETONES UR STRIP-MCNC: NEGATIVE MG/DL
LEUKOCYTE ESTERASE UR QL STRIP: NEGATIVE
LYMPHOCYTES # BLD AUTO: 2.32 THOUSANDS/ΜL (ref 0.6–4.47)
LYMPHOCYTES NFR BLD AUTO: 38 % (ref 14–44)
MCH RBC QN AUTO: 32.5 PG (ref 26.8–34.3)
MCHC RBC AUTO-ENTMCNC: 31.9 G/DL (ref 31.4–37.4)
MCV RBC AUTO: 102 FL (ref 82–98)
MONOCYTES # BLD AUTO: 0.48 THOUSAND/ΜL (ref 0.17–1.22)
MONOCYTES NFR BLD AUTO: 8 % (ref 4–12)
N GONORRHOEA DNA SPEC QL NAA+PROBE: NEGATIVE
NEUTROPHILS # BLD AUTO: 3.1 THOUSANDS/ΜL (ref 1.85–7.62)
NEUTS SEG NFR BLD AUTO: 52 % (ref 43–75)
NITRITE UR QL STRIP: NEGATIVE
NON-SQ EPI CELLS URNS QL MICRO: ABNORMAL /HPF
NRBC BLD AUTO-RTO: 0 /100 WBCS
PH UR STRIP.AUTO: 5.5 [PH]
PLATELET # BLD AUTO: 279 THOUSANDS/UL (ref 149–390)
PMV BLD AUTO: 9.6 FL (ref 8.9–12.7)
POTASSIUM SERPL-SCNC: 3.7 MMOL/L (ref 3.5–5.3)
PROT SERPL-MCNC: 7.6 G/DL (ref 6.4–8.2)
PROT UR STRIP-MCNC: NEGATIVE MG/DL
RBC # BLD AUTO: 4.18 MILLION/UL (ref 3.81–5.12)
RBC #/AREA URNS AUTO: ABNORMAL /HPF
SODIUM SERPL-SCNC: 142 MMOL/L (ref 136–145)
SP GR UR STRIP.AUTO: <=1.005 (ref 1–1.03)
UROBILINOGEN UR QL STRIP.AUTO: 0.2 E.U./DL
WBC # BLD AUTO: 6.05 THOUSAND/UL (ref 4.31–10.16)
WBC #/AREA URNS AUTO: ABNORMAL /HPF

## 2021-04-20 PROCEDURE — 36415 COLL VENOUS BLD VENIPUNCTURE: CPT

## 2021-04-20 PROCEDURE — 87591 N.GONORRHOEAE DNA AMP PROB: CPT

## 2021-04-20 PROCEDURE — 85025 COMPLETE CBC W/AUTO DIFF WBC: CPT

## 2021-04-20 PROCEDURE — 87491 CHLMYD TRACH DNA AMP PROBE: CPT

## 2021-04-20 PROCEDURE — 86361 T CELL ABSOLUTE COUNT: CPT

## 2021-04-20 PROCEDURE — 81001 URINALYSIS AUTO W/SCOPE: CPT

## 2021-04-20 PROCEDURE — 80053 COMPREHEN METABOLIC PANEL: CPT

## 2021-04-20 PROCEDURE — 87536 HIV-1 QUANT&REVRSE TRNSCRPJ: CPT

## 2021-04-21 LAB
BASOPHILS # BLD AUTO: 0.1 X10E3/UL (ref 0–0.2)
BASOPHILS NFR BLD AUTO: 1 %
CD3+CD4+ CELLS # BLD: 521 /UL (ref 359–1519)
CD3+CD4+ CELLS NFR BLD: 21.7 % (ref 30.8–58.5)
EOSINOPHIL # BLD AUTO: 0.1 X10E3/UL (ref 0–0.4)
EOSINOPHIL NFR BLD AUTO: 1 %
ERYTHROCYTE [DISTWIDTH] IN BLOOD BY AUTOMATED COUNT: 12.4 % (ref 11.7–15.4)
HCT VFR BLD AUTO: 41.6 % (ref 34–46.6)
HGB BLD-MCNC: 13.4 G/DL (ref 11.1–15.9)
HIV1 RNA # SERPL NAA+PROBE: <20 COPIES/ML
HIV1 RNA SERPL NAA+PROBE-LOG#: NORMAL LOG10COPY/ML
IMM GRANULOCYTES # BLD: 0 X10E3/UL (ref 0–0.1)
IMM GRANULOCYTES NFR BLD: 0 %
LYMPHOCYTES # BLD AUTO: 2.4 X10E3/UL (ref 0.7–3.1)
LYMPHOCYTES NFR BLD AUTO: 39 %
MCH RBC QN AUTO: 31.5 PG (ref 26.6–33)
MCHC RBC AUTO-ENTMCNC: 32.2 G/DL (ref 31.5–35.7)
MCV RBC AUTO: 98 FL (ref 79–97)
MONOCYTES # BLD AUTO: 0.5 X10E3/UL (ref 0.1–0.9)
MONOCYTES NFR BLD AUTO: 8 %
NEUTROPHILS # BLD AUTO: 3.1 X10E3/UL (ref 1.4–7)
NEUTROPHILS NFR BLD AUTO: 51 %
PLATELET # BLD AUTO: 297 X10E3/UL (ref 150–450)
RBC # BLD AUTO: 4.25 X10E6/UL (ref 3.77–5.28)
WBC # BLD AUTO: 6.1 X10E3/UL (ref 3.4–10.8)

## 2021-04-23 ENCOUNTER — PATIENT OUTREACH (OUTPATIENT)
Dept: SURGERY | Facility: CLINIC | Age: 61
End: 2021-04-23

## 2021-04-26 ENCOUNTER — PATIENT OUTREACH (OUTPATIENT)
Dept: SURGERY | Facility: CLINIC | Age: 61
End: 2021-04-26

## 2021-04-30 ENCOUNTER — OFFICE VISIT (OUTPATIENT)
Dept: SURGERY | Facility: CLINIC | Age: 61
End: 2021-04-30
Payer: COMMERCIAL

## 2021-04-30 VITALS
WEIGHT: 166 LBS | BODY MASS INDEX: 27.62 KG/M2 | HEART RATE: 78 BPM | OXYGEN SATURATION: 97 % | TEMPERATURE: 96.4 F | DIASTOLIC BLOOD PRESSURE: 62 MMHG | SYSTOLIC BLOOD PRESSURE: 144 MMHG

## 2021-04-30 DIAGNOSIS — B20 HIV DISEASE (HCC): ICD-10-CM

## 2021-04-30 DIAGNOSIS — E11.628 TYPE 2 DIABETES MELLITUS WITH OTHER SKIN COMPLICATION, WITHOUT LONG-TERM CURRENT USE OF INSULIN (HCC): Primary | ICD-10-CM

## 2021-04-30 DIAGNOSIS — M65.341 TRIGGER RING FINGER OF RIGHT HAND: ICD-10-CM

## 2021-04-30 DIAGNOSIS — R03.0 ELEVATED BLOOD PRESSURE READING: ICD-10-CM

## 2021-04-30 DIAGNOSIS — R31.29 MICROSCOPIC HEMATURIA: ICD-10-CM

## 2021-04-30 PROCEDURE — 99214 OFFICE O/P EST MOD 30 MIN: CPT | Performed by: INTERNAL MEDICINE

## 2021-04-30 RX ORDER — ELVITEGRAVIR, COBICISTAT, EMTRICITABINE, AND TENOFOVIR ALAFENAMIDE 150; 150; 200; 10 MG/1; MG/1; MG/1; MG/1
1 TABLET ORAL DAILY
Qty: 90 TABLET | Refills: 1 | Status: SHIPPED | OUTPATIENT
Start: 2021-04-30 | End: 2021-05-28 | Stop reason: SDUPTHER

## 2021-04-30 NOTE — PROGRESS NOTES
Assessment:     T2DM    Nutrition Diagnosis    Problem: Altered nutrition related laboratory values    Related to: Estimated intake inconsistent with measured nutritional needs    As Evidenced By: Dietary Recall and HgbA1C 7 3    Nutrition Education Intervention: Provided     Person Educated: patient    Topics Discussed: CHOs     Teaching Method: Verbal    Goals    Goal #1 Meredith willing to reduce intake of fruit and fruit juice     Education started - reinforce in follow-up  Details:   -Pt reported eating several servings of fruit and juice daily   -RD advised pt of CHO content in fruit and suggested limiting to just two servings daily; encouraged other healthy snacks instead  -Meredith reported being willing to try less fruit and juice daily     Visit Summary    RD met with Meredith in conjunction with ID appt, to discuss increasing HgbA1C  Pt reported awareness of increased A1c, she was confused since she denied making any nutritional changes  Meredith reported not eating sweets, however she reported eating many fruits: kiwi, chironga (citrus fruit), grapes, amado, and drinking fruit juice in AM with breakfast   RD advised pt of CHO in fruits and juice, as well as starchy vegetables  She was willing to reduce intake  She does drink mostly water, and Coke Zero  Reminded Meredith to try walking for exercise  She did not check BG yet this AM   RD stressed continuing to take all medications as prescribed, Meredith agreed  Encounter kept brief due to timing of ID clinic appt  HgbA1C 7 3 (worsened from 6 4)    Albumin 3 4       Monitor diabetes management, CMP/BMP, lipid panel       Panchito Mccord, MS, RD, LDN

## 2021-04-30 NOTE — PROGRESS NOTES
Progress Note - Infectious Disease   Liliane Hair 62 y o  female MRN: [id number]  Unit/Bed#:  Encounter: [id number]      Impression/Plan:    1  HIV-doing well on Genvoya with undetectable viral load,  with a CD4 count in the 500s  Continue antiretrovirals   Will recheck labs in 5 months and follow up in 6 months  Stressed adherence        2  Nicotine dependence-continue intervention by our tobacco cessation program   Patient admits of smoking, but now down to 1 pack per week  and attributes this to current quarantine status and stress   Advised patient about smoking cessation       3   Diabetes mellitus-better controlled now, with HbA1c up to at 7 3%,  Patient is  being treated with metformin, dietary interventions, and education   Discussed with primary     4   Microhematuria-seen by Urology 07/2019 who did not recommend any additional workup for now  Repeat UA still shows mild microhematuria   Will continue to monitor the UA annually     5  Trigger finger: will discuss with PCP and patient referred to Ortho  She received lidocaine and steroid injection, with plan to follow up with Ortho prn for possible need to repeat treatment      6  Elevated BP: patient asymptomatic  Discussed with primary  Patient also to measure her BP once as outpatient and report to PCP  Patient was provided medication, adherence and prevention education    Subjective:  Routine follow-up for HIV  Patient claims 100% adherence with     Patient denies any notable side effects  Overall the feeling well  The patient denies any fever chills or sweats, denies any nausea vomiting or diarrhea, denies any cough or shortness of breath  ROS: A complete 12 point ROS is negative other than that noted in the HPI    Followup portions patient history reviewed and updated as:   Allergies, current medications, past medical history, past social history, past surgical history, and the problem list    Objective:  Vitals:  Vitals:    04/30/21 0735   BP: 144/62   BP Location: Left arm   Patient Position: Sitting   Pulse: 78   Temp: (!) 96 4 °F (35 8 °C)   TempSrc: Tympanic   SpO2: 97%   Weight: 75 3 kg (166 lb)       Physical Exam:   General Appearance:  Alert, interactive, appearing well,  nontoxic, no acute distress  Neck:   Supple without lymphadenopathy, no thyromegaly or masses   Throat: Oropharynx moist without lesions  Lungs:   Clear to auscultation bilaterally; no wheezes, rhonchi or rales; respirations unlabored   Heart:  RRR; no murmur, rub or gallop   Abdomen:   Soft, non-tender, non-distended, positive bowel sounds  Extremities: No clubbing, cyanosis or edema   Skin: No new rashes or lesions  No draining wounds noted         Labs, Imaging, & Other studies:   All pertinent labs and imaging studies were personally reviewed    Lab Results   Component Value Date    K 3 7 04/20/2021     04/20/2021    CO2 25 04/20/2021    BUN 17 04/20/2021    CREATININE 0 72 04/20/2021    GLUF 213 (H) 04/20/2021    CALCIUM 8 7 04/20/2021    CORRECTEDCA 9 2 04/20/2021    AST 8 04/20/2021    ALT 24 04/20/2021    ALKPHOS 42 (L) 04/20/2021    EGFR 92 04/20/2021     Lab Results   Component Value Date    WBC 6 05 04/20/2021    WBC 6 1 04/20/2021    HGB 13 6 04/20/2021    HGB 13 4 04/20/2021    HCT 42 6 04/20/2021    HCT 41 6 04/20/2021     (H) 04/20/2021    MCV 98 (H) 04/20/2021     04/20/2021     04/20/2021     Lab Results   Component Value Date    HEPCAB Non-reactive 03/29/2021     Lab Results   Component Value Date    HAV Reactive (A) 11/10/2017    HEPCAB Non-reactive 03/29/2021     Lab Results   Component Value Date    RPR Non-Reactive 03/29/2021     CD4 ABS   Date/Time Value Ref Range Status   04/20/2021 07:50  359 - 1519 /uL Final     HIV-1 RNA by PCR, Qn   Date/Time Value Ref Range Status   04/20/2021 07:50 AM <20 copies/mL Final     Comment:     HIV-1 RNA not detected  The reportable range for this assay is 20 to 10,000,000  copies HIV-1 RNA/mL  Current Outpatient Medications:     albuterol (Proventil HFA) 90 mcg/act inhaler, Inhale 2 puffs every 6 (six) hours as needed for wheezing, Disp: 1 Inhaler, Rfl: 5    Alcohol Swabs (Alcohol Prep) 70 % PADS, Use 1 pad daily, Disp: 100 each, Rfl: 5    ammonium lactate (LAC-HYDRIN) 12 % lotion, , Disp: , Rfl:     Aspirin Low Dose 81 MG EC tablet, TAKE ONE TABLET BY MOUTH ONCE DAILY, Disp: 90 tablet, Rfl: 10    BANOPHEN 50 MG capsule, TAKE 1 CAPSULE BY MOUTH DAILY AT BEDTIME AS NEEDED FOR SLEEP (Patient not taking: Reported on 1/14/2020), Disp: 30 capsule, Rfl: 1    bisacodyl (DULCOLAX) 5 mg EC tablet, Take 30 mg by mouth, Disp: , Rfl:     Blood Glucose Monitoring Suppl (TRUE METRIX METER) GIL, daily  , Disp: , Rfl:     Blood Glucose Monitoring Suppl GIL, daily  , Disp: , Rfl:     budesonide-formoterol (SYMBICORT) 160-4 5 mcg/act inhaler, Inhale 2 puffs, Disp: , Rfl:     busPIRone (BUSPAR) 5 mg tablet, Take 5 mg by mouth 2 (two) times a day, Disp: , Rfl:     calcium carbonate-vitamin D (OSCAL-D) 500 mg-200 units per tablet, TAKE TWO TABLETS BY MOUTH ONCE DAILY WITH BREAKFAST, Disp: 180 tablet, Rfl: 4    cholecalciferol (VITAMIN D3) 1,000 units tablet, TAKE TWO TABLETS BY MOUTH ONCE DAILY, Disp: 180 tablet, Rfl: 4    clotrimazole (LOTRIMIN) 1 % cream, Apply topically 2 (two) times a day, Disp: , Rfl:     cromolyn (NASALCHROM) 5 2 MG/ACT nasal spray, 1 spray into each nostril 3 (three) times a day, Disp: 1 Act, Rfl: 6    Easy Comfort Lancets MISC, USE TO TEST THE BLOOD SUGAR FOUR TIMES A DAY, Disp: 100 each, Rfl: 5    FLUoxetine (PROzac) 20 mg capsule, , Disp: , Rfl:     gabapentin (NEURONTIN) 800 mg tablet, TAKE ONE TABLET BY MOUTH THREE TIMES A DAY, Disp: 270 tablet, Rfl: 6    Genvoya tablet, TAKE ONE TABLET BY MOUTH ONCE DAILY, Disp: 30 tablet, Rfl: 1    glucose blood (FREESTYLE LITE) test strip, Use as instructed, Disp: 100 each, Rfl: 5    lidocaine (LIDODERM) 5 %, Place 1 patch on the skin daily Remove & Discard patch within 12 hours or as directed by MD (Patient not taking: Reported on 11/9/2020), Disp: 30 patch, Rfl: 0    lidocaine (LMX) 4 % cream, Apply topically as needed for mild pain (Patient not taking: Reported on 3/1/2021), Disp: 30 g, Rfl: 0    loratadine (CLARITIN) 10 mg tablet, Take 1 tablet (10 mg total) by mouth daily (Patient not taking: Reported on 1/14/2020), Disp: 90 tablet, Rfl: 1    metFORMIN (GLUCOPHAGE) 1000 MG tablet, TAKE ONE TABLET BY MOUTH TWICE A DAY WITH MEALS, Disp: 180 tablet, Rfl: 1    nystatin (MYCOSTATIN) powder, Apply topically 2 (two) times a day Please clean area with soap and water and apply to area under bilateral breast , Disp: 15 g, Rfl: 3    ofloxacin (OCUFLOX) 0 3 % ophthalmic solution, Administer 2 drops to both eyes 4 (four) times a day For 5 days   (Patient not taking: Reported on 1/14/2020), Disp: 5 mL, Rfl: 0    omega-3-acid ethyl esters (LOVAZA) 1 g capsule, Take 2 capsules (2 g total) by mouth 2 (two) times a day (Patient not taking: Reported on 3/1/2021), Disp: 180 capsule, Rfl: 1    polyethylene glycol (GLYCOLAX) powder, Drink per MD recommendations, Disp: , Rfl:     pravastatin (PRAVACHOL) 80 mg tablet, Take 1 tablet (80 mg total) by mouth daily, Disp: 90 tablet, Rfl: 2    sulfaSALAzine (AZULFIDINE-EN) 500 mg EC tablet, , Disp: , Rfl:     traZODone (DESYREL) 50 mg tablet, , Disp: , Rfl: 0

## 2021-05-04 DIAGNOSIS — E11.628 TYPE 2 DIABETES MELLITUS WITH OTHER SKIN COMPLICATION, WITHOUT LONG-TERM CURRENT USE OF INSULIN (HCC): ICD-10-CM

## 2021-05-04 RX ORDER — UBIQUINOL 100 MG
1 CAPSULE ORAL DAILY
Qty: 100 EACH | Refills: 5 | Status: SHIPPED | OUTPATIENT
Start: 2021-05-04

## 2021-05-04 RX ORDER — BLOOD SUGAR DIAGNOSTIC
STRIP MISCELLANEOUS
Refills: 4 | OUTPATIENT
Start: 2021-05-04

## 2021-05-04 NOTE — PROGRESS NOTES
Assessment/Plan: [facility] approached pt to explore behavioral, cognitive, and emotional aspects in need to be addressed  During today's contact, pt disclosed being stable at the time, mentioning getting prepared to travel to [facility] to buy a property towards transitioning completely  Pt described that most of hr family live in [place], and as the caregiver of her mother, she recognizes the need to acquire assistance from her family  After disclosures, pt was encouraged to consider ongoing [facility] interventions to address core behavioral, cognitive, and emotional impacts upon transition which pt agreed to consider as needed  [facility]     Today patient present with   Chief Complaint   Patient presents with    Follow-up    HIV Positive     Patient would likely benefit from: Addressing the impact of relocation within behavioral, cognitive, and emotional displays  Consider/focus/continue: Monitoring stress levels  Stage of change: Preparation  Plan/ Behavioral Recommendations: Ongoing  interventions per pt's requests  Diagnoses and all orders for this visit:    Type 2 diabetes mellitus with other skin complication, without long-term current use of insulin (HCC)    Microscopic hematuria    Trigger ring finger of right hand    Elevated blood pressure reading    HIV disease ([facility] [address] )          Discussion:     Patient mood is stable  Subjective:     Patient ID: Luz Maria Shipman is a 62 y o  female  HPI    History of Present Illness: The patient is seeing the [facility] today for a routine behavioral health follow up      Review of Systems      Objective:     Physical Exam      [facility]    Orientation     Person: yes    Place: yes    Time: yes    Appearance    Well Developed: yes healthy    Uncomfortable: no    Normal Body Odor: yes    Smells of Feces: no    Smells of Urine: no    Disheveled: no    Well Nourished: yes weight WNL of ideal    Grooming Unkempt: no    Poor Eye Contact: no    Hirsute: no    Looks Tired: no    Acutely Exhausted: noappearance reflects stated age    Mood and Affect:     Appropriate: yes    Euthymicno    Irritable: no    Angry: no    Anxious: no    Depressed:no    Blunted:no    Labile: no    Restricted: no    Harm to Self or Others: No SI or HI were disclosed, nor displayed throughout contact  Substance Abuse: None reported by pt

## 2021-05-05 DIAGNOSIS — B20 HIV DISEASE (HCC): Primary | ICD-10-CM

## 2021-05-11 DIAGNOSIS — E11.628 TYPE 2 DIABETES MELLITUS WITH OTHER SKIN COMPLICATION, WITHOUT LONG-TERM CURRENT USE OF INSULIN (HCC): ICD-10-CM

## 2021-05-14 ENCOUNTER — PATIENT OUTREACH (OUTPATIENT)
Dept: SURGERY | Facility: CLINIC | Age: 61
End: 2021-05-14

## 2021-05-27 ENCOUNTER — PATIENT OUTREACH (OUTPATIENT)
Dept: SURGERY | Facility: CLINIC | Age: 61
End: 2021-05-27

## 2021-05-27 ENCOUNTER — OFFICE VISIT (OUTPATIENT)
Dept: SURGERY | Facility: CLINIC | Age: 61
End: 2021-05-27
Payer: COMMERCIAL

## 2021-05-27 VITALS
BODY MASS INDEX: 27.42 KG/M2 | OXYGEN SATURATION: 97 % | WEIGHT: 164.6 LBS | RESPIRATION RATE: 16 BRPM | HEIGHT: 65 IN | DIASTOLIC BLOOD PRESSURE: 73 MMHG | TEMPERATURE: 97.9 F | HEART RATE: 80 BPM | SYSTOLIC BLOOD PRESSURE: 144 MMHG

## 2021-05-27 DIAGNOSIS — E78.5 DYSLIPIDEMIA: ICD-10-CM

## 2021-05-27 DIAGNOSIS — N63.10 PAINFUL LUMPY RIGHT BREAST: ICD-10-CM

## 2021-05-27 DIAGNOSIS — T65.221D TOXIC EFFECT OF TOBACCO CIGARETTE, UNINTENTIONAL, SUBSEQUENT ENCOUNTER: ICD-10-CM

## 2021-05-27 DIAGNOSIS — E11.628 TYPE 2 DIABETES MELLITUS WITH OTHER SKIN COMPLICATION, WITHOUT LONG-TERM CURRENT USE OF INSULIN (HCC): ICD-10-CM

## 2021-05-27 DIAGNOSIS — N64.4 PAINFUL LUMPY RIGHT BREAST: ICD-10-CM

## 2021-05-27 DIAGNOSIS — B20 HIV DISEASE (HCC): Primary | ICD-10-CM

## 2021-05-27 PROCEDURE — 99214 OFFICE O/P EST MOD 30 MIN: CPT | Performed by: NURSE PRACTITIONER

## 2021-05-27 NOTE — ASSESSMENT & PLAN NOTE
BP: 144/73  Pt is currently not on any antiHTN  Pt has brought in BP log with SBP: 116-169 and DBP: 67-92  Pt has been under recently stress with taking care of her aging mother  Pt is getting ready to leave for [place] for 2 months  If bp remains elevated > 140/90 at home was 127/77 this am prior to visit  · Follow low Na+ 2000 mg diet  Stressed importance of exercising for 30 minutes 5 days a week per recommended guidelines  Stressed importance of eating a diet low in fat, carbohydrates, cholesterol, sugar intake, monitor portion control and eating more fruits and vegetables    · Work closely with [place] dietician  · May need to start on lisinopril with A1C of 7 3  · UA: no protein

## 2021-05-27 NOTE — PROGRESS NOTES
Assessment/Plan:    HIV disease ([facility] [address] )  Doing good  Pt reports 100% medication compliance  Stressed the importance of 100% medication adherence  HIV Counseling:    Viral Load: < 20    CD4 Count: 976    ART: Martinez Baca     Denies side effects  Stressed the importance of adherence  Continue follow up in the ID clinic with Dr Jewel Vargas  Reviewed the most recent labs, including the Cd4 and viral load  Discussed the risks and benefits of treatment options, instructions for management, importance of treatment adherence, and reduction of risk factors  Educated on possible medication side effects  Counseled on routes of HIV transmission, including the risk of  infection  Emphasized that viral suppression is the best method to prevent HIV transmission  At this time the pt denies the need for HIV testing of anyone in their life  Total encounter time was greater than 35 minutes  Greater than 20 minutes were spent on counseling and patient education  Pt voices understanding and agreement with treatment plan  Type 2 diabetes mellitus with skin complication, without long-term current use of insulin (HCC)  Worsening  Lab Results   Component Value Date    HGBA1C 7 3 (H) 2021   Last A1C: 6 4  FBS runnin-140  Metformin 1000 mg BID with meals  Pt has not attend diabetic educational classes  Pt has not completed DM eye exam  Pt's mother and DM and she was started on trulicity and E5N has improved to around 7     · Recheck A1C  · Complete DM eye exam; appt 21  · Attend DM educational classes  · FBS goal < 130 closer to 90    Toxic effect of tobacco cigarette, unintentional  Ongoing  Pt is still smoking more than 2 packs per week  Due to the stress of caring for her mother she does not count how many cigarettes she smokes  Does not want any intervention at this time     Discussed that if pt continues to smoke, does not control her DM, or exercise with life style modifications she is a high risk for a CV event  As pt is caring for aging mother  · Continue to work with pt for complete smoking cessation  Nicotine Dependency - Primary    Counseled for greater than 15 minutes on the importance of smoking cessation  Education was given regarding the cardiovascular effects of how nicotine affects the heart, lungs, kidneys,and peripheral vascular system  Referred to [facility] for enrollment in smoking cessation program       Elevated blood pressure reading  BP: 144/73  Pt is currently not on any antiHTN  Pt has brought in BP log with SBP: 116-169 and DBP: 67-92  Pt has been under recently stress with taking care of her aging mother  Pt is getting ready to leave for [place] for 2 months  If bp remains elevated > 140/90 at home was 127/77 this am prior to visit  · Follow low Na+ 2000 mg diet  Stressed importance of exercising for 30 minutes 5 days a week per recommended guidelines  Stressed importance of eating a diet low in fat, carbohydrates, cholesterol, sugar intake, monitor portion control and eating more fruits and vegetables  · Work closely with [place] dietician  · May need to start on lisinopril with A1C of 7 3  · UA: no protein     Painful lumpy right breast  Pain continues  Some days are better than others  Pt has mammo and US of right breast: No suspicious sonographic findings are identified in the area of pain in the right breast   Routine screening in 1 year for both breast   Right lateral side of breast tenderness/pain during palpation  Pt does not wear a bra and sleeps on right side with adjusting of right breast to sleep  Tylenol does provide some relief at times but not always    · Pt is aware she will need to live with the pain  · Recommend wear bra during the day and sleeping on left side to see if improvement in breast  · Continue Tylenol and lidocaine cream to affected area  · Review results of mammo and bilateral US with pt       Diagnoses and all orders for this visit:    HIV disease ([facility] )    Type 2 diabetes mellitus with other skin complication, without long-term current use of insulin ([facility])  -     HEMOGLOBIN A1C W/ EAG ESTIMATION; Future    Toxic effect of tobacco cigarette, unintentional, subsequent encounter    Dyslipidemia  -     Lipid panel;  Future    Painful lumpy right breast        Patient Active Problem List   Diagnosis    Asthma    Colon polyps    Type 2 diabetes mellitus with skin complication, without long-term current use of insulin ([place] [address] )    Fibromyalgia    MDD (major depressive disorder), single episode, severe with psychotic features ([place] [address] )    Multiple environmental allergies    Toxic effect of tobacco cigarette, unintentional    Poor dentition    H/O abdominal hysterectomy    Hidradenitis suppurativa    Effusion of left knee    HIV disease ([place] [address] )    Mild intermittent asthma without complication    Neuropathy    Osteopenia    Periodontal disease    Primary insomnia    Polyp of colon    Dyslipidemia    Knee strain, left, sequela    Incomplete tear of left rotator cuff    Adhesive capsulitis of left shoulder    Noncompliance with medications    Numbness and tingling of both lower extremities    Falls, initial encounter    Viral upper respiratory infection    Abscess of buttock, left    Right upper quadrant abdominal tenderness with rebound tenderness    BMI 27 0-27 9,adult    Microscopic hematuria    Elevated blood pressure reading    History of smoking 30 or more pack years    Generalized headaches    Osteopenia determined by x-ray    Bilateral swelling of feet and ankles    Cough    Vitamin D deficiency    Trigger ring finger of right hand    Ringing in ear, bilateral    Painful lumpy right breast    Lump in upper outer quadrant of left breast    Yeast infection of the skin     Lab Results   Component Value Date    K 3 7 04/20/2021     04/20/2021    CO2 25 04/20/2021    BUN 17 04/20/2021    CREATININE 0 72 04/20/2021    GLUF 213 (H) 04/20/2021    CALCIUM 8 7 04/20/2021    CORRECTEDCA 9 2 04/20/2021    AST 8 04/20/2021    ALT 24 04/20/2021    ALKPHOS 42 (L) 04/20/2021    EGFR 92 04/20/2021     Lab Results   Component Value Date    WBC 6 05 04/20/2021    WBC 6 1 04/20/2021    HGB 13 6 04/20/2021    HGB 13 4 04/20/2021    HCT 42 6 04/20/2021    HCT 41 6 04/20/2021     (H) 04/20/2021    MCV 98 (H) 04/20/2021     04/20/2021     04/20/2021          Subjective:      Patient ID: Tish Pickering is a 62 y o  female  HPI  Pt is being seen for 3 month follow up of chronic health care conditions  Pt reports she is doing ok  Pt is traveling to [place] for 2 months starting June 6, 2021 and is recquesting all medication for 90 days  Pt aware she will need to contact insurance company since all her medication is for 90 days  Pt has won her lawsuit and will be given her money tomorrow  Pt is Thai speaking only and interpretor was present during the entire visit  The following portions of the patient's history were reviewed and updated as appropriate: allergies, current medications, past family history, past medical history, past social history, past surgical history and problem list     Review of Systems   Constitutional: Negative  HENT: Negative  Respiratory: Negative  Cardiovascular: Negative  Gastrointestinal: Negative  Neurological: Negative  Psychiatric/Behavioral: Negative  Objective:      /73 (BP Location: Right arm, Patient Position: Sitting, Cuff Size: Standard)   Pulse 80   Temp 97 9 °F (36 6 °C)   Resp 16   Ht 5' 5" (1 651 m)   Wt 74 7 kg (164 lb 9 6 oz)   SpO2 97%   BMI 27 39 kg/m²          Physical Exam  Vitals signs and nursing note reviewed  Constitutional:       General: She is not in acute distress  Appearance: Normal appearance  She is not ill-appearing  Cardiovascular:      Rate and Rhythm: Normal rate and regular rhythm  Chest Wall: PMI is not displaced  Pulses: Normal pulses  Heart sounds: Normal heart sounds  Pulmonary:      Effort: Pulmonary effort is normal       Breath sounds: Normal breath sounds  Chest:      Breasts:         Right: Tenderness present  Abdominal:      General: Bowel sounds are normal       Palpations: Abdomen is soft  Musculoskeletal: Normal range of motion  Skin:     General: Skin is warm and dry  Capillary Refill: Capillary refill takes less than 2 seconds  Neurological:      Mental Status: She is alert and oriented to person, place, and time  Psychiatric:         Mood and Affect: Mood normal          Behavior: Behavior normal          Thought Content:  Thought content normal          Judgment: Judgment normal

## 2021-05-27 NOTE — ASSESSMENT & PLAN NOTE
Doing good  Pt reports 100% medication compliance  Stressed the importance of 100% medication adherence  HIV Counseling:    Viral Load: < 20    CD4 Count: 343    ART: Yee Cintron     Denies side effects  Stressed the importance of adherence  Continue follow up in the ID clinic with Dr Yoel Brown  Reviewed the most recent labs, including the Cd4 and viral load  Discussed the risks and benefits of treatment options, instructions for management, importance of treatment adherence, and reduction of risk factors  Educated on possible medication side effects  Counseled on routes of HIV transmission, including the risk of  infection  Emphasized that viral suppression is the best method to prevent HIV transmission  At this time the pt denies the need for HIV testing of anyone in their life  Total encounter time was greater than 35 minutes  Greater than 20 minutes were spent on counseling and patient education  Pt voices understanding and agreement with treatment plan

## 2021-05-27 NOTE — ASSESSMENT & PLAN NOTE
Ongoing  Pt is still smoking more than 2 packs per week  Due to the stress of caring for her mother she does not count how many cigarettes she smokes  Does not want any intervention at this time  Discussed that if pt continues to smoke, does not control her DM, or exercise with life style modifications she is a high risk for a CV event  As pt is caring for aging mother  · Continue to work with pt for complete smoking cessation  Nicotine Dependency - Primary    Counseled for greater than 15 minutes on the importance of smoking cessation  Education was given regarding the cardiovascular effects of how nicotine affects the heart, lungs, kidneys,and peripheral vascular system    Referred to [facility] for enrollment in smoking cessation program

## 2021-05-27 NOTE — ASSESSMENT & PLAN NOTE
Worsening    Lab Results   Component Value Date    HGBA1C 7 3 (H) 2021   Last A1C: 6 4  FBS runnin-140  Metformin 1000 mg BID with meals  Pt has not attend diabetic educational classes  Pt has not completed DM eye exam  Pt's mother and DM and she was started on trulicity and D2I has improved to around 7     · Recheck A1C  · Complete DM eye exam; appt 21  · Attend DM educational classes  · FBS goal < 130 closer to 90

## 2021-05-27 NOTE — ASSESSMENT & PLAN NOTE
Pain continues  Some days are better than others  Pt has mammo and US of right breast: No suspicious sonographic findings are identified in the area of pain in the right breast   Routine screening in 1 year for both breast   Right lateral side of breast tenderness/pain during palpation  Pt does not wear a bra and sleeps on right side with adjusting of right breast to sleep  Tylenol does provide some relief at times but not always    · Pt is aware she will need to live with the pain  · Recommend wear bra during the day and sleeping on left side to see if improvement in breast  · Continue Tylenol and lidocaine cream to affected area  · Review results of mammo and bilateral US with pt

## 2021-05-28 DIAGNOSIS — J45.20 MILD INTERMITTENT ASTHMA WITHOUT COMPLICATION: ICD-10-CM

## 2021-05-28 DIAGNOSIS — B20 HIV DISEASE (HCC): ICD-10-CM

## 2021-05-28 DIAGNOSIS — E78.2 ELEVATED TRIGLYCERIDES WITH HIGH CHOLESTEROL: ICD-10-CM

## 2021-05-28 DIAGNOSIS — E78.00 HIGH CHOLESTEROL: ICD-10-CM

## 2021-05-28 DIAGNOSIS — E11.9 TYPE 2 DIABETES MELLITUS WITHOUT COMPLICATION, WITH LONG-TERM CURRENT USE OF INSULIN (HCC): ICD-10-CM

## 2021-05-28 DIAGNOSIS — E11.9 TYPE 2 DIABETES MELLITUS WITHOUT COMPLICATION, WITHOUT LONG-TERM CURRENT USE OF INSULIN (HCC): ICD-10-CM

## 2021-05-28 DIAGNOSIS — M85.88 OSTEOPENIA OF LUMBAR SPINE: ICD-10-CM

## 2021-05-28 DIAGNOSIS — Z79.4 TYPE 2 DIABETES MELLITUS WITHOUT COMPLICATION, WITH LONG-TERM CURRENT USE OF INSULIN (HCC): ICD-10-CM

## 2021-05-28 DIAGNOSIS — E11.628 TYPE 2 DIABETES MELLITUS WITH OTHER SKIN COMPLICATION, WITHOUT LONG-TERM CURRENT USE OF INSULIN (HCC): ICD-10-CM

## 2021-05-28 DIAGNOSIS — E55.9 VITAMIN D DEFICIENCY: ICD-10-CM

## 2021-05-28 RX ORDER — ALBUTEROL SULFATE 90 UG/1
2 AEROSOL, METERED RESPIRATORY (INHALATION) EVERY 6 HOURS PRN
Qty: 18 G | Refills: 2 | Status: SHIPPED | OUTPATIENT
Start: 2021-05-28

## 2021-05-28 RX ORDER — BLOOD-GLUCOSE METER
KIT MISCELLANEOUS
Qty: 100 EACH | Refills: 5 | Status: SHIPPED | OUTPATIENT
Start: 2021-05-28

## 2021-05-28 RX ORDER — B-COMPLEX WITH VITAMIN C
1 TABLET ORAL 2 TIMES DAILY WITH MEALS
Qty: 180 TABLET | Refills: 4 | Status: SHIPPED | OUTPATIENT
Start: 2021-05-28

## 2021-05-28 RX ORDER — OMEGA-3-ACID ETHYL ESTERS 1 G/1
2 CAPSULE, LIQUID FILLED ORAL 2 TIMES DAILY
Qty: 180 CAPSULE | Refills: 1 | Status: SHIPPED | OUTPATIENT
Start: 2021-05-28 | End: 2021-12-21

## 2021-05-28 RX ORDER — BLOOD-GLUCOSE METER
EACH MISCELLANEOUS DAILY
Qty: 100 EACH | Refills: 5 | Status: SHIPPED | OUTPATIENT
Start: 2021-05-28

## 2021-05-28 RX ORDER — ELVITEGRAVIR, COBICISTAT, EMTRICITABINE, AND TENOFOVIR ALAFENAMIDE 150; 150; 200; 10 MG/1; MG/1; MG/1; MG/1
1 TABLET ORAL DAILY
Qty: 90 TABLET | Refills: 1 | Status: SHIPPED | OUTPATIENT
Start: 2021-05-28 | End: 2021-08-02 | Stop reason: SDUPTHER

## 2021-05-28 RX ORDER — MELATONIN
2000 DAILY
Qty: 180 TABLET | Refills: 4 | Status: SHIPPED | OUTPATIENT
Start: 2021-05-28

## 2021-05-28 RX ORDER — PRAVASTATIN SODIUM 80 MG/1
80 TABLET ORAL DAILY
Qty: 90 TABLET | Refills: 2 | Status: SHIPPED | OUTPATIENT
Start: 2021-05-28

## 2021-06-01 ENCOUNTER — PATIENT OUTREACH (OUTPATIENT)
Dept: SURGERY | Facility: CLINIC | Age: 61
End: 2021-06-01

## 2021-06-02 ENCOUNTER — PATIENT OUTREACH (OUTPATIENT)
Dept: SURGERY | Facility: CLINIC | Age: 61
End: 2021-06-02

## 2021-06-09 ENCOUNTER — PATIENT OUTREACH (OUTPATIENT)
Dept: SURGERY | Facility: CLINIC | Age: 61
End: 2021-06-09

## 2021-06-16 ENCOUNTER — PATIENT OUTREACH (OUTPATIENT)
Dept: SURGERY | Facility: CLINIC | Age: 61
End: 2021-06-16

## 2021-06-17 ENCOUNTER — PATIENT OUTREACH (OUTPATIENT)
Dept: SURGERY | Facility: CLINIC | Age: 61
End: 2021-06-17

## 2021-06-18 ENCOUNTER — PATIENT OUTREACH (OUTPATIENT)
Dept: SURGERY | Facility: CLINIC | Age: 61
End: 2021-06-18

## 2021-06-21 ENCOUNTER — TELEPHONE (OUTPATIENT)
Dept: SURGERY | Facility: CLINIC | Age: 61
End: 2021-06-21

## 2021-06-21 NOTE — TELEPHONE ENCOUNTER
RD mailed nutrition assessment notification letter, [place] [facility] [place]

## 2021-07-08 ENCOUNTER — PATIENT OUTREACH (OUTPATIENT)
Dept: SURGERY | Facility: CLINIC | Age: 61
End: 2021-07-08

## 2021-07-09 ENCOUNTER — PATIENT OUTREACH (OUTPATIENT)
Dept: SURGERY | Facility: CLINIC | Age: 61
End: 2021-07-09

## 2021-07-29 ENCOUNTER — PATIENT OUTREACH (OUTPATIENT)
Dept: SURGERY | Facility: CLINIC | Age: 61
End: 2021-07-29

## 2021-08-02 ENCOUNTER — PATIENT OUTREACH (OUTPATIENT)
Dept: SURGERY | Facility: CLINIC | Age: 61
End: 2021-08-02

## 2021-08-02 DIAGNOSIS — B20 HIV DISEASE (HCC): ICD-10-CM

## 2021-08-02 RX ORDER — ELVITEGRAVIR, COBICISTAT, EMTRICITABINE, AND TENOFOVIR ALAFENAMIDE 150; 150; 200; 10 MG/1; MG/1; MG/1; MG/1
1 TABLET ORAL DAILY
Qty: 90 TABLET | Refills: 1 | Status: SHIPPED | OUTPATIENT
Start: 2021-08-02

## 2021-08-03 ENCOUNTER — PATIENT OUTREACH (OUTPATIENT)
Dept: SURGERY | Facility: CLINIC | Age: 61
End: 2021-08-03

## 2021-08-03 NOTE — PROGRESS NOTES
Ct called cm stating that she is moving to [place] and needs assistance in looking for care   Cm to follow up with ct

## 2021-08-05 ENCOUNTER — PATIENT OUTREACH (OUTPATIENT)
Dept: SURGERY | Facility: CLINIC | Age: 61
End: 2021-08-05

## 2021-08-19 ENCOUNTER — PATIENT OUTREACH (OUTPATIENT)
Dept: SURGERY | Facility: CLINIC | Age: 61
End: 2021-08-19

## 2021-08-27 ENCOUNTER — PATIENT OUTREACH (OUTPATIENT)
Dept: SURGERY | Facility: CLINIC | Age: 61
End: 2021-08-27

## 2021-08-27 NOTE — PROGRESS NOTES
1  Name Lindsay Miami  ct #[id number]     Juliana: [id number]  2   62  3  Date closed 21  4  Reason closed : relocated to [facility] [place] [address]  5  Current ID Physician: Al Jamison  Current PCP: Nicolasa Hollins  Current Insurance: [place]  8  Recertification due date 21  9  Petros French Fee Scale due date N/A  10  Acuity Score:2  11   Past services: [facility] application

## 2021-08-30 ENCOUNTER — TELEPHONE (OUTPATIENT)
Dept: SURGERY | Facility: CLINIC | Age: 61
End: 2021-08-30

## 2021-08-30 NOTE — TELEPHONE ENCOUNTER
Patient called requesting refills sent to current pharmacy  Patient aware refills have been placed by PCP for an extended period of time to facilitate transition of care and suggested to call [facility] to inquire about transfer to pharmacy in [place]  Patient expressed understanding

## 2021-09-14 ENCOUNTER — VBI (OUTPATIENT)
Dept: ADMINISTRATIVE | Facility: OTHER | Age: 61
End: 2021-09-14

## 2021-09-14 NOTE — TELEPHONE ENCOUNTER
09/14/21 3:54 PM     See documentation in the VB [employer] SmartForm       [facility]

## 2021-12-20 DIAGNOSIS — E78.2 ELEVATED TRIGLYCERIDES WITH HIGH CHOLESTEROL: ICD-10-CM

## 2021-12-21 RX ORDER — OMEGA-3-ACID ETHYL ESTERS 1 G/1
CAPSULE, LIQUID FILLED ORAL
Qty: 180 CAPSULE | Refills: 0 | Status: SHIPPED | OUTPATIENT
Start: 2021-12-21

## 2023-06-09 NOTE — PROGRESS NOTES
PT has free transportation from [employer] for medical appointments until January 2018  Phone number to call to schedule trips to medical visits [phone]  Pt reported going for blood work tomorrow in the morning  Pt reported that she received approval for dental services  Cm communicated information to [address] who will call the patient to schedule an appointment 
show

## 2025-02-13 NOTE — PROGRESS NOTES
COPD/PN Week 2 Survey      Flowsheet Row Responses   [facility] facility patient discharged from? [facility]   Does the patient have one of the following disease processes/diagnoses(primary or secondary)? COPD   Week 2 attempt successful? No   Unsuccessful attempts Attempt 1            LEANN JOSEPH - Registered Nurse   Progress Note - Infectious Disease   Javon Rehman 65 y o  female MRN: [id number]  Unit/Bed#:  Encounter: [id number]      Impression/Plan:  1  HIV/aids-doing well onGenvoya with an undetectable viral load but with a CD4 count that  is now over 200  Continue antiretrovirals    Will recheck labs in 2 months and follow up in 3 months  Stressed adherence  Also stressed with the patient that she needs to be taking her Genvoya with food        2  Nicotine dependence-continue intervention by are tobacco cessation program    3  Diabetes mellitus-poorly controlled  Will refer to the primary  Discussed in detail with the primary who will assess    4  Left shoulder impingement-apparently failed physical therapy  To be seen in follow-up to see if she requires surgery  5   Major depression-patient started on BuSpar and trazodone  Review drug interaction issues and there is no contraindication to these medications with the Genvoya  However the levels of the PEs are and trazodone may be elevated so side effects from those medications will need to be watched  Follow up with mental health  Patient was provided medication, adherence and prevention education    Subjective:  Routine follow-up for HIV  Patient claims 100% adherence with Genvoya  Patient denies any notable side effects  Overall the feeling well  The patient denies any fever chills or sweats, denies any nausea vomiting or diarrhea, denies any cough or shortness of breath  She recently fell and injured her shoulder and has been receiving physical therapy  She is to see Orthopedics once again to see if she needs surgery  She is concerned about her glucose being in the 180s when it is usually in the 200s  ROS: A complete 12 point ROS is negative other than that noted in the HPI    Followup portions patient history reviewed and updated as:   Allergies, current medications, past medical history, past social history, past surgical history, and the problem list    Objective:  Vitals:  Vitals:    09/10/18 0944   BP: 148/80   BP Location: Right arm   Patient Position: Sitting   Cuff Size: Standard   Pulse: 72   Temp: 97 9 °F (36 6 °C)   SpO2: 99%   Weight: 77 4 kg (170 lb 9 6 oz)   Height: 5' 4" (1 626 m)       Physical Exam:   General Appearance:  Alert, interactive, appearing well,  nontoxic, no acute distress  Neck:   Supple without lymphadenopathy, no thyromegaly or masses   Throat: Oropharynx moist without lesions  Lungs:   Clear to auscultation bilaterally; no wheezes, rhonchi or rales; respirations unlabored   Heart:  RRR; no murmur, rub or gallop   Abdomen:   Soft, non-tender, non-distended, positive bowel sounds  Extremities: No clubbing, cyanosis or edema   Skin: No new rashes or lesions  No draining wounds noted         Lab Results   Component Value Date     07/26/2018    K 4 0 07/26/2018     07/26/2018    CO2 27 07/26/2018    BUN 8 07/26/2018    CREATININE 0 69 07/26/2018    GLUF 234 (H) 07/26/2018    CALCIUM 9 3 07/26/2018    AST 14 07/26/2018    ALT 19 07/26/2018    ALKPHOS 67 07/26/2018    EGFR 98 07/26/2018     Lab Results   Component Value Date    WBC 4 67 07/26/2018    WBC 4 6 07/26/2018    HGB 14 4 07/26/2018    HGB 13 3 07/26/2018    HCT 44 0 07/26/2018    HCT 42 4 07/26/2018     (H) 07/26/2018     (H) 07/26/2018     07/26/2018     07/26/2018     Lab Results   Component Value Date    HEPCAB Non-reactive 07/26/2018     Lab Results   Component Value Date    HAV Reactive (A) 11/10/2017    HEPCAB Non-reactive 07/26/2018     Lab Results   Component Value Date    RPR Non-Reactive 07/26/2018       HIV-1 RNA Viral Load Log   Date/Time Value Ref Range Status   07/26/2018 07:08 AM 1 699 [id number]/mL Final     HIV RNA 50 copies    CD4 272    Labs, Imaging, & Other studies:   All pertinent labs and imaging studies were personally reviewed      Current Outpatient Prescriptions:     albuterol (2 5 mg/3 mL) 0 083 % nebulizer solution, Inhale 3 mL, Disp: , Rfl:     albuterol (PROVENTIL HFA,VENTOLIN HFA) 90 mcg/act inhaler, Inhale 2 puffs, Disp: , Rfl:     ammonium lactate (LAC-HYDRIN) 12 % lotion, , Disp: , Rfl:     BANOPHEN 50 MG capsule, TAKE 1 CAPSULE BY MOUTH DAILY AT BEDTIME AS NEEDED FOR SLEEP, Disp: 30 capsule, Rfl: 1    bisacodyl (DULCOLAX) 5 mg EC tablet, Take 30 mg by mouth, Disp: , Rfl:     Blood Glucose Monitoring Suppl (TRUE METRIX METER) GIL, daily  , Disp: , Rfl:     Blood Glucose Monitoring Suppl GIL, daily  , Disp: , Rfl:     budesonide-formoterol (SYMBICORT) 160-4 5 mcg/act inhaler, Inhale 2 puffs, Disp: , Rfl:     clotrimazole (LOTRIMIN) 1 % cream, Apply topically 2 (two) times a day, Disp: , Rfl:     cyclobenzaprine (FLEXERIL) 10 mg tablet, Take 1 tablet (10 mg total) by mouth 2 (two) times a day as needed for muscle spasms, Disp: 15 tablet, Rfl: 0    ergocalciferol (DRISDOL) 73918 units capsule, Take 50,000 Units by mouth, Disp: , Rfl:     gabapentin (NEURONTIN) 800 mg tablet, TAKE 1 TABLET BY MOUTH THREE TIMES A DAY, Disp: 90 tablet, Rfl: 3    GENVOYA tablet, TAKE 1 TABLET BY MOUTH DAILY, Disp: 30 tablet, Rfl: 3    Glucose Blood (BLOOD GLUCOSE TEST STRIPS) STRP, 1 each, Disp: , Rfl:     glucose blood test strip, 1 each by Other route 4 (four) times a day, Disp: 100 each, Rfl: 3    ibuprofen (MOTRIN) 600 mg tablet, Take 1 tablet (600 mg total) by mouth every 6 (six) hours as needed for mild pain for up to 10 days, Disp: 30 tablet, Rfl: 0    lidocaine (LIDODERM) 5 %, Place 1 patch on the skin daily Remove & Discard patch within 12 hours or as directed by MD, Disp: 30 patch, Rfl: 0    lidocaine (LMX) 4 % cream, Apply topically as needed for mild pain, Disp: 30 g, Rfl: 0    Melatonin 5 MG TABS, Take 1 tablet by mouth at bedtime daily, Disp: 90 tablet, Rfl: 3    metFORMIN (GLUCOPHAGE) 500 mg tablet, TAKE 1 TABLET BY MOUTH TWICE A DAY, Disp: 60 tablet, Rfl: 1    methocarbamol (ROBAXIN) 500 mg tablet, Take 1 tablet (500 mg total) by mouth 4 (four) times a day for 10 days, Disp: 40 tablet, Rfl: 0    Na Sulfate-K Sulfate-Mg Sulf (SUPREP BOWEL PREP KIT) 17 5-3 13-1 6 GM/180ML SOLN, Take 1 Bottle by mouth once for 1 dose, Disp: 1 Bottle, Rfl: 0    naproxen (NAPROSYN) 500 mg tablet, Take 250 mg by mouth 2 (two) times a day with meals, Disp: , Rfl:     oxyCODONE-acetaminophen (PERCOCET) 5-325 mg per tablet, Take 1 tablet by mouth every 6 (six) hours as needed for moderate pain Max Daily Amount: 4 tablets, Disp: 20 tablet, Rfl: 0    polyethylene glycol (GLYCOLAX) powder, Drink per MD recommendations, Disp: , Rfl:     pravastatin (PRAVACHOL) 20 mg tablet, Take 1 tablet (20 mg total) by mouth daily, Disp: 30 tablet, Rfl: 3    sulfaSALAzine (AZULFIDINE-EN) 500 mg EC tablet, , Disp: , Rfl:     SUPER THIN LANCETS MISC, by Percutaneous route, Disp: , Rfl:     SUPER THIN LANCETS MISC, by Percutaneous route, Disp: , Rfl: